# Patient Record
Sex: FEMALE | Race: WHITE | Employment: FULL TIME | URBAN - NONMETROPOLITAN AREA
[De-identification: names, ages, dates, MRNs, and addresses within clinical notes are randomized per-mention and may not be internally consistent; named-entity substitution may affect disease eponyms.]

---

## 2017-09-14 ENCOUNTER — APPOINTMENT (OUTPATIENT)
Dept: GENERAL RADIOLOGY | Age: 47
End: 2017-09-14
Payer: COMMERCIAL

## 2017-09-14 ENCOUNTER — HOSPITAL ENCOUNTER (EMERGENCY)
Age: 47
Discharge: HOME | End: 2017-09-14
Attending: INTERNAL MEDICINE
Payer: COMMERCIAL

## 2017-09-14 VITALS
SYSTOLIC BLOOD PRESSURE: 116 MMHG | TEMPERATURE: 98 F | OXYGEN SATURATION: 98 % | WEIGHT: 238 LBS | RESPIRATION RATE: 16 BRPM | HEIGHT: 64 IN | HEART RATE: 99 BPM | BODY MASS INDEX: 40.63 KG/M2 | DIASTOLIC BLOOD PRESSURE: 80 MMHG

## 2017-09-14 DIAGNOSIS — E86.0 DEHYDRATION: ICD-10-CM

## 2017-09-14 DIAGNOSIS — R00.0 TACHYCARDIA: Primary | ICD-10-CM

## 2017-09-14 LAB
ALBUMIN SERPL-MCNC: 4 G/DL (ref 3.5–5.1)
ALLEN TEST: POSITIVE
ALP BLD-CCNC: 72 U/L (ref 38–126)
ALT SERPL-CCNC: 19 U/L (ref 11–66)
AMYLASE: 29 U/L (ref 20–104)
ANION GAP SERPL CALCULATED.3IONS-SCNC: 15 MEQ/L (ref 8–16)
AST SERPL-CCNC: 18 U/L (ref 5–40)
BASE EXCESS (CALCULATED): 1.6 MMOL/L (ref -2.5–2.5)
BASOPHILS # BLD: 0.8 %
BASOPHILS ABSOLUTE: 0.1 THOU/MM3 (ref 0–0.1)
BILIRUB SERPL-MCNC: 0.3 MG/DL (ref 0.3–1.2)
BILIRUBIN DIRECT: < 0.2 MG/DL (ref 0–0.3)
BUN BLDV-MCNC: 20 MG/DL (ref 7–22)
CALCIUM SERPL-MCNC: 10 MG/DL (ref 8.5–10.5)
CHLORIDE BLD-SCNC: 91 MEQ/L (ref 98–111)
CO2: 27 MEQ/L (ref 23–33)
COLLECTED BY:: NORMAL
CREAT SERPL-MCNC: 0.8 MG/DL (ref 0.4–1.2)
D-DIMER QUANTITATIVE: < 215 NG/ML FEU (ref 0–500)
DEVICE: NORMAL
EKG ATRIAL RATE: 114 BPM
EKG P AXIS: 55 DEGREES
EKG P-R INTERVAL: 128 MS
EKG Q-T INTERVAL: 314 MS
EKG QRS DURATION: 76 MS
EKG QTC CALCULATION (BAZETT): 432 MS
EKG R AXIS: -7 DEGREES
EKG T AXIS: 41 DEGREES
EKG VENTRICULAR RATE: 114 BPM
EOSINOPHIL # BLD: 4.1 %
EOSINOPHILS ABSOLUTE: 0.4 THOU/MM3 (ref 0–0.4)
GFR SERPL CREATININE-BSD FRML MDRD: 77 ML/MIN/1.73M2
GLUCOSE BLD-MCNC: 324 MG/DL (ref 70–108)
HCO3: 26 MMOL/L (ref 23–28)
HCT VFR BLD CALC: 41.7 % (ref 37–47)
HEMOGLOBIN: 14.2 GM/DL (ref 12–16)
LIPASE: 22.7 U/L (ref 5.6–51.3)
LYMPHOCYTES # BLD: 26.4 %
LYMPHOCYTES ABSOLUTE: 2.5 THOU/MM3 (ref 1–4.8)
MCH RBC QN AUTO: 29.7 PG (ref 27–31)
MCHC RBC AUTO-ENTMCNC: 34.1 GM/DL (ref 33–37)
MCV RBC AUTO: 87 FL (ref 81–99)
MONOCYTES # BLD: 5.5 %
MONOCYTES ABSOLUTE: 0.5 THOU/MM3 (ref 0.4–1.3)
NUCLEATED RED BLOOD CELLS: 0 /100 WBC
O2 SATURATION: 97 %
OSMOLALITY CALCULATION: 281.5 MOSMOL/KG (ref 275–300)
PCO2: 37 MMHG (ref 35–45)
PDW BLD-RTO: 13.3 % (ref 11.5–14.5)
PH BLOOD GAS: 7.44 (ref 7.35–7.45)
PLATELET # BLD: 230 THOU/MM3 (ref 130–400)
PMV BLD AUTO: 8.1 MCM (ref 7.4–10.4)
PO2: 83 MMHG (ref 71–104)
POTASSIUM SERPL-SCNC: 4.1 MEQ/L (ref 3.5–5.2)
PREGNANCY, SERUM: NEGATIVE
PRO-BNP: 7 PG/ML (ref 0–450)
RBC # BLD: 4.79 MILL/MM3 (ref 4.2–5.4)
RBC # BLD: NORMAL 10*6/UL
SEG NEUTROPHILS: 63.2 %
SEGMENTED NEUTROPHILS ABSOLUTE COUNT: 5.9 THOU/MM3 (ref 1.8–7.7)
SODIUM BLD-SCNC: 133 MEQ/L (ref 135–145)
SOURCE, BLOOD GAS: NORMAL
TOTAL PROTEIN: 7 G/DL (ref 6.1–8)
TROPONIN T: < 0.01 NG/ML
WBC # BLD: 9.3 THOU/MM3 (ref 4.8–10.8)

## 2017-09-14 PROCEDURE — 96361 HYDRATE IV INFUSION ADD-ON: CPT

## 2017-09-14 PROCEDURE — 83690 ASSAY OF LIPASE: CPT

## 2017-09-14 PROCEDURE — 82248 BILIRUBIN DIRECT: CPT

## 2017-09-14 PROCEDURE — 85025 COMPLETE CBC W/AUTO DIFF WBC: CPT

## 2017-09-14 PROCEDURE — 2580000003 HC RX 258: Performed by: INTERNAL MEDICINE

## 2017-09-14 PROCEDURE — 83880 ASSAY OF NATRIURETIC PEPTIDE: CPT

## 2017-09-14 PROCEDURE — 82150 ASSAY OF AMYLASE: CPT

## 2017-09-14 PROCEDURE — 85379 FIBRIN DEGRADATION QUANT: CPT

## 2017-09-14 PROCEDURE — 36600 WITHDRAWAL OF ARTERIAL BLOOD: CPT

## 2017-09-14 PROCEDURE — 84484 ASSAY OF TROPONIN QUANT: CPT

## 2017-09-14 PROCEDURE — 96360 HYDRATION IV INFUSION INIT: CPT

## 2017-09-14 PROCEDURE — 84703 CHORIONIC GONADOTROPIN ASSAY: CPT

## 2017-09-14 PROCEDURE — 99284 EMERGENCY DEPT VISIT MOD MDM: CPT

## 2017-09-14 PROCEDURE — 93005 ELECTROCARDIOGRAM TRACING: CPT

## 2017-09-14 PROCEDURE — 80053 COMPREHEN METABOLIC PANEL: CPT

## 2017-09-14 PROCEDURE — 36415 COLL VENOUS BLD VENIPUNCTURE: CPT

## 2017-09-14 PROCEDURE — 82803 BLOOD GASES ANY COMBINATION: CPT

## 2017-09-14 PROCEDURE — 71020 XR CHEST STANDARD TWO VW: CPT

## 2017-09-14 RX ORDER — 0.9 % SODIUM CHLORIDE 0.9 %
1000 INTRAVENOUS SOLUTION INTRAVENOUS ONCE
Status: COMPLETED | OUTPATIENT
Start: 2017-09-14 | End: 2017-09-14

## 2017-09-14 RX ADMIN — SODIUM CHLORIDE 1000 ML: 9 INJECTION, SOLUTION INTRAVENOUS at 18:06

## 2017-09-14 ASSESSMENT — ENCOUNTER SYMPTOMS
SHORTNESS OF BREATH: 0
EYE DISCHARGE: 0
NAUSEA: 1
BACK PAIN: 0
DIARRHEA: 0
WHEEZING: 0
SORE THROAT: 0
VOMITING: 0
COUGH: 0
EYE PAIN: 0
ABDOMINAL PAIN: 0
RHINORRHEA: 0

## 2017-09-20 ENCOUNTER — HOSPITAL ENCOUNTER (OUTPATIENT)
Dept: WOMENS IMAGING | Age: 47
Discharge: HOME | End: 2017-09-20
Payer: COMMERCIAL

## 2017-09-20 DIAGNOSIS — N64.4 BREAST PAIN: ICD-10-CM

## 2017-09-20 PROCEDURE — 76642 ULTRASOUND BREAST LIMITED: CPT

## 2017-09-20 PROCEDURE — G0279 TOMOSYNTHESIS, MAMMO: HCPCS

## 2018-11-01 ENCOUNTER — APPOINTMENT (OUTPATIENT)
Dept: CT IMAGING | Age: 48
End: 2018-11-01
Payer: COMMERCIAL

## 2018-11-01 ENCOUNTER — HOSPITAL ENCOUNTER (EMERGENCY)
Age: 48
Discharge: HOME | End: 2018-11-01
Attending: FAMILY MEDICINE
Payer: COMMERCIAL

## 2018-11-01 ENCOUNTER — APPOINTMENT (OUTPATIENT)
Dept: GENERAL RADIOLOGY | Age: 48
End: 2018-11-01
Payer: COMMERCIAL

## 2018-11-01 VITALS
RESPIRATION RATE: 16 BRPM | HEART RATE: 96 BPM | TEMPERATURE: 98.3 F | WEIGHT: 241 LBS | HEIGHT: 63 IN | DIASTOLIC BLOOD PRESSURE: 97 MMHG | BODY MASS INDEX: 42.7 KG/M2 | SYSTOLIC BLOOD PRESSURE: 138 MMHG | OXYGEN SATURATION: 98 %

## 2018-11-01 DIAGNOSIS — I16.0 HYPERTENSIVE URGENCY: ICD-10-CM

## 2018-11-01 DIAGNOSIS — I10 ESSENTIAL HYPERTENSION: Primary | ICD-10-CM

## 2018-11-01 LAB
ALBUMIN SERPL-MCNC: 4.5 G/DL (ref 3.5–5.1)
ALP BLD-CCNC: 75 U/L (ref 38–126)
ALT SERPL-CCNC: 53 U/L (ref 11–66)
AMPHETAMINE+METHAMPHETAMINE URINE SCREEN: NEGATIVE
ANION GAP SERPL CALCULATED.3IONS-SCNC: 18 MEQ/L (ref 8–16)
APTT: 28.4 SECONDS (ref 22–38)
AST SERPL-CCNC: 74 U/L (ref 5–40)
BACTERIA: ABNORMAL /HPF
BARBITURATE QUANTITATIVE URINE: NEGATIVE
BASOPHILS # BLD: 0.6 %
BASOPHILS ABSOLUTE: 0 THOU/MM3 (ref 0–0.1)
BENZODIAZEPINE QUANTITATIVE URINE: NEGATIVE
BILIRUB SERPL-MCNC: 0.5 MG/DL (ref 0.3–1.2)
BILIRUBIN URINE: NEGATIVE
BLOOD, URINE: NEGATIVE
BUN BLDV-MCNC: 20 MG/DL (ref 7–22)
CALCIUM SERPL-MCNC: 10.2 MG/DL (ref 8.5–10.5)
CANNABINOID QUANTITATIVE URINE: NEGATIVE
CASTS 2: ABNORMAL /LPF
CASTS UA: ABNORMAL /LPF
CHARACTER, URINE: CLEAR
CHLORIDE BLD-SCNC: 96 MEQ/L (ref 98–111)
CO2: 22 MEQ/L (ref 23–33)
COCAINE METABOLITE QUANTITATIVE URINE: NEGATIVE
COLOR: YELLOW
CREAT SERPL-MCNC: 0.7 MG/DL (ref 0.4–1.2)
CRYSTALS, UA: ABNORMAL
D-DIMER QUANTITATIVE: 390 NG/ML FEU (ref 0–500)
EKG ATRIAL RATE: 105 BPM
EKG P AXIS: 47 DEGREES
EKG P-R INTERVAL: 130 MS
EKG Q-T INTERVAL: 340 MS
EKG QRS DURATION: 78 MS
EKG QTC CALCULATION (BAZETT): 449 MS
EKG R AXIS: -9 DEGREES
EKG T AXIS: 45 DEGREES
EKG VENTRICULAR RATE: 105 BPM
EOSINOPHIL # BLD: 2.5 %
EOSINOPHILS ABSOLUTE: 0.2 THOU/MM3 (ref 0–0.4)
EPITHELIAL CELLS, UA: ABNORMAL /HPF
ERYTHROCYTE [DISTWIDTH] IN BLOOD BY AUTOMATED COUNT: 12.7 % (ref 11.5–14.5)
ERYTHROCYTE [DISTWIDTH] IN BLOOD BY AUTOMATED COUNT: 40.3 FL (ref 35–45)
GFR SERPL CREATININE-BSD FRML MDRD: 90 ML/MIN/1.73M2
GLUCOSE BLD-MCNC: 181 MG/DL (ref 70–108)
GLUCOSE URINE: >= 1000 MG/DL
HCT VFR BLD CALC: 43.2 % (ref 37–47)
HEMOGLOBIN: 14.5 GM/DL (ref 12–16)
IMMATURE GRANS (ABS): 0.03 THOU/MM3 (ref 0–0.07)
IMMATURE GRANULOCYTES: 0.4 %
INR BLD: 0.94 (ref 0.85–1.13)
KETONES, URINE: 15
LEUKOCYTE ESTERASE, URINE: NEGATIVE
LYMPHOCYTES # BLD: 22.1 %
LYMPHOCYTES ABSOLUTE: 1.6 THOU/MM3 (ref 1–4.8)
MAGNESIUM: 1.8 MG/DL (ref 1.6–2.4)
MCH RBC QN AUTO: 29.2 PG (ref 26–33)
MCHC RBC AUTO-ENTMCNC: 33.6 GM/DL (ref 32.2–35.5)
MCV RBC AUTO: 87.1 FL (ref 81–99)
MISCELLANEOUS 2: ABNORMAL
MONOCYTES # BLD: 6.9 %
MONOCYTES ABSOLUTE: 0.5 THOU/MM3 (ref 0.4–1.3)
NITRITE, URINE: NEGATIVE
NUCLEATED RED BLOOD CELLS: 0 /100 WBC
OPIATES, URINE: NEGATIVE
OSMOLALITY CALCULATION: 279.2 MOSMOL/KG (ref 275–300)
OXYCODONE: NEGATIVE
PH UA: 5
PHENCYCLIDINE QUANTITATIVE URINE: NEGATIVE
PLATELET # BLD: 237 THOU/MM3 (ref 130–400)
PMV BLD AUTO: 9.3 FL (ref 9.4–12.4)
POTASSIUM SERPL-SCNC: 3.7 MEQ/L (ref 3.5–5.2)
PROTEIN UA: NEGATIVE
RBC # BLD: 4.96 MILL/MM3 (ref 4.2–5.4)
RBC URINE: ABNORMAL /HPF
RENAL EPITHELIAL, UA: ABNORMAL
SEG NEUTROPHILS: 67.5 %
SEGMENTED NEUTROPHILS ABSOLUTE COUNT: 4.9 THOU/MM3 (ref 1.8–7.7)
SODIUM BLD-SCNC: 136 MEQ/L (ref 135–145)
SPECIFIC GRAVITY, URINE: > 1.03 (ref 1–1.03)
TOTAL PROTEIN: 7.8 G/DL (ref 6.1–8)
TROPONIN T: < 0.01 NG/ML
TROPONIN T: < 0.01 NG/ML
UROBILINOGEN, URINE: 0.2 EU/DL
WBC # BLD: 7.2 THOU/MM3 (ref 4.8–10.8)
WBC UA: ABNORMAL /HPF
YEAST: ABNORMAL

## 2018-11-01 PROCEDURE — 83735 ASSAY OF MAGNESIUM: CPT

## 2018-11-01 PROCEDURE — 85379 FIBRIN DEGRADATION QUANT: CPT

## 2018-11-01 PROCEDURE — 71046 X-RAY EXAM CHEST 2 VIEWS: CPT

## 2018-11-01 PROCEDURE — 80053 COMPREHEN METABOLIC PANEL: CPT

## 2018-11-01 PROCEDURE — 85610 PROTHROMBIN TIME: CPT

## 2018-11-01 PROCEDURE — 85730 THROMBOPLASTIN TIME PARTIAL: CPT

## 2018-11-01 PROCEDURE — 84484 ASSAY OF TROPONIN QUANT: CPT

## 2018-11-01 PROCEDURE — 71275 CT ANGIOGRAPHY CHEST: CPT

## 2018-11-01 PROCEDURE — 36415 COLL VENOUS BLD VENIPUNCTURE: CPT

## 2018-11-01 PROCEDURE — 6360000004 HC RX CONTRAST MEDICATION: Performed by: FAMILY MEDICINE

## 2018-11-01 PROCEDURE — 74174 CTA ABD&PLVS W/CONTRAST: CPT

## 2018-11-01 PROCEDURE — 99284 EMERGENCY DEPT VISIT MOD MDM: CPT

## 2018-11-01 PROCEDURE — 80307 DRUG TEST PRSMV CHEM ANLYZR: CPT

## 2018-11-01 PROCEDURE — 85025 COMPLETE CBC W/AUTO DIFF WBC: CPT

## 2018-11-01 PROCEDURE — 93005 ELECTROCARDIOGRAM TRACING: CPT | Performed by: FAMILY MEDICINE

## 2018-11-01 PROCEDURE — 93010 ELECTROCARDIOGRAM REPORT: CPT | Performed by: INTERNAL MEDICINE

## 2018-11-01 PROCEDURE — 81001 URINALYSIS AUTO W/SCOPE: CPT

## 2018-11-01 RX ORDER — AMLODIPINE BESYLATE 5 MG/1
10 TABLET ORAL DAILY
COMMUNITY
End: 2020-02-04 | Stop reason: ALTCHOICE

## 2018-11-01 RX ADMIN — IOPAMIDOL 80 ML: 755 INJECTION, SOLUTION INTRAVENOUS at 10:19

## 2018-11-01 ASSESSMENT — ENCOUNTER SYMPTOMS
SORE THROAT: 0
ABDOMINAL DISTENTION: 0
ABDOMINAL PAIN: 0
RHINORRHEA: 0
VOMITING: 0
PHOTOPHOBIA: 0
WHEEZING: 0
SHORTNESS OF BREATH: 1
NAUSEA: 0
CHEST TIGHTNESS: 0
COUGH: 0
DIARRHEA: 0
BACK PAIN: 1

## 2018-11-01 ASSESSMENT — PAIN DESCRIPTION - ORIENTATION: ORIENTATION: LOWER

## 2018-11-01 ASSESSMENT — PAIN DESCRIPTION - LOCATION: LOCATION: BACK

## 2018-11-01 ASSESSMENT — PAIN DESCRIPTION - PAIN TYPE: TYPE: ACUTE PAIN

## 2018-11-01 ASSESSMENT — PAIN SCALES - GENERAL: PAINLEVEL_OUTOF10: 5

## 2018-11-01 NOTE — ED PROVIDER NOTES
[facility]  eMERGENCY dEPARTMENT eNCOUnter          CHIEF COMPLAINT       Chief Complaint   Patient presents with    Shortness of Breath       Nurses Notes reviewed and I agree except as noted in the HPI. HISTORY OF PRESENT ILLNESS    Nasim Nathan is a 57 y.o. female who presents to the Emergency Department for the evaluation of hypertension, shortness breath, chest pain and back pain. Currently pain free. Episode occurred at work this morning. Patient took blood pressure during the episode states that BP was 161/99. She did take all her morning medications including hydrochlorothiazide, lisinopril and amlodipine. Patient is also diabetic. She denies any other symptoms. No cough congestion or diaphoresis endorsed. The HPI was provided by the patient. REVIEW OF SYSTEMS     Review of Systems   Constitutional: Negative for chills, diaphoresis, fatigue and fever. HENT: Negative for congestion, rhinorrhea and sore throat. Eyes: Negative for photophobia and visual disturbance. No blurry vision   Respiratory: Positive for shortness of breath. Negative for cough, chest tightness and wheezing. Cardiovascular: Positive for chest pain. Negative for palpitations. Gastrointestinal: Negative for abdominal distention, abdominal pain, diarrhea, nausea and vomiting. Genitourinary: Negative for dysuria and frequency. Musculoskeletal: Positive for back pain. Negative for arthralgias, joint swelling, neck pain and neck stiffness. Skin: Negative for pallor, rash and wound. Neurological: Negative for dizziness, syncope, weakness, light-headedness, numbness and headaches. Psychiatric/Behavioral: Negative for agitation and confusion. PAST MEDICAL HISTORY    has a past medical history of Diabetes mellitus ([facility] [address] SURGICAL HISTORY      has a past surgical history that includes Cholecystectomy.     CURRENT MEDICATIONS       Discharge Medication List as of 11/1/2018 12:39 PM

## 2018-11-01 NOTE — ED TRIAGE NOTES
Pt states she just has not felt well for the last few days-She states she has been SOB and her BP has been running high-Pt alert, no acute distress noted-Skin warm and dry, respirations even and unlabored-Pt updated on plan of care- no questions noted at this time

## 2018-11-14 ENCOUNTER — OFFICE VISIT (OUTPATIENT)
Dept: CARDIOLOGY CLINIC | Age: 48
End: 2018-11-14
Payer: COMMERCIAL

## 2018-11-14 VITALS
SYSTOLIC BLOOD PRESSURE: 134 MMHG | DIASTOLIC BLOOD PRESSURE: 92 MMHG | BODY MASS INDEX: 42.17 KG/M2 | HEART RATE: 80 BPM | WEIGHT: 238 LBS | HEIGHT: 63 IN

## 2018-11-14 DIAGNOSIS — I10 ESSENTIAL HYPERTENSION: Primary | ICD-10-CM

## 2018-11-14 DIAGNOSIS — R06.02 SOB (SHORTNESS OF BREATH) ON EXERTION: ICD-10-CM

## 2018-11-14 PROCEDURE — 1036F TOBACCO NON-USER: CPT | Performed by: INTERNAL MEDICINE

## 2018-11-14 PROCEDURE — G8419 CALC BMI OUT NRM PARAM NOF/U: HCPCS | Performed by: INTERNAL MEDICINE

## 2018-11-14 PROCEDURE — 99204 OFFICE O/P NEW MOD 45 MIN: CPT | Performed by: INTERNAL MEDICINE

## 2018-11-14 PROCEDURE — G8427 DOCREV CUR MEDS BY ELIG CLIN: HCPCS | Performed by: INTERNAL MEDICINE

## 2018-11-14 PROCEDURE — G8484 FLU IMMUNIZE NO ADMIN: HCPCS | Performed by: INTERNAL MEDICINE

## 2018-11-14 RX ORDER — ASPIRIN 81 MG/1
81 TABLET ORAL DAILY
Qty: 90 TABLET | Refills: 1 | COMMUNITY
Start: 2018-11-14 | End: 2021-12-30 | Stop reason: SDUPTHER

## 2018-11-14 RX ORDER — LISINOPRIL AND HYDROCHLOROTHIAZIDE 20; 12.5 MG/1; MG/1
1 TABLET ORAL DAILY
Qty: 30 TABLET | Refills: 5
Start: 2018-11-14 | End: 2018-12-11

## 2018-11-26 ENCOUNTER — HOSPITAL ENCOUNTER (OUTPATIENT)
Dept: NON INVASIVE DIAGNOSTICS | Age: 48
Discharge: HOME | End: 2018-11-26
Payer: COMMERCIAL

## 2018-11-26 DIAGNOSIS — R06.02 SOB (SHORTNESS OF BREATH) ON EXERTION: ICD-10-CM

## 2018-11-26 DIAGNOSIS — I10 ESSENTIAL HYPERTENSION: ICD-10-CM

## 2018-11-26 LAB
LV EF: 57 %
LV EF: 60 %
LV EF: 60 %
LVEF MODALITY: NORMAL

## 2018-11-26 PROCEDURE — A9500 TC99M SESTAMIBI: HCPCS | Performed by: INTERNAL MEDICINE

## 2018-11-26 PROCEDURE — 93306 TTE W/DOPPLER COMPLETE: CPT

## 2018-11-26 PROCEDURE — 2709999900 HC NON-CHARGEABLE SUPPLY

## 2018-11-26 PROCEDURE — 93017 CV STRESS TEST TRACING ONLY: CPT | Performed by: INTERNAL MEDICINE

## 2018-11-26 PROCEDURE — 78452 HT MUSCLE IMAGE SPECT MULT: CPT | Performed by: INTERNAL MEDICINE

## 2018-11-26 PROCEDURE — 3430000000 HC RX DIAGNOSTIC RADIOPHARMACEUTICAL: Performed by: INTERNAL MEDICINE

## 2018-11-26 RX ADMIN — Medication 33.5 MILLICURIE: at 10:35

## 2018-11-26 RX ADMIN — Medication 10.5 MILLICURIE: at 09:20

## 2018-12-11 ENCOUNTER — OFFICE VISIT (OUTPATIENT)
Dept: CARDIOLOGY CLINIC | Age: 48
End: 2018-12-11
Payer: COMMERCIAL

## 2018-12-11 VITALS
BODY MASS INDEX: 40.82 KG/M2 | SYSTOLIC BLOOD PRESSURE: 160 MMHG | HEART RATE: 85 BPM | WEIGHT: 230.4 LBS | DIASTOLIC BLOOD PRESSURE: 107 MMHG | HEIGHT: 63 IN

## 2018-12-11 DIAGNOSIS — I10 ESSENTIAL HYPERTENSION: Primary | ICD-10-CM

## 2018-12-11 DIAGNOSIS — R06.02 SOB (SHORTNESS OF BREATH) ON EXERTION: ICD-10-CM

## 2018-12-11 PROCEDURE — G8417 CALC BMI ABV UP PARAM F/U: HCPCS | Performed by: INTERNAL MEDICINE

## 2018-12-11 PROCEDURE — 1036F TOBACCO NON-USER: CPT | Performed by: INTERNAL MEDICINE

## 2018-12-11 PROCEDURE — 99213 OFFICE O/P EST LOW 20 MIN: CPT | Performed by: INTERNAL MEDICINE

## 2018-12-11 PROCEDURE — G8428 CUR MEDS NOT DOCUMENT: HCPCS | Performed by: INTERNAL MEDICINE

## 2018-12-11 PROCEDURE — G8484 FLU IMMUNIZE NO ADMIN: HCPCS | Performed by: INTERNAL MEDICINE

## 2018-12-11 RX ORDER — LISINOPRIL AND HYDROCHLOROTHIAZIDE 20; 12.5 MG/1; MG/1
2 TABLET ORAL DAILY
Qty: 60 TABLET | Refills: 5 | Status: SHIPPED | OUTPATIENT
Start: 2018-12-11 | End: 2021-12-30 | Stop reason: SDUPTHER

## 2018-12-11 NOTE — PROGRESS NOTES
Component Value Date    ALKPHOS 75 11/01/2018    ALT 53 11/01/2018    AST 74 11/01/2018    PROT 7.8 11/01/2018    BILITOT 0.5 11/01/2018    BILIDIR <0.2 09/14/2017    LABALBU 4.5 11/01/2018     Magnesium:    Lab Results   Component Value Date    MG 1.8 11/01/2018     Warfarin PT/INR:  No components found for: PTPATWAR, PTINRWAR  HgBA1c:  No results found for: LABA1C  FLP:  No results found for: TRIG, HDL, LDLCALC, LDLDIRECT, LABVLDL  TSH:  No results found for: TSH    Sinus tachycardia  Minimal voltage criteria for LVH, may be normal variant  Borderline ECG  When compared with ECG of 14-SEP-2017 16:19,  No significant change was found  Confirmed by Mahamed Waddell MD, Tee Crockett ([id number]) on 11/1/2018 7:01:02 PM    Assessment     Diagnosis Orders   1. Essential hypertension     2. SOB (shortness of breath) on exertion         Plan   Continue the current treatment and with constant vigilance to changes in symptoms and also any potential side effects. Return for care or seek medical attention immediately if symptoms got worse and/or develop new symptoms. Hypertension, on medical treatment. need better control. Patient is compliant with medical treatment.    Home BP after 15 min rest  Cont norvasc 10  Increase lisn HCTZ 20/12.5 2 tab po qd from 1 tab po qd      Sob on exertion for 3 weeks  No recent wt gain  Abn ekg- LVH  Echo and  Ex nuc stress- wnl  Asa 81     RTC in  2 months    [facility]

## 2019-02-15 ENCOUNTER — TELEPHONE (OUTPATIENT)
Dept: CARDIOLOGY CLINIC | Age: 49
End: 2019-02-15

## 2019-04-01 ENCOUNTER — HOSPITAL ENCOUNTER (OUTPATIENT)
Dept: ULTRASOUND IMAGING | Age: 49
Discharge: HOME | End: 2019-04-01
Payer: COMMERCIAL

## 2019-04-01 DIAGNOSIS — R10.11 RIGHT UPPER QUADRANT PAIN: ICD-10-CM

## 2019-04-01 PROCEDURE — 76705 ECHO EXAM OF ABDOMEN: CPT

## 2019-04-02 ENCOUNTER — APPOINTMENT (OUTPATIENT)
Dept: CT IMAGING | Age: 49
End: 2019-04-02
Payer: COMMERCIAL

## 2019-04-02 ENCOUNTER — HOSPITAL ENCOUNTER (EMERGENCY)
Age: 49
Discharge: HOME | End: 2019-04-02
Attending: FAMILY MEDICINE
Payer: COMMERCIAL

## 2019-04-02 VITALS
HEART RATE: 91 BPM | TEMPERATURE: 98.1 F | RESPIRATION RATE: 19 BRPM | BODY MASS INDEX: 42.17 KG/M2 | SYSTOLIC BLOOD PRESSURE: 109 MMHG | OXYGEN SATURATION: 97 % | DIASTOLIC BLOOD PRESSURE: 73 MMHG | WEIGHT: 238 LBS | HEIGHT: 63 IN

## 2019-04-02 DIAGNOSIS — N63.0 BREAST NODULE: ICD-10-CM

## 2019-04-02 DIAGNOSIS — R07.9 CHEST PAIN, UNSPECIFIED TYPE: Primary | ICD-10-CM

## 2019-04-02 LAB
ANION GAP SERPL CALCULATED.3IONS-SCNC: 18 MEQ/L (ref 8–16)
APTT: 27.9 SECONDS (ref 22–38)
BASOPHILS # BLD: 0.5 %
BASOPHILS ABSOLUTE: 0 THOU/MM3 (ref 0–0.1)
BUN BLDV-MCNC: 15 MG/DL (ref 7–22)
CALCIUM SERPL-MCNC: 9.2 MG/DL (ref 8.5–10.5)
CHLORIDE BLD-SCNC: 97 MEQ/L (ref 98–111)
CO2: 22 MEQ/L (ref 23–33)
CREAT SERPL-MCNC: 0.6 MG/DL (ref 0.4–1.2)
EKG ATRIAL RATE: 100 BPM
EKG P AXIS: 54 DEGREES
EKG P-R INTERVAL: 138 MS
EKG Q-T INTERVAL: 352 MS
EKG QRS DURATION: 80 MS
EKG QTC CALCULATION (BAZETT): 454 MS
EKG R AXIS: -7 DEGREES
EKG T AXIS: 19 DEGREES
EKG VENTRICULAR RATE: 100 BPM
EOSINOPHIL # BLD: 2.2 %
EOSINOPHILS ABSOLUTE: 0.1 THOU/MM3 (ref 0–0.4)
ERYTHROCYTE [DISTWIDTH] IN BLOOD BY AUTOMATED COUNT: 12.2 % (ref 11.5–14.5)
ERYTHROCYTE [DISTWIDTH] IN BLOOD BY AUTOMATED COUNT: 40.5 FL (ref 35–45)
GFR SERPL CREATININE-BSD FRML MDRD: > 90 ML/MIN/1.73M2
GLUCOSE BLD-MCNC: 312 MG/DL (ref 70–108)
HCT VFR BLD CALC: 38.8 % (ref 37–47)
HEMOGLOBIN: 12.8 GM/DL (ref 12–16)
IMMATURE GRANS (ABS): 0.03 THOU/MM3 (ref 0–0.07)
IMMATURE GRANULOCYTES: 0.5 %
INR BLD: 0.99 (ref 0.85–1.13)
LYMPHOCYTES # BLD: 30.1 %
LYMPHOCYTES ABSOLUTE: 2 THOU/MM3 (ref 1–4.8)
MCH RBC QN AUTO: 29.6 PG (ref 26–33)
MCHC RBC AUTO-ENTMCNC: 33 GM/DL (ref 32.2–35.5)
MCV RBC AUTO: 89.8 FL (ref 81–99)
MONOCYTES # BLD: 5 %
MONOCYTES ABSOLUTE: 0.3 THOU/MM3 (ref 0.4–1.3)
NUCLEATED RED BLOOD CELLS: 0 /100 WBC
OSMOLALITY CALCULATION: 286.5 MOSMOL/KG (ref 275–300)
PLATELET # BLD: 206 THOU/MM3 (ref 130–400)
PMV BLD AUTO: 9.1 FL (ref 9.4–12.4)
POTASSIUM SERPL-SCNC: 3.5 MEQ/L (ref 3.5–5.2)
RBC # BLD: 4.32 MILL/MM3 (ref 4.2–5.4)
SEG NEUTROPHILS: 61.7 %
SEGMENTED NEUTROPHILS ABSOLUTE COUNT: 4 THOU/MM3 (ref 1.8–7.7)
SODIUM BLD-SCNC: 137 MEQ/L (ref 135–145)
TROPONIN T: < 0.01 NG/ML
WBC # BLD: 6.5 THOU/MM3 (ref 4.8–10.8)

## 2019-04-02 PROCEDURE — 2580000003 HC RX 258: Performed by: FAMILY MEDICINE

## 2019-04-02 PROCEDURE — 71275 CT ANGIOGRAPHY CHEST: CPT

## 2019-04-02 PROCEDURE — 99285 EMERGENCY DEPT VISIT HI MDM: CPT

## 2019-04-02 PROCEDURE — 84484 ASSAY OF TROPONIN QUANT: CPT

## 2019-04-02 PROCEDURE — 85025 COMPLETE CBC W/AUTO DIFF WBC: CPT

## 2019-04-02 PROCEDURE — 6360000004 HC RX CONTRAST MEDICATION: Performed by: FAMILY MEDICINE

## 2019-04-02 PROCEDURE — 80048 BASIC METABOLIC PNL TOTAL CA: CPT

## 2019-04-02 PROCEDURE — 85730 THROMBOPLASTIN TIME PARTIAL: CPT

## 2019-04-02 PROCEDURE — 85610 PROTHROMBIN TIME: CPT

## 2019-04-02 PROCEDURE — 36415 COLL VENOUS BLD VENIPUNCTURE: CPT

## 2019-04-02 PROCEDURE — 93005 ELECTROCARDIOGRAM TRACING: CPT | Performed by: FAMILY MEDICINE

## 2019-04-02 RX ORDER — SODIUM CHLORIDE 9 MG/ML
INJECTION, SOLUTION INTRAVENOUS CONTINUOUS
Status: DISCONTINUED | OUTPATIENT
Start: 2019-04-02 | End: 2019-04-02 | Stop reason: HOSPADM

## 2019-04-02 RX ADMIN — IOPAMIDOL 80 ML: 755 INJECTION, SOLUTION INTRAVENOUS at 12:35

## 2019-04-02 RX ADMIN — SODIUM CHLORIDE: 9 INJECTION, SOLUTION INTRAVENOUS at 11:22

## 2019-04-02 ASSESSMENT — ENCOUNTER SYMPTOMS
ABDOMINAL PAIN: 0
SHORTNESS OF BREATH: 1

## 2019-04-02 NOTE — ED PROVIDER NOTES
[facility]  eMERGENCY dEPARTMENT eNCOUnter          CHIEF COMPLAINT     No chief complaint on file. Nurses Notes reviewed and I agree except as noted in the HPI. HISTORY OF PRESENT ILLNESS    Cata Corona is a 54 y.o. female who presents to the Emergency Department for the evaluation of abnormal lab value. The patient reports that yesterday she had an 8/10 sharp pain in her right side. She states that the pain was exacerbated with movement. She was seen by her PCP who did an ultrasound of her kidney that was negative. She states that she received a call today while she was at work and was informed that she had an elevated D-dimer level and needed to be seen in the ED. She denies current pain or shortness of breath. She denies smoking or taking oral contraceptives. She also denies pain or swelling to her legs. Patient denies any further complaints at initial encounter. Location/Symptom: abnormal lab value. Right side pain yesterday  Timing/Onset: yesterday  Context/Setting: patient had sharp pain in her right side yesterday that has resolved today  Quality: sharp  Duration: constant  Modifying Factors: deep breaths or movement exacerbates   Severity: 8/10  The HPI was provided by the patient. REVIEW OF SYSTEMS     Review of Systems   Constitutional: Negative for chills and fever. HENT: Negative for congestion. Eyes: Negative for visual disturbance. Respiratory: Positive for shortness of breath. Slight shortness of breath    Nonsmoker   Cardiovascular: Positive for chest pain. Negative for leg swelling. Right posterior lateral chest pain yesterday, 8/10, worse with breathing or movement   Gastrointestinal: Negative for abdominal pain. Genitourinary: Negative for dysuria. Musculoskeletal: Negative for joint swelling. Neurological: Negative for weakness and numbness. Hematological: Negative for adenopathy. Does not bruise/bleed easily. Psychiatric/Behavioral: Negative for confusion. PAST MEDICAL HISTORY    has a past medical history of Diabetes mellitus ([address] SURGICAL HISTORY    has a past surgical history that includes Cholecystectomy. CURRENT MEDICATIONS       Previous Medications    AMLODIPINE (NORVASC) 5 MG TABLET    Take 10 mg by mouth daily     ASPIRIN EC 81 MG EC TABLET    Take 1 tablet by mouth daily    DAPAGLIFLOZIN (FARXIGA) 10 MG TABLET    Take 10 mg by mouth every morning    LISINOPRIL-HYDROCHLOROTHIAZIDE (PRINZIDE;ZESTORETIC) 20-12.5 MG PER TABLET    Take 2 tablets by mouth daily    METFORMIN (GLUCOPHAGE) 500 MG TABLET    Take 500 mg by mouth 2 times daily (with meals)     SERTRALINE (ZOLOFT) 50 MG TABLET    Take 50 mg by mouth daily    SITAGLIPTIN PHOSPHATE (JANUVIA PO)    Take 100 mg/day by mouth. ALLERGIES     is allergic to penicillins. FAMILY HISTORY     has no family status information on file. family history is not on file. SOCIAL HISTORY      reports that she has never smoked. She has never used smokeless tobacco. She reports that she does not drink alcohol or use drugs. PHYSICAL EXAM     INITIAL VITALS:  height is 5' 3\" (1.6 m) and weight is 238 lb (108 kg). Her oral temperature is 98.1 °F (36.7 °C). Her blood pressure is 109/73 and her pulse is 91. Her respiration is 19 and oxygen saturation is 97%. Physical Exam   Constitutional: She is oriented to person, place, and time. She appears well-developed and well-nourished. GCS 15   HENT:   Head: Normocephalic and atraumatic. Eyes: Pupils are equal, round, and reactive to light. EOM are normal. No scleral icterus. Neck: Normal range of motion. Neck supple. No JVD present. No thyromegaly present. Cardiovascular: Regular rhythm, normal heart sounds and intact distal pulses. Tachycardia   Pulmonary/Chest: Effort normal and breath sounds normal. She has no wheezes. She exhibits no tenderness. Abdominal: Soft. There is no tenderness. Musculoskeletal: She exhibits no edema or tenderness. Neurological: She is alert and oriented to person, place, and time. She exhibits normal muscle tone. Skin: Skin is warm and dry. Psychiatric: She has a normal mood and affect. Her behavior is normal.   Nursing note and vitals reviewed. DIFFERENTIAL DIAGNOSIS:     Right-sided chest pain yesterday with elevated d-dimer noted today consider pulmonary embolism        DIAGNOSTIC RESULTS     EKG: All EKG's are interpreted by the Emergency Department Physician who either signs or Co-signs this chart in the absence of a cardiologist.  EKG interpreted by Juliana Preciado MD:    EKG showed sinus rhythm with rate 100. QRS complexes show -7° axis, normal conduction. ST-T waves show no acute change        RADIOLOGY: non-plain film images(s) such as CT, Ultrasound and MRI are read by the radiologist.    CTA chest obtained revealed no evidence of PE. There was a right breast nodule 1.1 cm which is slightly bigger than previously. Radiology recommended follow-up mammogram.        CTA CHEST W WO CONTRAST   Final Result   1. No pulmonary emboli are seen. 2. 11 mm nodule right breast, slightly larger than on prior study. If the patient has not had recent mammography, such would be recommended at this time. 3. Mild groundglass appearance of the lung parenchyma bilaterally. This may simply be an artifactual finding related to the technique utilized but I cannot exclude mild pulmonary edema or interstitial pneumonitis. **This report has been created using voice recognition software. It may contain minor errors which are inherent in voice recognition technology. **          Final report electronically signed by Dr. Jacques Garcia on 4/2/2019 12:46 PM           LABS:   Labs Reviewed   CBC WITH AUTO DIFFERENTIAL - Abnormal; Notable for the following components:       Result Value    MPV 9.1 (*)     Monocytes # 0.3 (*)     All other components within normal limits   BASIC METABOLIC PANEL - Abnormal; Notable for the following components:    Chloride 97 (*)     CO2 22 (*)     Glucose 312 (*)     All other components within normal limits   ANION GAP - Abnormal; Notable for the following components:    Anion Gap 18.0 (*)     All other components within normal limits   PROTIME-INR   APTT   TROPONIN   OSMOLALITY   GLOMERULAR FILTRATION RATE, ESTIMATED       EMERGENCY DEPARTMENT COURSE:   Vitals:    Vitals:    04/02/19 1114 04/02/19 1209 04/02/19 1210   BP: (!) 140/88 109/73    Pulse: 105 91    Resp: 20 19    Temp: 98.1 °F (36.7 °C)     TempSrc: Oral     SpO2: 97% 97%    Weight:   238 lb (108 kg)   Height:   5' 3\" (1.6 m)       11:17 AM: The patient was seen and evaluated. Nursing notes reviewed    CTA chest negative for PE    She does not have pain today and is asymptomatic    No specific treatment needed for pain from yesterday    She was informed of the 1.1 cm nodule right breast which is slightly bigger than previous  She is to follow with her primary care to help coordinate outpatient mammogram    Discharge home           CRITICAL CARE:   none     CONSULTS:  none    PROCEDURES:  none     FINAL IMPRESSION      1. Chest pain, unspecified type    2.  Breast nodule          DISPOSITION/PLAN     Discharge home    PATIENT REFERRED TO:  Janet Dejesus  [address]  [phone]      For recheck in the next week, reevaluate right breast nodule, consider mammogram      DISCHARGE MEDICATIONS:  New Prescriptions    No medications on file       (Please note that portions of this note were completed with a voice recognition program.  Efforts were made to edit the dictations but occasionally words are mis-transcribed.)    Scribe:  Erendira Llanos   4/2/19 11:17 AM Scribing for and in the presence of Leyla Berger MD.    Signed by: Govind Flores, 04/02/19 1:19 PM    Provider:  I personally performed the services described in

## 2019-04-02 NOTE — ED NOTES
Patient to ED for abnormal lab. Patient was seen yesterday for right sided chest pain. Patient had US done and sent home. Patient's PCP tawanna labs at 7am which resulted in an elevated D-dimer. Patient denies pain at this time, easy and unlabored respirations noted.      Roseanne Darling RN  04/02/19 0292

## 2019-04-03 PROCEDURE — 93010 ELECTROCARDIOGRAM REPORT: CPT | Performed by: INTERNAL MEDICINE

## 2019-04-09 ENCOUNTER — HOSPITAL ENCOUNTER (OUTPATIENT)
Dept: WOMENS IMAGING | Age: 49
Discharge: HOME | End: 2019-04-09
Payer: COMMERCIAL

## 2019-04-09 ENCOUNTER — APPOINTMENT (OUTPATIENT)
Dept: WOMENS IMAGING | Age: 49
End: 2019-04-09
Payer: COMMERCIAL

## 2019-04-09 DIAGNOSIS — N63.0 BREAST NODULE: ICD-10-CM

## 2019-04-09 PROCEDURE — G0279 TOMOSYNTHESIS, MAMMO: HCPCS

## 2020-01-25 ENCOUNTER — HOSPITAL ENCOUNTER (EMERGENCY)
Age: 50
Discharge: HOME | End: 2020-01-25
Payer: COMMERCIAL

## 2020-01-25 VITALS
RESPIRATION RATE: 23 BRPM | HEART RATE: 106 BPM | DIASTOLIC BLOOD PRESSURE: 100 MMHG | SYSTOLIC BLOOD PRESSURE: 164 MMHG | OXYGEN SATURATION: 95 %

## 2020-01-25 LAB
AMPHETAMINE+METHAMPHETAMINE URINE SCREEN: NEGATIVE
ANION GAP SERPL CALCULATED.3IONS-SCNC: 21 MEQ/L (ref 8–16)
BACTERIA: ABNORMAL
BARBITURATE QUANTITATIVE URINE: NEGATIVE
BASOPHILS # BLD: 0.4 %
BASOPHILS ABSOLUTE: 0 THOU/MM3 (ref 0–0.1)
BENZODIAZEPINE QUANTITATIVE URINE: NEGATIVE
BILIRUBIN URINE: NEGATIVE
BLOOD, URINE: NEGATIVE
BUN BLDV-MCNC: 13 MG/DL (ref 7–22)
CALCIUM SERPL-MCNC: 9.7 MG/DL (ref 8.5–10.5)
CANNABINOID QUANTITATIVE URINE: NEGATIVE
CASTS: ABNORMAL /LPF
CASTS: ABNORMAL /LPF
CHARACTER, URINE: CLEAR
CHLORIDE BLD-SCNC: 94 MEQ/L (ref 98–111)
CO2: 22 MEQ/L (ref 23–33)
COCAINE METABOLITE QUANTITATIVE URINE: NEGATIVE
COLOR: YELLOW
CREAT SERPL-MCNC: 0.6 MG/DL (ref 0.4–1.2)
CRYSTALS: ABNORMAL
EKG ATRIAL RATE: 120 BPM
EKG P AXIS: 61 DEGREES
EKG P-R INTERVAL: 132 MS
EKG Q-T INTERVAL: 326 MS
EKG QRS DURATION: 76 MS
EKG QTC CALCULATION (BAZETT): 460 MS
EKG R AXIS: -14 DEGREES
EKG T AXIS: 42 DEGREES
EKG VENTRICULAR RATE: 120 BPM
EOSINOPHIL # BLD: 0.2 %
EOSINOPHILS ABSOLUTE: 0 THOU/MM3 (ref 0–0.4)
EPITHELIAL CELLS, UA: ABNORMAL /HPF
ERYTHROCYTE [DISTWIDTH] IN BLOOD BY AUTOMATED COUNT: 12.7 % (ref 11.5–14.5)
ERYTHROCYTE [DISTWIDTH] IN BLOOD BY AUTOMATED COUNT: 39.7 FL (ref 35–45)
GFR SERPL CREATININE-BSD FRML MDRD: > 90 ML/MIN/1.73M2
GLUCOSE BLD-MCNC: 286 MG/DL (ref 70–108)
GLUCOSE, URINE: >= 1000 MG/DL
HCT VFR BLD CALC: 45 % (ref 37–47)
HEMOGLOBIN: 14.6 GM/DL (ref 12–16)
IMMATURE GRANS (ABS): 0.05 THOU/MM3 (ref 0–0.07)
IMMATURE GRANULOCYTES: 0.4 %
KETONES, URINE: 15
LEUKOCYTE ESTERASE, URINE: NEGATIVE
LYMPHOCYTES # BLD: 10 %
LYMPHOCYTES ABSOLUTE: 1.2 THOU/MM3 (ref 1–4.8)
MCH RBC QN AUTO: 27.9 PG (ref 26–33)
MCHC RBC AUTO-ENTMCNC: 32.4 GM/DL (ref 32.2–35.5)
MCV RBC AUTO: 86 FL (ref 81–99)
MISCELLANEOUS LAB TEST RESULT: ABNORMAL
MONOCYTES # BLD: 2.5 %
MONOCYTES ABSOLUTE: 0.3 THOU/MM3 (ref 0.4–1.3)
NITRITE, URINE: NEGATIVE
NUCLEATED RED BLOOD CELLS: 0 /100 WBC
OPIATES, URINE: NEGATIVE
OSMOLALITY CALCULATION: 284.4 MOSMOL/KG (ref 275–300)
OXYCODONE: NEGATIVE
PH UA: 5 (ref 5–9)
PHENCYCLIDINE QUANTITATIVE URINE: NEGATIVE
PLATELET # BLD: 241 THOU/MM3 (ref 130–400)
PMV BLD AUTO: 9.8 FL (ref 9.4–12.4)
POTASSIUM SERPL-SCNC: 3.9 MEQ/L (ref 3.5–5.2)
PROTEIN UA: NEGATIVE MG/DL
RBC # BLD: 5.23 MILL/MM3 (ref 4.2–5.4)
RBC URINE: ABNORMAL /HPF
RENAL EPITHELIAL, UA: ABNORMAL
SEG NEUTROPHILS: 86.5 %
SEGMENTED NEUTROPHILS ABSOLUTE COUNT: 9.9 THOU/MM3 (ref 1.8–7.7)
SODIUM BLD-SCNC: 137 MEQ/L (ref 135–145)
SPECIFIC GRAVITY UA: > 1.03 (ref 1–1.03)
TROPONIN T: < 0.01 NG/ML
TSH SERPL DL<=0.05 MIU/L-ACNC: 0.85 UIU/ML (ref 0.4–4.2)
UROBILINOGEN, URINE: 0.2 EU/DL (ref 0–1)
WBC # BLD: 11.5 THOU/MM3 (ref 4.8–10.8)
WBC UA: ABNORMAL /HPF
YEAST: ABNORMAL

## 2020-01-25 PROCEDURE — 96375 TX/PRO/DX INJ NEW DRUG ADDON: CPT

## 2020-01-25 PROCEDURE — 84484 ASSAY OF TROPONIN QUANT: CPT

## 2020-01-25 PROCEDURE — 93005 ELECTROCARDIOGRAM TRACING: CPT | Performed by: NURSE PRACTITIONER

## 2020-01-25 PROCEDURE — 85025 COMPLETE CBC W/AUTO DIFF WBC: CPT

## 2020-01-25 PROCEDURE — 36415 COLL VENOUS BLD VENIPUNCTURE: CPT

## 2020-01-25 PROCEDURE — 99283 EMERGENCY DEPT VISIT LOW MDM: CPT

## 2020-01-25 PROCEDURE — 96374 THER/PROPH/DIAG INJ IV PUSH: CPT

## 2020-01-25 PROCEDURE — 80048 BASIC METABOLIC PNL TOTAL CA: CPT

## 2020-01-25 PROCEDURE — 80307 DRUG TEST PRSMV CHEM ANLYZR: CPT

## 2020-01-25 PROCEDURE — 84443 ASSAY THYROID STIM HORMONE: CPT

## 2020-01-25 PROCEDURE — 2500000003 HC RX 250 WO HCPCS: Performed by: NURSE PRACTITIONER

## 2020-01-25 PROCEDURE — 2580000003 HC RX 258: Performed by: NURSE PRACTITIONER

## 2020-01-25 PROCEDURE — 81001 URINALYSIS AUTO W/SCOPE: CPT

## 2020-01-25 PROCEDURE — 6360000002 HC RX W HCPCS: Performed by: NURSE PRACTITIONER

## 2020-01-25 RX ORDER — METOPROLOL TARTRATE 5 MG/5ML
5 INJECTION INTRAVENOUS ONCE
Status: COMPLETED | OUTPATIENT
Start: 2020-01-25 | End: 2020-01-25

## 2020-01-25 RX ORDER — KETOROLAC TROMETHAMINE 30 MG/ML
30 INJECTION, SOLUTION INTRAMUSCULAR; INTRAVENOUS ONCE
Status: COMPLETED | OUTPATIENT
Start: 2020-01-25 | End: 2020-01-25

## 2020-01-25 RX ORDER — 0.9 % SODIUM CHLORIDE 0.9 %
1000 INTRAVENOUS SOLUTION INTRAVENOUS ONCE
Status: COMPLETED | OUTPATIENT
Start: 2020-01-25 | End: 2020-01-25

## 2020-01-25 RX ADMIN — KETOROLAC TROMETHAMINE 30 MG: 30 INJECTION, SOLUTION INTRAMUSCULAR at 14:30

## 2020-01-25 RX ADMIN — METOPROLOL TARTRATE 5 MG: 5 INJECTION INTRAVENOUS at 13:31

## 2020-01-25 RX ADMIN — SODIUM CHLORIDE 1000 ML: 9 INJECTION, SOLUTION INTRAVENOUS at 13:31

## 2020-01-25 ASSESSMENT — PAIN DESCRIPTION - LOCATION: LOCATION: HEAD;NECK

## 2020-01-25 ASSESSMENT — ENCOUNTER SYMPTOMS
VOMITING: 0
NAUSEA: 0
SHORTNESS OF BREATH: 0

## 2020-01-25 ASSESSMENT — PAIN SCALES - GENERAL: PAINLEVEL_OUTOF10: 6

## 2020-01-25 ASSESSMENT — PAIN DESCRIPTION - ORIENTATION: ORIENTATION: RIGHT

## 2020-01-25 ASSESSMENT — PAIN DESCRIPTION - PAIN TYPE: TYPE: ACUTE PAIN

## 2020-01-25 ASSESSMENT — PAIN DESCRIPTION - FREQUENCY: FREQUENCY: CONTINUOUS

## 2020-01-25 NOTE — ED PROVIDER NOTES
independently by myself. The patient's final impression and disposition and plan was determined by myself. CRITICAL CARE:   None    CONSULTS:  None    PROCEDURES:  None    FINAL IMPRESSION      1. Sinus tachycardia    2. Essential hypertension    3. Type 2 diabetes mellitus with hyperglycemia, without long-term current use of insulin ([facility] [address]    4. Acute nonintractable headache, unspecified headache type          DISPOSITION/PLAN   Discharged in stable condition      PATIENT REFERRED TO:  Austin Abbey  [address] [place] [address]  [phone]    Call   For follow up and evaluation next week      DISCHARGE MEDICATIONS:  Discharge Medication List as of 1/25/2020  3:20 PM      START taking these medications    Details   metoprolol tartrate (LOPRESSOR) 25 MG tablet Take 1 tablet by mouth 2 times daily, Disp-60 tablet, R-0Print             (Please note that portions of this note were completed with a voice recognitionprogram.  Efforts were made to edit thedictations but occasionally words are mis-transcribed.)    The patient was given an opportunity to see theEmergency Attending. The patient voicedunderstanding that I was a Mid-Level Provider and was in agreement with being seen independently by myself. Scribe: Ciarra Lopez 1/25/20 12:52 PM Scribing for and in thepresence of Segundo Goddard CNP. Signed by: Govind Vazquez, 01/25/20 6:26 PM    Provider:  Viviane Hernandez performed the services described in the documentation, reviewed and edited the documentation which was dictated to the scribe in my presence, and it accurately records my words andactions.     Janice Goddard CNP 1/25/20 6:26 PM             SPENCER Guerra - ROGELIO  01/25/20 [id number]

## 2020-02-04 ENCOUNTER — OFFICE VISIT (OUTPATIENT)
Dept: CARDIOLOGY CLINIC | Age: 50
End: 2020-02-04
Payer: COMMERCIAL

## 2020-02-04 VITALS
HEART RATE: 68 BPM | WEIGHT: 235 LBS | BODY MASS INDEX: 41.63 KG/M2 | SYSTOLIC BLOOD PRESSURE: 136 MMHG | DIASTOLIC BLOOD PRESSURE: 64 MMHG

## 2020-02-04 PROBLEM — R00.2 INTERMITTENT PALPITATIONS: Status: ACTIVE | Noted: 2020-02-04

## 2020-02-04 PROBLEM — R00.0 SINUS TACHYCARDIA: Status: ACTIVE | Noted: 2020-02-04

## 2020-02-04 PROCEDURE — 1036F TOBACCO NON-USER: CPT | Performed by: INTERNAL MEDICINE

## 2020-02-04 PROCEDURE — G8484 FLU IMMUNIZE NO ADMIN: HCPCS | Performed by: INTERNAL MEDICINE

## 2020-02-04 PROCEDURE — G8417 CALC BMI ABV UP PARAM F/U: HCPCS | Performed by: INTERNAL MEDICINE

## 2020-02-04 PROCEDURE — G8427 DOCREV CUR MEDS BY ELIG CLIN: HCPCS | Performed by: INTERNAL MEDICINE

## 2020-02-04 PROCEDURE — 99214 OFFICE O/P EST MOD 30 MIN: CPT | Performed by: INTERNAL MEDICINE

## 2020-02-04 NOTE — PROGRESS NOTES
Chief Complaint   Patient presents with    Follow-Up from Hospital     sinus tachycardia    Hypertension       Originally Referred for HTN and sinus tachy    13 months F/u    Occasional palpitation and started on lopressor    Sob on exertion  Associated with sweating- chronic    Fatigue  Denied cp, palpitation, dizziness or swelling    Never smoked    FHX  Mother  at 76   None for CAD    Home /99 to 136/99 what she remember did not bring BP log      Patient Active Problem List   Diagnosis    Essential hypertension    SOB (shortness of breath) on exertion    Intermittent palpitations    Hx of Sinus tachycardia       Past Surgical History:   Procedure Laterality Date    CHOLECYSTECTOMY         Allergies   Allergen Reactions    Penicillins Swelling        History reviewed. No pertinent family history.      Social History     Socioeconomic History    Marital status:      Spouse name: Not on file    Number of children: Not on file    Years of education: Not on file    Highest education level: Not on file   Occupational History    Not on file   Social Needs    Financial resource strain: Not on file    Food insecurity:     Worry: Not on file     Inability: Not on file    Transportation needs:     Medical: Not on file     Non-medical: Not on file   Tobacco Use    Smoking status: Never Smoker    Smokeless tobacco: Never Used   Substance and Sexual Activity    Alcohol use: No    Drug use: No    Sexual activity: Not on file   Lifestyle    Physical activity:     Days per week: Not on file     Minutes per session: Not on file    Stress: Not on file   Relationships    Social connections:     Talks on phone: Not on file     Gets together: Not on file     Attends Yarsanism service: Not on file     Active member of club or organization: Not on file     Attends meetings of clubs or organizations: Not on file     Relationship status: Not on file    Intimate partner violence:     Fear of current or ex partner: Not on file     Emotionally abused: Not on file     Physically abused: Not on file     Forced sexual activity: Not on file   Other Topics Concern    Not on file   Social History Narrative    Not on file       Current Outpatient Medications   Medication Sig Dispense Refill    metoprolol tartrate (LOPRESSOR) 25 MG tablet Take 1 tablet by mouth 2 times daily 60 tablet 0    sertraline (ZOLOFT) 50 MG tablet Take 50 mg by mouth daily      lisinopril-hydrochlorothiazide (PRINZIDE;ZESTORETIC) 20-12.5 MG per tablet Take 2 tablets by mouth daily 60 tablet 5    aspirin EC 81 MG EC tablet Take 1 tablet by mouth daily 90 tablet 1    dapagliflozin (FARXIGA) 10 MG tablet Take 10 mg by mouth every morning      metFORMIN (GLUCOPHAGE) 500 MG tablet Take 500 mg by mouth 2 times daily (with meals)       SitaGLIPtin Phosphate (JANUVIA PO) Take 100 mg/day by mouth. No current facility-administered medications for this visit. Review of Systems -     General ROS: negative  Psychological ROS: negative  Hematological and Lymphatic ROS: No history of blood clots or bleeding disorder. Respiratory ROS: no cough, shortness of breath, or wheezing  Cardiovascular ROS: no chest pain or dyspnea on exertion  Gastrointestinal ROS: negative  Genito-Urinary ROS: no dysuria, trouble voiding, or hematuria  Musculoskeletal ROS: negative  Neurological ROS: no TIA or stroke symptoms  Dermatological ROS: negative      Blood pressure 136/64, pulse 68, weight 235 lb (106.6 kg).         Physical Examination:    General appearance - alert, well appearing, and in no distress  Mental status - alert, oriented to person, place, and time  Neck - supple, no significant adenopathy, no JVD, or carotid bruits  Chest - clear to auscultation, no wheezes, rales or rhonchi, symmetric air entry  Heart - normal rate, regular rhythm, normal S1, S2, no murmurs, rubs, clicks or gallops  Abdomen - soft, nontender, nondistended, no masses or organomegaly  Neurological - alert, oriented, normal speech, no focal findings or movement disorder noted  Musculoskeletal - no joint tenderness, deformity or swelling  Extremities - peripheral pulses normal, no pedal edema, no clubbing or cyanosis  Skin - normal coloration and turgor, no rashes, no suspicious skin lesions noted    Lab  No results for input(s): CKTOTAL, CKMB, CKMBINDEX, TROPONINI in the last 72 hours.   CBC:   Lab Results   Component Value Date    WBC 11.5 01/25/2020    RBC 5.23 01/25/2020    HGB 14.6 01/25/2020    HCT 45.0 01/25/2020    MCV 86.0 01/25/2020    MCH 27.9 01/25/2020    MCHC 32.4 01/25/2020    RDW 13.3 09/14/2017     01/25/2020    MPV 9.8 01/25/2020     BMP:    Lab Results   Component Value Date     01/25/2020    K 3.9 01/25/2020    CL 94 01/25/2020    CO2 22 01/25/2020    BUN 13 01/25/2020    LABALBU 4.5 11/01/2018    CREATININE 0.6 01/25/2020    CALCIUM 9.7 01/25/2020    LABGLOM >90 01/25/2020    GLUCOSE 286 01/25/2020     Hepatic Function Panel:    Lab Results   Component Value Date    ALKPHOS 75 11/01/2018    ALT 53 11/01/2018    AST 74 11/01/2018    PROT 7.8 11/01/2018    BILITOT 0.5 11/01/2018    BILIDIR <0.2 09/14/2017    LABALBU 4.5 11/01/2018     Magnesium:    Lab Results   Component Value Date    MG 1.8 11/01/2018     Warfarin PT/INR:  No components found for: PTPATWAR, PTINRWAR  HgBA1c:  No results found for: LABA1C  FLP:  No results found for: TRIG, HDL, LDLCALC, LDLDIRECT, LABVLDL  TSH:    Lab Results   Component Value Date    TSH 0.846 01/25/2020       Sinus tachycardia  Minimal voltage criteria for LVH, may be normal variant  Borderline ECG  When compared with ECG of 14-SEP-2017 16:19,  No significant change was found  Confirmed by Gordo Zhou MD, Miller Hodges ([id number]) on 11/1/2018 7:01:02 PM    Sinus tachycardia 120  Minimal voltage criteria for LVH, may be normal variant  Borderline ECG  When compared with ECG of 02-APR-2019 11:24,  No significant change was found  Confirmed by Brandy Erazo [phone]) on 1/25/2020 2:57:35 PM      Assessment     Diagnosis Orders   1. Intermittent palpitations     2. Hx of Sinus tachycardia     3. Essential hypertension     4. SOB (shortness of breath) on exertion         Plan   meds and labs reviewed    ER record reviewed    Continue the current treatment and with constant vigilance to changes in symptoms and also any potential side effects. Return for care or seek medical attention immediately if symptoms got worse and/or develop new symptoms. Hypertension, on medical treatment. need better control. Patient is compliant with medical treatment. palpitations  Home BP after 15 min rest  Cont lopressor 25 po bid  cont  lisn HCTZ 20/12.5 2 tab po qd     Sinus tachy Hx  Cont lopressor 25 bid consider to increased to 37 bid  Consider lopressor 25 po prn  Echo to assess lv funct  No Monitor as HR got better      Sob on exertion chronic  No recent wt gain  Abn ekg- LVH  Echo and  Ex nuc stress- wnl  Asa 81     Discussed use, benefit, and side effects of prescribed medications. All patient questions answered. Pt voiced understanding. Instructed to continue current medications, diet and exercise. Continue risk factor modification and medical management. Patient agreed with treatment plan. Follow up as directed.       RTC in  6 months    Carole Levi [facility]

## 2020-02-05 ENCOUNTER — TELEPHONE (OUTPATIENT)
Dept: CARDIOLOGY CLINIC | Age: 50
End: 2020-02-05

## 2020-02-18 ENCOUNTER — HOSPITAL ENCOUNTER (OUTPATIENT)
Dept: NON INVASIVE DIAGNOSTICS | Age: 50
Discharge: HOME | End: 2020-02-18
Payer: COMMERCIAL

## 2020-02-18 LAB
LV EF: 60 %
LVEF MODALITY: NORMAL

## 2020-02-18 PROCEDURE — 93306 TTE W/DOPPLER COMPLETE: CPT

## 2020-08-04 ENCOUNTER — TELEPHONE (OUTPATIENT)
Dept: CARDIOLOGY CLINIC | Age: 50
End: 2020-08-04

## 2020-08-04 NOTE — LETTER
[address] [facility]  [facility].  [address] [facility] [address]  Phone: [phone]  Fax: [phone]      August 4, 2020     [place] [address]      Dear Eneida Hernandez:    I am writing you because I have been informed of your missed appointment(s). We care about you and the management of your healthcare and want to make sure that you follow up as recommended. We're sorry you were unable to keep your appointment and hope that you are doing well. We would like to continue treating your healthcare needs. Please call the office to let us know your plans for treatment and to reschedule your appointment.      Sincerely,        Gertrude Snow MD

## 2021-12-29 ENCOUNTER — APPOINTMENT (OUTPATIENT)
Dept: CT IMAGING | Age: 51
End: 2021-12-29

## 2021-12-29 ENCOUNTER — HOSPITAL ENCOUNTER (EMERGENCY)
Age: 51
Discharge: HOME | End: 2021-12-29

## 2021-12-29 VITALS
WEIGHT: 230 LBS | RESPIRATION RATE: 18 BRPM | TEMPERATURE: 97.7 F | BODY MASS INDEX: 39.27 KG/M2 | DIASTOLIC BLOOD PRESSURE: 113 MMHG | HEIGHT: 64 IN | OXYGEN SATURATION: 96 % | SYSTOLIC BLOOD PRESSURE: 183 MMHG | HEART RATE: 88 BPM

## 2021-12-29 DIAGNOSIS — R03.0 ELEVATED BLOOD PRESSURE READING: ICD-10-CM

## 2021-12-29 DIAGNOSIS — L02.215 PERINEAL ABSCESS: Primary | ICD-10-CM

## 2021-12-29 DIAGNOSIS — R11.0 NAUSEA: ICD-10-CM

## 2021-12-29 LAB
ALBUMIN SERPL-MCNC: 4 G/DL (ref 3.5–5.1)
ALP BLD-CCNC: 114 U/L (ref 38–126)
ALT SERPL-CCNC: 18 U/L (ref 11–66)
ANION GAP SERPL CALCULATED.3IONS-SCNC: 15 MEQ/L (ref 8–16)
AST SERPL-CCNC: 17 U/L (ref 5–40)
BASOPHILS # BLD: 0.8 %
BASOPHILS ABSOLUTE: 0.1 THOU/MM3 (ref 0–0.1)
BILIRUB SERPL-MCNC: 0.3 MG/DL (ref 0.3–1.2)
BILIRUBIN DIRECT: < 0.2 MG/DL (ref 0–0.3)
BUN BLDV-MCNC: 10 MG/DL (ref 7–22)
CALCIUM SERPL-MCNC: 9.4 MG/DL (ref 8.5–10.5)
CHLORIDE BLD-SCNC: 96 MEQ/L (ref 98–111)
CO2: 22 MEQ/L (ref 23–33)
CREAT SERPL-MCNC: 0.5 MG/DL (ref 0.4–1.2)
EKG ATRIAL RATE: 99 BPM
EKG P AXIS: 57 DEGREES
EKG P-R INTERVAL: 140 MS
EKG Q-T INTERVAL: 348 MS
EKG QRS DURATION: 80 MS
EKG QTC CALCULATION (BAZETT): 446 MS
EKG R AXIS: -18 DEGREES
EKG T AXIS: 41 DEGREES
EKG VENTRICULAR RATE: 99 BPM
EOSINOPHIL # BLD: 4.3 %
EOSINOPHILS ABSOLUTE: 0.3 THOU/MM3 (ref 0–0.4)
ERYTHROCYTE [DISTWIDTH] IN BLOOD BY AUTOMATED COUNT: 11.9 % (ref 11.5–14.5)
ERYTHROCYTE [DISTWIDTH] IN BLOOD BY AUTOMATED COUNT: 38.5 FL (ref 35–45)
GFR SERPL CREATININE-BSD FRML MDRD: > 90 ML/MIN/1.73M2
GLUCOSE BLD-MCNC: 358 MG/DL (ref 70–108)
HCT VFR BLD CALC: 41.5 % (ref 37–47)
HEMOGLOBIN: 14.1 GM/DL (ref 12–16)
IMMATURE GRANS (ABS): 0.05 THOU/MM3 (ref 0–0.07)
IMMATURE GRANULOCYTES: 0.6 %
LACTIC ACID: 1.3 MMOL/L (ref 0.5–2)
LIPASE: 13.4 U/L (ref 5.6–51.3)
LYMPHOCYTES # BLD: 28.9 %
LYMPHOCYTES ABSOLUTE: 2.3 THOU/MM3 (ref 1–4.8)
MCH RBC QN AUTO: 29.7 PG (ref 26–33)
MCHC RBC AUTO-ENTMCNC: 34 GM/DL (ref 32.2–35.5)
MCV RBC AUTO: 87.4 FL (ref 81–99)
MONOCYTES # BLD: 5.9 %
MONOCYTES ABSOLUTE: 0.5 THOU/MM3 (ref 0.4–1.3)
NUCLEATED RED BLOOD CELLS: 0 /100 WBC
OSMOLALITY CALCULATION: 279.8 MOSMOL/KG (ref 275–300)
PLATELET # BLD: 246 THOU/MM3 (ref 130–400)
PMV BLD AUTO: 9 FL (ref 9.4–12.4)
POTASSIUM SERPL-SCNC: 3.4 MEQ/L (ref 3.5–5.2)
RBC # BLD: 4.75 MILL/MM3 (ref 4.2–5.4)
SEG NEUTROPHILS: 59.5 %
SEGMENTED NEUTROPHILS ABSOLUTE COUNT: 4.6 THOU/MM3 (ref 1.8–7.7)
SODIUM BLD-SCNC: 133 MEQ/L (ref 135–145)
TOTAL PROTEIN: 7.6 G/DL (ref 6.1–8)
TROPONIN T: < 0.01 NG/ML
WBC # BLD: 7.8 THOU/MM3 (ref 4.8–10.8)

## 2021-12-29 PROCEDURE — 84484 ASSAY OF TROPONIN QUANT: CPT

## 2021-12-29 PROCEDURE — 56405 I&D VULVA/PERINEAL ABSCESS: CPT

## 2021-12-29 PROCEDURE — 80053 COMPREHEN METABOLIC PANEL: CPT

## 2021-12-29 PROCEDURE — 85025 COMPLETE CBC W/AUTO DIFF WBC: CPT

## 2021-12-29 PROCEDURE — 82248 BILIRUBIN DIRECT: CPT

## 2021-12-29 PROCEDURE — 10061 I&D ABSCESS COMP/MULTIPLE: CPT

## 2021-12-29 PROCEDURE — 87040 BLOOD CULTURE FOR BACTERIA: CPT

## 2021-12-29 PROCEDURE — 2580000003 HC RX 258: Performed by: NURSE PRACTITIONER

## 2021-12-29 PROCEDURE — 2500000003 HC RX 250 WO HCPCS

## 2021-12-29 PROCEDURE — 96365 THER/PROPH/DIAG IV INF INIT: CPT

## 2021-12-29 PROCEDURE — 74177 CT ABD & PELVIS W/CONTRAST: CPT

## 2021-12-29 PROCEDURE — 6360000002 HC RX W HCPCS: Performed by: NURSE PRACTITIONER

## 2021-12-29 PROCEDURE — 99285 EMERGENCY DEPT VISIT HI MDM: CPT

## 2021-12-29 PROCEDURE — 6360000004 HC RX CONTRAST MEDICATION: Performed by: NURSE PRACTITIONER

## 2021-12-29 PROCEDURE — 96375 TX/PRO/DX INJ NEW DRUG ADDON: CPT

## 2021-12-29 PROCEDURE — 96376 TX/PRO/DX INJ SAME DRUG ADON: CPT

## 2021-12-29 PROCEDURE — 83690 ASSAY OF LIPASE: CPT

## 2021-12-29 PROCEDURE — 83605 ASSAY OF LACTIC ACID: CPT

## 2021-12-29 PROCEDURE — 96366 THER/PROPH/DIAG IV INF ADDON: CPT

## 2021-12-29 PROCEDURE — 93005 ELECTROCARDIOGRAM TRACING: CPT | Performed by: NURSE PRACTITIONER

## 2021-12-29 RX ORDER — LIDOCAINE HYDROCHLORIDE 10 MG/ML
INJECTION, SOLUTION INFILTRATION; PERINEURAL
Status: COMPLETED
Start: 2021-12-29 | End: 2021-12-29

## 2021-12-29 RX ORDER — CLINDAMYCIN HYDROCHLORIDE 150 MG/1
450 CAPSULE ORAL 4 TIMES DAILY
Qty: 84 CAPSULE | Refills: 0 | Status: SHIPPED | OUTPATIENT
Start: 2021-12-29 | End: 2022-01-05

## 2021-12-29 RX ORDER — KETOROLAC TROMETHAMINE 30 MG/ML
30 INJECTION, SOLUTION INTRAMUSCULAR; INTRAVENOUS ONCE
Status: COMPLETED | OUTPATIENT
Start: 2021-12-29 | End: 2021-12-29

## 2021-12-29 RX ORDER — 0.9 % SODIUM CHLORIDE 0.9 %
1000 INTRAVENOUS SOLUTION INTRAVENOUS ONCE
Status: COMPLETED | OUTPATIENT
Start: 2021-12-29 | End: 2021-12-29

## 2021-12-29 RX ORDER — ONDANSETRON 2 MG/ML
4 INJECTION INTRAMUSCULAR; INTRAVENOUS ONCE
Status: COMPLETED | OUTPATIENT
Start: 2021-12-29 | End: 2021-12-29

## 2021-12-29 RX ORDER — CLINDAMYCIN HYDROCHLORIDE 150 MG/1
450 CAPSULE ORAL 4 TIMES DAILY
Qty: 84 CAPSULE | Refills: 0 | Status: SHIPPED | OUTPATIENT
Start: 2021-12-29 | End: 2021-12-29 | Stop reason: CLARIF

## 2021-12-29 RX ADMIN — SODIUM CHLORIDE 1000 ML: 9 INJECTION, SOLUTION INTRAVENOUS at 02:45

## 2021-12-29 RX ADMIN — IOPAMIDOL 80 ML: 755 INJECTION, SOLUTION INTRAVENOUS at 03:32

## 2021-12-29 RX ADMIN — LIDOCAINE HYDROCHLORIDE 200 MG: 10 INJECTION, SOLUTION INFILTRATION; PERINEURAL at 05:20

## 2021-12-29 RX ADMIN — KETOROLAC TROMETHAMINE 30 MG: 30 INJECTION, SOLUTION INTRAMUSCULAR; INTRAVENOUS at 02:36

## 2021-12-29 RX ADMIN — ONDANSETRON 4 MG: 2 INJECTION INTRAMUSCULAR; INTRAVENOUS at 02:33

## 2021-12-29 RX ADMIN — VANCOMYCIN HYDROCHLORIDE 1500 MG: 5 INJECTION, POWDER, LYOPHILIZED, FOR SOLUTION INTRAVENOUS at 03:56

## 2021-12-29 RX ADMIN — ONDANSETRON 4 MG: 2 INJECTION INTRAMUSCULAR; INTRAVENOUS at 04:52

## 2021-12-29 ASSESSMENT — ENCOUNTER SYMPTOMS
CONSTIPATION: 0
VOMITING: 0
EYE REDNESS: 0
NAUSEA: 1
CHEST TIGHTNESS: 0
RHINORRHEA: 0
ABDOMINAL PAIN: 0
COUGH: 0
DIARRHEA: 0
BACK PAIN: 0

## 2021-12-29 ASSESSMENT — PAIN SCALES - GENERAL
PAINLEVEL_OUTOF10: 10
PAINLEVEL_OUTOF10: 10
PAINLEVEL_OUTOF10: 4
PAINLEVEL_OUTOF10: 4

## 2021-12-29 ASSESSMENT — PAIN DESCRIPTION - LOCATION: LOCATION: BUTTOCKS

## 2021-12-29 NOTE — ED TRIAGE NOTES
Pt reports abscess between her intergluteal cleft. Pt tried warm compress and reports some drainage. Pt states it has gotten larger and is c/o nausea. Pt also reports high BP. Pt changed jobs and is having problems with insurance so she has not been taking her prescribed medications.

## 2021-12-29 NOTE — Clinical Note
Ever Bryan was seen and treated in our emergency department on 12/29/2021. She may return to work on 12/31/2021. If you have any questions or concerns, please don't hesitate to call.       Aditya Ricks, APRN - CNP

## 2021-12-29 NOTE — Clinical Note
Patria Salcedo was seen and treated in our emergency department on 12/29/2021. She may return to work on 12/31/2021. If you have any questions or concerns, please don't hesitate to call.       Arnulfo Jacobs, SPENCER - CNP

## 2021-12-29 NOTE — Clinical Note
Narendra Whipple was seen and treated in our emergency department on 12/29/2021. She may return to work on 12/31/2021. If you have any questions or concerns, please don't hesitate to call.       Tomasa Zuniga, APRN - CNP

## 2021-12-29 NOTE — ED NOTES
Patient stable, respirations even and unlabored. Patient waiting for Vanc IV to be completed for discharge.       Brigitte Heart RN  12/29/21 0480

## 2021-12-30 ENCOUNTER — OFFICE VISIT (OUTPATIENT)
Dept: FAMILY MEDICINE CLINIC | Age: 51
End: 2021-12-30
Payer: COMMERCIAL

## 2021-12-30 VITALS
WEIGHT: 215.4 LBS | RESPIRATION RATE: 20 BRPM | HEART RATE: 110 BPM | TEMPERATURE: 97 F | DIASTOLIC BLOOD PRESSURE: 100 MMHG | BODY MASS INDEX: 36.77 KG/M2 | SYSTOLIC BLOOD PRESSURE: 198 MMHG | HEIGHT: 64 IN | OXYGEN SATURATION: 99 %

## 2021-12-30 DIAGNOSIS — E11.9 TYPE 2 DIABETES MELLITUS WITHOUT COMPLICATION, WITHOUT LONG-TERM CURRENT USE OF INSULIN (HCC): ICD-10-CM

## 2021-12-30 DIAGNOSIS — K61.0 PERIANAL ABSCESS: ICD-10-CM

## 2021-12-30 DIAGNOSIS — I10 ESSENTIAL HYPERTENSION: Primary | ICD-10-CM

## 2021-12-30 LAB — HBA1C MFR BLD: 12.4 % (ref 4.3–5.7)

## 2021-12-30 PROCEDURE — 83036 HEMOGLOBIN GLYCOSYLATED A1C: CPT | Performed by: STUDENT IN AN ORGANIZED HEALTH CARE EDUCATION/TRAINING PROGRAM

## 2021-12-30 PROCEDURE — 1036F TOBACCO NON-USER: CPT | Performed by: STUDENT IN AN ORGANIZED HEALTH CARE EDUCATION/TRAINING PROGRAM

## 2021-12-30 PROCEDURE — 99204 OFFICE O/P NEW MOD 45 MIN: CPT | Performed by: STUDENT IN AN ORGANIZED HEALTH CARE EDUCATION/TRAINING PROGRAM

## 2021-12-30 PROCEDURE — G8417 CALC BMI ABV UP PARAM F/U: HCPCS | Performed by: STUDENT IN AN ORGANIZED HEALTH CARE EDUCATION/TRAINING PROGRAM

## 2021-12-30 PROCEDURE — G8427 DOCREV CUR MEDS BY ELIG CLIN: HCPCS | Performed by: STUDENT IN AN ORGANIZED HEALTH CARE EDUCATION/TRAINING PROGRAM

## 2021-12-30 PROCEDURE — G8484 FLU IMMUNIZE NO ADMIN: HCPCS | Performed by: STUDENT IN AN ORGANIZED HEALTH CARE EDUCATION/TRAINING PROGRAM

## 2021-12-30 PROCEDURE — 2022F DILAT RTA XM EVC RTNOPTHY: CPT | Performed by: STUDENT IN AN ORGANIZED HEALTH CARE EDUCATION/TRAINING PROGRAM

## 2021-12-30 RX ORDER — LISINOPRIL AND HYDROCHLOROTHIAZIDE 20; 12.5 MG/1; MG/1
2 TABLET ORAL DAILY
Qty: 60 TABLET | Refills: 5 | Status: CANCELLED | OUTPATIENT
Start: 2021-12-30

## 2021-12-30 RX ORDER — LISINOPRIL AND HYDROCHLOROTHIAZIDE 20; 12.5 MG/1; MG/1
2 TABLET ORAL DAILY
Qty: 60 TABLET | Refills: 5 | Status: ON HOLD | OUTPATIENT
Start: 2021-12-30 | End: 2022-06-04 | Stop reason: HOSPADM

## 2021-12-30 RX ORDER — ONDANSETRON 4 MG/1
4 TABLET, ORALLY DISINTEGRATING ORAL 3 TIMES DAILY PRN
Qty: 21 TABLET | Refills: 0 | Status: SHIPPED | OUTPATIENT
Start: 2021-12-30 | End: 2022-01-09

## 2021-12-30 RX ORDER — ASPIRIN 81 MG/1
81 TABLET ORAL DAILY
Qty: 90 TABLET | Refills: 1 | Status: CANCELLED | OUTPATIENT
Start: 2021-12-30

## 2021-12-30 RX ORDER — ASPIRIN 81 MG/1
81 TABLET ORAL DAILY
Qty: 90 TABLET | Refills: 1 | Status: SHIPPED | OUTPATIENT
Start: 2021-12-30

## 2021-12-30 RX ORDER — GLIPIZIDE 5 MG/1
5 TABLET ORAL 2 TIMES DAILY
Qty: 60 TABLET | Refills: 3 | Status: ON HOLD | OUTPATIENT
Start: 2021-12-30 | End: 2022-06-04 | Stop reason: HOSPADM

## 2021-12-30 SDOH — ECONOMIC STABILITY: FOOD INSECURITY: WITHIN THE PAST 12 MONTHS, THE FOOD YOU BOUGHT JUST DIDN'T LAST AND YOU DIDN'T HAVE MONEY TO GET MORE.: NEVER TRUE

## 2021-12-30 SDOH — ECONOMIC STABILITY: FOOD INSECURITY: WITHIN THE PAST 12 MONTHS, YOU WORRIED THAT YOUR FOOD WOULD RUN OUT BEFORE YOU GOT MONEY TO BUY MORE.: NEVER TRUE

## 2021-12-30 ASSESSMENT — ENCOUNTER SYMPTOMS
DIARRHEA: 0
WHEEZING: 0
COUGH: 0
NAUSEA: 0
ABDOMINAL PAIN: 0
CONSTIPATION: 0
BACK PAIN: 0
VOMITING: 0
SHORTNESS OF BREATH: 0

## 2021-12-30 ASSESSMENT — PATIENT HEALTH QUESTIONNAIRE - PHQ9
SUM OF ALL RESPONSES TO PHQ QUESTIONS 1-9: 0
SUM OF ALL RESPONSES TO PHQ QUESTIONS 1-9: 0
SUM OF ALL RESPONSES TO PHQ9 QUESTIONS 1 & 2: 0
SUM OF ALL RESPONSES TO PHQ QUESTIONS 1-9: 0
2. FEELING DOWN, DEPRESSED OR HOPELESS: 0
1. LITTLE INTEREST OR PLEASURE IN DOING THINGS: 0

## 2021-12-30 ASSESSMENT — SOCIAL DETERMINANTS OF HEALTH (SDOH): HOW HARD IS IT FOR YOU TO PAY FOR THE VERY BASICS LIKE FOOD, HOUSING, MEDICAL CARE, AND HEATING?: NOT HARD AT ALL

## 2021-12-30 NOTE — ED PROVIDER NOTES
[facility] Emergency Department    CHIEF COMPLAINT       Chief Complaint   Patient presents with    Abscess    Nausea       Nurses Notes reviewed and I agree except as noted in the HPI. HISTORY OF PRESENT ILLNESS    Akash Contreras marie 54 y.o. female who presents to the ED for evaluation of abscess on her buttock/perineum. The patient states that this is day #3 of the pain and swelling. No fever. States she feels nauseated. Her BP is high because she is off all her meds (hypertension and diabetes) because of a job change/insurance change. Denies headache or dizziness, no chest pain. HPI was provided by the patient    REVIEW OF SYSTEMS     Review of Systems   Constitutional: Negative for chills, fatigue and fever. HENT: Negative for congestion, ear discharge, ear pain, postnasal drip and rhinorrhea. Eyes: Negative for redness. Respiratory: Negative for cough and chest tightness. Cardiovascular: Negative for chest pain and leg swelling. Gastrointestinal: Positive for nausea. Negative for abdominal pain, constipation, diarrhea and vomiting. Genitourinary: Negative for difficulty urinating, dysuria, enuresis, flank pain and hematuria. Musculoskeletal: Negative for back pain and joint swelling. Skin: Positive for wound. Negative for rash. Neurological: Negative for dizziness, light-headedness, numbness and headaches. Psychiatric/Behavioral: Negative for agitation, behavioral problems and confusion. All other systems negative except as noted. PAST MEDICAL HISTORY     Past Medical History:   Diagnosis Date    COVID-19 11/2021    Diabetes mellitus ([facility] [address]     Hypertension        SURGICALHISTORY      has a past surgical history that includes Cholecystectomy and Endometrial ablation.     CURRENT MEDICATIONS       Discharge Medication List as of 12/29/2021  6:30 AM      CONTINUE these medications which have NOT CHANGED    Details   metoprolol tartrate (LOPRESSOR) 25 MG tablet Take 1 tablet by mouth 2 times daily, Disp-60 tablet, R-0Print      sertraline (ZOLOFT) 50 MG tablet Take 50 mg by mouth dailyHistorical Med      lisinopril-hydrochlorothiazide (PRINZIDE;ZESTORETIC) 20-12.5 MG per tablet Take 2 tablets by mouth daily, Disp-60 tablet, R-5Normal      aspirin EC 81 MG EC tablet Take 1 tablet by mouth daily, Disp-90 tablet, R-1OTC      dapagliflozin (FARXIGA) 10 MG tablet Take 10 mg by mouth every morningHistorical Med      metFORMIN (GLUCOPHAGE) 500 MG tablet Take 500 mg by mouth 2 times daily (with meals) Historical Med      SitaGLIPtin Phosphate (JANUVIA PO) Take 100 mg/day by mouth. Historical Med             ALLERGIES     is allergic to penicillins. FAMILY HISTORY     She indicated that her mother is . She indicated that her father is alive. family history is not on file. SOCIAL HISTORY       Social History     Socioeconomic History    Marital status:      Spouse name: Not on file    Number of children: Not on file    Years of education: Not on file    Highest education level: Not on file   Occupational History    Not on file   Tobacco Use    Smoking status: Never Smoker    Smokeless tobacco: Never Used   Vaping Use    Vaping Use: Never used   Substance and Sexual Activity    Alcohol use: No    Drug use: No    Sexual activity: Not on file   Other Topics Concern    Not on file   Social History Narrative    Not on file     Social Determinants of Health     Financial Resource Strain:     Difficulty of Paying Living Expenses: Not on file   Food Insecurity:     Worried About Running Out of Food in the Last Year: Not on file    Imtiaz of Food in the Last Year: Not on file   Transportation Needs:     Lack of Transportation (Medical): Not on file    Lack of Transportation (Non-Medical):  Not on file   Physical Activity:     Days of Exercise per Week: Not on file    Minutes of Exercise per Session: Not on file   Stress:     Feeling of Stress : Not on file   Social Connections:     Frequency of Communication with Friends and Family: Not on file    Frequency of Social Gatherings with Friends and Family: Not on file    Attends Mandaen Services: Not on file    Active Member of Clubs or Organizations: Not on file    Attends Club or Organization Meetings: Not on file    Marital Status: Not on file   Intimate Partner Violence:     Fear of Current or Ex-Partner: Not on file    Emotionally Abused: Not on file    Physically Abused: Not on file    Sexually Abused: Not on file   Housing Stability:     Unable to Pay for Housing in the Last Year: Not on file    Number of [place] in the Last Year: Not on file    Unstable Housing in the Last Year: Not on file       PHYSICAL EXAM     INITIAL VITALS:  height is 5' 4\" (1.626 m) and weight is 230 lb (104.3 kg). Her oral temperature is 97.7 °F (36.5 °C). Her blood pressure is 183/113 (abnormal) and her pulse is 88. Her respiration is 18 and oxygen saturation is 96%. Physical Exam  Vitals and nursing note reviewed. Constitutional:       General: She is not in acute distress. Appearance: She is well-developed. She is not diaphoretic. HENT:      Head: Normocephalic and atraumatic. Nose: Nose normal.      Mouth/Throat:      Mouth: Mucous membranes are moist.      Pharynx: Oropharynx is clear. Eyes:      General:         Right eye: No discharge. Left eye: No discharge. Conjunctiva/sclera: Conjunctivae normal.   Neck:      Trachea: No tracheal deviation. Cardiovascular:      Rate and Rhythm: Normal rate and regular rhythm. Pulses: Normal pulses. Heart sounds: Normal heart sounds. No murmur heard. No gallop. Comments: Normal capillary refill  Pulmonary:      Effort: Pulmonary effort is normal. No respiratory distress. Breath sounds: Normal breath sounds. No stridor. Abdominal:      General: Bowel sounds are normal.      Palpations: Abdomen is soft. Genitourinary:      Musculoskeletal:         General: No tenderness or deformity. Normal range of motion. Cervical back: Normal range of motion. Skin:     General: Skin is warm and dry. Capillary Refill: Capillary refill takes less than 2 seconds. Coloration: Skin is not pale. Findings: No erythema or rash. Neurological:      General: No focal deficit present. Mental Status: She is alert and oriented to person, place, and time. Cranial Nerves: No cranial nerve deficit. Psychiatric:         Mood and Affect: Mood normal.         Behavior: Behavior normal.           DIFFERENTIAL DIAGNOSIS:   Abscess, nec fasc, uncontrolled hypertension, diabetes    DIAGNOSTIC RESULTS     EKG: All EKG's are interpreted by the Emergency Department Physician who eithersigns or Co-signs this chart in the absence of a cardiologist.        RADIOLOGY: non-plainfilm images(s) such as CT, Ultrasound and MRI are read by the radiologist.  Plain radiographic images are visualized and preliminarily interpreted by the emergency physician unless otherwise stated below. CT ABDOMEN PELVIS W IV CONTRAST Additional Contrast? None   Final Result   Impression:   1. Partial visualization of a left inferior perianal abscess that measures    3.2 cm in greatest diameter. 2. No acute process in the abdomen and pelvis. 3. Nonspecific 1.6 cm left adrenal nodule. Statistically this is most    likely a benign adenoma which could be confirmed with adrenal CT or MRI on    a nonurgent elective basis. 4. 2 cm right ovarian cyst.      This document has been electronically signed by: Deep Swenson DO on    12/29/2021 04:40 AM      All CTs at this facility use dose modulation techniques and iterative    reconstructions, and/or weight-based dosing   when appropriate to reduce radiation to a low as reasonably achievable.             LABS:   Labs Reviewed   CBC WITH AUTO DIFFERENTIAL - Abnormal; Notable for the following components:       Result Value    MPV 9.0 (*)     All other components within normal limits   BASIC METABOLIC PANEL - Abnormal; Notable for the following components:    Sodium 133 (*)     Potassium 3.4 (*)     Chloride 96 (*)     CO2 22 (*)     Glucose 358 (*)     All other components within normal limits   CULTURE, BLOOD 1    Narrative:     Source: blood-Adult-suboptimal <5.5oz./set volume       Site: Right arm          Current   Antibiotics: none   CULTURE, BLOOD 2    Narrative:     Source: blood-Adult-suboptimal <5.5oz./set volume       Site: left arm          Current   Antibiotics: none   LIPASE   HEPATIC FUNCTION PANEL   TROPONIN   LACTIC ACID, PLASMA   ANION GAP   OSMOLALITY   GLOMERULAR FILTRATION RATE, ESTIMATED       EMERGENCY DEPARTMENT COURSE:   Vitals:    Vitals:    12/29/21 0153 12/29/21 0322 12/29/21 0541   BP: (!) 192/134 (!) 186/97 (!) 183/113   Pulse: 110 97 88   Resp: 16 18 18   Temp: 97.7 °F (36.5 °C)     TempSrc: Oral     SpO2: 96% 97% 96%   Weight: 230 lb (104.3 kg)     Height: 5' 4\" (1.626 m)                                      MDM    Patient was seen in the ER for an abscess on her perineal area. Appropriate labs and imaging are ordered and reviewed. Abscess is drained per the procedure note. Patient tolerated well. She was given Vanco in the ER x 1 dose. Dc home on clindamycin due to allergies. [facility] [employer] provided to cover cost.  Patient instructed to do warm compresses and follow up with the family medicine clinic as scheduled for a wound check.       Medications   0.9 % sodium chloride bolus (0 mLs IntraVENous Stopped 12/29/21 0320)   ondansetron (ZOFRAN) injection 4 mg (4 mg IntraVENous Given 12/29/21 0233)   ketorolac (TORADOL) injection 30 mg (30 mg IntraVENous Given 12/29/21 0236)   iopamidol (ISOVUE-370) 76 % injection 80 mL (80 mLs IntraVENous Given 12/29/21 0332)   vancomycin (VANCOCIN) 1,500 mg in dextrose 5 % 500 mL IVPB (0 mg/kg × 104.3 kg IntraVENous Stopped 12/29/21 4156)   ondansetron (ZOFRAN) injection 4 mg (4 mg IntraVENous Given 12/29/21 4079)   lidocaine 1 % injection (200 mg  Given 12/29/21 9037)       Please note that the patient was evaluated during a pandemic. All efforts were made for HIPPA compliance as well as provision of appropriate care. Patient was seen independently by myself. The patient's final impression and disposition and plan was determined by myself. Strict return precautions and follow up instructions were discussed with the patient prior to discharge, with which the patient agrees. Physical assessment findings, diagnostic testing(s) if applicable, and vital signs reviewed with patient/patient representative. Questions answered. Medications asdirected, including OTC medications for supportive care. Education provided on medications. Differential diagnosis(s) discussed with patient/patient representative. Home care/self care instructions reviewed withpatient/patient representative. Patient is to follow-up with family care provider in 2-3 days if no improvement. Patient is to go to the emergency department if symptoms worsen. Patient/patient representative isaware of care plan, questions answered, verbalizes understanding and is in agreement. CRITICAL CARE:   None    CONSULTS:  None    PROCEDURES:  Incision/Drainage    Date/Time: 12/29/2021 7:57 PM  Performed by: SPENCER Abad CNP  Authorized by: SPENCER Abad CNP     Consent:     Consent obtained:  Verbal    Consent given by:  Patient    Risks discussed:  Bleeding, damage to other organs, infection, incomplete drainage and pain    Alternatives discussed:  No treatment, delayed treatment, alternative treatment, observation and referral  Location:     Type:  Abscess    Size:  4 cm    Location:  Anogenital    Anogenital location:  Perineum  Pre-procedure details:     Skin preparation:  Betadine  Anesthesia (see MAR for exact dosages):      Anesthesia method: Local infiltration    Local anesthetic:  Lidocaine 1% w/o epi  Procedure type:     Complexity:  Simple  Procedure details:     Needle aspiration: no      Incision types:  Cruciate    Incision depth:  Subcutaneous    Scalpel blade:  11    Wound management:  Probed and deloculated    Drainage:  Bloody and purulent    Drainage amount:  Copious    Wound treatment:  Wound left open    Packing materials:  None  Post-procedure details:     Patient tolerance of procedure: Tolerated well, no immediate complications        FINAL IMPRESSION     1. Perineal abscess    2. Elevated blood pressure reading    3. Nausea          DISPOSITION/PLAN   DISPOSITION Decision To Discharge 12/29/2021 05:25:59 AM      PATIENT REFERREDTO:  [address] [place] [address],[address]  [address] [place] [address]  Go on 12/30/2021  @ 1:20 pm      DISCHARGE MEDICATIONS:  Discharge Medication List as of 12/29/2021  6:30 AM      START taking these medications    Details   clindamycin (CLEOCIN) 150 MG capsule Take 3 capsules by mouth 4 times daily for 7 days, Disp-84 capsule, R-0Normal             (Please note that portions of this note were completed with a voice recognition program.  Efforts were made to edit the dictations but occasionally words are mis-transcribed.)         SPENCER Ruth CNP, APRN - CNP  12/29/21 2001

## 2021-12-30 NOTE — PROGRESS NOTES
S: 54 y.o. female with   Chief Complaint   Patient presents with    Skin Problem     f/u er from boil onleft inner buttock        HPI: please see resident note for HPI and ROS. BP Readings from Last 3 Encounters:   12/30/21 (!) 198/100   12/29/21 (!) 183/113   02/04/20 136/64     Wt Readings from Last 3 Encounters:   12/30/21 215 lb 6.4 oz (97.7 kg)   12/29/21 230 lb (104.3 kg)   02/04/20 235 lb (106.6 kg)       O: VS:  height is 5' 4\" (1.626 m) and weight is 215 lb 6.4 oz (97.7 kg). Her skin temperature is 97 °F (36.1 °C). Her blood pressure is 198/100 (abnormal) and her pulse is 110. Her respiration is 20 and oxygen saturation is 99%. AAO/NAD, appropriate affect for mood       Diagnosis Orders   1. Essential hypertension     2. Type 2 diabetes mellitus without complication, without long-term current use of insulin (HCC)  POCT glycosylated hemoglobin (Hb A1C)   3. Perianal abscess         Plan:  Continue clinda. Start zofran prn. Restart medications for htn and dm2 as ordered, increasing metformin to 1000mg bid and start glipizide in place of farxiga for the short term and farxiga side effects. Having cost issues. F/u 4 wks. Health Maintenance Due   Topic Date Due    Hepatitis C screen  Never done    COVID-19 Vaccine (1) Never done    HIV screen  Never done    Cervical cancer screen  Never done    Lipid screen  Never done    Colon cancer screen colonoscopy  Never done    Flu vaccine (1) Never done       Attending Physician Statement  I have discussed the case, including pertinent history and exam findings with the resident. I also have seen the patient and performed key portions of the examination. I agree with the documented assessment and plan as documented by the resident.         Pratik Payne DO 12/30/2021 2:26 PM

## 2021-12-30 NOTE — PROGRESS NOTES
Colin Lang is a 54 y.o. female who presents today for:  Chief Complaint   Patient presents with    Skin Problem     f/u er from boil onleft inner buttock          HPI:   Colin Lang is 54 y.o. who presents today for ED Follow-up. Patient was seen in the ED on 12/29/2021 for perianal abscess. She received vancomycin x1 dose. Abscess was drained. She was discharged home on clindamycin and instructions to do warm compresses to the area. Patient doing well since discharge. Taking clindamycin daily, does report some nausea with this. Patient has also recently been switching jobs, and has been off her medications for hypertension and diabetes for the past 3-4 months. Blood pressure elevated 183/100 in the ED. HTN: Patient previously taking Lopressor, and lisinopril-HCTZ. She has been off of these for several months. However reports that her blood pressure would still run [id number] systolic sometimes. She has not been monitoring her blood pressure. Denies chest pain, shortness of breath, palpitations, leg swelling, headaches, vision changes, dizziness. DM2: She was diagnosed in 2014. She was most recently taking Januvia and Metformin twice a day without side effects. She was also on Farxiga, but this caused diarrhea. She has not been checking blood sugars. She has been off of these medications for several months as well. Denies vision changes, problems with urination, or numbness and tingling.     Objective:     Vitals:    12/30/21 1344 12/30/21 1347   BP: (!) 198/98 (!) 198/100   Site: Right Upper Arm Left Upper Arm   Position: Sitting Sitting   Cuff Size: Large Adult Large Adult   Pulse: 110    Resp: 20    Temp: 97 °F (36.1 °C)    TempSrc: Skin    SpO2: 99%    Weight: 215 lb 6.4 oz (97.7 kg)    Height: 5' 4\" (1.626 m)        Wt Readings from Last 3 Encounters:   12/30/21 215 lb 6.4 oz (97.7 kg)   12/29/21 230 lb (104.3 kg)   02/04/20 235 lb (106.6 kg)       BP Readings from Last 3 Encounters:   12/30/21 (!) 198/100   12/29/21 (!) 183/113   02/04/20 136/64       Lab Results   Component Value Date    WBC 7.8 12/29/2021    HGB 14.1 12/29/2021    HCT 41.5 12/29/2021    MCV 87.4 12/29/2021     12/29/2021     Lab Results   Component Value Date     (L) 12/29/2021    K 3.4 (L) 12/29/2021    CL 96 (L) 12/29/2021    CO2 22 (L) 12/29/2021    BUN 10 12/29/2021    CREATININE 0.5 12/29/2021    GLUCOSE 358 (H) 12/29/2021    CALCIUM 9.4 12/29/2021    PROT 7.6 12/29/2021    LABALBU 4.0 12/29/2021    BILITOT 0.3 12/29/2021    ALKPHOS 114 12/29/2021    AST 17 12/29/2021    ALT 18 12/29/2021    LABGLOM >90 12/29/2021     Lab Results   Component Value Date    TSH 0.846 01/25/2020     Lab Results   Component Value Date    LABA1C 12.4 (H) 12/30/2021     No results found for: EAG  No results found for: CHOL  No results found for: TRIG  No results found for: HDL  No results found for: LDLCHOLESTEROL, LDLCALC    No results found for: LABMICR, [id number]    Review of Systems   Constitutional: Negative for activity change, chills, fatigue and fever. HENT: Negative for congestion. Eyes: Negative for visual disturbance. Respiratory: Negative for cough, shortness of breath and wheezing. Cardiovascular: Negative for chest pain and palpitations. Gastrointestinal: Negative for abdominal pain, constipation, diarrhea, nausea and vomiting. Genitourinary: Negative for dysuria. Musculoskeletal: Negative for back pain. Skin: Positive for wound (left perianal abscess). Neurological: Negative for dizziness, syncope, light-headedness and headaches. Psychiatric/Behavioral: Negative for dysphoric mood. The patient is not nervous/anxious. Physical Exam  Vitals and nursing note reviewed. Exam conducted with a chaperone present. Constitutional:       Appearance: Normal appearance. She is normal weight. HENT:      Head: Normocephalic and atraumatic.       Right Ear: Tympanic membrane and ear canal normal.      Left Ear: Tympanic membrane and ear canal normal.      Nose: Nose normal.      Mouth/Throat:      Mouth: Mucous membranes are moist.      Pharynx: No oropharyngeal exudate or posterior oropharyngeal erythema. Eyes:      Extraocular Movements: Extraocular movements intact. Conjunctiva/sclera: Conjunctivae normal.      Pupils: Pupils are equal, round, and reactive to light. Cardiovascular:      Rate and Rhythm: Normal rate and regular rhythm. Pulses: Normal pulses. Heart sounds: No murmur heard. Pulmonary:      Effort: Pulmonary effort is normal. No respiratory distress. Breath sounds: Normal breath sounds. No wheezing. Abdominal:      General: Abdomen is flat. Bowel sounds are normal. There is no distension. Palpations: Abdomen is soft. There is no mass. Tenderness: There is no abdominal tenderness. Genitourinary:      Musculoskeletal:      Cervical back: Neck supple. Skin:     General: Skin is warm and dry. Neurological:      General: No focal deficit present. Mental Status: She is alert and oriented to person, place, and time. Psychiatric:         Mood and Affect: Mood normal.         Behavior: Behavior normal.           There is no immunization history on file for this patient. Health Maintenance Due   Topic Date Due    Hepatitis C screen  Never done    COVID-19 Vaccine (1) Never done    HIV screen  Never done    Cervical cancer screen  Never done    Lipid screen  Never done    Colon cancer screen colonoscopy  Never done    Flu vaccine (1) Never done          Food Insecurity: No Food Insecurity    Worried About Running Out of Food in the Last Year: Never true    Ran Out of Food in the Last Year: Never true       Assessment / Plan:   1. Essential hypertension  -Restart previous dose of Lopressor and lisinopril-HCTZ  -Recommend patient monitor BP at home  -Follow-up in 1 month for medication changes    2.  Type 2 diabetes mellitus without complication, without long-term current use of insulin (HCC)  Hemoglobin A1c 12.4 in the office today. -Restart Metformin, recommend patient begin taking 1 tablet BID, then increase to 2 tablets BID if no diarrhea occurs  -Restart Januvia  -Discontinue Farxiga due to diarrhea  -Will start glipizide 5 mg BID, consider transitioning to GLP-1 after patient's new insurance starts  - POCT glycosylated hemoglobin (Hb A1C)    3. Perianal abscess  - Complete course of Clindamycin as prescribed       Return in about 4 weeks (around 1/27/2022). Medications Prescribed:  Orders Placed This Encounter   Medications    aspirin EC 81 MG EC tablet     Sig: Take 1 tablet by mouth daily     Dispense:  90 tablet     Refill:  1    lisinopril-hydroCHLOROthiazide (PRINZIDE;ZESTORETIC) 20-12.5 MG per tablet     Sig: Take 2 tablets by mouth daily     Dispense:  60 tablet     Refill:  5    metoprolol tartrate (LOPRESSOR) 25 MG tablet     Sig: Take 1 tablet by mouth 2 times daily     Dispense:  60 tablet     Refill:  0    metFORMIN (GLUCOPHAGE) 500 MG tablet     Sig: Take 1 tablet by mouth 2 times daily (with meals) Indications: two tablets twice daily     Dispense:  60 tablet     Refill:  0    sertraline (ZOLOFT) 50 MG tablet     Sig: Take 1 tablet by mouth daily     Dispense:  30 tablet     Refill:  0    SITagliptin (JANUVIA) 100 MG tablet     Sig: Take 1 tablet by mouth daily     Dispense:  30 tablet     Refill:  0    ondansetron (ZOFRAN-ODT) 4 MG disintegrating tablet     Sig: Take 1 tablet by mouth 3 times daily as needed for Nausea or Vomiting     Dispense:  21 tablet     Refill:  0    glipiZIDE (GLUCOTROL) 5 MG tablet     Sig: Take 1 tablet by mouth 2 times daily     Dispense:  60 tablet     Refill:  3       Future Appointments   Date Time Provider Hung Giron   2/8/2022  8:00 AM [facility] [address]       Patient given educational materials - see patient instructions.   Discussed use, benefit,

## 2022-01-03 LAB
BLOOD CULTURE, ROUTINE: NORMAL
BLOOD CULTURE, ROUTINE: NORMAL

## 2022-05-16 ENCOUNTER — HOSPITAL ENCOUNTER (OUTPATIENT)
Dept: WOMENS IMAGING | Age: 52
Discharge: HOME | End: 2022-05-16
Payer: COMMERCIAL

## 2022-05-16 DIAGNOSIS — Z12.31 VISIT FOR SCREENING MAMMOGRAM: ICD-10-CM

## 2022-05-16 PROCEDURE — 77063 BREAST TOMOSYNTHESIS BI: CPT

## 2022-05-29 ENCOUNTER — APPOINTMENT (OUTPATIENT)
Dept: CT IMAGING | Age: 52
DRG: 871 | End: 2022-05-29

## 2022-05-29 ENCOUNTER — HOSPITAL ENCOUNTER (INPATIENT)
Age: 52
LOS: 6 days | Discharge: HOME | DRG: 871 | End: 2022-06-04
Attending: FAMILY MEDICINE | Admitting: PEDIATRICS

## 2022-05-29 DIAGNOSIS — N39.0 ACUTE UTI: ICD-10-CM

## 2022-05-29 DIAGNOSIS — E80.6 HYPERBILIRUBINEMIA: ICD-10-CM

## 2022-05-29 DIAGNOSIS — A41.9 SEPTICEMIA (HCC): Primary | ICD-10-CM

## 2022-05-29 DIAGNOSIS — N10 ACUTE PYELONEPHRITIS: ICD-10-CM

## 2022-05-29 DIAGNOSIS — R79.89 ELEVATED LFTS: ICD-10-CM

## 2022-05-29 DIAGNOSIS — E11.10 DIABETIC KETOACIDOSIS WITHOUT COMA ASSOCIATED WITH TYPE 2 DIABETES MELLITUS (HCC): ICD-10-CM

## 2022-05-29 LAB
ALBUMIN SERPL-MCNC: 4.2 G/DL (ref 3.5–5.1)
ALP BLD-CCNC: 162 U/L (ref 38–126)
ALT SERPL-CCNC: 136 U/L (ref 11–66)
ANION GAP SERPL CALCULATED.3IONS-SCNC: 13 MEQ/L (ref 8–16)
ANION GAP SERPL CALCULATED.3IONS-SCNC: 15 MEQ/L (ref 8–16)
AST SERPL-CCNC: 182 U/L (ref 5–40)
BASOPHILS # BLD: 0.4 %
BASOPHILS ABSOLUTE: 0 THOU/MM3 (ref 0–0.1)
BETA-HYDROXYBUTYRATE: 10.15 MG/DL (ref 0.2–2.81)
BILIRUB SERPL-MCNC: 2.4 MG/DL (ref 0.3–1.2)
BILIRUBIN DIRECT: 1.6 MG/DL (ref 0–0.3)
BILIRUBIN URINE: NEGATIVE
BLOOD, URINE: ABNORMAL
BUN BLDV-MCNC: 10 MG/DL (ref 7–22)
BUN BLDV-MCNC: 14 MG/DL (ref 7–22)
CALCIUM SERPL-MCNC: 8.8 MG/DL (ref 8.5–10.5)
CALCIUM SERPL-MCNC: 9.6 MG/DL (ref 8.5–10.5)
CHARACTER, URINE: ABNORMAL
CHLORIDE BLD-SCNC: 102 MEQ/L (ref 98–111)
CHLORIDE BLD-SCNC: 95 MEQ/L (ref 98–111)
CO2: 22 MEQ/L (ref 23–33)
CO2: 23 MEQ/L (ref 23–33)
COLOR: ABNORMAL
CREAT SERPL-MCNC: 0.6 MG/DL (ref 0.4–1.2)
CREAT SERPL-MCNC: 0.7 MG/DL (ref 0.4–1.2)
EKG ATRIAL RATE: 148 BPM
EKG P AXIS: 63 DEGREES
EKG P-R INTERVAL: 124 MS
EKG Q-T INTERVAL: 270 MS
EKG QRS DURATION: 74 MS
EKG QTC CALCULATION (BAZETT): 423 MS
EKG R AXIS: -14 DEGREES
EKG T AXIS: 67 DEGREES
EKG VENTRICULAR RATE: 148 BPM
EOSINOPHIL # BLD: 0.4 %
EOSINOPHILS ABSOLUTE: 0 THOU/MM3 (ref 0–0.4)
ERYTHROCYTE [DISTWIDTH] IN BLOOD BY AUTOMATED COUNT: 11.9 % (ref 11.5–14.5)
ERYTHROCYTE [DISTWIDTH] IN BLOOD BY AUTOMATED COUNT: 38.3 FL (ref 35–45)
GFR SERPL CREATININE-BSD FRML MDRD: 88 ML/MIN/1.73M2
GFR SERPL CREATININE-BSD FRML MDRD: > 90 ML/MIN/1.73M2
GLUCOSE BLD-MCNC: 192 MG/DL (ref 70–108)
GLUCOSE BLD-MCNC: 206 MG/DL (ref 70–108)
GLUCOSE BLD-MCNC: 209 MG/DL (ref 70–108)
GLUCOSE BLD-MCNC: 212 MG/DL (ref 70–108)
GLUCOSE BLD-MCNC: 241 MG/DL (ref 70–108)
GLUCOSE BLD-MCNC: 279 MG/DL (ref 70–108)
GLUCOSE BLD-MCNC: 281 MG/DL (ref 70–108)
GLUCOSE BLD-MCNC: 344 MG/DL (ref 70–108)
GLUCOSE BLD-MCNC: 411 MG/DL (ref 70–108)
GLUCOSE BLD-MCNC: 430 MG/DL (ref 70–108)
GLUCOSE URINE: 500 MG/DL
HCT VFR BLD CALC: 40.4 % (ref 37–47)
HEMOGLOBIN: 13.6 GM/DL (ref 12–16)
IMMATURE GRANS (ABS): 0.01 THOU/MM3 (ref 0–0.07)
IMMATURE GRANULOCYTES: 0.2 %
KETONES, URINE: 15
LACTIC ACID, SEPSIS: 2 MMOL/L (ref 0.5–1.9)
LACTIC ACID, SEPSIS: 2.7 MMOL/L (ref 0.5–1.9)
LEUKOCYTE ESTERASE, URINE: ABNORMAL
LIPASE: 10.3 U/L (ref 5.6–51.3)
LYMPHOCYTES # BLD: 7.5 %
LYMPHOCYTES ABSOLUTE: 0.3 THOU/MM3 (ref 1–4.8)
MAGNESIUM: 1.6 MG/DL (ref 1.6–2.4)
MAGNESIUM: 1.9 MG/DL (ref 1.6–2.4)
MCH RBC QN AUTO: 29.9 PG (ref 26–33)
MCHC RBC AUTO-ENTMCNC: 33.7 GM/DL (ref 32.2–35.5)
MCV RBC AUTO: 88.8 FL (ref 81–99)
MONOCYTES # BLD: 2.4 %
MONOCYTES ABSOLUTE: 0.1 THOU/MM3 (ref 0.4–1.3)
NITRITE, URINE: POSITIVE
NUCLEATED RED BLOOD CELLS: 0 /100 WBC
OSMOLALITY CALCULATION: 284.2 MOSMOL/KG (ref 275–300)
PH UA: 5.5 (ref 5–9)
PLATELET # BLD: 122 THOU/MM3 (ref 130–400)
PMV BLD AUTO: 9.9 FL (ref 9.4–12.4)
POTASSIUM REFLEX MAGNESIUM: 4 MEQ/L (ref 3.5–5.2)
POTASSIUM SERPL-SCNC: 3.5 MEQ/L (ref 3.5–5.2)
PROTEIN UA: >= 300 MG/DL
RBC # BLD: 4.55 MILL/MM3 (ref 4.2–5.4)
SEG NEUTROPHILS: 89.1 %
SEGMENTED NEUTROPHILS ABSOLUTE COUNT: 4 THOU/MM3 (ref 1.8–7.7)
SODIUM BLD-SCNC: 133 MEQ/L (ref 135–145)
SODIUM BLD-SCNC: 137 MEQ/L (ref 135–145)
SPECIFIC GRAVITY UA: 1.02 (ref 1–1.03)
TOTAL PROTEIN: 7.6 G/DL (ref 6.1–8)
TROPONIN T: < 0.01 NG/ML
UROBILINOGEN, URINE: 0.2 EU/DL (ref 0.2–1)
WBC # BLD: 4.5 THOU/MM3 (ref 4.8–10.8)

## 2022-05-29 PROCEDURE — 6360000004 HC RX CONTRAST MEDICATION: Performed by: STUDENT IN AN ORGANIZED HEALTH CARE EDUCATION/TRAINING PROGRAM

## 2022-05-29 PROCEDURE — 82010 KETONE BODYS QUAN: CPT

## 2022-05-29 PROCEDURE — 96374 THER/PROPH/DIAG INJ IV PUSH: CPT

## 2022-05-29 PROCEDURE — 93005 ELECTROCARDIOGRAM TRACING: CPT | Performed by: STUDENT IN AN ORGANIZED HEALTH CARE EDUCATION/TRAINING PROGRAM

## 2022-05-29 PROCEDURE — 96375 TX/PRO/DX INJ NEW DRUG ADDON: CPT

## 2022-05-29 PROCEDURE — 36415 COLL VENOUS BLD VENIPUNCTURE: CPT

## 2022-05-29 PROCEDURE — 80076 HEPATIC FUNCTION PANEL: CPT

## 2022-05-29 PROCEDURE — 80048 BASIC METABOLIC PNL TOTAL CA: CPT

## 2022-05-29 PROCEDURE — 74177 CT ABD & PELVIS W/CONTRAST: CPT

## 2022-05-29 PROCEDURE — 83605 ASSAY OF LACTIC ACID: CPT

## 2022-05-29 PROCEDURE — 6360000002 HC RX W HCPCS: Performed by: STUDENT IN AN ORGANIZED HEALTH CARE EDUCATION/TRAINING PROGRAM

## 2022-05-29 PROCEDURE — 2060000000 HC ICU INTERMEDIATE R&B

## 2022-05-29 PROCEDURE — 96361 HYDRATE IV INFUSION ADD-ON: CPT

## 2022-05-29 PROCEDURE — 85025 COMPLETE CBC W/AUTO DIFF WBC: CPT

## 2022-05-29 PROCEDURE — 87186 SC STD MICRODIL/AGAR DIL: CPT

## 2022-05-29 PROCEDURE — 82948 REAGENT STRIP/BLOOD GLUCOSE: CPT

## 2022-05-29 PROCEDURE — 6370000000 HC RX 637 (ALT 250 FOR IP): Performed by: STUDENT IN AN ORGANIZED HEALTH CARE EDUCATION/TRAINING PROGRAM

## 2022-05-29 PROCEDURE — 87040 BLOOD CULTURE FOR BACTERIA: CPT

## 2022-05-29 PROCEDURE — 87077 CULTURE AEROBIC IDENTIFY: CPT

## 2022-05-29 PROCEDURE — 2580000003 HC RX 258: Performed by: STUDENT IN AN ORGANIZED HEALTH CARE EDUCATION/TRAINING PROGRAM

## 2022-05-29 PROCEDURE — 83690 ASSAY OF LIPASE: CPT

## 2022-05-29 PROCEDURE — 6370000000 HC RX 637 (ALT 250 FOR IP): Performed by: PEDIATRICS

## 2022-05-29 PROCEDURE — 81003 URINALYSIS AUTO W/O SCOPE: CPT

## 2022-05-29 PROCEDURE — 6360000002 HC RX W HCPCS

## 2022-05-29 PROCEDURE — 93005 ELECTROCARDIOGRAM TRACING: CPT | Performed by: PEDIATRICS

## 2022-05-29 PROCEDURE — 2580000003 HC RX 258: Performed by: PEDIATRICS

## 2022-05-29 PROCEDURE — 87801 DETECT AGNT MULT DNA AMPLI: CPT

## 2022-05-29 PROCEDURE — 99285 EMERGENCY DEPT VISIT HI MDM: CPT

## 2022-05-29 PROCEDURE — 83735 ASSAY OF MAGNESIUM: CPT

## 2022-05-29 PROCEDURE — 96376 TX/PRO/DX INJ SAME DRUG ADON: CPT

## 2022-05-29 PROCEDURE — 84484 ASSAY OF TROPONIN QUANT: CPT

## 2022-05-29 PROCEDURE — 93010 ELECTROCARDIOGRAM REPORT: CPT | Performed by: INTERNAL MEDICINE

## 2022-05-29 PROCEDURE — 2500000003 HC RX 250 WO HCPCS: Performed by: PEDIATRICS

## 2022-05-29 PROCEDURE — 96365 THER/PROPH/DIAG IV INF INIT: CPT

## 2022-05-29 PROCEDURE — 96367 TX/PROPH/DG ADDL SEQ IV INF: CPT

## 2022-05-29 PROCEDURE — 99215 OFFICE O/P EST HI 40 MIN: CPT

## 2022-05-29 PROCEDURE — 87086 URINE CULTURE/COLONY COUNT: CPT

## 2022-05-29 RX ORDER — SODIUM CHLORIDE, SODIUM LACTATE, POTASSIUM CHLORIDE, CALCIUM CHLORIDE 600; 310; 30; 20 MG/100ML; MG/100ML; MG/100ML; MG/100ML
INJECTION, SOLUTION INTRAVENOUS CONTINUOUS
Status: ACTIVE | OUTPATIENT
Start: 2022-05-29 | End: 2022-05-30

## 2022-05-29 RX ORDER — DEXTROSE MONOHYDRATE 50 MG/ML
100 INJECTION, SOLUTION INTRAVENOUS PRN
Status: DISCONTINUED | OUTPATIENT
Start: 2022-05-29 | End: 2022-06-04 | Stop reason: HOSPADM

## 2022-05-29 RX ORDER — ALOGLIPTIN 25 MG/1
25 TABLET, FILM COATED ORAL DAILY
Status: DISCONTINUED | OUTPATIENT
Start: 2022-05-30 | End: 2022-05-30

## 2022-05-29 RX ORDER — DEXTROSE AND SODIUM CHLORIDE 5; .9 G/100ML; G/100ML
INJECTION, SOLUTION INTRAVENOUS CONTINUOUS
Status: DISCONTINUED | OUTPATIENT
Start: 2022-05-29 | End: 2022-05-30

## 2022-05-29 RX ORDER — SODIUM CHLORIDE 0.9 % (FLUSH) 0.9 %
10 SYRINGE (ML) INJECTION EVERY 12 HOURS SCHEDULED
Status: DISCONTINUED | OUTPATIENT
Start: 2022-05-29 | End: 2022-06-04 | Stop reason: HOSPADM

## 2022-05-29 RX ORDER — ENOXAPARIN SODIUM 100 MG/ML
40 INJECTION SUBCUTANEOUS DAILY
Status: DISCONTINUED | OUTPATIENT
Start: 2022-05-30 | End: 2022-06-04 | Stop reason: HOSPADM

## 2022-05-29 RX ORDER — GLIPIZIDE 5 MG/1
5 TABLET ORAL 2 TIMES DAILY
Status: DISCONTINUED | OUTPATIENT
Start: 2022-05-29 | End: 2022-06-04 | Stop reason: HOSPADM

## 2022-05-29 RX ORDER — 0.9 % SODIUM CHLORIDE 0.9 %
1000 INTRAVENOUS SOLUTION INTRAVENOUS ONCE
Status: COMPLETED | OUTPATIENT
Start: 2022-05-29 | End: 2022-05-29

## 2022-05-29 RX ORDER — SODIUM CHLORIDE 9 MG/ML
INJECTION, SOLUTION INTRAVENOUS PRN
Status: DISCONTINUED | OUTPATIENT
Start: 2022-05-29 | End: 2022-06-04 | Stop reason: HOSPADM

## 2022-05-29 RX ORDER — DILTIAZEM HYDROCHLORIDE 5 MG/ML
10 INJECTION INTRAVENOUS ONCE
Status: DISCONTINUED | OUTPATIENT
Start: 2022-05-29 | End: 2022-05-29

## 2022-05-29 RX ORDER — SODIUM CHLORIDE, SODIUM LACTATE, POTASSIUM CHLORIDE, AND CALCIUM CHLORIDE .6; .31; .03; .02 G/100ML; G/100ML; G/100ML; G/100ML
1000 INJECTION, SOLUTION INTRAVENOUS ONCE
Status: COMPLETED | OUTPATIENT
Start: 2022-05-29 | End: 2022-05-29

## 2022-05-29 RX ORDER — ACETAMINOPHEN 650 MG/1
650 SUPPOSITORY RECTAL EVERY 6 HOURS PRN
Status: DISCONTINUED | OUTPATIENT
Start: 2022-05-29 | End: 2022-06-04 | Stop reason: HOSPADM

## 2022-05-29 RX ORDER — DILTIAZEM HYDROCHLORIDE 5 MG/ML
10 INJECTION INTRAVENOUS ONCE
Status: COMPLETED | OUTPATIENT
Start: 2022-05-29 | End: 2022-05-29

## 2022-05-29 RX ORDER — PROCHLORPERAZINE EDISYLATE 5 MG/ML
10 INJECTION INTRAMUSCULAR; INTRAVENOUS ONCE
Status: COMPLETED | OUTPATIENT
Start: 2022-05-29 | End: 2022-05-29

## 2022-05-29 RX ORDER — ACETAMINOPHEN 325 MG/1
650 TABLET ORAL EVERY 6 HOURS PRN
Status: DISCONTINUED | OUTPATIENT
Start: 2022-05-29 | End: 2022-06-04 | Stop reason: HOSPADM

## 2022-05-29 RX ORDER — ASPIRIN 81 MG/1
81 TABLET ORAL DAILY
Status: DISCONTINUED | OUTPATIENT
Start: 2022-05-30 | End: 2022-06-04 | Stop reason: HOSPADM

## 2022-05-29 RX ORDER — ACETAMINOPHEN 500 MG
1000 TABLET ORAL ONCE
Status: COMPLETED | OUTPATIENT
Start: 2022-05-29 | End: 2022-05-29

## 2022-05-29 RX ORDER — INSULIN LISPRO 100 [IU]/ML
0-3 INJECTION, SOLUTION INTRAVENOUS; SUBCUTANEOUS NIGHTLY
Status: DISCONTINUED | OUTPATIENT
Start: 2022-05-29 | End: 2022-05-30

## 2022-05-29 RX ORDER — MAGNESIUM SULFATE IN WATER 40 MG/ML
2000 INJECTION, SOLUTION INTRAVENOUS PRN
Status: DISCONTINUED | OUTPATIENT
Start: 2022-05-29 | End: 2022-06-04 | Stop reason: HOSPADM

## 2022-05-29 RX ORDER — POLYETHYLENE GLYCOL 3350 17 G/17G
17 POWDER, FOR SOLUTION ORAL DAILY PRN
Status: DISCONTINUED | OUTPATIENT
Start: 2022-05-29 | End: 2022-06-04 | Stop reason: HOSPADM

## 2022-05-29 RX ORDER — DEXTROSE MONOHYDRATE 25 G/50ML
12.5 INJECTION, SOLUTION INTRAVENOUS PRN
Status: DISCONTINUED | OUTPATIENT
Start: 2022-05-29 | End: 2022-06-04 | Stop reason: HOSPADM

## 2022-05-29 RX ORDER — SODIUM CHLORIDE, SODIUM LACTATE, POTASSIUM CHLORIDE, AND CALCIUM CHLORIDE .6; .31; .03; .02 G/100ML; G/100ML; G/100ML; G/100ML
1000 INJECTION, SOLUTION INTRAVENOUS ONCE
Status: DISCONTINUED | OUTPATIENT
Start: 2022-05-29 | End: 2022-05-29

## 2022-05-29 RX ORDER — LABETALOL 20 MG/4 ML (5 MG/ML) INTRAVENOUS SYRINGE
10 ONCE
Status: DISCONTINUED | OUTPATIENT
Start: 2022-05-29 | End: 2022-05-29

## 2022-05-29 RX ORDER — FENTANYL CITRATE 50 UG/ML
50 INJECTION, SOLUTION INTRAMUSCULAR; INTRAVENOUS ONCE
Status: COMPLETED | OUTPATIENT
Start: 2022-05-29 | End: 2022-05-29

## 2022-05-29 RX ORDER — POTASSIUM CHLORIDE 7.45 MG/ML
10 INJECTION INTRAVENOUS PRN
Status: DISCONTINUED | OUTPATIENT
Start: 2022-05-29 | End: 2022-06-04 | Stop reason: HOSPADM

## 2022-05-29 RX ORDER — ONDANSETRON 2 MG/ML
4 INJECTION INTRAMUSCULAR; INTRAVENOUS EVERY 6 HOURS PRN
Status: DISCONTINUED | OUTPATIENT
Start: 2022-05-29 | End: 2022-06-04 | Stop reason: HOSPADM

## 2022-05-29 RX ORDER — METOPROLOL TARTRATE 50 MG/1
50 TABLET, FILM COATED ORAL 2 TIMES DAILY
Status: DISCONTINUED | OUTPATIENT
Start: 2022-05-30 | End: 2022-06-02

## 2022-05-29 RX ORDER — DROPERIDOL 2.5 MG/ML
2.5 INJECTION, SOLUTION INTRAMUSCULAR; INTRAVENOUS EVERY 6 HOURS PRN
Status: DISCONTINUED | OUTPATIENT
Start: 2022-05-29 | End: 2022-05-29 | Stop reason: RX

## 2022-05-29 RX ORDER — MAGNESIUM HYDROXIDE/ALUMINUM HYDROXICE/SIMETHICONE 120; 1200; 1200 MG/30ML; MG/30ML; MG/30ML
30 SUSPENSION ORAL EVERY 6 HOURS PRN
Status: DISCONTINUED | OUTPATIENT
Start: 2022-05-29 | End: 2022-06-04 | Stop reason: HOSPADM

## 2022-05-29 RX ORDER — POTASSIUM CHLORIDE 20 MEQ/1
40 TABLET, EXTENDED RELEASE ORAL PRN
Status: DISCONTINUED | OUTPATIENT
Start: 2022-05-29 | End: 2022-06-04 | Stop reason: HOSPADM

## 2022-05-29 RX ORDER — ONDANSETRON 4 MG/1
4 TABLET, ORALLY DISINTEGRATING ORAL EVERY 8 HOURS PRN
Status: DISCONTINUED | OUTPATIENT
Start: 2022-05-29 | End: 2022-06-04 | Stop reason: HOSPADM

## 2022-05-29 RX ORDER — ONDANSETRON 2 MG/ML
INJECTION INTRAMUSCULAR; INTRAVENOUS
Status: COMPLETED
Start: 2022-05-29 | End: 2022-05-29

## 2022-05-29 RX ORDER — SODIUM CHLORIDE, SODIUM LACTATE, POTASSIUM CHLORIDE, CALCIUM CHLORIDE 600; 310; 30; 20 MG/100ML; MG/100ML; MG/100ML; MG/100ML
500 INJECTION, SOLUTION INTRAVENOUS ONCE
Status: COMPLETED | OUTPATIENT
Start: 2022-05-29 | End: 2022-05-29

## 2022-05-29 RX ORDER — ONDANSETRON 2 MG/ML
4 INJECTION INTRAMUSCULAR; INTRAVENOUS ONCE
Status: COMPLETED | OUTPATIENT
Start: 2022-05-29 | End: 2022-05-29

## 2022-05-29 RX ORDER — LISINOPRIL 20 MG/1
20 TABLET ORAL DAILY
Status: DISCONTINUED | OUTPATIENT
Start: 2022-05-29 | End: 2022-05-31

## 2022-05-29 RX ORDER — INSULIN LISPRO 100 [IU]/ML
0-6 INJECTION, SOLUTION INTRAVENOUS; SUBCUTANEOUS
Status: DISCONTINUED | OUTPATIENT
Start: 2022-05-30 | End: 2022-05-30

## 2022-05-29 RX ORDER — METOPROLOL TARTRATE 50 MG/1
25 TABLET, FILM COATED ORAL ONCE
Status: COMPLETED | OUTPATIENT
Start: 2022-05-29 | End: 2022-05-29

## 2022-05-29 RX ORDER — SODIUM CHLORIDE 0.9 % (FLUSH) 0.9 %
10 SYRINGE (ML) INJECTION PRN
Status: DISCONTINUED | OUTPATIENT
Start: 2022-05-29 | End: 2022-06-04 | Stop reason: HOSPADM

## 2022-05-29 RX ORDER — INSULIN GLARGINE 100 [IU]/ML
20 INJECTION, SOLUTION SUBCUTANEOUS NIGHTLY
Status: DISCONTINUED | OUTPATIENT
Start: 2022-05-29 | End: 2022-05-31

## 2022-05-29 RX ADMIN — SODIUM CHLORIDE 5.7 UNITS/HR: 9 INJECTION, SOLUTION INTRAVENOUS at 17:59

## 2022-05-29 RX ADMIN — ONDANSETRON 4 MG: 2 INJECTION INTRAMUSCULAR; INTRAVENOUS at 20:01

## 2022-05-29 RX ADMIN — FENTANYL CITRATE 50 MCG: 50 INJECTION, SOLUTION INTRAMUSCULAR; INTRAVENOUS at 15:40

## 2022-05-29 RX ADMIN — SODIUM CHLORIDE, POTASSIUM CHLORIDE, SODIUM LACTATE AND CALCIUM CHLORIDE 1000 ML: 600; 310; 30; 20 INJECTION, SOLUTION INTRAVENOUS at 16:23

## 2022-05-29 RX ADMIN — INSULIN LISPRO 1 UNITS: 100 INJECTION, SOLUTION INTRAVENOUS; SUBCUTANEOUS at 23:51

## 2022-05-29 RX ADMIN — ACETAMINOPHEN 1000 MG: 500 TABLET ORAL at 15:42

## 2022-05-29 RX ADMIN — SODIUM CHLORIDE, PRESERVATIVE FREE 10 ML: 5 INJECTION INTRAVENOUS at 23:31

## 2022-05-29 RX ADMIN — ONDANSETRON 4 MG: 2 INJECTION INTRAMUSCULAR; INTRAVENOUS at 15:38

## 2022-05-29 RX ADMIN — SODIUM CHLORIDE, POTASSIUM CHLORIDE, SODIUM LACTATE AND CALCIUM CHLORIDE 1000 ML: 600; 310; 30; 20 INJECTION, SOLUTION INTRAVENOUS at 20:56

## 2022-05-29 RX ADMIN — SODIUM CHLORIDE, POTASSIUM CHLORIDE, SODIUM LACTATE AND CALCIUM CHLORIDE 1000 ML: 600; 310; 30; 20 INJECTION, SOLUTION INTRAVENOUS at 15:57

## 2022-05-29 RX ADMIN — FENTANYL CITRATE 50 MCG: 50 INJECTION, SOLUTION INTRAMUSCULAR; INTRAVENOUS at 18:49

## 2022-05-29 RX ADMIN — METOPROLOL TARTRATE 25 MG: 50 TABLET, FILM COATED ORAL at 15:42

## 2022-05-29 RX ADMIN — PROCHLORPERAZINE EDISYLATE 10 MG: 5 INJECTION INTRAMUSCULAR; INTRAVENOUS at 21:22

## 2022-05-29 RX ADMIN — DEXTROSE AND SODIUM CHLORIDE: 5; 900 INJECTION, SOLUTION INTRAVENOUS at 21:26

## 2022-05-29 RX ADMIN — IOPAMIDOL 80 ML: 755 INJECTION, SOLUTION INTRAVENOUS at 17:52

## 2022-05-29 RX ADMIN — SODIUM CHLORIDE, POTASSIUM CHLORIDE, SODIUM LACTATE AND CALCIUM CHLORIDE: 600; 310; 30; 20 INJECTION, SOLUTION INTRAVENOUS at 23:31

## 2022-05-29 RX ADMIN — SODIUM CHLORIDE 8.8 UNITS/HR: 9 INJECTION, SOLUTION INTRAVENOUS at 19:53

## 2022-05-29 RX ADMIN — SODIUM CHLORIDE 1000 ML: 9 INJECTION, SOLUTION INTRAVENOUS at 15:28

## 2022-05-29 RX ADMIN — SODIUM CHLORIDE, SODIUM LACTATE, POTASSIUM CHLORIDE, CALCIUM CHLORIDE 500 ML: 600; 310; 30; 20 INJECTION, SOLUTION INTRAVENOUS at 22:53

## 2022-05-29 RX ADMIN — DILTIAZEM HYDROCHLORIDE 5 MG: 5 INJECTION INTRAVENOUS at 22:54

## 2022-05-29 RX ADMIN — HYDROMORPHONE HYDROCHLORIDE 0.5 MG: 1 INJECTION, SOLUTION INTRAMUSCULAR; INTRAVENOUS; SUBCUTANEOUS at 21:20

## 2022-05-29 RX ADMIN — ACETAMINOPHEN 650 MG: 650 SUPPOSITORY RECTAL at 22:46

## 2022-05-29 RX ADMIN — CEFTRIAXONE SODIUM 1000 MG: 1 INJECTION, POWDER, FOR SOLUTION INTRAMUSCULAR; INTRAVENOUS at 15:35

## 2022-05-29 ASSESSMENT — PAIN DESCRIPTION - FREQUENCY: FREQUENCY: CONTINUOUS

## 2022-05-29 ASSESSMENT — PAIN - FUNCTIONAL ASSESSMENT: PAIN_FUNCTIONAL_ASSESSMENT: 0-10

## 2022-05-29 ASSESSMENT — ENCOUNTER SYMPTOMS
SHORTNESS OF BREATH: 0
SORE THROAT: 0
CHEST TIGHTNESS: 0
EYE REDNESS: 0
BACK PAIN: 0
NAUSEA: 0
COUGH: 0
VOMITING: 0
RHINORRHEA: 0
DIARRHEA: 0
SINUS PAIN: 0
ABDOMINAL PAIN: 1

## 2022-05-29 ASSESSMENT — PAIN DESCRIPTION - LOCATION
LOCATION: ABDOMEN
LOCATION: ABDOMEN

## 2022-05-29 ASSESSMENT — PAIN SCALES - GENERAL
PAINLEVEL_OUTOF10: 6
PAINLEVEL_OUTOF10: 3
PAINLEVEL_OUTOF10: 9
PAINLEVEL_OUTOF10: 10
PAINLEVEL_OUTOF10: 3
PAINLEVEL_OUTOF10: 5
PAINLEVEL_OUTOF10: 9

## 2022-05-29 ASSESSMENT — PAIN DESCRIPTION - DESCRIPTORS
DESCRIPTORS: ACHING
DESCRIPTORS: ACHING

## 2022-05-29 ASSESSMENT — PAIN DESCRIPTION - PAIN TYPE
TYPE: ACUTE PAIN
TYPE: ACUTE PAIN

## 2022-05-29 ASSESSMENT — PAIN DESCRIPTION - ORIENTATION: ORIENTATION: RIGHT;LEFT

## 2022-05-29 NOTE — ED NOTES
ED to inpatient nurses report    Chief Complaint   Patient presents with    Dysuria     fever    Abdominal Pain     (B) lower      Present to ED from home  LOC: alert and orientated to name, place, date  Vital signs   Vitals:    05/29/22 1542 05/29/22 1601 05/29/22 1708 05/29/22 1801   BP: (!) 221/117 (!) 226/189 (!) 147/90 (!) 171/88   Pulse: (!) 137 (!) 143 (!) 117 (!) 119   Resp:  28 18 20   Temp:   99.4 °F (37.4 °C)    TempSrc:   Oral    SpO2:  94% 95% 95%   Weight:          Oxygen Baseline room air    Current needs required room air Bipap/Cpap No  LDAs:   Peripheral IV 05/29/22 Right Antecubital (Active)   Site Assessment Clean, dry & intact 05/29/22 1528     Mobility: Independent  Pending ED orders: none  Present condition: stable.     Electronically signed by Mercedez Jenkins RN on 5/29/2022 at 7:00 PM       Mercedez Jenkins RN  05/29/22 1905

## 2022-05-29 NOTE — ED PROVIDER NOTES
[facility]          Pt Name: Edmar Vicente  MRN: [id number]  [place] 1970  Date of evaluation: 5/29/2022  Treating Resident Physician: Dottie Veras MD  Supervising Physician: Dr Amisha Thomas   Patient presents with    Dysuria     fever    Abdominal Pain     (B) lower     History obtained from the patient. HISTORY OF PRESENT ILLNESS    HPI  Edmar Vicente is a 50 y.o. female with past medical history of essential hypertension, diabetes mellitus, COVID-19, cholecystectomy who presents to the emergency department for evaluation of urinary tract infection, dehydration. She has had dysuria frequency last 3 days. She had fever and chills at home. She has not been taking her hypertensive medications or diabetic medications. Comes in with lower abdominal pain as well as been present last few days is waxing and waning sharp in nature. Denies any vaginal pain vaginal discharge. The patient has no other acute complaints at this time. REVIEW OF SYSTEMS   Review of Systems   Constitutional: Positive for chills, fatigue and fever. HENT: Negative for rhinorrhea, sinus pain and sore throat. Eyes: Negative for redness. Respiratory: Negative for cough and shortness of breath. Cardiovascular: Negative for chest pain. Gastrointestinal: Positive for abdominal pain. Negative for diarrhea, nausea and vomiting. Genitourinary: Positive for dysuria and frequency. Negative for vaginal bleeding and vaginal discharge. Musculoskeletal: Negative for back pain. Skin: Negative for rash. Neurological: Negative for light-headedness and headaches. Psychiatric/Behavioral: Negative for agitation.          PAST MEDICAL AND SURGICAL HISTORY     Past Medical History:   Diagnosis Date    COVID-19 11/2021    Diabetes mellitus ([address]     Hypertension      Past Surgical History:   Procedure Laterality Date    CHOLECYSTECTOMY      ENDOMETRIAL ABLATION      US BREAST NEEDLE BIOPSY RIGHT Right 09/03/2014    benign         MEDICATIONS     Current Facility-Administered Medications:     dextrose 50 % IV solution, 12.5 g, IntraVENous, PRN, Franky Emmanuel MD    insulin regular (HUMULIN R;NOVOLIN R) 100 Units in sodium chloride 0.9 % 100 mL infusion, 1-50 Units/hr, IntraVENous, Continuous, Franky Emmanuel MD, Last Rate: 5.7 mL/hr at 05/29/22 1759, 5.7 Units/hr at 05/29/22 1759    fentaNYL (SUBLIMAZE) injection 50 mcg, 50 mcg, IntraVENous, Once, Franky Emmanuel MD    Current Outpatient Medications:     aspirin EC 81 MG EC tablet, Take 1 tablet by mouth daily, Disp: 90 tablet, Rfl: 1    lisinopril-hydroCHLOROthiazide (PRINZIDE;ZESTORETIC) 20-12.5 MG per tablet, Take 2 tablets by mouth daily, Disp: 60 tablet, Rfl: 5    metoprolol tartrate (LOPRESSOR) 25 MG tablet, Take 1 tablet by mouth 2 times daily, Disp: 60 tablet, Rfl: 0    metFORMIN (GLUCOPHAGE) 500 MG tablet, Take 1 tablet by mouth 2 times daily (with meals) Indications: two tablets twice daily, Disp: 60 tablet, Rfl: 0    sertraline (ZOLOFT) 50 MG tablet, Take 1 tablet by mouth daily, Disp: 30 tablet, Rfl: 0    SITagliptin (JANUVIA) 100 MG tablet, Take 1 tablet by mouth daily, Disp: 30 tablet, Rfl: 0    glipiZIDE (GLUCOTROL) 5 MG tablet, Take 1 tablet by mouth 2 times daily, Disp: 60 tablet, Rfl: 3      SOCIAL HISTORY     Social History     Social History Narrative    Not on file     Social History     Tobacco Use    Smoking status: Never Smoker    Smokeless tobacco: Never Used   Vaping Use    Vaping Use: Never used   Substance Use Topics    Alcohol use: No    Drug use: No         ALLERGIES     Allergies   Allergen Reactions    Penicillins Swelling         FAMILY HISTORY   History reviewed. No pertinent family history. PREVIOUS RECORDS   Previous records reviewed: Patient was seen on 12/29/2021.        PHYSICAL EXAM     ED Triage Vitals [05/29/22 1406]   BP Temp Temp Source Heart Rate Resp SpO2 Height Weight   (!) 211/116 (!) 100.5 °F (38.1 °C) Temporal (!) 128 20 97 % -- 191 lb (86.6 kg)     Initial vital signs and nursing assessment reviewed and abnormal from hypertensive, febrile, tachycardic. Pulsoximetry is normal per my interpretation. Additional Vital Signs:  Vitals:    05/29/22 1801   BP: (!) 171/88   Pulse: (!) 119   Resp: 20   Temp:    SpO2: 95%       Physical Exam  Vitals and nursing note reviewed. Constitutional:       General: She is in acute distress. Appearance: She is ill-appearing. She is not toxic-appearing. HENT:      Head: Normocephalic and atraumatic. Right Ear: External ear normal.      Left Ear: External ear normal.      Nose: Nose normal.      Mouth/Throat:      Mouth: Mucous membranes are moist.      Pharynx: Oropharynx is clear. Eyes:      General: No scleral icterus. Conjunctiva/sclera: Conjunctivae normal.   Cardiovascular:      Rate and Rhythm: Regular rhythm. Tachycardia present. Pulses: Normal pulses. Heart sounds: Normal heart sounds. Pulmonary:      Effort: Pulmonary effort is normal. No respiratory distress. Breath sounds: Normal breath sounds. Abdominal:      General: Abdomen is flat. There is no distension. Palpations: Abdomen is soft. Tenderness: There is abdominal tenderness. There is no guarding or rebound. Comments: Lower abdominal tenderness to palpation. Musculoskeletal:         General: Normal range of motion. Cervical back: Normal range of motion and neck supple. No rigidity. No muscular tenderness. Lymphadenopathy:      Cervical: No cervical adenopathy. Skin:     General: Skin is warm and dry. Capillary Refill: Capillary refill takes less than 2 seconds. Coloration: Skin is not jaundiced. Neurological:      General: No focal deficit present.       Mental Status: She is alert and oriented to person, place, and time.   Psychiatric:         Mood and Affect: Mood normal.         Behavior: Behavior normal.             MEDICAL DECISION MAKING   Initial Assessment: This is a 61-year-old female with past medical history of hypertension diabetes mellitus with lab take her medication last 2 days. Is coming with lower abdominal pain, dysuria, frequency, fever and chills at home and last 3 days. Differential Diagnosis Included but not limited to: DKA, sepsis, UTI, cystitis, pyelonephritis, intra-abdominal abscess    MDM:   Patient is significantly elevated pediatrics. With her glucose anion gap she is in DKA she is given multiple liters of fluids Here in the Emergency Department As Well As Her on Insulin Infusion. Her Symptoms Have Improved. She Was Recognized As Being High Risk for Sirs Given Antibiotics Shortly after Arrival As Well As Blood Cultures Were Obtained and Lactic Acid Was Ordered. She Is Not Hypotensive Does Not Require the Full 30 Cc/Kg Bolus However Due To Her DKA Will Benefit Tremendously from Fluid Resuscitation. ED RESULTS   Laboratory results:  Labs Reviewed   URINALYSIS - Abnormal; Notable for the following components:       Result Value    Glucose, Ur 500 (*)     Blood, Urine Moderate (*)     Protein, UA >= 300 (*)     Nitrite, Urine Positive (*)     Leukocyte Esterase, Urine Trace (*)     Color, UA Dark yellow (*)     Character, Urine Cloudy (*)     All other components within normal limits   CBC WITH AUTO DIFFERENTIAL - Abnormal; Notable for the following components:    WBC 4.5 (*)     Platelets 444 (*)     Lymphocytes Absolute 0.3 (*)     Monocytes Absolute 0.1 (*)     All other components within normal limits   BASIC METABOLIC PANEL W/ REFLEX TO MG FOR LOW K - Abnormal; Notable for the following components:    Sodium 133 (*)     Chloride 95 (*)     Glucose 411 (*)     All other components within normal limits   HEPATIC FUNCTION PANEL - Abnormal; Notable for the following components:     Total 100 Units in sodium chloride 0.9 % 100 mL infusion (5.7 Units/hr IntraVENous New Bag 5/29/22 1759)   fentaNYL (SUBLIMAZE) injection 50 mcg (has no administration in time range)   0.9 % sodium chloride bolus (0 mLs IntraVENous Stopped 5/29/22 1557)   acetaminophen (TYLENOL) tablet 1,000 mg (1,000 mg Oral Given 5/29/22 1542)   cefTRIAXone (ROCEPHIN) 1000 mg IVPB in 50 mL D5W minibag (0 mg IntraVENous Stopped 5/29/22 1604)   lactated ringers bolus (0 mLs IntraVENous Stopped 5/29/22 1712)   fentaNYL (SUBLIMAZE) injection 50 mcg (50 mcg IntraVENous Given 5/29/22 1540)   metoprolol tartrate (LOPRESSOR) tablet 25 mg (25 mg Oral Given 5/29/22 1542)   ondansetron (ZOFRAN) injection 4 mg (4 mg IntraVENous Given 5/29/22 1538)   lactated ringers bolus (0 mLs IntraVENous Stopped 5/29/22 1801)   iopamidol (ISOVUE-370) 76 % injection 80 mL (80 mLs IntraVENous Given 5/29/22 1752)         ED COURSE     ED Course as of 05/29/22 1837   Sun May 29, 2022   1511 Glucose, UA(!): 500 [AL]   1511 Blood, Urine(!): Moderate [AL]   1511 Protein, UA(!): >= 300 [AL]   1511 Nitrite, Urine(!): Positive [AL]   1511 Leukocyte Esterase, Urine(!): Trace [AL]   1511 Color, UA(!): Dark yellow [AL]   1511 Character, Urine(!): Cloudy [AL]   1534 POC Glucose(!): 430 [AL]   1605 Beta-Hydroxybutyrate(!): 10.15 [AL]   1605 Lactic Acid, Sepsis(!): 2.7 [AL]   1605 WBC(!): 4.5 [AL]   1605 Hemoglobin Quant: 13.6 [AL]   1605 Hematocrit: 40.4 [AL]   1605 Platelet Count(!): 629 [AL]   1606 Lactic Acid, Sepsis(!): 2.7 [AL]   1635 Bilirubin, Direct(!): 1.6 [AL]   1635 Alk Phos(!): 162 [AL]   1635 AST(!): 182 [AL]   1635 ALT(!): 136 [AL]   1716 Heart Rate(!): 117 [AL]   1716 BP(!): 147/90 [AL]      ED Course User Index  [AL] Jesusita Badillo MD       MEDICATION CHANGES     New Prescriptions    No medications on file         FINAL DISPOSITION     Final diagnoses:   Septicemia [address] [address]   Acute UTI   Diabetic ketoacidosis without coma associated with type 2 diabetes

## 2022-05-29 NOTE — ED NOTES
Report received from [facility]. Pt resting in bed. Respirations unlabored on room air. Denies further needs at this time. Call light within reach.       Edward Chung RN  05/29/22 0752

## 2022-05-29 NOTE — ED PROVIDER NOTES
[facility] EMERGENCY DEPT  Urgent Care Encounter       CHIEF COMPLAINT       Chief Complaint   Patient presents with    Dysuria     fever    Abdominal Pain     (B) lower       Nurses Notes reviewed and I agree except as noted in the HPI. HISTORY OF PRESENT ILLNESS   Rebecca Swain is a 50 y.o. female who presents to the [facility] urgent care for evaluation of dysuria and abdominal pain. She reports the dysuria started Wednesday, 5 days ago. She reports roughly 1 hour prior to arrival developing severe suprapubic abdominal pain. She reports her dysuria as dysuria, urinary frequency, and urgency. She denies fever or chills. She is noted to be febrile on arrival at 100.5. She is also noted to be tachycardic at a heart rate of 134. She reports that she did not take her antihypertensive medications prior to arrival.  Her blood pressure was taken 3 times. All 3 readings had a systolic blood pressure of greater than 200. She denies nausea, vomiting and diarrhea. Denies nasal congestion, rhinorrhea, or postnasal drainage. Denies chest pain or dyspnea. She was sitting in the chair with her head down on the examination table on arrival for my assessment. Patient reported she had her head down trying to help alleviate the pain. The history is provided by the patient. No  was used. REVIEW OF SYSTEMS     Review of Systems   Constitutional: Negative for activity change, appetite change, chills, fatigue and fever. HENT: Negative for ear discharge, ear pain, rhinorrhea and sore throat. Respiratory: Negative for cough, chest tightness and shortness of breath. Cardiovascular: Negative for chest pain. Gastrointestinal: Positive for abdominal pain. Negative for diarrhea, nausea and vomiting. Genitourinary: Positive for dysuria, frequency and urgency. Skin: Negative for rash. Allergic/Immunologic: Negative for environmental allergies and food allergies.    Neurological: Negative for dizziness and headaches. PAST MEDICAL HISTORY         Diagnosis Date    COVID-19 11/2021    Diabetes mellitus ([facility] [address]     Hypertension        SURGICALHISTORY     Patient  has a past surgical history that includes Cholecystectomy; Endometrial ablation; and US BREAST BIOPSY W LOC DEVICE 1ST LESION RIGHT (Right, 09/03/2014). CURRENT MEDICATIONS       Previous Medications    ASPIRIN EC 81 MG EC TABLET    Take 1 tablet by mouth daily    GLIPIZIDE (GLUCOTROL) 5 MG TABLET    Take 1 tablet by mouth 2 times daily    LISINOPRIL-HYDROCHLOROTHIAZIDE (PRINZIDE;ZESTORETIC) 20-12.5 MG PER TABLET    Take 2 tablets by mouth daily    METFORMIN (GLUCOPHAGE) 500 MG TABLET    Take 1 tablet by mouth 2 times daily (with meals) Indications: two tablets twice daily    METOPROLOL TARTRATE (LOPRESSOR) 25 MG TABLET    Take 1 tablet by mouth 2 times daily    SERTRALINE (ZOLOFT) 50 MG TABLET    Take 1 tablet by mouth daily    SITAGLIPTIN (JANUVIA) 100 MG TABLET    Take 1 tablet by mouth daily       ALLERGIES     Patient is is allergic to penicillins. Patients   There is no immunization history on file for this patient. FAMILY HISTORY     Patient's family history is not on file. SOCIAL HISTORY     Patient  reports that she has never smoked. She has never used smokeless tobacco. She reports that she does not drink alcohol and does not use drugs. PHYSICAL EXAM     ED TRIAGE VITALS  BP: (!) 201/98, Temp: (!) 100.5 °F (38.1 °C), Heart Rate: (!) 134, Resp: 20, SpO2: 97 %,Estimated body mass index is 32.79 kg/m² as calculated from the following:    Height as of 12/30/21: 5' 4\" (1.626 m). Weight as of this encounter: 191 lb (86.6 kg). ,No LMP recorded. Patient has had an ablation. Physical Exam  Vitals and nursing note reviewed. Constitutional:       General: She is not in acute distress. Appearance: Normal appearance. She is not ill-appearing, toxic-appearing or diaphoretic.    HENT:      Head: Normocephalic. Right Ear: Ear canal and external ear normal.      Left Ear: Ear canal and external ear normal.      Nose: Nose normal. No congestion or rhinorrhea. Mouth/Throat:      Mouth: Mucous membranes are moist.      Pharynx: Oropharynx is clear. No oropharyngeal exudate or posterior oropharyngeal erythema. Cardiovascular:      Rate and Rhythm: Tachycardia present. Pulses: Normal pulses. Pulmonary:      Effort: Pulmonary effort is normal. No respiratory distress. Breath sounds: No stridor. No wheezing or rhonchi. Abdominal:      General: Abdomen is flat. Bowel sounds are normal.      Palpations: Abdomen is soft. Tenderness: There is abdominal tenderness in the right upper quadrant and suprapubic area. Musculoskeletal:         General: No swelling or tenderness. Normal range of motion. Cervical back: Normal range of motion. Neurological:      General: No focal deficit present. Mental Status: She is alert and oriented to person, place, and time.    Psychiatric:         Mood and Affect: Mood normal.         Behavior: Behavior normal.         DIAGNOSTIC RESULTS     Labs:  Results for orders placed or performed during the hospital encounter of 05/29/22   Urinalysis   Result Value Ref Range    Glucose, Ur 500 (A) NEGATIVE mg/dl    Bilirubin Urine Negative NEGATIVE    Ketones, Urine 15 NEGATIVE    Specific Gravity, UA 1.025 1.002 - 1.030    Blood, Urine Moderate (A) NEGATIVE    pH, UA 5.50 5.0 - 9.0    Protein, UA >= 300 (A) NEGATIVE mg/dl    Urobilinogen, Urine 0.20 0.2 - 1.0 eu/dl    Nitrite, Urine Positive (A) NEGATIVE    Leukocyte Esterase, Urine Trace (A) NEGATIVE    Color, UA Dark yellow (A) STRAW-YELLOW    Character, Urine Cloudy (A) CLEAR-SL CLOUD       IMAGING:    No orders to display         EKG: None      URGENT CARE COURSE:     Vitals:    05/29/22 1406 05/29/22 1409 05/29/22 1413 05/29/22 1414   BP: (!) 211/116 (!) 228/112 (!) 201/98    Pulse: (!) 128   (!) 134 Resp: 20      Temp: (!) 100.5 °F (38.1 °C)      TempSrc: Temporal      SpO2: 97%      Weight: 191 lb (86.6 kg)          Medications - No data to display         PROCEDURES:  None    FINAL IMPRESSION      1. Suprapubic abdominal pain          DISPOSITION/ PLAN     Patient seen and evaluated for abdominal pain and dysuria. A urinalysis was obtained revealing trace leukocytes, positive nitrites, moderate blood, greater than 300 protein, and glucose of 500. She does appear to have severe suprapubic abdominal pain. She is noted to be tender and guarding. Due to the abdominal pain, hypertension, and tachycardia I did elect to transfer the patient to [facility] emergency department. I did discuss the transfer with [facility]. The patient is instructed to go directly to the emergency department for further evaluation. She is instructed not to eat or drink on the way. Patient is agreeable to the above plan and denies questions or concerns at this time. PATIENT REFERRED TO:  Ayan Dia MD  [address] [address] / [facility] [id number]      DISCHARGE MEDICATIONS:  New Prescriptions    No medications on file       Discontinued Medications    No medications on file       Current Discharge Medication List          SPENCER Saha CNP    (Please note that portions of this note were completed with a voice recognition program. Efforts were made to edit the dictations but occasionally words are mis-transcribed.)           SPENCER Saha CNP  05/29/22 1856

## 2022-05-29 NOTE — LETTER
[facility] ICU [facility]PDOWN TELEMET[facility]K  [address] [facility] [address]  Phone: [phone]    No name on file. June 4, 2022     Patient: Weston Walters   YOB: 1970   Date of Visit: 5/29/2022       To Whom It May Concern: It is my medical opinion that Dana Olivia may return to work on 06/20/2022. If you have any questions or concerns, please don't hesitate to call. Sincerely,        No name on file.

## 2022-05-29 NOTE — ED TRIAGE NOTES
Patient to room with c/o bilateral lower abdominal pain and pain with urination beginning two days ago. C/o fever and chills beginning yesterday evening. Urine specimen  obtained.

## 2022-05-29 NOTE — ED NOTES
Glucose 344 at this time. CT at bedside to take patient to imaging.       Fanta Nick RN  05/29/22 1088

## 2022-05-30 LAB
ACETAMINOPHEN LEVEL: 5.5 UG/ML (ref 0–20)
ACINETOBACTER BAUMANNII FILM ARRAY: NOT DETECTED
ALBUMIN SERPL-MCNC: 3 G/DL (ref 3.5–5.1)
ALBUMIN SERPL-MCNC: 3.2 G/DL (ref 3.5–5.1)
ALP BLD-CCNC: 156 U/L (ref 38–126)
ALP BLD-CCNC: 178 U/L (ref 38–126)
ALT SERPL-CCNC: 119 U/L (ref 11–66)
ALT SERPL-CCNC: 122 U/L (ref 11–66)
ANION GAP SERPL CALCULATED.3IONS-SCNC: 11 MEQ/L (ref 8–16)
ANION GAP SERPL CALCULATED.3IONS-SCNC: 11 MEQ/L (ref 8–16)
AST SERPL-CCNC: 106 U/L (ref 5–40)
AST SERPL-CCNC: 134 U/L (ref 5–40)
AVERAGE GLUCOSE: 309 MG/DL (ref 70–126)
BASOPHILS # BLD: 0.4 %
BASOPHILS ABSOLUTE: 0 THOU/MM3 (ref 0–0.1)
BILIRUB SERPL-MCNC: 2.5 MG/DL (ref 0.3–1.2)
BILIRUB SERPL-MCNC: 2.9 MG/DL (ref 0.3–1.2)
BILIRUBIN DIRECT: 2.3 MG/DL (ref 0–0.3)
BOTTLE TYPE: ABNORMAL
BUN BLDV-MCNC: 10 MG/DL (ref 7–22)
BUN BLDV-MCNC: 12 MG/DL (ref 7–22)
CALCIUM SERPL-MCNC: 8.3 MG/DL (ref 8.5–10.5)
CALCIUM SERPL-MCNC: 8.9 MG/DL (ref 8.5–10.5)
CANDIDA ALBICANS FILM ARRAY: NOT DETECTED
CANDIDA GLABRATA FILM ARRAY: NOT DETECTED
CANDIDA KRUSEI FILM ARRAY: NOT DETECTED
CANDIDA PARAPSILOSIS FILM ARRAY: NOT DETECTED
CANDIDA TROPICALIS FILM ARRAY: NOT DETECTED
CARBAPENEM RESITANT FILM ARRAY: NOT DETECTED
CHLORIDE BLD-SCNC: 100 MEQ/L (ref 98–111)
CHLORIDE BLD-SCNC: 98 MEQ/L (ref 98–111)
CO2: 23 MEQ/L (ref 23–33)
CO2: 23 MEQ/L (ref 23–33)
CREAT SERPL-MCNC: 0.5 MG/DL (ref 0.4–1.2)
CREAT SERPL-MCNC: 0.5 MG/DL (ref 0.4–1.2)
EKG ATRIAL RATE: 155 BPM
EKG ATRIAL RATE: 92 BPM
EKG P AXIS: 64 DEGREES
EKG P AXIS: 72 DEGREES
EKG P-R INTERVAL: 120 MS
EKG P-R INTERVAL: 140 MS
EKG Q-T INTERVAL: 250 MS
EKG Q-T INTERVAL: 340 MS
EKG QRS DURATION: 74 MS
EKG QRS DURATION: 82 MS
EKG QTC CALCULATION (BAZETT): 401 MS
EKG QTC CALCULATION (BAZETT): 420 MS
EKG R AXIS: -11 DEGREES
EKG R AXIS: -12 DEGREES
EKG T AXIS: 34 DEGREES
EKG T AXIS: 78 DEGREES
EKG VENTRICULAR RATE: 155 BPM
EKG VENTRICULAR RATE: 92 BPM
ENTERBACTER CLOACAE FILM ARRAY: NOT DETECTED
ENTERBACTERIACEAE FILM ARRAY: DETECTED
ENTEROCOCCUS FILM ARRAY: NOT DETECTED
EOSINOPHIL # BLD: 0 %
EOSINOPHILS ABSOLUTE: 0 THOU/MM3 (ref 0–0.4)
ERYTHROCYTE [DISTWIDTH] IN BLOOD BY AUTOMATED COUNT: 12.2 % (ref 11.5–14.5)
ERYTHROCYTE [DISTWIDTH] IN BLOOD BY AUTOMATED COUNT: 39.5 FL (ref 35–45)
ESCHERICHIA COLI FILM ARRAY: DETECTED
GFR SERPL CREATININE-BSD FRML MDRD: > 90 ML/MIN/1.73M2
GFR SERPL CREATININE-BSD FRML MDRD: > 90 ML/MIN/1.73M2
GLUCOSE BLD-MCNC: 211 MG/DL (ref 70–108)
GLUCOSE BLD-MCNC: 224 MG/DL (ref 70–108)
GLUCOSE BLD-MCNC: 253 MG/DL (ref 70–108)
GLUCOSE BLD-MCNC: 262 MG/DL (ref 70–108)
GLUCOSE BLD-MCNC: 288 MG/DL (ref 70–108)
GLUCOSE BLD-MCNC: 290 MG/DL (ref 70–108)
GLUCOSE BLD-MCNC: 296 MG/DL (ref 70–108)
GLUCOSE BLD-MCNC: 319 MG/DL (ref 70–108)
HAEMOPHILUS INFLUENZA FILM ARRAY: NOT DETECTED
HBA1C MFR BLD: 12.3 % (ref 4.4–6.4)
HCT VFR BLD CALC: 34.6 % (ref 37–47)
HEMOGLOBIN: 11.3 GM/DL (ref 12–16)
IMMATURE GRANS (ABS): 0.05 THOU/MM3 (ref 0–0.07)
IMMATURE GRANULOCYTES: 0.6 %
INR BLD: 1.27 (ref 0.85–1.13)
KLEBSIELLA OXYTOCA FILM ARRAY: NOT DETECTED
KLEBSIELLA PNEUMONIAE FILM ARRAY: NOT DETECTED
LACTIC ACID: 1.4 MMOL/L (ref 0.5–2)
LISTERIA MONOCYTOGENES FILM ARRAY: NOT DETECTED
LYMPHOCYTES # BLD: 9.8 %
LYMPHOCYTES ABSOLUTE: 0.8 THOU/MM3 (ref 1–4.8)
MAGNESIUM: 1.6 MG/DL (ref 1.6–2.4)
MCH RBC QN AUTO: 29.1 PG (ref 26–33)
MCHC RBC AUTO-ENTMCNC: 32.7 GM/DL (ref 32.2–35.5)
MCV RBC AUTO: 89.2 FL (ref 81–99)
METHICILLIN RESISTANT FILM ARRAY: ABNORMAL
MONOCYTES # BLD: 6.1 %
MONOCYTES ABSOLUTE: 0.5 THOU/MM3 (ref 0.4–1.3)
NEISSERIA MENIGITIDIS FILM ARRAY: NOT DETECTED
NUCLEATED RED BLOOD CELLS: 0 /100 WBC
PLATELET # BLD: 100 THOU/MM3 (ref 130–400)
PLATELET ESTIMATE: ADEQUATE
PMV BLD AUTO: 10 FL (ref 9.4–12.4)
POTASSIUM REFLEX MAGNESIUM: 3.3 MEQ/L (ref 3.5–5.2)
POTASSIUM REFLEX MAGNESIUM: 4.2 MEQ/L (ref 3.5–5.2)
PROTEUS FILM ARRAY: NOT DETECTED
PSEUDOMONAS AERUGINOSA FILM ARRAY: NOT DETECTED
RBC # BLD: 3.88 MILL/MM3 (ref 4.2–5.4)
SCAN OF BLOOD SMEAR: NORMAL
SEG NEUTROPHILS: 83.1 %
SEGMENTED NEUTROPHILS ABSOLUTE COUNT: 6.5 THOU/MM3 (ref 1.8–7.7)
SERRATIA MARCESCENS FILM ARRAY: NOT DETECTED
SODIUM BLD-SCNC: 132 MEQ/L (ref 135–145)
SODIUM BLD-SCNC: 134 MEQ/L (ref 135–145)
SOURCE OF BLOOD CULTURE: ABNORMAL
STAPH AUREUS FILM ARRAY: NOT DETECTED
STAPHYLOCOCCUS FILM ARRAY: NOT DETECTED
STREP AGALACTIAE FILM ARRAY: NOT DETECTED
STREP PNEUMONIAE FILM ARRAY: NOT DETECTED
STREP PYOCGENES FILM ARRAY: NOT DETECTED
STREPTOCOCCUS FILM ARRAY: NOT DETECTED
TOTAL PROTEIN: 6.1 G/DL (ref 6.1–8)
TOTAL PROTEIN: 6.3 G/DL (ref 6.1–8)
VANCOMYCIN RESISTANT FILM ARRAY: ABNORMAL
WBC # BLD: 7.8 THOU/MM3 (ref 4.8–10.8)

## 2022-05-30 PROCEDURE — 82948 REAGENT STRIP/BLOOD GLUCOSE: CPT

## 2022-05-30 PROCEDURE — 85610 PROTHROMBIN TIME: CPT

## 2022-05-30 PROCEDURE — 93005 ELECTROCARDIOGRAM TRACING: CPT | Performed by: PEDIATRICS

## 2022-05-30 PROCEDURE — 6360000002 HC RX W HCPCS: Performed by: PEDIATRICS

## 2022-05-30 PROCEDURE — 2060000000 HC ICU INTERMEDIATE R&B

## 2022-05-30 PROCEDURE — 80143 DRUG ASSAY ACETAMINOPHEN: CPT

## 2022-05-30 PROCEDURE — 2580000003 HC RX 258: Performed by: PEDIATRICS

## 2022-05-30 PROCEDURE — 83036 HEMOGLOBIN GLYCOSYLATED A1C: CPT

## 2022-05-30 PROCEDURE — 93010 ELECTROCARDIOGRAM REPORT: CPT | Performed by: INTERNAL MEDICINE

## 2022-05-30 PROCEDURE — 6370000000 HC RX 637 (ALT 250 FOR IP): Performed by: STUDENT IN AN ORGANIZED HEALTH CARE EDUCATION/TRAINING PROGRAM

## 2022-05-30 PROCEDURE — 99233 SBSQ HOSP IP/OBS HIGH 50: CPT | Performed by: FAMILY MEDICINE

## 2022-05-30 PROCEDURE — 83605 ASSAY OF LACTIC ACID: CPT

## 2022-05-30 PROCEDURE — 83735 ASSAY OF MAGNESIUM: CPT

## 2022-05-30 PROCEDURE — 93010 ELECTROCARDIOGRAM REPORT: CPT | Performed by: NUCLEAR MEDICINE

## 2022-05-30 PROCEDURE — 85025 COMPLETE CBC W/AUTO DIFF WBC: CPT

## 2022-05-30 PROCEDURE — 80053 COMPREHEN METABOLIC PANEL: CPT

## 2022-05-30 PROCEDURE — 36415 COLL VENOUS BLD VENIPUNCTURE: CPT

## 2022-05-30 PROCEDURE — 6370000000 HC RX 637 (ALT 250 FOR IP): Performed by: PEDIATRICS

## 2022-05-30 PROCEDURE — 99223 1ST HOSP IP/OBS HIGH 75: CPT | Performed by: PEDIATRICS

## 2022-05-30 RX ORDER — POTASSIUM CHLORIDE 20 MEQ/1
40 TABLET, EXTENDED RELEASE ORAL ONCE
Status: COMPLETED | OUTPATIENT
Start: 2022-05-30 | End: 2022-05-30

## 2022-05-30 RX ORDER — INSULIN GLARGINE 100 [IU]/ML
10 INJECTION, SOLUTION SUBCUTANEOUS ONCE
Status: COMPLETED | OUTPATIENT
Start: 2022-05-30 | End: 2022-05-30

## 2022-05-30 RX ORDER — MAGNESIUM SULFATE 1 G/100ML
1000 INJECTION INTRAVENOUS ONCE
Status: COMPLETED | OUTPATIENT
Start: 2022-05-30 | End: 2022-05-30

## 2022-05-30 RX ORDER — INSULIN LISPRO 100 [IU]/ML
0-12 INJECTION, SOLUTION INTRAVENOUS; SUBCUTANEOUS
Status: DISCONTINUED | OUTPATIENT
Start: 2022-05-30 | End: 2022-06-04 | Stop reason: HOSPADM

## 2022-05-30 RX ORDER — INSULIN LISPRO 100 [IU]/ML
0-6 INJECTION, SOLUTION INTRAVENOUS; SUBCUTANEOUS NIGHTLY
Status: DISCONTINUED | OUTPATIENT
Start: 2022-05-30 | End: 2022-06-04 | Stop reason: HOSPADM

## 2022-05-30 RX ORDER — HYDROCODONE BITARTRATE AND ACETAMINOPHEN 5; 325 MG/1; MG/1
1 TABLET ORAL ONCE
Status: COMPLETED | OUTPATIENT
Start: 2022-05-30 | End: 2022-05-30

## 2022-05-30 RX ADMIN — LISINOPRIL 20 MG: 20 TABLET ORAL at 00:10

## 2022-05-30 RX ADMIN — INSULIN LISPRO 3 UNITS: 100 INJECTION, SOLUTION INTRAVENOUS; SUBCUTANEOUS at 08:56

## 2022-05-30 RX ADMIN — ACETAMINOPHEN 650 MG: 650 SUPPOSITORY RECTAL at 09:15

## 2022-05-30 RX ADMIN — SODIUM CHLORIDE, PRESERVATIVE FREE 10 ML: 5 INJECTION INTRAVENOUS at 09:15

## 2022-05-30 RX ADMIN — INSULIN GLARGINE 20 UNITS: 100 INJECTION, SOLUTION SUBCUTANEOUS at 00:22

## 2022-05-30 RX ADMIN — ACETAMINOPHEN 650 MG: 650 SUPPOSITORY RECTAL at 21:52

## 2022-05-30 RX ADMIN — ENOXAPARIN SODIUM 40 MG: 100 INJECTION SUBCUTANEOUS at 12:15

## 2022-05-30 RX ADMIN — INSULIN GLARGINE 20 UNITS: 100 INJECTION, SOLUTION SUBCUTANEOUS at 21:52

## 2022-05-30 RX ADMIN — INSULIN LISPRO 3 UNITS: 100 INJECTION, SOLUTION INTRAVENOUS; SUBCUTANEOUS at 12:15

## 2022-05-30 RX ADMIN — METOPROLOL TARTRATE 50 MG: 50 TABLET, FILM COATED ORAL at 19:48

## 2022-05-30 RX ADMIN — CEFEPIME HYDROCHLORIDE 2000 MG: 2 INJECTION, POWDER, FOR SOLUTION INTRAVENOUS at 12:18

## 2022-05-30 RX ADMIN — ACETAMINOPHEN 650 MG: 325 TABLET ORAL at 03:54

## 2022-05-30 RX ADMIN — CEFEPIME HYDROCHLORIDE 2000 MG: 2 INJECTION, POWDER, FOR SOLUTION INTRAVENOUS at 00:14

## 2022-05-30 RX ADMIN — ONDANSETRON 4 MG: 2 INJECTION INTRAMUSCULAR; INTRAVENOUS at 04:00

## 2022-05-30 RX ADMIN — INSULIN GLARGINE 10 UNITS: 100 INJECTION, SOLUTION SUBCUTANEOUS at 07:04

## 2022-05-30 RX ADMIN — MAGNESIUM SULFATE HEPTAHYDRATE 1000 MG: 1 INJECTION, SOLUTION INTRAVENOUS at 05:36

## 2022-05-30 RX ADMIN — INSULIN LISPRO 4 UNITS: 100 INJECTION, SOLUTION INTRAVENOUS; SUBCUTANEOUS at 17:17

## 2022-05-30 RX ADMIN — METOPROLOL TARTRATE 50 MG: 50 TABLET, FILM COATED ORAL at 00:34

## 2022-05-30 RX ADMIN — INSULIN LISPRO 2 UNITS: 100 INJECTION, SOLUTION INTRAVENOUS; SUBCUTANEOUS at 19:48

## 2022-05-30 RX ADMIN — METOPROLOL TARTRATE 50 MG: 50 TABLET, FILM COATED ORAL at 09:15

## 2022-05-30 RX ADMIN — ONDANSETRON 4 MG: 2 INJECTION INTRAMUSCULAR; INTRAVENOUS at 13:45

## 2022-05-30 RX ADMIN — ASPIRIN 81 MG: 81 TABLET, COATED ORAL at 12:14

## 2022-05-30 RX ADMIN — POTASSIUM CHLORIDE 40 MEQ: 1500 TABLET, EXTENDED RELEASE ORAL at 05:36

## 2022-05-30 RX ADMIN — POTASSIUM CHLORIDE 40 MEQ: 1500 TABLET, EXTENDED RELEASE ORAL at 09:15

## 2022-05-30 RX ADMIN — SODIUM CHLORIDE, POTASSIUM CHLORIDE, SODIUM LACTATE AND CALCIUM CHLORIDE: 600; 310; 30; 20 INJECTION, SOLUTION INTRAVENOUS at 04:01

## 2022-05-30 RX ADMIN — LISINOPRIL 20 MG: 20 TABLET ORAL at 09:15

## 2022-05-30 RX ADMIN — ACETAMINOPHEN 650 MG: 650 SUPPOSITORY RECTAL at 15:45

## 2022-05-30 RX ADMIN — SODIUM CHLORIDE, PRESERVATIVE FREE 10 ML: 5 INJECTION INTRAVENOUS at 19:48

## 2022-05-30 RX ADMIN — HYDROCODONE BITARTRATE AND ACETAMINOPHEN 1 TABLET: 5; 325 TABLET ORAL at 13:42

## 2022-05-30 ASSESSMENT — PAIN SCALES - GENERAL
PAINLEVEL_OUTOF10: 0

## 2022-05-30 NOTE — CONSULTS
Urology Consult Note      Reason for Consult:  Pyelonephritis and mild hydronephrosis  Requesting Physician:  Dr. Memo Nunez    History Obtained From:  patient    Chief Complaint: fever, \"feeling like I had a UTI\"    HISTORY OF PRESENT ILLNESS:                The patient is a 50 y.o. female with hx of DM who presented to [facility] ER on 5/29/22 secondary to abdominal pain, n/v, fever/chills. Pt had fever 105.9 on presentation. UA significant for infection. Blood cultures growing GNB and Biofire with E coli. CT imaging with area of decreased perfusion in left mid kidney and mild left hydronephrosis with suggestion of ureteral wall enhancement. Started on Cefepime and fluids. Urology consultation requested for pyelonephritis and mild hydronephrosis. Pt reports she is feeling better after medications. Reports pain currently mainly resolved and mild nausea without any further vomiting. Urinating frequently, small amounts.       Past Medical History:        Diagnosis Date    COVID-19 11/2021    Diabetes mellitus ([facility] [address]     Hypertension      Past Surgical History:        Procedure Laterality Date    CHOLECYSTECTOMY      ENDOMETRIAL ABLATION      US BREAST NEEDLE BIOPSY RIGHT Right 09/03/2014    benign       Social History     Socioeconomic History    Marital status:      Spouse name: Not on file    Number of children: Not on file    Years of education: Not on file    Highest education level: Not on file   Occupational History    Not on file   Tobacco Use    Smoking status: Never Smoker    Smokeless tobacco: Never Used   Vaping Use    Vaping Use: Never used   Substance and Sexual Activity    Alcohol use: No    Drug use: No    Sexual activity: Not on file   Other Topics Concern    Not on file   Social History Narrative    Not on file     Social Determinants of Health     Financial Resource Strain: Low Risk     Difficulty of Paying Living Expenses: Not hard at all   Food Insecurity: No Food Insecurity    Worried About Running Out of Food in the Last Year: Never true    Imtiaz of Food in the Last Year: Never true   Transportation Needs:     Lack of Transportation (Medical): Not on file    Lack of Transportation (Non-Medical): Not on file   Physical Activity:     Days of Exercise per Week: Not on file    Minutes of Exercise per Session: Not on file   Stress:     Feeling of Stress : Not on file   Social Connections:     Frequency of Communication with Friends and Family: Not on file    Frequency of Social Gatherings with Friends and Family: Not on file    Attends [employer] Services: Not on file    Active Member of [address] [place] [address] or Organizations: Not on file    Attends Club or Organization Meetings: Not on file    Marital Status: Not on file   Intimate Partner Violence:     Fear of Current or Ex-Partner: Not on file    Emotionally Abused: Not on file    Physically Abused: Not on file    Sexually Abused: Not on file   Housing Stability:     Unable to Pay for Housing in the Last Year: Not on file    Number of [place] in the Last Year: Not on file    Unstable Housing in the Last Year: Not on file       ALHistory reviewed. No pertinent family history. Allergies: Allergies   Allergen Reactions    Penicillins Swelling       ROS:  Constitutional: See HPI  Eyes: Denies reported visual changes. ENT: Denies headache, difficulty swallowing, nose bleeds, ringing in ears, or earaches. Cardiovascular: Negative for chest pain, palpitations, tachycardia or edema. Respiratory: Denies cough or SOB. GI:  See HPI  : See HPI  Musculoskeletal: Patient denies low back pain or painful or reduced ROM of the back or extremities. Neurological: The patient denies any symptoms of neurological impairment or               TIA's; no history of stroke. Lymphatic: Denies swollen glands in neck, axillary or inguinal areas. Psychiatric: Denies anxiety or depression. Skin: Denies rash or lesions.   The remainder of the complete ROS is negative    PHYSICAL EXAM:  VITALS:  BP (!) 156/90   Pulse (S) (!) 115   Temp (S) (!) 102 °F (38.9 °C) (Rectal)   Resp 15   Ht 5' 4\" (1.626 m)   Wt 222 lb (100.7 kg)   SpO2 96%   BMI 38.11 kg/m² . Nursing note and vitals reviewed. Constitutional:    Alert and oriented times 3, no acute distress and cooperative to examination with appropriate mood and affect. HEENT:   Head:         Normocephalic and atraumatic. Mouth/Throat:          Mucous membranes are normal.   Eyes:         EOM are normal. No scleral icterus. Nose:    The external appearance of the nose is normal  Ears: The ears appear normal to external inspection   Neck:         Supple, symmetrical, trachea midline, no adenopathy, thyroid symmetric, not enlarged and no tenderness. Cardiovascular:        Normal rate, regular rhythm, S1 S2 heart sounds. Pulmonary/Chest:       Chest symmetric with normal A/P diameter, no wheezes, rales, or rhonchi noted. Normal respiratory rate and rhthym. No use of accessory muscles. Abdominal:          Soft. Mild suprapubic tenderness. Hypoactive bowel sounds. No CVA tenderness. Musculoskeletal:    Normal range of motion. She exhibits no edema or tenderness of lower extremities. Extremities:    No cyanosis, clubbing, or edema present. Neurological:    Alert and oriented. No cranial nerve deficit. There are no focalizing motor or sensory deficits.     DATA:  CBC:   Lab Results   Component Value Date    WBC 7.8 05/30/2022    RBC 3.88 05/30/2022    HGB 11.3 05/30/2022    HCT 34.6 05/30/2022    MCV 89.2 05/30/2022    MCH 29.1 05/30/2022    MCHC 32.7 05/30/2022    RDW 13.3 09/14/2017     05/30/2022    MPV 10.0 05/30/2022     BMP:    Lab Results   Component Value Date     05/30/2022    K 3.3 05/30/2022     05/30/2022    CO2 23 05/30/2022    BUN 10 05/30/2022    CREATININE 0.5 05/30/2022    CALCIUM 8.3 05/30/2022    LABGLOM >90 05/30/2022    GLUCOSE 319 05/30/2022     BUN/Creatinine:    Lab Results   Component Value Date    BUN 10 05/30/2022    CREATININE 0.5 05/30/2022     Magnesium:    Lab Results   Component Value Date    MG 1.6 05/30/2022     Phosphorus:  No results found for: PHOS  PT/INR:    Lab Results   Component Value Date    INR 0.99 04/02/2019     U/A:    Lab Results   Component Value Date    COLORU Dark yellow 05/29/2022    PHUR 5.50 05/29/2022    LABCAST NONE SEEN 01/25/2020    LABCAST NONE SEEN 01/25/2020    WBCUA 2-4 01/25/2020    RBCUA NONE SEEN 01/25/2020    YEAST NONE SEEN 01/25/2020    BACTERIA FEW 01/25/2020    SPECGRAV 1.025 05/29/2022    LEUKOCYTESUR Trace 05/29/2022    UROBILINOGEN 0.20 05/29/2022    BILIRUBINUR Negative 05/29/2022    BLOODU Moderate 05/29/2022    GLUCOSEU 500 05/29/2022       Imaging: The patient has had a CT abd/pelvis 5/29/22 which I have reviewed along with its accompanying report. The study demonstrates an indistinct area of decreased perfusion in the left kidney and mild left hydroureter with suggestion of ureteral wall enhancement. IMPRESSION:   Sepsis with bacteremia  Acute pyelonephritis with mild left hydroureter  Elevated LFTs  DM uncontrolled    Plan:      Mild hydoureter likely from infection. CRT normal.  Clinically improving. Ok to eat from urology standpoint. Continue broad spectrum anbx and IVFs.         Thank you for including us in the care of [facility]  05/30/22 9:44 AM  Urology

## 2022-05-30 NOTE — PROGRESS NOTES
PROGRESS NOTE      Patient:  Mohit Stewart      Unit/Bed:4-28/028-A    YOB: 1970    MRN: [id number]       Acct: [de-identified]     PCP: Isidro Tolbert MD    Date of Admission: 5/29/2022      Assessment/Plan:    Anticipated Discharge in : 2-3 days    Active Hospital Problems    Diagnosis Date Noted    Sepsis due to Escherichia coli without acute organ dysfunction ([facility] [address] [A41.51]      Priority: Medium    Acute pyelonephritis [N10]      Priority: Medium    Acute UTI [N39.0]      Priority: Medium    Elevated LFTs [R79.89]      Priority: Medium    Hyperbilirubinemia [E80.6]      Priority: Medium    Hypokalemia [E87.6]      Priority: Medium    Normocytic anemia [D64.9]      Priority: Medium    Thrombocytopenia ([facility] [address] [D69.6]      Priority: Medium    Hyponatremia [E87.1]      Priority: Medium    Hydroureter on left [N13.4]      Priority: Medium    Leukopenia [D72.819]      Priority: Medium    Type 2 diabetes mellitus with insulin therapy ([facility] [address] [E11.9, Z79.4]      Priority: Medium    Obesity (BMI 30-39. 9) [E66.9]      Priority: Medium    Essential hypertension [I10] 11/14/2018     Sepsis with evidence of end-organ damage  Patient with high fever, tachycardia, and tachypnea on admission. Source of infection gram negative bacilli from urinary tract. Patient also with elevated lactate at 2.7 on admission. Blood cultures and urine cultures with gram negative bacilli. S/p full sepsis bolus of IV fluid (5/29). - Treated with Rocephin (5/29) -> transition to Cefepime (5/30)  - Await final cultures and sensitivities  - Continue IV fluids  - Liver enzymes and bilirubin also elevated - likely related to hypoperfusion from sepsis - treatment as below. Bacteremia  2 of 2 blood cultures positive for gram negative bacilli. Urinary source most likely.   - Continue Cefepime (started 5/30), await final culture results and sensitivities  - Continue IV fluids     Acute Hypoxic Respiratory Failure  Patient initially maintaining O2 on room air on presentation, then had desaturation requiring up to 3L NC.  - Continue weaning as tolerated to maintain O2 >90%  - If unable to wean, will obtain CXR to further evaluate  - Incentive Spirometry ordered     Pyelonephritis  UA + for Nitrites, leukocyte esterase, blood, protein. Urine culture with gram negative bacilli (5/29). CT abdomen (5/29) with left hydroureter, decreased perfusion of left mid kidney concerning for pyelo. - Treated with Rocephin (5/29) -> transition to Cefepime (5/30)  - Continue IV fluids  - Urology following - continue IV antibiotics, no plans for surgical intervention  - Await final culture and sensitivities    Left hydroureter, mild  Seen on CT abdomen (5/29) with suggestion of ureteral wall enhancement, and poorly defined area of decreased perfusion of left mid kidney, consistent with pyelonephritis. - Urology consulted - no plans for surgical management currently  - Continue IVF and Antibiotics as above    Fever  Persistently febrile throughout admission. Tmax 105.9 F (5/29). Due to underlying sepsis. - Tylenol PRN for fever  - Avoid NSAIDs due to pyelonephritis  - Ice packs, cooling blankets PRN for fever    Tachycardia  HR elevated throughout admission, between 100-182. Rapid Response called evening of 5/29 due to HR in the 160s. EKG with sinus tachycardia. Patient treated with Cardizem 10 mg x1 dose with improvement of HR. Likely related to underlying sepsis, fevers, and pain. - Continue Metoprolol 50 mg PO BID - may need to increase further  - Treat infection as above  - Monitor telemetry    Uncontrolled HTN  BP noted to be 211/116 on arrival to ED, max /189. Patient had not been taking medications prior to arrival, also component of pain.   - Will lower BP slowly due to extent of elevation on presentation  - Continue home dose Lisinopril  - Increase Metoprolol to 50 mg BID  - Holding home dose HCTZ  - Overall, BP improving - continue to monitor, adjust medications as necessary    Hyponatremia, mild  Na 134 -> 132 (5/30). Likely pseudohyponatremia due to hyperglycemia, as Na corrects to near normal range. - Monitor BMP daily  - Will decrease IVF rate if Na continues to decrease, pending patient able to tolerate PO    Elevated liver enzymes  AST, ALT, Alk Phos all mildly elevated on admission. Suspect most likely related to hypoperfusion due to underlying sepsis and dehydration. Mild RUQ tenderness on exam. No prior history of elevated LFTs. - Trend LFTs daily  - Will check acute hepatitis panel in am  - Continue IV fluids    Direct Hyperbilirubinemia  Total bili 2.4 on admission -> 2.9 (5/30). Direct Bili 1.6 -> 2.3 (5/30). Likely related to underlying sepsis. Patient with mild RUQ pain, but s/p cholecystectomy. - Trend bilirubin daily  - If not improving, consider possible retained common bile duct stone and will evaluate with RUQ vs. ERCP as needed     Thrombocytopenia  Plt 122 on admission -> 100 (5/30). Likely related to underlying infection. No signs of bleeding currently. - Monitor CBC daily  - If continues to decrease despite treating underlying infection, will hold Lovenox    Normocytic Anemia  Hemoglobin 11.3 (5/30) - decreased from 13.6 on admission. Baseline appears in the 14s. However likely a dilutional component given patient received 4L IVF for sepsis on presentation. No signs of bleeding.  - Monitor clinically, if continues to decrease - will check iron studies  - Daily CBC    Leukopenia, resolved  WBC 4.5 on admission -> 7.8 (5/30). Likely related to underlying infection. - Will monitor as clinical status improves, otherwise may need to evaluate for further causes of bone marrow suppression  - Daily CBC    Hypokalemia, resolved  K 3.3 -> 4.0 (5/30). Received KLOR 40 mEq x1 dose (5/30). - Potassium replacement protocol PRN  - Daily BMP    Elevated Lactate, resolved  Lactate 2.7 on admission -> 1.4 (5/30).  Likely related to underlying sepsis. S/p 1L NS + 3L LR for sepsis bolus. - Monitor clinically, no need to trend lactate further    Ketosis  Beta-hydroxybutyrate 10.15 on admission. CO2 23. AG 15. Glucose 411. Likely due to decreased PO intake, uncontrolled hyperglycemia. Likely represents developing DKA on admission.   - Treat hyperglycemia as below  - No indication for DKA protocol currently  - PO intake as tolerated  - Will monitor serum ketone level    Uncontrolled Type 2 Diabetes Mellitus  Glucose 411 on admission. A1c 12.3% (5/30) - was 12.4% in 12/2021. Patient not checking blood sugars at home. Had not taken medication in several days. Patient with ketosis on admission, but not yet classified as DKA - noted as above. Insulin gtt initiated, but then discontinued (5/29). Treated with 20 U + 10 U Lantus (5/29-5/30)  - POCT Glucose  - Hypoglycemia protocol in place  - Continue 20 U Lantus nightly  - Medium dose SSI PRN  - Diabetic Diet  - Holding home Metformin, Januvia, Glipizide currently. Depression and Anxiety  Per history, patient on Zoloft at home  - Zoloft held currently with patient's fever, tachycardia, and hypertension - resume as patient clinically improves    Obesity with BMI 38.11 kg/m2  - Noted, encourage lifestyle modifications        Chief Complaint: Fevers, dysuria, weakness, fatigue    Hospital Course:   Per H&P:  Rolando Salinas is a 50 y.o. female with PMHx of hypertension, hyperlipidemia, type 2 diabetes mellitus, depression/anxiety, who presented to the emergency room [facility] at [facility] on [facility]/29/2022 complaining of at least a 3-day history of recurrent fevers with chills, lower abdominal pain, dysuria and increased frequency, lack of energy, decreased appetite, and progressive weakness. Patient admits to not being able to take her home medications including her diabetes medications for the last 2 days at least due to being very tired and sick.   Patient suspected that she might have a urinary tract infection due to her symptoms above and decided to come in for further evaluation. Her abdominal pain did not radiate and was sharp and present mostly in the suprapubic area. No flank pain. Some nausea without vomiting. Denies any diarrhea. No sick contacts or recent traveling. No vaginal bleeding or discharge.     In the emergency room the patient was febrile at 100.5 °F with severe tachycardia in the 130s to 140s, tachypneic, toxic looking, with good O2 sats on room air. Laboratory investigations revealed a positive urinalysis with positive nitrates and leukocyte esterase. Elevated beta hydroxybutyrate of 10. Elevated blood sugars in the 300-400 range. CT scan of the abdomen pelvis with contrast with findings suspicious for left pyelonephritis. Patient given ceftriaxone 2 g IV x1 with appropriate fluid resuscitation. She was then admitted to our hospitalist service for further evaluation and treatment.     At the time of my evaluation on the medical floor, patient was in obvious discomfort with tachycardia up to 160, tachypnea, and a high fever of 105.8 degrees Fahrenheit stating that she feels very hot. Blood pressure was also elevated in the 682I systolic. Patient was flushed with warm skin to touch. Was still responsive and answering questions appropriately and following commands.     Rapid response was called on patient just prior to my arrival due to severe tachycardia. Twelve-lead EKG confirmed sinus tachycardia with no ischemic changes or underlying pathological tachyarrhythmia. Patient was given 5 mg of IV Cardizem push and 500 cc LR fluid bolus then transferred to stepdown for further management and treatment. \"    Subjective:  Patient seen and examined this morning. States she is feeling tired, but is otherwise feeling better compared to prior to admission. She does have some abdominal pain, but this is also improved from prior.  She has remained febrile since admission, which is intermittently brought down with ice packs and rectal Tylenol. Tmax 105.9 F over last 24 hours. She remains mildly tachycardic with elevated BP this morning. Otherwise, patient denies nausea, vomiting, chest pain, shortness of breath, palpitations, leg swelling, or change in bowel movements. Medications:  Reviewed    Infusion Medications    dextrose      sodium chloride      lactated ringers 100 mL/hr at 05/30/22 1719     Scheduled Medications    insulin lispro  0-12 Units SubCUTAneous TID WC    insulin lispro  0-6 Units SubCUTAneous Nightly    aspirin EC  81 mg Oral Daily    [Held by provider] glipiZIDE  5 mg Oral BID    metoprolol tartrate  50 mg Oral BID    [Held by provider] sertraline  50 mg Oral Daily    sodium chloride flush  10 mL IntraVENous 2 times per day    enoxaparin  40 mg SubCUTAneous Daily    insulin glargine  20 Units SubCUTAneous Nightly    lisinopril  20 mg Oral Daily    cefepime  2,000 mg IntraVENous Q12H     PRN Meds: dextrose, glucose, dextrose bolus **OR** dextrose bolus, glucagon (rDNA), dextrose, sodium chloride flush, sodium chloride, ondansetron **OR** ondansetron, polyethylene glycol, acetaminophen **OR** acetaminophen, potassium chloride **OR** potassium alternative oral replacement **OR** potassium chloride, magnesium sulfate, aluminum & magnesium hydroxide-simethicone      Intake/Output Summary (Last 24 hours) at 5/30/2022 1740  Last data filed at 5/30/2022 1719  Gross per 24 hour   Intake 2987.38 ml   Output 700 ml   Net 2287.38 ml       Diet:  ADULT DIET; Regular; 3 carb choices (45 gm/meal)    Exam:  BP (!) 146/96   Pulse (!) 108   Temp (!) 102.1 °F (38.9 °C) (Oral)   Resp 22   Ht 5' 4\" (1.626 m)   Wt 222 lb (100.7 kg)   SpO2 94%   BMI 38.11 kg/m²     General appearance: Alert, ill appearing female resting in bed; No apparent distress, appears stated age and cooperative. HEENT: Pupils equal, round, and reactive to light. Conjunctivae/corneas clear.   Neck: Supple, with full range of motion. No jugular venous distention. Trachea midline. Respiratory:  Clear bilaterally, no wheezing or rhonchi noted  Cardiovascular: tachycardic rate, regular rhythm with normal S1/S2 without murmurs, rubs or gallops. Abdomen: Soft, obese, mildly tender RUQ and RLQ, non-distended with normal bowel sounds. No CVA tenderness  Musculoskeletal: passive and active ROM x 4 extremities. No edema  Skin: Skin color, texture, turgor normal.  No rashes or lesions. Psychiatric: Alert and oriented, thought content appropriate, normal insight  Capillary Refill: Brisk,< 3 seconds   Peripheral Pulses: +2 palpable, equal bilaterally       Labs:   Recent Labs     05/29/22  1515 05/30/22  0531   WBC 4.5* 7.8   HGB 13.6 11.3*   HCT 40.4 34.6*   * 100*     Recent Labs     05/29/22  2251 05/30/22  0531 05/30/22  1428    134* 132*   K 3.5 3.3* 4.2    100 98   CO2 22* 23 23   BUN 10 10 12   CREATININE 0.6 0.5 0.5   CALCIUM 8.8 8.3* 8.9     Recent Labs     05/29/22  1515 05/30/22  0531 05/30/22  1428   * 134* 106*   * 122* 119*   BILIDIR 1.6* 2.3*  --    BILITOT 2.4* 2.5* 2.9*   ALKPHOS 162* 156* 178*     Recent Labs     05/30/22  1428   INR 1.27*     No results for input(s): CKTOTAL, TROPONINI in the last 72 hours. Urinalysis:      Lab Results   Component Value Date    NITRU Positive 05/29/2022    WBCUA 2-4 01/25/2020    BACTERIA FEW 01/25/2020    RBCUA NONE SEEN 01/25/2020    BLOODU Moderate 05/29/2022    SPECGRAV 1.025 05/29/2022    GLUCOSEU 500 05/29/2022       Radiology:  CT ABDOMEN PELVIS W IV CONTRAST Additional Contrast? None   Final Result   1. Indistinct area of decreased perfusion in the left kidney suspicious for pyelonephritis. Clinical and laboratory correlation are required. 2. Mild left-sided hydroureter with suggestion of ureteral wall enhancement, also suspicious for infection.       Final report electronically signed by Dr. Lauren Singer on 5/29/2022 6:16 PM          Diet: ADULT DIET; Regular; 3 carb choices (45 gm/meal)    DVT prophylaxis: [x] Lovenox                                 [] SCDs                                 [] SQ Heparin                                 [] Encourage ambulation           [] Already on Anticoagulation     Disposition:    [x] Home       [] TCU       [] Rehab       [] Psych       [] SNF       [] [place]       [] Other-    Code Status: Full Code    PT/OT Eval Status: pending clinical improvement      Electronically signed by Elijah Carmichael MD on 5/30/2022 at 5:40 PM    This note was electronically signed. Parts of this note may have been dictated by use of voice recognition software and electronically transcribed. The note may contain errors not detected in proofreading. Please refer to my supervising physician's documentation if my documentation differs.

## 2022-05-30 NOTE — PLAN OF CARE
Problem: Discharge Planning  Goal: Discharge to home or other facility with appropriate resources  5/30/2022 1043 by Bigg Hassan RN  Outcome: Progressing  Flowsheets (Taken 5/30/2022 0900)  Discharge to home or other facility with appropriate resources:   Identify barriers to discharge with patient and caregiver   Arrange for needed discharge resources and transportation as appropriate   Identify discharge learning needs (meds, wound care, etc)   Refer to discharge planning if patient needs post-hospital services based on physician order or complex needs related to functional status, cognitive ability or social support system     Problem: Pain  Goal: Verbalizes/displays adequate comfort level or baseline comfort level  5/30/2022 1043 by Bigg Hassan RN  Outcome: Progressing  Flowsheets (Taken 5/30/2022 0900)  Verbalizes/displays adequate comfort level or baseline comfort level:   Encourage patient to monitor pain and request assistance   Assess pain using appropriate pain scale   Administer analgesics based on type and severity of pain and evaluate response   Implement non-pharmacological measures as appropriate and evaluate response     Problem: Safety - Adult  Goal: Free from fall injury  5/30/2022 1043 by Bigg Hassan RN  Outcome: Progressing  Flowsheets (Taken 5/30/2022 0900)  Free From Fall Injury:   Instruct family/caregiver on patient safety   Based on caregiver fall risk screen, instruct family/caregiver to ask for assistance with transferring infant if caregiver noted to have fall risk factors     Problem: ABCDS Injury Assessment  Goal: Absence of physical injury  Outcome: Progressing  Flowsheets (Taken 5/30/2022 1043)  Absence of Physical Injury: Implement safety measures based on patient assessment     Problem: Respiratory - Adult  Goal: Achieves optimal ventilation and oxygenation  Outcome: Progressing  Flowsheets (Taken 5/30/2022 1043)  Achieves optimal ventilation and oxygenation:   Assess for changes in respiratory status   Position to facilitate oxygenation and minimize respiratory effort   Respiratory therapy support as indicated   Assess for changes in mentation and behavior   Assess and instruct to report shortness of breath or any respiratory difficulty     Problem: Cardiovascular - Adult  Goal: Maintains optimal cardiac output and hemodynamic stability  Outcome: Progressing  Flowsheets (Taken 5/30/2022 1043)  Maintains optimal cardiac output and hemodynamic stability: Monitor blood pressure and heart rate     Problem: Cardiovascular - Adult  Goal: Absence of cardiac dysrhythmias or at baseline  Outcome: Progressing     Problem: Skin/Tissue Integrity - Adult  Goal: Skin integrity remains intact  Outcome: Progressing  Flowsheets (Taken 5/30/2022 1043)  Skin Integrity Remains Intact: Monitor for areas of redness and/or skin breakdown     Problem: Musculoskeletal - Adult  Goal: Return mobility to safest level of function  Outcome: Progressing  Flowsheets (Taken 5/30/2022 1043)  Return Mobility to Safest Level of Function:   Assess patient stability and activity tolerance for standing, transferring and ambulating with or without assistive devices   Instruct patient/family in ordered activity level     Problem: Gastrointestinal - Adult  Goal: Minimal or absence of nausea and vomiting  Outcome: Progressing  Flowsheets (Taken 5/30/2022 1043)  Minimal or absence of nausea and vomiting:   Administer IV fluids as ordered to ensure adequate hydration   Provide nonpharmacologic comfort measures as appropriate   Administer ordered antiemetic medications as needed     Problem: Infection - Adult  Goal: Absence of infection at discharge  Outcome: Progressing  Flowsheets (Taken 5/30/2022 1043)  Absence of infection at discharge:   Assess and monitor for signs and symptoms of infection   Monitor lab/diagnostic results   Instruct and encourage patient and family to use good hand hygiene technique   [place] appropriate cooling/warming therapies per order   Administer medications as ordered     Problem: Infection - Adult  Goal: Absence of infection during hospitalization  Outcome: Progressing  Flowsheets (Taken 5/30/2022 1043)  Absence of infection during hospitalization:   Assess and monitor for signs and symptoms of infection   Monitor lab/diagnostic results   [place]ass appropriate cooling/warming therapies per order   Administer medications as ordered   Instruct and encourage patient and family to use good hand hygiene technique     Problem: Infection - Adult  Goal: Absence of fever/infection during anticipated neutropenic period  Outcome: Progressing  Flowsheets (Taken 5/30/2022 1043)  Absence of fever/infection during anticipated neutropenic period: Monitor white blood cell count     Problem: Metabolic/Fluid and Electrolytes - Adult  Goal: Electrolytes maintained within normal limits  Outcome: Progressing  Flowsheets (Taken 5/30/2022 1043)  Electrolytes maintained within normal limits:   Monitor labs and assess patient for signs and symptoms of electrolyte imbalances   Administer electrolyte replacement as ordered   Monitor response to electrolyte replacements, including repeat lab results as appropriate     Problem: Metabolic/Fluid and Electrolytes - Adult  Goal: Hemodynamic stability and optimal renal function maintained  Outcome: Progressing  Flowsheets (Taken 5/30/2022 1043)  Hemodynamic stability and optimal renal function maintained:   Monitor labs and assess for signs and symptoms of volume excess or deficit   Monitor intake, output and patient weight   Monitor urine specific gravity, serum osmolarity and serum sodium as indicated or ordered   Monitor response to interventions for patient's volume status, including labs, urine output, blood pressure (other measures as available)   Encourage oral intake as appropriate     Problem: Metabolic/Fluid and Electrolytes - Adult  Goal: Glucose maintained within prescribed range  Outcome: Progressing  Flowsheets (Taken 5/30/2022 1043)  Glucose maintained within prescribed range:   Monitor blood glucose as ordered   Administer ordered medications to maintain glucose within target range   Assess for signs and symptoms of hyperglycemia and hypoglycemia     Care plan reviewed with patient and family. Patient and family verbalize understanding of the plan of care and contribute to goal setting.

## 2022-05-30 NOTE — H&P
liver function tests. Consider right upper quadrant ultrasound if not improved. Starvation ketosis:  Elevated beta hydroxybutyrate up to 10, however, normal serum bicarb of 23. Patient confirms no oral intake for at least 24 hours. IV fluid hydration and advance diet as tolerated. Type 2 diabetes mellitus:  Severely uncontrolled hyperglycemia secondary to sepsis and not taking medications for several days due to sickness. Discontinue insulin drip. Lantus 20 units subcu x1 now. Accu-Cheks every 4 hours. Sliding scale insulin coverage. Hold metformin, Januvia, and glipizide. Diabetic diet. Hypertension:  Severely uncontrolled secondary to pain, discomfort, and not taking medications for several days. Resumed home dose lisinopril. Increased metoprolol to 50 mg twice daily. Hold hydrochlorothiazide. As needed clonidine or IV hydralazine for severely elevated systolic blood pressures above 180 mmHg. Tachycardia:  Sinus tachycardia up to 160. Secondary to high fevers, pain, and intravascular volume depletion. Cardizem 5 mg IV x1 given. Increased home dose metoprolol to 50 mg twice daily. EKG confirming sinus tachycardia. Telemetry monitor. Hold Zoloft. Depression/anxiety:  Holding Zoloft for now. DVT prophylaxis:  Subcu Lovenox. Fluids/electrolytes/nutrition:  Continue LR at 100 cc an hour. Replenish borderline hypokalemia and hypomagnesemia. Diabetic diet.      =======================================================================      Chief Complaint: Fevers, fatigue, chills, no appetite, weakness.       History Of Present Illness:  Jose Avalos is a 50 y.o. female with PMHx of hypertension, hyperlipidemia, type 2 diabetes mellitus, depression/anxiety, who presented to the emergency room here at [facility] on 5/29/2022 complaining of at least a 3-day history of recurrent fevers with chills, lower abdominal pain, dysuria and increased frequency, lack of energy, decreased appetite, and progressive weakness. Patient admits to not being able to take her home medications including her diabetes medications for the last 2 days at least due to being very tired and sick. Patient suspected that she might have a urinary tract infection due to her symptoms above and decided to come in for further evaluation. Her abdominal pain did not radiate and was sharp and present mostly in the suprapubic area. No flank pain. Some nausea without vomiting. Denies any diarrhea. No sick contacts or recent traveling. No vaginal bleeding or discharge. In the emergency room the patient was febrile at 100.5 °F with severe tachycardia in the 130s to 140s, tachypneic, toxic looking, with good O2 sats on room air. Laboratory investigations revealed a positive urinalysis with positive nitrates and leukocyte esterase. Elevated beta hydroxybutyrate of 10. Elevated blood sugars in the 300-400 range. CT scan of the abdomen pelvis with contrast with findings suspicious for left pyelonephritis. Patient given ceftriaxone 2 g IV x1 with appropriate fluid resuscitation. She was then admitted to our hospitalist service for further evaluation and treatment. At the time of my evaluation on the medical floor, patient was in obvious discomfort with tachycardia up to 160, tachypnea, and a high fever of 105.8 degrees Fahrenheit stating that she feels very hot. Blood pressure was also elevated in the 112C systolic. Patient was flushed with warm skin to touch. Was still responsive and answering questions appropriately and following commands. Rapid response was called on patient just prior to my arrival due to severe tachycardia. Twelve-lead EKG confirmed sinus tachycardia with no ischemic changes or underlying pathological tachyarrhythmia. Patient was given 5 mg of IV Cardizem push and 500 cc LR fluid bolus then transferred to stepdown for further management and treatment.       Past Medical History:        Diagnosis Date    COVID-19 11/2021    Diabetes mellitus ([facility] [address]     Hypertension        Past Surgical History:        Procedure Laterality Date    CHOLECYSTECTOMY      ENDOMETRIAL ABLATION      US BREAST NEEDLE BIOPSY RIGHT Right 09/03/2014    benign       Medications Prior to Admission:   Prior to Admission medications    Medication Sig Start Date End Date Taking? Authorizing Provider   aspirin EC 81 MG EC tablet Take 1 tablet by mouth daily  Patient not taking: Reported on 5/29/2022 12/30/21   Romel Og MD   lisinopril-hydroCHLOROthiazide (PRINZIDE;ZESTORETIC) 20-12.5 MG per tablet Take 2 tablets by mouth daily  Patient not taking: Reported on 5/29/2022 12/30/21   Romel Og MD   metoprolol tartrate (LOPRESSOR) 25 MG tablet Take 1 tablet by mouth 2 times daily 12/30/21 1/29/22  Romel Og MD   metFORMIN (GLUCOPHAGE) 500 MG tablet Take 1 tablet by mouth 2 times daily (with meals) Indications: two tablets twice daily  Patient not taking: Reported on 5/29/2022 12/30/21   Romel Og MD   sertraline (ZOLOFT) 50 MG tablet Take 1 tablet by mouth daily  Patient not taking: Reported on 5/29/2022 12/30/21   Romel Og MD   SITagliptin (JANUVIA) 100 MG tablet Take 1 tablet by mouth daily  Patient not taking: Reported on 5/29/2022 12/30/21   Romel Og MD   glipiZIDE (GLUCOTROL) 5 MG tablet Take 1 tablet by mouth 2 times daily  Patient not taking: Reported on 5/29/2022 12/30/21   Romel Og MD       Allergies:  Penicillins    Social History:    The patient currently lives alone and is pretty independent. Tobacco use:   reports that she has never smoked. She has never used smokeless tobacco.  Alcohol use:   reports no history of alcohol use. Drug use:  reports no history of drug use. Family History:  as follows:  History reviewed. No pertinent family history. Review of Systems:   Pertinent positives and negatives as noted in the HPI.  All other systems reviewed and negative. Physical Exam:    BP (!) 153/93   Pulse (!) 117   Temp (!) 101.5 °F (38.6 °C) (Rectal)   Resp 21   Ht 5' 4\" (1.626 m)   Wt 222 lb (100.7 kg)   SpO2 97%   BMI 38.11 kg/m²       General appearance: No apparent distress, well developed, appears stated age. Eyes:  Pupils equal, round, and reactive to light. Conjunctivae/corneas clear. HENT: Head normal in appearance. External nares normal.  Oral mucosa moist without lesions. Hearing grossly intact. Neck: Supple, with full range of motion. Trachea midline. No gross JVD appreciated. Respiratory:  Normal respiratory effort. Clear to auscultation, bilaterally without rales or wheezes or rhonchi. Cardiovascular: Tachycardic with regular rhythm, with normal S1/S2 without murmurs. No lower extremity edema. Abdomen: Soft, mild tenderness to palpation of the suprapubic area, no CVA tenderness no rebound,, non-distended with normal bowel sounds. Musculoskeletal: There is no joint swelling or tenderness. Normal tone. No abnormal movements. Skin: Warm and flushed all over. Neurologic:  No focal sensory/motor deficits in the upper and lower extremities. Cranial nerves:  grossly non-focal 2-12. Psychiatric: Alert and oriented, normal insight and thought content. Capillary Refill: Brisk,< 3 seconds. Peripheral Pulses: +2 palpable, equal bilaterally. Labs:     Recent Labs     05/29/22  1515   WBC 4.5*   HGB 13.6   HCT 40.4   *     Recent Labs     05/29/22  1515 05/29/22  2251   * 137   K 4.0 3.5   CL 95* 102   CO2 23 22*   BUN 14 10   CREATININE 0.7 0.6   CALCIUM 9.6 8.8     Recent Labs     05/29/22  1515   *   *   BILIDIR 1.6*   BILITOT 2.4*   ALKPHOS 162*     No results for input(s): INR in the last 72 hours. No results for input(s): Rajan Breeze in the last 72 hours.   Lab Results   Component Value Date    NITRU Positive 05/29/2022    WBCUA 2-4 01/25/2020    BACTERIA FEW 01/25/2020    RBCUA NONE SEEN 01/25/2020    BLOODU Moderate 05/29/2022    SPECGRAV 1.025 05/29/2022    GLUCOSEU 500 05/29/2022         Radiology:     CT ABDOMEN PELVIS W IV CONTRAST Additional Contrast? None   Final Result   1. Indistinct area of decreased perfusion in the left kidney suspicious for pyelonephritis. Clinical and laboratory correlation are required. 2. Mild left-sided hydroureter with suggestion of ureteral wall enhancement, also suspicious for infection. Final report electronically signed by Dr. Petra Vicente on 5/29/2022 6:16 PM             EKG:  I have reviewed the EKG with the following interpretation: Sinus tachycardia, no acute ischemic changes. Diet: ADULT DIET; Regular; 4 carb choices (60 gm/meal)  DVT prophylaxis: Subcu Lovenox. Code Status: Full Code  Disposition: admit to inpatient stepdown with telemetry. Thank you Dayna Mueller MD for the opportunity to be involved in this patient's care.     Electronically signed by Slime Larson MD on 5/30/2022 at 5:14 AM.

## 2022-05-30 NOTE — PLAN OF CARE
Problem: Discharge Planning  Goal: Discharge to home or other facility with appropriate resources  5/30/2022 0106 by Gerald Maria RN  Outcome: Progressing  5/30/2022 0106 by Gerald Maria RN  Outcome: Progressing  5/30/2022 0106 by Gerald Maria RN  Outcome: Progressing     Problem: Pain  Goal: Verbalizes/displays adequate comfort level or baseline comfort level  5/30/2022 0106 by Gerald Maria RN  Outcome: Progressing  5/30/2022 0106 by Gerald Maria RN  Outcome: Progressing  5/30/2022 0106 by Gerald Maria RN  Outcome: Progressing     Problem: Safety - Adult  Goal: Free from fall injury  5/30/2022 0106 by Gerald Maria RN  Outcome: Progressing  5/30/2022 0106 by Gerald Maria RN  Outcome: Progressing  5/30/2022 0106 by Gerald Maria RN  Outcome: Progressing

## 2022-05-30 NOTE — PROGRESS NOTES
Patient arrived from ED via stretcher. Moved over in bed. Vitals signs obtained. Bp- 192/94. Hr- 182. This RN and Jasmyne Hernandez RN in room. Jyoti Silva RN making rounds, assessed patient and suggested to call rapid response. House Supervisor Sparkle SCHMID arrived to unit followed by Dr. Charles Garyole EKG tech, lab, and respiratory. Temperature 105.9 rectally. Blood sugar- 206. Ice packs applied to patient. 650 mg of tylenol given rectally. Vitals obtained again Bp- 179/94 Hr- 167. 10mg of cardizem ordered only 5 mg given, 500 ml bolus of LR started. EKG and labs ordered and obtained. Order for transfer placed to stepdown. Vitals obtained Bp- 176/94 Hr- 153. Pt transferred to [facility].

## 2022-05-30 NOTE — ED NOTES
Patient transferred to [facility] room 018 nurse informed     Brayden Bidding  05/29/22 [id number]

## 2022-05-31 ENCOUNTER — APPOINTMENT (OUTPATIENT)
Dept: MRI IMAGING | Age: 52
DRG: 871 | End: 2022-05-31

## 2022-05-31 LAB
ALBUMIN SERPL-MCNC: 2.8 G/DL (ref 3.5–5.1)
ALP BLD-CCNC: 215 U/L (ref 38–126)
ALT SERPL-CCNC: 115 U/L (ref 11–66)
ANION GAP SERPL CALCULATED.3IONS-SCNC: 13 MEQ/L (ref 8–16)
AST SERPL-CCNC: 119 U/L (ref 5–40)
BASOPHILS # BLD: 0.2 %
BASOPHILS ABSOLUTE: 0 THOU/MM3 (ref 0–0.1)
BETA-HYDROXYBUTYRATE: 18.06 MG/DL (ref 0.2–2.81)
BILIRUB SERPL-MCNC: 3.5 MG/DL (ref 0.3–1.2)
BILIRUBIN DIRECT: 3.2 MG/DL (ref 0–0.3)
BLOOD CULTURE, ROUTINE: ABNORMAL
BUN BLDV-MCNC: 13 MG/DL (ref 7–22)
CALCIUM SERPL-MCNC: 8.8 MG/DL (ref 8.5–10.5)
CHLORIDE BLD-SCNC: 98 MEQ/L (ref 98–111)
CO2: 19 MEQ/L (ref 23–33)
CREAT SERPL-MCNC: 0.3 MG/DL (ref 0.4–1.2)
EOSINOPHIL # BLD: 0.1 %
EOSINOPHILS ABSOLUTE: 0 THOU/MM3 (ref 0–0.4)
ERYTHROCYTE [DISTWIDTH] IN BLOOD BY AUTOMATED COUNT: 12.3 % (ref 11.5–14.5)
ERYTHROCYTE [DISTWIDTH] IN BLOOD BY AUTOMATED COUNT: 42.2 FL (ref 35–45)
GFR SERPL CREATININE-BSD FRML MDRD: > 90 ML/MIN/1.73M2
GLUCOSE BLD-MCNC: 219 MG/DL (ref 70–108)
GLUCOSE BLD-MCNC: 223 MG/DL (ref 70–108)
GLUCOSE BLD-MCNC: 224 MG/DL (ref 70–108)
GLUCOSE BLD-MCNC: 227 MG/DL (ref 70–108)
GLUCOSE BLD-MCNC: 231 MG/DL (ref 70–108)
HAV IGM SER IA-ACNC: NEGATIVE
HCT VFR BLD CALC: 35.8 % (ref 37–47)
HEMOGLOBIN: 11.4 GM/DL (ref 12–16)
HEPATITIS B CORE IGM ANTIBODY: NEGATIVE
HEPATITIS B SURFACE ANTIGEN: NEGATIVE
HEPATITIS C ANTIBODY: NEGATIVE
IMMATURE GRANS (ABS): 0.07 THOU/MM3 (ref 0–0.07)
IMMATURE GRANULOCYTES: 0.8 %
LD: 359 U/L (ref 100–190)
LYMPHOCYTES # BLD: 10.1 %
LYMPHOCYTES ABSOLUTE: 0.9 THOU/MM3 (ref 1–4.8)
MAGNESIUM: 2 MG/DL (ref 1.6–2.4)
MCH RBC QN AUTO: 29.5 PG (ref 26–33)
MCHC RBC AUTO-ENTMCNC: 31.8 GM/DL (ref 32.2–35.5)
MCV RBC AUTO: 92.7 FL (ref 81–99)
MONOCYTES # BLD: 6.9 %
MONOCYTES ABSOLUTE: 0.6 THOU/MM3 (ref 0.4–1.3)
NUCLEATED RED BLOOD CELLS: 0 /100 WBC
ORGANISM: ABNORMAL
PHOSPHORUS: 1.7 MG/DL (ref 2.4–4.7)
PLATELET # BLD: 104 THOU/MM3 (ref 130–400)
PMV BLD AUTO: 11.1 FL (ref 9.4–12.4)
POTASSIUM SERPL-SCNC: 4.3 MEQ/L (ref 3.5–5.2)
RBC # BLD: 3.86 MILL/MM3 (ref 4.2–5.4)
REVIEWED BY: NORMAL
SCAN OF BLOOD SMEAR: NORMAL
SEG NEUTROPHILS: 81.9 %
SEGMENTED NEUTROPHILS ABSOLUTE COUNT: 7.4 THOU/MM3 (ref 1.8–7.7)
SMEAR REVIEW: NORMAL
SODIUM BLD-SCNC: 130 MEQ/L (ref 135–145)
SODIUM BLD-SCNC: 131 MEQ/L (ref 135–145)
TOTAL PROTEIN: 6.3 G/DL (ref 6.1–8)
TSH SERPL DL<=0.05 MIU/L-ACNC: 0.7 UIU/ML (ref 0.4–4.2)
URINE CULTURE, ROUTINE: ABNORMAL
WBC # BLD: 9 THOU/MM3 (ref 4.8–10.8)

## 2022-05-31 PROCEDURE — 6360000002 HC RX W HCPCS: Performed by: PEDIATRICS

## 2022-05-31 PROCEDURE — 84443 ASSAY THYROID STIM HORMONE: CPT

## 2022-05-31 PROCEDURE — 85025 COMPLETE CBC W/AUTO DIFF WBC: CPT

## 2022-05-31 PROCEDURE — 2580000003 HC RX 258: Performed by: FAMILY MEDICINE

## 2022-05-31 PROCEDURE — 99233 SBSQ HOSP IP/OBS HIGH 50: CPT | Performed by: FAMILY MEDICINE

## 2022-05-31 PROCEDURE — 6370000000 HC RX 637 (ALT 250 FOR IP): Performed by: STUDENT IN AN ORGANIZED HEALTH CARE EDUCATION/TRAINING PROGRAM

## 2022-05-31 PROCEDURE — 80074 ACUTE HEPATITIS PANEL: CPT

## 2022-05-31 PROCEDURE — 6370000000 HC RX 637 (ALT 250 FOR IP): Performed by: PEDIATRICS

## 2022-05-31 PROCEDURE — 2580000003 HC RX 258: Performed by: PEDIATRICS

## 2022-05-31 PROCEDURE — 74183 MRI ABD W/O CNTR FLWD CNTR: CPT

## 2022-05-31 PROCEDURE — 83615 LACTATE (LD) (LDH) ENZYME: CPT

## 2022-05-31 PROCEDURE — 84295 ASSAY OF SERUM SODIUM: CPT

## 2022-05-31 PROCEDURE — 83735 ASSAY OF MAGNESIUM: CPT

## 2022-05-31 PROCEDURE — 2060000000 HC ICU INTERMEDIATE R&B

## 2022-05-31 PROCEDURE — 6360000004 HC RX CONTRAST MEDICATION: Performed by: STUDENT IN AN ORGANIZED HEALTH CARE EDUCATION/TRAINING PROGRAM

## 2022-05-31 PROCEDURE — 99232 SBSQ HOSP IP/OBS MODERATE 35: CPT | Performed by: UROLOGY

## 2022-05-31 PROCEDURE — 36415 COLL VENOUS BLD VENIPUNCTURE: CPT

## 2022-05-31 PROCEDURE — 84100 ASSAY OF PHOSPHORUS: CPT

## 2022-05-31 PROCEDURE — 82248 BILIRUBIN DIRECT: CPT

## 2022-05-31 PROCEDURE — 83010 ASSAY OF HAPTOGLOBIN QUANT: CPT

## 2022-05-31 PROCEDURE — 2500000003 HC RX 250 WO HCPCS: Performed by: FAMILY MEDICINE

## 2022-05-31 PROCEDURE — 6370000000 HC RX 637 (ALT 250 FOR IP)

## 2022-05-31 PROCEDURE — A9579 GAD-BASE MR CONTRAST NOS,1ML: HCPCS | Performed by: STUDENT IN AN ORGANIZED HEALTH CARE EDUCATION/TRAINING PROGRAM

## 2022-05-31 PROCEDURE — 82010 KETONE BODYS QUAN: CPT

## 2022-05-31 PROCEDURE — 93005 ELECTROCARDIOGRAM TRACING: CPT

## 2022-05-31 PROCEDURE — 82948 REAGENT STRIP/BLOOD GLUCOSE: CPT

## 2022-05-31 PROCEDURE — 80053 COMPREHEN METABOLIC PANEL: CPT

## 2022-05-31 PROCEDURE — 6360000002 HC RX W HCPCS: Performed by: FAMILY MEDICINE

## 2022-05-31 PROCEDURE — 6370000000 HC RX 637 (ALT 250 FOR IP): Performed by: FAMILY MEDICINE

## 2022-05-31 RX ORDER — IBUPROFEN 600 MG/1
600 TABLET ORAL ONCE
Status: COMPLETED | OUTPATIENT
Start: 2022-05-31 | End: 2022-05-31

## 2022-05-31 RX ORDER — HYDRALAZINE HYDROCHLORIDE 20 MG/ML
10 INJECTION INTRAMUSCULAR; INTRAVENOUS EVERY 6 HOURS PRN
Status: DISCONTINUED | OUTPATIENT
Start: 2022-05-31 | End: 2022-06-01

## 2022-05-31 RX ORDER — INSULIN GLARGINE 100 [IU]/ML
25 INJECTION, SOLUTION SUBCUTANEOUS NIGHTLY
Status: DISCONTINUED | OUTPATIENT
Start: 2022-05-31 | End: 2022-06-02

## 2022-05-31 RX ORDER — LABETALOL 20 MG/4 ML (5 MG/ML) INTRAVENOUS SYRINGE
10 ONCE
Status: DISCONTINUED | OUTPATIENT
Start: 2022-06-01 | End: 2022-05-31

## 2022-05-31 RX ORDER — LISINOPRIL 20 MG/1
20 TABLET ORAL DAILY
Status: DISCONTINUED | OUTPATIENT
Start: 2022-05-31 | End: 2022-06-01

## 2022-05-31 RX ORDER — LABETALOL 20 MG/4 ML (5 MG/ML) INTRAVENOUS SYRINGE
5 ONCE
Status: COMPLETED | OUTPATIENT
Start: 2022-06-01 | End: 2022-06-01

## 2022-05-31 RX ADMIN — IBUPROFEN 600 MG: 600 TABLET, FILM COATED ORAL at 16:09

## 2022-05-31 RX ADMIN — ASPIRIN 81 MG: 81 TABLET, COATED ORAL at 08:48

## 2022-05-31 RX ADMIN — GADOTERIDOL 20 ML: 279.3 INJECTION, SOLUTION INTRAVENOUS at 18:28

## 2022-05-31 RX ADMIN — ENOXAPARIN SODIUM 40 MG: 100 INJECTION SUBCUTANEOUS at 08:48

## 2022-05-31 RX ADMIN — ACETAMINOPHEN 650 MG: 650 SUPPOSITORY RECTAL at 04:04

## 2022-05-31 RX ADMIN — INSULIN LISPRO 2 UNITS: 100 INJECTION, SOLUTION INTRAVENOUS; SUBCUTANEOUS at 20:25

## 2022-05-31 RX ADMIN — ACETAMINOPHEN 650 MG: 325 TABLET ORAL at 22:04

## 2022-05-31 RX ADMIN — CEFEPIME HYDROCHLORIDE 2000 MG: 2 INJECTION, POWDER, FOR SOLUTION INTRAVENOUS at 11:51

## 2022-05-31 RX ADMIN — ONDANSETRON 4 MG: 2 INJECTION INTRAMUSCULAR; INTRAVENOUS at 03:14

## 2022-05-31 RX ADMIN — SODIUM CHLORIDE, PRESERVATIVE FREE 10 ML: 5 INJECTION INTRAVENOUS at 08:50

## 2022-05-31 RX ADMIN — CEFEPIME HYDROCHLORIDE 2000 MG: 2 INJECTION, POWDER, FOR SOLUTION INTRAVENOUS at 00:30

## 2022-05-31 RX ADMIN — LISINOPRIL 20 MG: 20 TABLET ORAL at 06:39

## 2022-05-31 RX ADMIN — SODIUM PHOSPHATE, MONOBASIC, MONOHYDRATE AND SODIUM PHOSPHATE, DIBASIC, ANHYDROUS 15 MMOL: 276; 142 INJECTION, SOLUTION INTRAVENOUS at 08:50

## 2022-05-31 RX ADMIN — INSULIN LISPRO 4 UNITS: 100 INJECTION, SOLUTION INTRAVENOUS; SUBCUTANEOUS at 11:54

## 2022-05-31 RX ADMIN — METOPROLOL TARTRATE 50 MG: 50 TABLET, FILM COATED ORAL at 08:48

## 2022-05-31 RX ADMIN — SODIUM CHLORIDE, PRESERVATIVE FREE 10 ML: 5 INJECTION INTRAVENOUS at 20:20

## 2022-05-31 RX ADMIN — ONDANSETRON 4 MG: 2 INJECTION INTRAMUSCULAR; INTRAVENOUS at 15:13

## 2022-05-31 RX ADMIN — INSULIN LISPRO 4 UNITS: 100 INJECTION, SOLUTION INTRAVENOUS; SUBCUTANEOUS at 08:48

## 2022-05-31 RX ADMIN — INSULIN GLARGINE 25 UNITS: 100 INJECTION, SOLUTION SUBCUTANEOUS at 20:25

## 2022-05-31 RX ADMIN — HYDRALAZINE HYDROCHLORIDE 10 MG: 20 INJECTION, SOLUTION INTRAMUSCULAR; INTRAVENOUS at 21:58

## 2022-05-31 RX ADMIN — IBUPROFEN 600 MG: 600 TABLET, FILM COATED ORAL at 05:28

## 2022-05-31 RX ADMIN — METOPROLOL TARTRATE 50 MG: 50 TABLET, FILM COATED ORAL at 20:20

## 2022-05-31 ASSESSMENT — PAIN SCALES - GENERAL
PAINLEVEL_OUTOF10: 8
PAINLEVEL_OUTOF10: 9
PAINLEVEL_OUTOF10: 0
PAINLEVEL_OUTOF10: 6

## 2022-05-31 ASSESSMENT — PAIN DESCRIPTION - ONSET
ONSET: ON-GOING

## 2022-05-31 ASSESSMENT — PAIN DESCRIPTION - PAIN TYPE
TYPE: ACUTE PAIN

## 2022-05-31 ASSESSMENT — PAIN - FUNCTIONAL ASSESSMENT
PAIN_FUNCTIONAL_ASSESSMENT: ACTIVITIES ARE NOT PREVENTED

## 2022-05-31 ASSESSMENT — PAIN DESCRIPTION - FREQUENCY
FREQUENCY: CONTINUOUS

## 2022-05-31 ASSESSMENT — PAIN DESCRIPTION - DESCRIPTORS
DESCRIPTORS: ACHING

## 2022-05-31 ASSESSMENT — PAIN DESCRIPTION - ORIENTATION
ORIENTATION: LEFT;RIGHT;MID
ORIENTATION: RIGHT;LEFT;MID
ORIENTATION: RIGHT;LEFT;MID

## 2022-05-31 ASSESSMENT — PAIN DESCRIPTION - LOCATION
LOCATION: HEAD

## 2022-05-31 NOTE — CARE COORDINATION
5/31/22, 2:17 PM EDT  DISCHARGE PLANNING EVALUATION:    Frankey Freer       Admitted: 5/29/2022/ [address] day: 2   Location: [facility]28/028-A Reason for admit: Hyperbilirubinemia [E80.6]  Septicemia ([facility] [address] [A41.9]  Acute pyelonephritis [N10]  Elevated LFTs [R79.89]  Acute UTI [N39.0]  Sepsis secondary to UTI ([address] [A41.9, N39.0]  Diabetic ketoacidosis without coma associated with type 2 diabetes mellitus ([address] [E11.10]   PMH:  has a past medical history of COVID-19, Diabetes mellitus ([facility] [address] and Hypertension. Procedure: 5/29: CT abd:  1. Indistinct area of decreased perfusion in the left kidney suspicious for pyelonephritis. Clinical and laboratory correlation are required. 2. Mild left-sided hydroureter with suggestion of ureteral wall enhancement, also suspicious for infection. Barriers to Discharge: Urine and blood + e coli. Fevers, IV Cefepime, urology following, bladder scan pending  PCP: Lg Rubio MD  Readmission Risk Score: 10.2 ( )%  Patient's Healthcare Decision Maker: Patient Declined (Legal Next of Kin Remains as Decision Maker)    Patient Goals/Plan/Treatment Preferences: Spoke with pt. She lives home with boyfriend. Independent with ADL's. Able to drive. Denies DME/HH needs upon admission. Does not have health insurance. PCP verified. Transportation/Food Security/Housekeeping Addressed:  No issues identified.

## 2022-05-31 NOTE — PLAN OF CARE
Problem: Discharge Planning  Goal: Discharge to home or other facility with appropriate resources  5/31/2022 0945 by Deep Stratton RN  Outcome: Progressing  Flowsheets (Taken 5/31/2022 0830)  Discharge to home or other facility with appropriate resources:   Identify barriers to discharge with patient and caregiver   Arrange for needed discharge resources and transportation as appropriate   Identify discharge learning needs (meds, wound care, etc)   Refer to discharge planning if patient needs post-hospital services based on physician order or complex needs related to functional status, cognitive ability or social support system     Problem: Pain  Goal: Verbalizes/displays adequate comfort level or baseline comfort level  5/31/2022 0945 by Deep Stratton RN  Outcome: Progressing  Flowsheets (Taken 5/31/2022 0830)  Verbalizes/displays adequate comfort level or baseline comfort level:   Encourage patient to monitor pain and request assistance   Assess pain using appropriate pain scale   Administer analgesics based on type and severity of pain and evaluate response   Implement non-pharmacological measures as appropriate and evaluate response     Problem: Safety - Adult  Goal: Free from fall injury  5/31/2022 0945 by Deep Stratton RN  Outcome: Progressing  Flowsheets (Taken 5/31/2022 0930)  Free From Fall Injury: Instruct family/caregiver on patient safety     Problem: ABCDS Injury Assessment  Goal: Absence of physical injury  5/31/2022 0945 by Deep Stratton RN  Outcome: Progressing  Flowsheets (Taken 5/31/2022 0930)  Absence of Physical Injury: Implement safety measures based on patient assessment     Problem: Respiratory - Adult  Goal: Achieves optimal ventilation and oxygenation  5/31/2022 0945 by Deep Stratton RN  Outcome: Progressing  Flowsheets (Taken 5/31/2022 0830)  Achieves optimal ventilation and oxygenation:   Assess for changes in respiratory status   Assess for changes in mentation and behavior   Encourage broncho-pulmonary hygiene including cough, deep breathe, incentive spirometry   Assess and instruct to report shortness of breath or any respiratory difficulty   Respiratory therapy support as indicated     Problem: Cardiovascular - Adult  Goal: Maintains optimal cardiac output and hemodynamic stability  5/31/2022 0945 by Luiza Pierce RN  Outcome: Progressing  Flowsheets (Taken 5/31/2022 0830)  Maintains optimal cardiac output and hemodynamic stability: Monitor blood pressure and heart rate     Problem: Cardiovascular - Adult  Goal: Absence of cardiac dysrhythmias or at baseline  5/31/2022 0945 by Luiza Pierce RN  Outcome: Progressing  Flowsheets (Taken 5/31/2022 0830)  Absence of cardiac dysrhythmias or at baseline: Monitor cardiac rate and rhythm     Problem: Skin/Tissue Integrity - Adult  Goal: Skin integrity remains intact  5/31/2022 0945 by Luiza Pierce RN  Outcome: Progressing  Flowsheets  Taken 5/31/2022 0930  Skin Integrity Remains Intact: Monitor for areas of redness and/or skin breakdown  Taken 5/31/2022 0830  Skin Integrity Remains Intact: Monitor for areas of redness and/or skin breakdown     Problem: Musculoskeletal - Adult  Goal: Return mobility to safest level of function  5/31/2022 0945 by Luiza Pierce RN  Outcome: Progressing  Flowsheets (Taken 5/31/2022 0830)  Return Mobility to Safest Level of Function:   Assess patient stability and activity tolerance for standing, transferring and ambulating with or without assistive devices   Instruct patient/family in ordered activity level     Problem: Gastrointestinal - Adult  Goal: Minimal or absence of nausea and vomiting  5/31/2022 0945 by Luiza Pierce RN  Outcome: Progressing  Flowsheets (Taken 5/31/2022 0830)  Minimal or absence of nausea and vomiting:   Administer IV fluids as ordered to ensure adequate hydration   Administer ordered antiemetic medications as needed   Provide nonpharmacologic comfort measures as appropriate     Problem: Infection - Adult  Goal: Absence of infection at discharge  5/31/2022 0945 by Magdy Thomas RN  Outcome: Progressing  Flowsheets (Taken 5/31/2022 0830)  Absence of infection at discharge:   Assess and monitor for signs and symptoms of infection   Monitor lab/diagnostic results   [place] appropriate cooling/warming therapies per order   Administer medications as ordered   Instruct and encourage patient and family to use good hand hygiene technique     Problem: Infection - Adult  Goal: Absence of infection during hospitalization  5/31/2022 0945 by Magdy Thomas RN  Outcome: Progressing  Flowsheets (Taken 5/31/2022 0830)  Absence of infection during hospitalization:   Assess and monitor for signs and symptoms of infection   Monitor lab/diagnostic results   [place] appropriate cooling/warming therapies per order   Administer medications as ordered   Instruct and encourage patient and family to use good hand hygiene technique     Problem: Infection - Adult  Goal: Absence of fever/infection during anticipated neutropenic period  5/31/2022 0945 by Magdy Thomas RN  Outcome: Progressing  Flowsheets (Taken 5/31/2022 0830)  Absence of fever/infection during anticipated neutropenic period: Monitor white blood cell count     Problem: Metabolic/Fluid and Electrolytes - Adult  Goal: Electrolytes maintained within normal limits  5/31/2022 0945 by Magdy Thomas RN  Outcome: Progressing  Flowsheets (Taken 5/31/2022 0830)  Electrolytes maintained within normal limits:   Monitor labs and assess patient for signs and symptoms of electrolyte imbalances   Administer electrolyte replacement as ordered   Monitor response to electrolyte replacements, including repeat lab results as appropriate     Problem: Metabolic/Fluid and Electrolytes - Adult  Goal: Hemodynamic stability and optimal renal function maintained  5/31/2022 0945 by Mark Jarvis Huseyin Gusman RN  Outcome: Progressing  Flowsheets (Taken 5/31/2022 0830)  Hemodynamic stability and optimal renal function maintained:   Monitor labs and assess for signs and symptoms of volume excess or deficit   Monitor intake, output and patient weight     Problem: Metabolic/Fluid and Electrolytes - Adult  Goal: Glucose maintained within prescribed range  5/31/2022 0945 by Dione Iqbal RN  Outcome: Progressing  Flowsheets (Taken 5/31/2022 0830)  Glucose maintained within prescribed range:   Monitor blood glucose as ordered   Assess for signs and symptoms of hyperglycemia and hypoglycemia   Administer ordered medications to maintain glucose within target range     Problem: Chronic Conditions and Co-morbidities  Goal: Patient's chronic conditions and co-morbidity symptoms are monitored and maintained or improved  Outcome: Progressing  Flowsheets (Taken 5/31/2022 0830)  Care Plan - Patient's Chronic Conditions and Co-Morbidity Symptoms are Monitored and Maintained or Improved:   Monitor and assess patient's chronic conditions and comorbid symptoms for stability, deterioration, or improvement   Collaborate with multidisciplinary team to address chronic and comorbid conditions and prevent exacerbation or deterioration   Update acute care plan with appropriate goals if chronic or comorbid symptoms are exacerbated and prevent overall improvement and discharge    Care plan reviewed with patient and family. Patient and family verbalize understanding of the plan of care and contribute to goal setting.

## 2022-05-31 NOTE — PROGRESS NOTES
PROGRESS NOTE      Patient:  Eliseo Dobbins      Unit/Bed:-28/028-A    YOB: 1970    MRN: [id number]       Acct: [de-identified]     PCP: Goyo Musa MD    Date of Admission: 5/29/2022      Assessment/Plan:    Anticipated Discharge in : 2-3 days    Active Hospital Problems    Diagnosis Date Noted    Sepsis due to Escherichia coli without acute organ dysfunction ([facility] [address] [A41.51]      Priority: Medium    Acute pyelonephritis [N10]      Priority: Medium    Acute UTI [N39.0]      Priority: Medium    Elevated LFTs [R79.89]      Priority: Medium    Hyperbilirubinemia [E80.6]      Priority: Medium    Hypokalemia [E87.6]      Priority: Medium    Normocytic anemia [D64.9]      Priority: Medium    Thrombocytopenia ([facility] [address] [D69.6]      Priority: Medium    Hyponatremia [E87.1]      Priority: Medium    Hydroureter on left [N13.4]      Priority: Medium    Leukopenia [D72.819]      Priority: Medium    Type 2 diabetes mellitus with insulin therapy ([facility] [address] [E11.9, Z79.4]      Priority: Medium    Obesity (BMI 30-39. 9) [E66.9]      Priority: Medium    Essential hypertension [I10] 11/14/2018     Sepsis with evidence of end-organ damage  Patient with high fever, tachycardia, and tachypnea on admission. Source of infection gram negative bacilli from urinary tract. Patient also with elevated lactate at 2.7 on admission. Blood cultures and urine cultures with gram negative bacilli. S/p full sepsis bolus of IV fluid (5/29). - Treated with Rocephin (5/29) -> transition to Cefepime (5/30)  - Await final cultures and sensitivities  - S/p IV fluids  - Liver enzymes and bilirubin also elevated - likely related to hypoperfusion from sepsis - treatment as below. Bacteremia  2 of 2 blood cultures positive for gram negative bacilli. Urinary source most likely.   - Continue Cefepime (started 5/30), await final culture results and sensitivities  - IV fluids discontinued    Acute Hypoxic Respiratory Failure  Patient (5/30)  - Holding home dose HCTZ  - Overall, BP improving - continue to monitor, adjust medications as necessary    Hyponatremia, mild  Na 134 -> 132 -> 130 (5/31). Likely pseudohyponatremia due to hyperglycemia on admission. Now compounded by treatment with IV fluids.  - Monitor BMP daily  - IV fluids discontinued    Elevated liver enzymes  AST, ALT, Alk Phos all mildly elevated on admission. Suspect most likely related to hypoperfusion due to underlying sepsis and dehydration. Mild RUQ tenderness on exam. No prior history of elevated LFTs. - Trend LFTs daily  - Acute hepatitis panel - pending  - Discontinue IV fluids    Direct Hyperbilirubinemia  Total bili 2.4 on admission -> 2.9 -> 3.5 (5/31). Direct Bili 1.6 -> 2.3 -> 3.2 (5/30). Likely related to underlying sepsis. Patient with mild RUQ pain, but s/p cholecystectomy. - Trend bilirubin daily  -  Will obtain MRCP due to persistently elevated bilirubin to further evaluate for possible retained biliary stone    Thrombocytopenia  Plt 122 on admission -> 100 -> 104 (5/31). Likely related to underlying infection. No signs of bleeding currently. - Monitor CBC daily  - If continues to decrease despite treating underlying infection, will hold Lovenox    Normocytic Anemia  Hemoglobin 11.3 -> 11.4 (5/31) - decreased from 13.6 on admission. Baseline appears in the 14s. However likely a dilutional component given patient received 4L IVF for sepsis on presentation. No signs of bleeding.  - Monitor clinically, if continues to decrease - will check iron studies  - Daily CBC    Leukopenia, resolved  WBC 4.5 on admission -> 7.8 -> 9.0 (5/31). Likely related to underlying infection. - Will monitor as clinical status improves, otherwise may need to evaluate for further causes of bone marrow suppression  - Daily CBC    Hypokalemia, resolved  K 3.3 -> 4.3 (5/31). Received KLOR 40 mEq x1 dose (5/30).   - Potassium replacement protocol PRN  - Daily BMP    Elevated Lactate, resolved  Lactate 2.7 on admission -> 1.4 (5/30). Likely related to underlying sepsis. S/p 1L NS + 3L LR for sepsis bolus. - Monitor clinically, no need to trend lactate further    Ketosis  Beta-hydroxybutyrate 10.15 on admission -> 18.06 (5/31). CO2 23. AG 15. Glucose 411. Likely due to decreased PO intake, uncontrolled hyperglycemia. Likely represents developing DKA on admission.   - Treat hyperglycemia as below  - No indication for DKA protocol currently  - PO intake as tolerated  - Will monitor patient clinically as PO intake increases    Uncontrolled Type 2 Diabetes Mellitus  Glucose 411 on admission. A1c 12.3% (5/30) - was 12.4% in 12/2021. Patient not checking blood sugars at home. Had not taken medication in several days. Patient with ketosis on admission, but not yet classified as DKA - noted as above. Insulin gtt initiated, but then discontinued (5/29). Treated with 20 U + 10 U Lantus (5/29-5/30)  - POCT Glucose  - Hypoglycemia protocol in place  - Increase to 25 U Lantus nightly  - Medium dose SSI PRN  - Diabetic Diet  - Holding home Metformin, Januvia, Glipizide currently. Depression and Anxiety  Per history, patient on Zoloft at home  - Zoloft held currently with patient's fever, tachycardia, and hypertension - resume as patient clinically improves    Obesity with BMI 38.11 kg/m2  - Noted, encourage lifestyle modifications        Chief Complaint: Fevers, dysuria, weakness, fatigue    Hospital Course:   Per H&P:  Kwabena Pritchett is a 50 y.o. female with PMHx of hypertension, hyperlipidemia, type 2 diabetes mellitus, depression/anxiety, who presented to the emergency room here at [address] [address] on 5/29/2022 complaining of at least a 3-day history of recurrent fevers with chills, lower abdominal pain, dysuria and increased frequency, lack of energy, decreased appetite, and progressive weakness.    Patient admits to not being able to take her home medications including her diabetes medications for the last 2 days at least due to being very tired and sick. Patient suspected that she might have a urinary tract infection due to her symptoms above and decided to come in for further evaluation. Her abdominal pain did not radiate and was sharp and present mostly in the suprapubic area. No flank pain. Some nausea without vomiting. Denies any diarrhea. No sick contacts or recent traveling. No vaginal bleeding or discharge.     In the emergency room the patient was febrile at 100.5 °F with severe tachycardia in the 130s to 140s, tachypneic, toxic looking, with good O2 sats on room air. Laboratory investigations revealed a positive urinalysis with positive nitrates and leukocyte esterase. Elevated beta hydroxybutyrate of 10. Elevated blood sugars in the 300-400 range. CT scan of the abdomen pelvis with contrast with findings suspicious for left pyelonephritis. Patient given ceftriaxone 2 g IV x1 with appropriate fluid resuscitation. She was then admitted to our hospitalist service for further evaluation and treatment.     At the time of my evaluation on the medical floor, patient was in obvious discomfort with tachycardia up to 160, tachypnea, and a high fever of 105.8 degrees Fahrenheit stating that she feels very hot. Blood pressure was also elevated in the 161S systolic. Patient was flushed with warm skin to touch. Was still responsive and answering questions appropriately and following commands.     Rapid response was called on patient just prior to my arrival due to severe tachycardia. Twelve-lead EKG confirmed sinus tachycardia with no ischemic changes or underlying pathological tachyarrhythmia. Patient was given 5 mg of IV Cardizem push and 500 cc LR fluid bolus then transferred to stepdown for further management and treatment. \"    Subjective:  Patient seen and examined this morning. States she is feeling tired, but somewhat better than prior. She denies any pain currently.  Fever and HR have improved this morning after receiving ibuprofen x1 dose. Tmax in last 24 hours has been 103.6 F. BP and HR noted to increase with fever rising. Otherwise, patient denies nausea, vomiting, chest pain, shortness of breath, palpitations, leg swelling, or change in bowel movements. No acute concerns this morning. Medications:  Reviewed    Infusion Medications    dextrose      sodium chloride       Scheduled Medications    lisinopril  20 mg Oral Daily    phosphorus replacement protocol   Other RX Placeholder    insulin glargine  25 Units SubCUTAneous Nightly    insulin lispro  0-12 Units SubCUTAneous TID     insulin lispro  0-6 Units SubCUTAneous Nightly    aspirin EC  81 mg Oral Daily    [Held by provider] glipiZIDE  5 mg Oral BID    metoprolol tartrate  50 mg Oral BID    [Held by provider] sertraline  50 mg Oral Daily    sodium chloride flush  10 mL IntraVENous 2 times per day    enoxaparin  40 mg SubCUTAneous Daily    cefepime  2,000 mg IntraVENous Q12H     PRN Meds: dextrose, glucose, dextrose bolus **OR** dextrose bolus, glucagon (rDNA), dextrose, sodium chloride flush, sodium chloride, ondansetron **OR** ondansetron, polyethylene glycol, acetaminophen **OR** acetaminophen, potassium chloride **OR** potassium alternative oral replacement **OR** potassium chloride, magnesium sulfate, aluminum & magnesium hydroxide-simethicone      Intake/Output Summary (Last 24 hours) at 5/31/2022 1705  Last data filed at 5/31/2022 1406  Gross per 24 hour   Intake 7749.92 ml   Output 700 ml   Net 7049.92 ml       Diet:  ADULT DIET; Regular; 3 carb choices (45 gm/meal)    Exam:  BP (!) 175/99   Pulse (!) 102   Temp 98.6 °F (37 °C) (Oral)   Resp 20   Ht 5' 4\" (1.626 m)   Wt 222 lb (100.7 kg)   SpO2 93%   BMI 38.11 kg/m²     General appearance: Alert, ill appearing female resting in bed; No apparent distress, appears stated age and cooperative. HEENT: Pupils equal, round, and reactive to light. Conjunctivae/corneas clear. Neck: Supple, with full range of motion. No jugular venous distention. Trachea midline. Respiratory:  Clear bilaterally, no wheezing or rhonchi noted  Cardiovascular: Regular rate and rhythm with normal S1/S2 without murmurs, rubs or gallops. Abdomen: Soft, obese, mildly tender RUQ and RLQ, non-distended with normal bowel sounds. No CVA tenderness  Musculoskeletal: passive and active ROM x 4 extremities. No edema  Skin: Skin color, texture, turgor normal.  No rashes or lesions. Psychiatric: Alert and oriented, thought content appropriate, normal insight  Capillary Refill: Brisk,< 3 seconds   Peripheral Pulses: +2 palpable, equal bilaterally       Labs:   Recent Labs     05/29/22  1515 05/30/22  0531 05/31/22  0439   WBC 4.5* 7.8 9.0   HGB 13.6 11.3* 11.4*   HCT 40.4 34.6* 35.8*   * 100* 104*     Recent Labs     05/30/22  0531 05/30/22  1428 05/31/22  0438   * 132* 130*   K 3.3* 4.2 4.3    98 98   CO2 23 23 19*   BUN 10 12 13   CREATININE 0.5 0.5 0.3*   CALCIUM 8.3* 8.9 8.8   PHOS  --   --  1.7*     Recent Labs     05/29/22  1515 05/29/22  1515 05/30/22  0531 05/30/22  1428 05/31/22  0438   *   < > 134* 106* 119*   *   < > 122* 119* 115*   BILIDIR 1.6*  --  2.3*  --  3.2*   BILITOT 2.4*   < > 2.5* 2.9* 3.5*   ALKPHOS 162*   < > 156* 178* 215*    < > = values in this interval not displayed. Recent Labs     05/30/22  1428   INR 1.27*     No results for input(s): Yadi Belts in the last 72 hours. Urinalysis:      Lab Results   Component Value Date    NITRU Positive 05/29/2022    WBCUA 2-4 01/25/2020    BACTERIA FEW 01/25/2020    RBCUA NONE SEEN 01/25/2020    BLOODU Moderate 05/29/2022    SPECGRAV 1.025 05/29/2022    GLUCOSEU 500 05/29/2022       Radiology:  CT ABDOMEN PELVIS W IV CONTRAST Additional Contrast? None   Final Result   1. Indistinct area of decreased perfusion in the left kidney suspicious for pyelonephritis.  Clinical and laboratory correlation are required. 2. Mild left-sided hydroureter with suggestion of ureteral wall enhancement, also suspicious for infection. Final report electronically signed by Dr. Nevaeh Lloyd on 5/29/2022 6:16 PM      MRI ABDOMEN W WO CONTRAST MRCP    (Results Pending)       Diet: ADULT DIET; Regular; 3 carb choices (45 gm/meal)    DVT prophylaxis: [x] Lovenox                                 [] SCDs                                 [] SQ Heparin                                 [] Encourage ambulation           [] Already on Anticoagulation     Disposition:    [x] Home       [] TCU       [] Rehab       [] Psych       [] SNF       [] [place]       [] Other-    Code Status: Full Code    PT/OT Eval Status: pending clinical improvement      Electronically signed by Latrice Garces MD on 5/31/2022 at 5:05 PM    This note was electronically signed. Parts of this note may have been dictated by use of voice recognition software and electronically transcribed. The note may contain errors not detected in proofreading. Please refer to my supervising physician's documentation if my documentation differs.

## 2022-05-31 NOTE — PROGRESS NOTES
Kaykay Clemons, APRN - CNP  Urology Progress Note    Subjective: Carlos A Lewis is a 50 y.o. female.    s/p * Surgery not found * on     His/Her current Diet is: ADULT DIET; Regular; 3 carb choices (45 gm/meal). Since the previous note, the patient reports the following:  No acute issues overnight. No fevers or chills. No nausea or vomiting. No chest pain or shortness of breath. No calf pain. Pain Controlled. Ambulating. Tolerating PO Diet. Feels better  Febrile overnight  Ecoli in urine and blood    Vitals and Labs:  Patient Vitals for the past 24 hrs:   BP Temp Temp src Pulse Resp SpO2   05/31/22 1119 137/89 97.9 °F (36.6 °C) Oral 90 25 93 %   05/31/22 0830 (!) 142/83 97.5 °F (36.4 °C) Rectal 81 18 96 %   05/31/22 0630 (!) 151/89 99 °F (37.2 °C) -- -- -- --   05/31/22 0515 -- 100.4 °F (38 °C) -- -- -- --   05/31/22 0415 (!) 178/100 -- -- -- -- --   05/31/22 0400 -- (!) 101.7 °F (38.7 °C) Rectal -- -- --   05/31/22 0315 (!) 174/97 (!) 101.4 °F (38.6 °C) Rectal (!) 112 23 94 %   05/31/22 0230 -- (!) 100.8 °F (38.2 °C) Rectal -- -- --   05/31/22 0130 -- (!) 101.6 °F (38.7 °C) Rectal -- -- --   05/31/22 0030 -- (!) 102.1 °F (38.9 °C) Rectal -- -- --   05/30/22 2330 (!) 158/98 (!) 101.8 °F (38.8 °C) Rectal (!) 104 22 95 %   05/30/22 2152 -- (!) 102.6 °F (39.2 °C) Rectal -- -- --   05/30/22 1915 (!) 170/96 (!) 103.6 °F (39.8 °C) Rectal (!) 127 20 91 %   05/30/22 1745 133/75 (!) 102.3 °F (39.1 °C) Rectal (!) 106 18 93 %   05/30/22 1715 (!) 146/96 -- -- (!) 108 22 94 %   05/30/22 1532 (!) 154/87 (!) 102.1 °F (38.9 °C) Oral (!) 108 20 93 %   05/30/22 1430 (!) 141/79 (!) 102.7 °F (39.3 °C) Rectal (!) 115 18 (!) 88 %     I/O last 3 completed shifts:   In: 3567.3 [I.V.:3470.2; IV Piggyback:97.1]  Out: 1000 [Urine:1000]    Recent Labs     05/29/22  1515 05/30/22  0531 05/31/22  0439   WBC 4.5* 7.8 9.0   HGB 13.6 11.3* 11.4*   HCT 40.4 34.6* 35.8*   MCV 88.8 89.2 92.7   * 100* 104*     Recent Labs 05/30/22  0531 05/30/22  1428 05/31/22  0438   * 132* 130*   K 3.3* 4.2 4.3    98 98   CO2 23 23 19*   PHOS  --   --  1.7*   BUN 10 12 13   CREATININE 0.5 0.5 0.3*       Recent Labs     05/29/22  1415   COLORU Dark yellow*   PHUR 5.50   SPECGRAV 1.025   LEUKOCYTESUR Trace*   UROBILINOGEN 0.20   BILIRUBINUR Negative   BLOODU Moderate*       Physical Exam:  Constitutional:               Alert and oriented times 3, no acute distress and cooperative to examination with appropriate mood and affect. HEENT:   Head:               Normocephalic and atraumatic. Mouth/Throat:                Mucous membranes are normal.   Eyes:               EOM are normal. No scleral icterus. Nose:               The external appearance of the nose is normal  Ears: The ears appear normal to external inspection   Neck:               Supple, symmetrical, trachea midline, no adenopathy, thyroid symmetric, not enlarged and no tenderness. Cardiovascular:               Normal rate, regular rhythm, S1 S2 heart sounds. Pulmonary/Chest:              Chest symmetric with normal A/P diameter, no wheezes, rales, or rhonchi noted. Normal respiratory rate and rhthym. No use of accessory muscles. Abdominal:               Soft. Mild suprapubic tenderness. Hypoactive bowel sounds. No CVA tenderness. Musculoskeletal:               Normal range of motion. She exhibits no edema or tenderness of lower extremities. Extremities:               No cyanosis, clubbing, or edema present. Neurological:               Alert and oriented. No cranial nerve deficit.  There are no focalizing motor or sensory deficits.     Additional Lab/Culture results:     Imaging Reviewed:     SPENCER Barrett CNP independently reviewed the images and verified the radiology reports from:    CT ABDOMEN PELVIS W IV CONTRAST Additional Contrast? None    Result Date: 5/29/2022  PROCEDURE: CT ABDOMEN PELVIS W IV CONTRAST CLINICAL INFORMATION: Lower abdominal pain TECHNIQUE: CT of the abdomen and pelvis was performed following administration of 80 mL Isovue-370 intravenous contrast only. Axial images as well as coronal and sagittal reconstructions were obtained. All CT scans at this facility use dose modulation, iterative reconstruction, and/or weight-based dosing when appropriate to reduce radiation dose to as low as reasonably achievable. COMPARISON: CT abdomen and pelvis 12/29/2021 FINDINGS: Lower thorax: There is minimal scarring or atelectasis at the left lung base. No pleural effusion is identified. Abdomen: There is no free intraperitoneal air or fluid. Bowel is normal in course and caliber without evidence of obstruction. The gallbladder is surgically absent. There is a poorly defined area of decreased perfusion at the left mid kidney (image 48). There is mild left-sided hydroureter with suggestion of ureteral wall enhancement. Prominence of the common bile duct is likely postsurgical. The liver, spleen, right adrenal gland and right kidney are normal. A 1.6 cm hypoattenuating left adrenal nodule  is stable (image 29). Atherosclerotic calcifications are present in the abdominal aorta without evidence of aneurysm. There is no mesenteric or retroperitoneal lymphadenopathy. No aggressive osseous lesions are identified in the lumbar spine. Pelvis: The urinary bladder is unremarkable. There are phleboliths in the pelvis. There is no pelvic or inguinal lymphadenopathy. No aggressive osseous lesions are identified. 1. Indistinct area of decreased perfusion in the left kidney suspicious for pyelonephritis. Clinical and laboratory correlation are required. 2. Mild left-sided hydroureter with suggestion of ureteral wall enhancement, also suspicious for infection.  Final report electronically signed by Dr. Cesia Villafana on 5/29/2022 6:16 PM      Impression:    Patient Active Problem List   Diagnosis    Essential hypertension    SOB (shortness of breath) on exertion    Intermittent palpitations    Hx of Sinus tachycardia    Septicemia (HCC)    Sepsis due to Escherichia coli without acute organ dysfunction (HCC)    Acute pyelonephritis    Acute UTI    Elevated LFTs    Hyperbilirubinemia    Hypokalemia    Normocytic anemia    Thrombocytopenia (HCC)    Hyponatremia    Hydroureter on left    Leukopenia    Type 2 diabetes mellitus with insulin therapy (HCC)    Obesity (BMI 30-39. 9)       s/p * Surgery not found * on     IMPRESSION:   Sepsis with bacteremia  Acute pyelonephritis with mild left hydroureter  Elevated LFTs  DM uncontrolled    Plan:  Mild hydoureter likely from infection. CRT normal.  Clinically improving. Continue broad spectrum anbx and IVFs.     Feels like she emptying, will get bladder scan    Will follow    Benjamin Moreno, SPENCER - CNP  2:10 PM 5/31/2022

## 2022-05-31 NOTE — PLAN OF CARE
Problem: Discharge Planning  Goal: Discharge to home or other facility with appropriate resources  Outcome: Progressing  Flowsheets (Taken 5/31/2022 0137)  Discharge to home or other facility with appropriate resources:   Identify barriers to discharge with patient and caregiver   Arrange for needed discharge resources and transportation as appropriate     Problem: Pain  Goal: Verbalizes/displays adequate comfort level or baseline comfort level  Outcome: Progressing  Flowsheets (Taken 5/31/2022 0137)  Verbalizes/displays adequate comfort level or baseline comfort level:   Encourage patient to monitor pain and request assistance   Assess pain using appropriate pain scale   Administer analgesics based on type and severity of pain and evaluate response   Notify Licensed Independent Practitioner if interventions unsuccessful or patient reports new pain     Problem: Safety - Adult  Goal: Free from fall injury  Outcome: Progressing  Note: Remained free from falls     Problem: ABCDS Injury Assessment  Goal: Absence of physical injury  Outcome: Progressing  Flowsheets (Taken 5/31/2022 0137)  Absence of Physical Injury: Implement safety measures based on patient assessment     Problem: Respiratory - Adult  Goal: Achieves optimal ventilation and oxygenation  Outcome: Progressing  Flowsheets (Taken 5/31/2022 0137)  Achieves optimal ventilation and oxygenation:   Assess for changes in respiratory status   Assess for changes in mentation and behavior   Position to facilitate oxygenation and minimize respiratory effort   Oxygen supplementation based on oxygen saturation or arterial blood gases     Problem: Cardiovascular - Adult  Goal: Maintains optimal cardiac output and hemodynamic stability  Outcome: Progressing  Flowsheets (Taken 5/31/2022 0137)  Maintains optimal cardiac output and hemodynamic stability:   Monitor blood pressure and heart rate   Monitor urine output and notify Licensed Independent Practitioner for values outside of normal range     Problem: Skin/Tissue Integrity - Adult  Goal: Skin integrity remains intact  Outcome: Progressing  Flowsheets (Taken 5/31/2022 0137)  Skin Integrity Remains Intact: Monitor for areas of redness and/or skin breakdown     Problem: Musculoskeletal - Adult  Goal: Return mobility to safest level of function  Outcome: Progressing  Flowsheets (Taken 5/31/2022 0137)  Return Mobility to Safest Level of Function:   Assess patient stability and activity tolerance for standing, transferring and ambulating with or without assistive devices   Instruct patient/family in ordered activity level     Problem: Gastrointestinal - Adult  Goal: Minimal or absence of nausea and vomiting  Outcome: Progressing  Flowsheets (Taken 5/31/2022 0137)  Minimal or absence of nausea and vomiting:   Administer IV fluids as ordered to ensure adequate hydration   Provide nonpharmacologic comfort measures as appropriate   Administer ordered antiemetic medications as needed     Problem: Infection - Adult  Goal: Absence of infection at discharge  Outcome: Progressing  Flowsheets (Taken 5/31/2022 0137)  Absence of infection at discharge:   Assess and monitor for signs and symptoms of infection   Monitor lab/diagnostic results   [place] appropriate cooling/warming therapies per order   Administer medications as ordered     Problem: Infection - Adult  Goal: Absence of fever/infection during anticipated neutropenic period  Outcome: Progressing  Flowsheets (Taken 5/31/2022 0137)  Absence of fever/infection during anticipated neutropenic period: Monitor white blood cell count     Problem: Metabolic/Fluid and Electrolytes - Adult  Goal: Electrolytes maintained within normal limits  Outcome: Progressing  Flowsheets (Taken 5/31/2022 0137)  Electrolytes maintained within normal limits:   Monitor labs and assess patient for signs and symptoms of electrolyte imbalances   Administer electrolyte replacement as ordered   Monitor response to electrolyte replacements, including repeat lab results as appropriate     Problem: Metabolic/Fluid and Electrolytes - Adult  Goal: Hemodynamic stability and optimal renal function maintained  Outcome: Progressing  Flowsheets (Taken 5/31/2022 0137)  Hemodynamic stability and optimal renal function maintained:   Monitor labs and assess for signs and symptoms of volume excess or deficit   Monitor intake, output and patient weight   Monitor urine specific gravity, serum osmolarity and serum sodium as indicated or ordered   Monitor response to interventions for patient's volume status, including labs, urine output, blood pressure (other measures as available)   Encourage oral intake as appropriate     Problem: Metabolic/Fluid and Electrolytes - Adult  Goal: Glucose maintained within prescribed range  Outcome: Progressing  Flowsheets (Taken 5/31/2022 0137)  Glucose maintained within prescribed range:   Monitor blood glucose as ordered   Assess for signs and symptoms of hyperglycemia and hypoglycemia   Administer ordered medications to maintain glucose within target range   Care plan reviewed with patient. Patient verbalized understanding of the plan of care and contribute to goal setting.

## 2022-06-01 ENCOUNTER — APPOINTMENT (OUTPATIENT)
Dept: ULTRASOUND IMAGING | Age: 52
DRG: 871 | End: 2022-06-01

## 2022-06-01 LAB
ANION GAP SERPL CALCULATED.3IONS-SCNC: 12 MEQ/L (ref 8–16)
BASOPHILS # BLD: 0.3 %
BASOPHILS ABSOLUTE: 0 THOU/MM3 (ref 0–0.1)
BUN BLDV-MCNC: 13 MG/DL (ref 7–22)
CALCIUM SERPL-MCNC: 8.5 MG/DL (ref 8.5–10.5)
CHLORIDE BLD-SCNC: 98 MEQ/L (ref 98–111)
CO2: 21 MEQ/L (ref 23–33)
CREAT SERPL-MCNC: 0.3 MG/DL (ref 0.4–1.2)
EKG ATRIAL RATE: 115 BPM
EKG P AXIS: 73 DEGREES
EKG P-R INTERVAL: 132 MS
EKG Q-T INTERVAL: 304 MS
EKG QRS DURATION: 80 MS
EKG QTC CALCULATION (BAZETT): 420 MS
EKG R AXIS: 0 DEGREES
EKG T AXIS: 48 DEGREES
EKG VENTRICULAR RATE: 115 BPM
EOSINOPHIL # BLD: 1.1 %
EOSINOPHILS ABSOLUTE: 0.1 THOU/MM3 (ref 0–0.4)
ERYTHROCYTE [DISTWIDTH] IN BLOOD BY AUTOMATED COUNT: 12.3 % (ref 11.5–14.5)
ERYTHROCYTE [DISTWIDTH] IN BLOOD BY AUTOMATED COUNT: 40.4 FL (ref 35–45)
GFR SERPL CREATININE-BSD FRML MDRD: > 90 ML/MIN/1.73M2
GLUCOSE BLD-MCNC: 153 MG/DL (ref 70–108)
GLUCOSE BLD-MCNC: 159 MG/DL (ref 70–108)
GLUCOSE BLD-MCNC: 175 MG/DL (ref 70–108)
GLUCOSE BLD-MCNC: 191 MG/DL (ref 70–108)
GLUCOSE BLD-MCNC: 206 MG/DL (ref 70–108)
HAPTOGLOBIN: 240 MG/DL (ref 30–200)
HCT VFR BLD CALC: 32.7 % (ref 37–47)
HEMOGLOBIN: 10.9 GM/DL (ref 12–16)
IMMATURE GRANS (ABS): 0.07 THOU/MM3 (ref 0–0.07)
IMMATURE GRANULOCYTES: 0.9 %
LYMPHOCYTES # BLD: 10.3 %
LYMPHOCYTES ABSOLUTE: 0.8 THOU/MM3 (ref 1–4.8)
MAGNESIUM: 1.9 MG/DL (ref 1.6–2.4)
MCH RBC QN AUTO: 29.8 PG (ref 26–33)
MCHC RBC AUTO-ENTMCNC: 33.3 GM/DL (ref 32.2–35.5)
MCV RBC AUTO: 89.3 FL (ref 81–99)
MONOCYTES # BLD: 7.5 %
MONOCYTES ABSOLUTE: 0.6 THOU/MM3 (ref 0.4–1.3)
NUCLEATED RED BLOOD CELLS: 0 /100 WBC
PHOSPHORUS: 2 MG/DL (ref 2.4–4.7)
PLATELET # BLD: 129 THOU/MM3 (ref 130–400)
PMV BLD AUTO: 10.4 FL (ref 9.4–12.4)
POTASSIUM SERPL-SCNC: 3.4 MEQ/L (ref 3.5–5.2)
RBC # BLD: 3.66 MILL/MM3 (ref 4.2–5.4)
SEG NEUTROPHILS: 79.9 %
SEGMENTED NEUTROPHILS ABSOLUTE COUNT: 5.9 THOU/MM3 (ref 1.8–7.7)
SODIUM BLD-SCNC: 131 MEQ/L (ref 135–145)
WBC # BLD: 7.4 THOU/MM3 (ref 4.8–10.8)

## 2022-06-01 PROCEDURE — 2500000003 HC RX 250 WO HCPCS: Performed by: STUDENT IN AN ORGANIZED HEALTH CARE EDUCATION/TRAINING PROGRAM

## 2022-06-01 PROCEDURE — 2500000003 HC RX 250 WO HCPCS

## 2022-06-01 PROCEDURE — 6370000000 HC RX 637 (ALT 250 FOR IP): Performed by: PEDIATRICS

## 2022-06-01 PROCEDURE — 6360000002 HC RX W HCPCS: Performed by: FAMILY MEDICINE

## 2022-06-01 PROCEDURE — 6360000002 HC RX W HCPCS: Performed by: PEDIATRICS

## 2022-06-01 PROCEDURE — 6360000002 HC RX W HCPCS: Performed by: INTERNAL MEDICINE

## 2022-06-01 PROCEDURE — 2580000003 HC RX 258: Performed by: INTERNAL MEDICINE

## 2022-06-01 PROCEDURE — 6370000000 HC RX 637 (ALT 250 FOR IP): Performed by: STUDENT IN AN ORGANIZED HEALTH CARE EDUCATION/TRAINING PROGRAM

## 2022-06-01 PROCEDURE — 2060000000 HC ICU INTERMEDIATE R&B

## 2022-06-01 PROCEDURE — 85025 COMPLETE CBC W/AUTO DIFF WBC: CPT

## 2022-06-01 PROCEDURE — 83735 ASSAY OF MAGNESIUM: CPT

## 2022-06-01 PROCEDURE — 99231 SBSQ HOSP IP/OBS SF/LOW 25: CPT | Performed by: FAMILY MEDICINE

## 2022-06-01 PROCEDURE — 36415 COLL VENOUS BLD VENIPUNCTURE: CPT

## 2022-06-01 PROCEDURE — 2580000003 HC RX 258: Performed by: PEDIATRICS

## 2022-06-01 PROCEDURE — 99232 SBSQ HOSP IP/OBS MODERATE 35: CPT | Performed by: UROLOGY

## 2022-06-01 PROCEDURE — 84100 ASSAY OF PHOSPHORUS: CPT

## 2022-06-01 PROCEDURE — 2500000003 HC RX 250 WO HCPCS: Performed by: FAMILY MEDICINE

## 2022-06-01 PROCEDURE — 2580000003 HC RX 258: Performed by: STUDENT IN AN ORGANIZED HEALTH CARE EDUCATION/TRAINING PROGRAM

## 2022-06-01 PROCEDURE — 82948 REAGENT STRIP/BLOOD GLUCOSE: CPT

## 2022-06-01 PROCEDURE — 80048 BASIC METABOLIC PNL TOTAL CA: CPT

## 2022-06-01 PROCEDURE — 93010 ELECTROCARDIOGRAM REPORT: CPT | Performed by: INTERNAL MEDICINE

## 2022-06-01 PROCEDURE — 76775 US EXAM ABDO BACK WALL LIM: CPT

## 2022-06-01 PROCEDURE — 2580000003 HC RX 258: Performed by: FAMILY MEDICINE

## 2022-06-01 RX ORDER — LISINOPRIL 40 MG/1
40 TABLET ORAL DAILY
Status: DISCONTINUED | OUTPATIENT
Start: 2022-06-01 | End: 2022-06-04 | Stop reason: HOSPADM

## 2022-06-01 RX ORDER — HYDROCHLOROTHIAZIDE 25 MG/1
25 TABLET ORAL DAILY
Status: DISCONTINUED | OUTPATIENT
Start: 2022-06-01 | End: 2022-06-04 | Stop reason: HOSPADM

## 2022-06-01 RX ORDER — METOPROLOL TARTRATE 5 MG/5ML
10 INJECTION INTRAVENOUS EVERY 6 HOURS PRN
Status: DISCONTINUED | OUTPATIENT
Start: 2022-06-01 | End: 2022-06-01 | Stop reason: SDUPTHER

## 2022-06-01 RX ORDER — METOPROLOL TARTRATE 5 MG/5ML
5 INJECTION INTRAVENOUS ONCE
Status: COMPLETED | OUTPATIENT
Start: 2022-06-01 | End: 2022-06-01

## 2022-06-01 RX ORDER — DILTIAZEM HYDROCHLORIDE 5 MG/ML
10 INJECTION INTRAVENOUS ONCE
Status: DISCONTINUED | OUTPATIENT
Start: 2022-06-01 | End: 2022-06-01

## 2022-06-01 RX ADMIN — ENOXAPARIN SODIUM 40 MG: 100 INJECTION SUBCUTANEOUS at 08:25

## 2022-06-01 RX ADMIN — INSULIN LISPRO 2 UNITS: 100 INJECTION, SOLUTION INTRAVENOUS; SUBCUTANEOUS at 12:04

## 2022-06-01 RX ADMIN — CEFEPIME HYDROCHLORIDE 2000 MG: 2 INJECTION, POWDER, FOR SOLUTION INTRAVENOUS at 00:02

## 2022-06-01 RX ADMIN — CEFEPIME HYDROCHLORIDE 2000 MG: 2 INJECTION, POWDER, FOR SOLUTION INTRAVENOUS at 11:58

## 2022-06-01 RX ADMIN — SODIUM CHLORIDE, PRESERVATIVE FREE 10 ML: 5 INJECTION INTRAVENOUS at 08:27

## 2022-06-01 RX ADMIN — LABETALOL 20 MG/4 ML (5 MG/ML) INTRAVENOUS SYRINGE 5 MG: at 00:28

## 2022-06-01 RX ADMIN — SODIUM CHLORIDE, PRESERVATIVE FREE 10 ML: 5 INJECTION INTRAVENOUS at 20:25

## 2022-06-01 RX ADMIN — ACETAMINOPHEN 650 MG: 325 TABLET ORAL at 22:24

## 2022-06-01 RX ADMIN — METOPROLOL TARTRATE 50 MG: 50 TABLET, FILM COATED ORAL at 08:33

## 2022-06-01 RX ADMIN — CEFTRIAXONE SODIUM 2000 MG: 2 INJECTION, POWDER, FOR SOLUTION INTRAMUSCULAR; INTRAVENOUS at 20:55

## 2022-06-01 RX ADMIN — INSULIN LISPRO 2 UNITS: 100 INJECTION, SOLUTION INTRAVENOUS; SUBCUTANEOUS at 08:25

## 2022-06-01 RX ADMIN — METOPROLOL TARTRATE 50 MG: 50 TABLET, FILM COATED ORAL at 22:23

## 2022-06-01 RX ADMIN — INSULIN GLARGINE 25 UNITS: 100 INJECTION, SOLUTION SUBCUTANEOUS at 20:43

## 2022-06-01 RX ADMIN — ASPIRIN 81 MG: 81 TABLET, COATED ORAL at 08:25

## 2022-06-01 RX ADMIN — INSULIN LISPRO 2 UNITS: 100 INJECTION, SOLUTION INTRAVENOUS; SUBCUTANEOUS at 17:52

## 2022-06-01 RX ADMIN — METOPROLOL TARTRATE 5 MG: 5 INJECTION INTRAVENOUS at 11:58

## 2022-06-01 RX ADMIN — POTASSIUM CHLORIDE 40 MEQ: 1500 TABLET, EXTENDED RELEASE ORAL at 06:10

## 2022-06-01 RX ADMIN — METOPROLOL TARTRATE 10 MG: 1 INJECTION, SOLUTION INTRAVENOUS at 20:23

## 2022-06-01 RX ADMIN — SODIUM PHOSPHATE, MONOBASIC, MONOHYDRATE AND SODIUM PHOSPHATE, DIBASIC, ANHYDROUS 15 MMOL: 276; 142 INJECTION, SOLUTION INTRAVENOUS at 08:39

## 2022-06-01 RX ADMIN — HYDRALAZINE HYDROCHLORIDE 10 MG: 20 INJECTION, SOLUTION INTRAMUSCULAR; INTRAVENOUS at 10:05

## 2022-06-01 RX ADMIN — HYDROCHLOROTHIAZIDE 25 MG: 25 TABLET ORAL at 16:36

## 2022-06-01 RX ADMIN — LISINOPRIL 40 MG: 20 TABLET ORAL at 08:26

## 2022-06-01 RX ADMIN — INSULIN LISPRO 2 UNITS: 100 INJECTION, SOLUTION INTRAVENOUS; SUBCUTANEOUS at 20:37

## 2022-06-01 ASSESSMENT — PAIN SCALES - GENERAL
PAINLEVEL_OUTOF10: 5
PAINLEVEL_OUTOF10: 7

## 2022-06-01 ASSESSMENT — PAIN DESCRIPTION - FREQUENCY: FREQUENCY: INTERMITTENT

## 2022-06-01 ASSESSMENT — PAIN DESCRIPTION - PAIN TYPE: TYPE: ACUTE PAIN

## 2022-06-01 ASSESSMENT — PAIN DESCRIPTION - ORIENTATION: ORIENTATION: OTHER (COMMENT)

## 2022-06-01 ASSESSMENT — PAIN DESCRIPTION - DESCRIPTORS
DESCRIPTORS: ACHING
DESCRIPTORS: ACHING

## 2022-06-01 ASSESSMENT — PAIN DESCRIPTION - LOCATION
LOCATION: HEAD
LOCATION: HEAD

## 2022-06-01 ASSESSMENT — PAIN - FUNCTIONAL ASSESSMENT: PAIN_FUNCTIONAL_ASSESSMENT: ACTIVITIES ARE NOT PREVENTED

## 2022-06-01 ASSESSMENT — PAIN DESCRIPTION - ONSET: ONSET: ON-GOING

## 2022-06-01 NOTE — PROGRESS NOTES
7. 4   HGB 11.3* 11.4* 10.9*   HCT 34.6* 35.8* 32.7*   MCV 89.2 92.7 89.3   * 104* 129*     Recent Labs     05/30/22  1428 05/30/22  1428 05/31/22  0438 05/31/22  2249 06/01/22  0418   *   < > 130* 131* 131*   K 4.2  --  4.3  --  3.4*   CL 98  --  98  --  98   CO2 23  --  19*  --  21*   PHOS  --   --  1.7*  --  2.0*   BUN 12  --  13  --  13   CREATININE 0.5  --  0.3*  --  0.3*    < > = values in this interval not displayed. Recent Labs     05/29/22  1415   COLORU Dark yellow*   PHUR 5.50   SPECGRAV 1.025   LEUKOCYTESUR Trace*   UROBILINOGEN 0.20   BILIRUBINUR Negative   BLOODU Moderate*       Physical Exam:  Constitutional:               Alert and oriented times 3, no acute distress and cooperative to examination with appropriate mood and affect. HEENT:   Head:               Normocephalic and atraumatic. Mouth/Throat:                Mucous membranes are normal.   Eyes:               EOM are normal. No scleral icterus. Nose:               The external appearance of the nose is normal  Ears: The ears appear normal to external inspection   Neck:               Supple, symmetrical, trachea midline, no adenopathy, thyroid symmetric, not enlarged and no tenderness. Cardiovascular:               Normal rate, regular rhythm, S1 S2 heart sounds. Pulmonary/Chest:              Chest symmetric with normal A/P diameter, no wheezes, rales, or rhonchi noted. Normal respiratory rate and rhthym. No use of accessory muscles. Abdominal:               Soft. Mild suprapubic tenderness. Hypoactive bowel sounds. No CVA tenderness. Musculoskeletal:               Normal range of motion. She exhibits no edema or tenderness of lower extremities. Extremities:               No cyanosis, clubbing, or edema present. Neurological:               Alert and oriented. No cranial nerve deficit.  There are no focalizing motor or sensory deficits.     Additional Lab/Culture results:     Imaging Reviewed: SPENCER Webster CNP independently reviewed the images and verified the radiology reports from:    CT ABDOMEN PELVIS W IV CONTRAST Additional Contrast? None    Result Date: 5/29/2022  PROCEDURE: CT ABDOMEN PELVIS W IV CONTRAST CLINICAL INFORMATION: Lower abdominal pain TECHNIQUE: CT of the abdomen and pelvis was performed following administration of 80 mL Isovue-370 intravenous contrast only. Axial images as well as coronal and sagittal reconstructions were obtained. All CT scans at this facility use dose modulation, iterative reconstruction, and/or weight-based dosing when appropriate to reduce radiation dose to as low as reasonably achievable. COMPARISON: CT abdomen and pelvis 12/29/2021 FINDINGS: Lower thorax: There is minimal scarring or atelectasis at the left lung base. No pleural effusion is identified. Abdomen: There is no free intraperitoneal air or fluid. Bowel is normal in course and caliber without evidence of obstruction. The gallbladder is surgically absent. There is a poorly defined area of decreased perfusion at the left mid kidney (image 48). There is mild left-sided hydroureter with suggestion of ureteral wall enhancement. Prominence of the common bile duct is likely postsurgical. The liver, spleen, right adrenal gland and right kidney are normal. A 1.6 cm hypoattenuating left adrenal nodule  is stable (image 29). Atherosclerotic calcifications are present in the abdominal aorta without evidence of aneurysm. There is no mesenteric or retroperitoneal lymphadenopathy. No aggressive osseous lesions are identified in the lumbar spine. Pelvis: The urinary bladder is unremarkable. There are phleboliths in the pelvis. There is no pelvic or inguinal lymphadenopathy. No aggressive osseous lesions are identified. 1. Indistinct area of decreased perfusion in the left kidney suspicious for pyelonephritis. Clinical and laboratory correlation are required.  2. Mild left-sided hydroureter with suggestion of ureteral wall enhancement, also suspicious for infection. Final report electronically signed by Dr. Rolando Lassiter on 5/29/2022 6:16 PM    MRI shows  Narrative    ADDENDUM #1    This report was discussed with Nikki Todd RN on May 31, 2022 21:40:00    EDT.       This document has been electronically signed by: Alexa Dacosta on 05/31/2022    09:40 PM    ORIGINAL REPORT    Exam: MRI abdomen without and with IV contrast   Exam: MRCP without IV contrast       Comparison: CT,SR - CT ABDOMEN PELVIS W IV CONTRAST - 05/29/2022 05:39 PM    EDT    CT,SR - CT ABDOMEN PELVIS W IV CONTRAST - 12/29/2021 03:29 AM EST       Findings:       MRCP:   Status post cholecystectomy. No intrahepatic or extrahepatic biliary    dilatation, common duct measures 7 mm at the david hepatis with mild    distal tapering, no choledocholithiasis. Pancreatic duct is not dilated,    no pancreatic divisum.       MRI abdomen: Motion artifacts degraded exam.   Small bilateral pleural effusion with associated compressive atelectasis. Hepatomegaly with steatosis, no discrete hepatic lesion, patent hepatic    and portal veins. Spleen, pancreas, right adrenal gland, right kidney and imaged ureters are    unremarkable. Left adrenal nodule 1.4 cm with signal drop of on the out of phase    imaging, compatible with adenoma. In the left kidney interpolar region, there is nonenhancing cystic    lesion/collection 2.8 x 3.6 cm, isointense on T1, mildly hyperintense on    T2 with hypointense rim, this collection demonstrates minimal perinephric    extension about 5 mm in thickness. Left perinephric stranding and renal    fascia thickening, corresponding to recent prior CT finding, new when    compared to 12/29/2021. Aorta is not dilated. No adenopathy. No ascites or pneumoperitoneum. Imaged GI tract is unremarkable. No suspicious bone marrow signal. Superficial soft tissues are    unremarkable.           Impression   Impression:   1. Nonenhancing cystic lesion/collection of the left kidney with minimal    perinephric extension, adjacent perinephric fatty stranding, most likely    renal abscess with perinephric abscess extension, complex cyst containing    hemorrhagic or proteinaceous content is less likely. 2. Hepatomegaly with steatosis. 3. Left adrenal adenoma. 4. Small bilateral pleural effusion with associated compressive    atelectasis. 5. Status post cholecystectomy, no biliary dilatation or    choledocholithiasis.           Impression:    Patient Active Problem List   Diagnosis    Essential hypertension    SOB (shortness of breath) on exertion    Intermittent palpitations    Hx of Sinus tachycardia    Septicemia ([address]    Sepsis due to Escherichia coli with acute hypercapnic respiratory failure without septic shock (HCC)    Acute pyelonephritis    Acute UTI    Elevated LFTs    Hyperbilirubinemia    Hypokalemia    Normocytic anemia    Thrombocytopenia (HCC)    Hyponatremia    Hydroureter on left    Leukopenia    Type 2 diabetes mellitus treated without insulin (HCC)    Obesity (BMI 30-39. 9)           IMPRESSION:   Sepsis with bacteremia  Left renal abscess  Elevated LFTs  DM uncontrolled    Plan:  Afebrile overnight, but having worsening tachycardia and hypertension  Ecoli in urine and blood  MRI shows developing renal abscess  Discussed with IR, may be able to aspirate with needle, but needs to be off blood thinners for 5 days  ASA now held, was given this AM  If fails to improve, may need emergent aspiration  Could re image in a couple days to evaluate if fails to improve    Will follow    SPENCER Parnell - CNP  1:39 PM 6/1/2022

## 2022-06-01 NOTE — PROGRESS NOTES
Comprehensive Nutrition Assessment    Type and Reason for Visit:  Initial,Positive Nutrition Screen (unplanned wt loss, poor po/appetite)    Nutrition Recommendations/Plan:   1. Continue current diet. 2. Send Glucerna TID. 3. Consider MVI. 4. Consider Vitamin B 12 level. 5. Monitor for diet education needs. Encouraged pt not to skip breakfast.      Malnutrition Assessment:  Malnutrition Status: At risk for malnutrition (Comment) (06/01/22 2613)    Context:  Acute Illness     Findings of the 6 clinical characteristics of malnutrition:  Energy Intake:  75% or less of estimated energy requirements for 7 or more days  Weight Loss:  No significant weight loss     Body Fat Loss:  No significant body fat loss     Muscle Mass Loss:  No significant muscle mass loss    Fluid Accumulation:  Unable to assess     Strength:  Not Performed    Nutrition Assessment:      Pt. nutritionally compromised AEB pt hx inadequate  intake ~ 7 months ( Dx covid ~11/2021) per pt . At risk for further nutrition compromise r/t admit with Sepsis with Bacteremia, Acute Pyelonephritis, Elevated LFT's, DM Uncontrolled Starvation Ketosis and underlying medical condition (Type II DM, HTN, Depression). Nutrition Related Findings:    Pt. Report/Treatments/Miscellaneous:  Pt seen ~1100, she & her  do the cooking, mentions poor appetite & \" food has no taste\" since she had Covid Dx ~11/2021. Pt denies difficulty chewing/swallowing foods. She denies N/V. Per diet hx, pt tends to skip breakfast, may have a small lunch such as a 6\" sub , tends to have portions that are~ 3/4 ours for dinner. May have bedtime snack such as regular size bowl of popcorn .      GI Status: Pt mentions had a small BM today ( not charted)  Pertinent Labs: (6/1) Na 131, K+ 3.4, Glucose 191, chemsticks 159-227, Phos 2, Hemoglobin 10.9  Pertinent Meds:  Lantus, Humalog, Phos replacement protocol, Zofran, Glycolax    Wound Type: None       Current Nutrition Intake & Therapies:    Average Meal Intake: 0% (breakfast per pt)  Average Supplements Intake:  (new)  ADULT DIET; Regular; 3 carb choices (45 gm/meal)  ADULT ORAL NUTRITION SUPPLEMENT; Breakfast, Lunch, Dinner; Diabetic Oral Supplement    Anthropometric Measures:  Height: 5' 4\" (162.6 cm)  Ideal Body Weight (IBW): 120 lbs (55 kg)    Admission Body Weight: 222 lb (100.7 kg) ((5/29) bedscale edema N/A)  Current Body Weight: 222 lb (100.7 kg) ((5/29) bedscale edema N/A),   IBW. Current BMI (kg/m2): 38.1  Usual Body Weight:  (per pt: 238# propr t her covid Dx in (11/2021), per EMR: (12/30/21: 215# 6.4oz))                            Estimated Daily Nutrient Needs:  Energy Requirements Based On: Kcal/kg     Energy (kcal/day): 1511-1813kcals (15-18kcals/kgm)  Weight Used for Protein Requirements: Ideal  Protein (g/day): 55+ grams as liver function tolerates (1gram protein/kgm IBW)         Nutrition Diagnosis:   · Inadequate oral intake related to catabolic illness as evidenced by poor intake prior to admission,weight loss      Nutrition Interventions:   Food and/or Nutrient Delivery: Continue Current Diet,Continue Oral Nutrition Supplement  Nutrition Education/Counseling: Education not appropriate  Coordination of Nutrition Care: Continue to monitor while inpatient       Goals:     Goals: PO intake 75% or greater,by next RD assessment       Nutrition Monitoring and Evaluation:   Behavioral-Environmental Outcomes: None Identified  Food/Nutrient Intake Outcomes: Diet Advancement/Tolerance,Food and Nutrient Intake,Supplement Intake  Physical Signs/Symptoms Outcomes: Biochemical Data,GI Status,Fluid Status or Edema,Hemodynamic Status,Nutrition Focused Physical Findings,Skin,Weight    Discharge Planning:     Too soon to determine     Iam Warren RD, LD  Contact: [phone]

## 2022-06-01 NOTE — CARE COORDINATION
[facility] [address]  :  1970  MRN:  [id number]    ADMIT DATE:  2022      Discharge Planning Discharge Planning  Potential Assistance Purchasing Medications: Yes  Meds-to-Beds: Does the patient want to have any new prescriptions delivered to bedside prior to discharge?: Yes  Patient expects to be discharged to[facility] Board Notes /Social Work Whiteboard Notes  /Social Work Whiteboard: : Plan return home with SO.  No DME/HH pta    Discharge Plan Home with family  Plans home w SO Mansi Patel; monitor for med assist as self pay status; monitor AB needs; Public Benefits following    Discharge Milestones and Delays: Clinical status    E.COLI Bacteremia/UTI, Na+ 131; IV AB continued     MRCP  Likely Renal Abscess w Perinephric Abscess Extension    ID consulted today    /s-190/s; monitor  /s-130/s; monitor    SIGNED:  Arnoldo Sawyer RN   2022, 12:09 PM

## 2022-06-01 NOTE — PROGRESS NOTES
PROGRESS NOTE      Patient:  Yessenia Espinal      Unit/Bed:4K-28/028-A    YOB: 1970    MRN: [id number]       Acct: [de-identified]     PCP: Gracie Cotton MD    Date of Admission: 5/29/2022      Assessment/Plan:    Anticipated Discharge in : 2-3 days    Active Hospital Problems    Diagnosis Date Noted    Sepsis due to Escherichia coli with acute hypercapnic respiratory failure without septic shock ([facility] [address] [A41.51, R65.20, J96.02]      Priority: Medium    Acute pyelonephritis [N10]      Priority: Medium    Acute UTI [N39.0]      Priority: Medium    Elevated LFTs [R79.89]      Priority: Medium    Hyperbilirubinemia [E80.6]      Priority: Medium    Hypokalemia [E87.6]      Priority: Medium    Normocytic anemia [D64.9]      Priority: Medium    Thrombocytopenia ([facility] [address] [D69.6]      Priority: Medium    Hyponatremia [E87.1]      Priority: Medium    Hydroureter on left [N13.4]      Priority: Medium    Leukopenia [D72.819]      Priority: Medium    Type 2 diabetes mellitus treated without insulin ([facility] [address] [E11.9]      Priority: Medium    Obesity (BMI 30-39. 9) [E66.9]      Priority: Medium    Essential hypertension [I10] 11/14/2018     Sepsis with evidence of end-organ damage  Patient with high fever, tachycardia, and tachypnea on admission. Source of infection gram negative bacilli from urinary tract. Patient also with elevated lactate at 2.7 on admission. Blood cultures and urine cultures with gram negative bacilli. S/p full sepsis bolus of IV fluid (5/29). MRCP (5/31) with left renal cystic lesion/collection 2.8 x 3.6 cm - most likely consistent with renal abscess  - Treated with Rocephin (5/29) -> transition to Cefepime (5/30)  - Await final cultures and sensitivities  - S/p IV fluids  - Liver enzymes and bilirubin also elevated - likely related to hypoperfusion from sepsis - treatment as below. Bacteremia  2 of 2 blood cultures positive for gram negative bacilli. Urinary source most likely.   - Continue Cefepime (started 5/30), await final culture results and sensitivities  - IV fluids discontinued  - ID consulted - appreciate recommendations    Pyelonephritis  UA + for Nitrites, leukocyte esterase, blood, protein. Urine culture with E. coli (5/30). CT abdomen (5/29) with left hydroureter, decreased perfusion of left mid kidney concerning for pyelo  - Treated with Rocephin (5/29) -> transition to Cefepime (5/30), plan for total 14 day course of treatment  - Urology following - continue IV antibiotics, no plans for surgical intervention  - MRCP (5/31) with left renal cystic lesion/collection 2.8 x 3.6 cm - most likely consistent with renal abscess  - Renal US pending to further evaluate possible left renal abscess. - ID consulted - appreciate recommendations given possible abscess    Left hydroureter, mild  Seen on CT abdomen (5/29) with suggestion of ureteral wall enhancement, and poorly defined area of decreased perfusion of left mid kidney, consistent with pyelonephritis. - Urology consulted - no plans for surgical management currently  - Repeat Renal US  - Continue Antibiotics as above    Fever, improved  Tmax 105.9 F (5/29). Due to underlying sepsis. Improved with ibuprofen x1 dose (5/31)  - Tylenol PRN for fever  - Caution with NSAIDs due to pyelonephritis  - Ice packs, cooling blankets PRN for fever    Tachycardia  HR elevated throughout admission, between 100-182. Rapid Response called evening of 5/29 due to HR in the 160s. EKG with sinus tachycardia. Patient treated with Cardizem 10 mg x1 dose with improvement of HR. Likely related to underlying sepsis, fevers, and pain. - Metoprolol 50 mg PO BID (5/30) - will increase in am if HR remains elevated  - Lopressor 5 mg IV x1 dose - monitor response. - Treat infection as above  - Monitor telemetry    Uncontrolled HTN  BP noted to be 211/116 on arrival to ED, max /189.  Patient had not been taking medications prior to arrival, also component of pain.  - Will lower BP slowly due to extent of elevation on presentation  - Increase Lisinopril to 40 mg daily  - /106,  today - treat with Lopressor 5 mg IV x1, monitor response  - Add HCTZ 25 mg daily  - Continue Metoprolol to 50 mg BID - will increase in am if HR and BP remain elevated  - Concern for secondary cause of HTN given unresponsiveness to medications, will check Aldosterone, Renin, and metanephrines    Left Adrenal Nodule  MRCP (5/31) noted a 1.4 cm nodule, compatible with adenoma. Given patient's ongoing uncontrolled HTN, tachycardia, and mild hypokalemia, consider possible hyperfunctioning adenoma. - Check am Aldosterone and Renin activity  - Patient will need outpatient follow-up to monitor nodule    Acute Hypoxic Respiratory Failure, resolved  Patient initially maintaining O2 on room air on presentation, then had desaturation requiring up to 3L NC.  - Currently on room air - Continue monitoring to maintain O2 >90%  - Incentive Spirometry ordered     Hyponatremia, mild  Na 134 -> 132 -> 130 -> 131. Likely pseudohyponatremia due to hyperglycemia on admission. Now compounded by treatment with IV fluids.  - Monitor BMP daily  - IV fluids discontinued    Elevated liver enzymes  AST, ALT, Alk Phos all mildly elevated on admission. Suspect most likely related to hypoperfusion due to underlying sepsis and dehydration. Mild RUQ tenderness on exam. No prior history of elevated LFTs. Hepatitis panel negative. - Trend LFTs in am  - Discontinue IV fluids    Direct Hyperbilirubinemia  Total bili 2.4 on admission -> 2.9 -> 3.5 (5/31). Direct Bili 1.6 -> 2.3 -> 3.2 (5/30). Likely related to underlying sepsis. Patient with mild RUQ pain, but s/p cholecystectomy. MRCP (5/31) without evidence of biliary cause of hyperbilirubinemia. - Trend bilirubin in am    Thrombocytopenia, improving  Plt 122 on admission -> 100 -> 129 Likely related to underlying infection. No signs of bleeding currently.   - Monitor CBC daily  - If continues to decrease despite treating underlying infection, will hold Lovenox    Normocytic Anemia  Hemoglobin 11.3 -> 11.4 -> 10.9 (6/1) - decreased from 13.6 on admission. Baseline appears in the 14s. However likely a dilutional component given patient received 4L IVF for sepsis on presentation. No signs of bleeding.  - Will check iron studies  - Daily CBC    Leukopenia, resolved  WBC 4.5 on admission -> 7.8 -> 9.0 (5/31). Likely related to underlying infection. - Will monitor as clinical status improves, otherwise may need to evaluate for further causes of bone marrow suppression  - Daily CBC    Hypokalemia, mild  K 3.3 -> 4.3 -> 3.4.  - Potassium replacement protocol PRN  - Daily BMP    Elevated Lactate, resolved  Lactate 2.7 on admission -> 1.4 (5/30). Likely related to underlying sepsis. S/p 1L NS + 3L LR for sepsis bolus. - Monitor clinically, no need to trend lactate further    Ketosis  Beta-hydroxybutyrate 10.15 on admission -> 18.06 (5/31). CO2 23. AG 15. Glucose 411. Likely due to decreased PO intake, uncontrolled hyperglycemia. Likely represents developing DKA on admission.   - Treat hyperglycemia as below  - No indication for DKA protocol currently  - PO intake as tolerated  - Will monitor patient clinically as PO intake increases    Uncontrolled Type 2 Diabetes Mellitus  Glucose 411 on admission. A1c 12.3% (5/30) - was 12.4% in 12/2021. Patient not checking blood sugars at home. Had not taken medication in several days. Patient with ketosis on admission, but not yet classified as DKA - noted as above. Insulin gtt initiated, but then discontinued (5/29). Treated with 20 U + 10 U Lantus (5/29-5/30)  - POCT Glucose  - Hypoglycemia protocol in place  - Continue 25 U Lantus nightly  - Medium dose SSI PRN  - Diabetic Diet  - Holding home Metformin, Januvia, Glipizide currently.     Depression and Anxiety  Per history, patient on Zoloft at home  - Zoloft held currently with patient's fever, tachycardia, and hypertension - will resume home dose    Obesity with BMI 38.11 kg/m2  - Noted, encourage lifestyle modifications        Chief Complaint: Fevers, dysuria, weakness, fatigue    Hospital Course:   Per H&P:  Carlos Carias is a 50 y.o. female with PMHx of hypertension, hyperlipidemia, type 2 diabetes mellitus, depression/anxiety, who presented to the emergency room here at [facility] on 5/29/2022 complaining of at least a 3-day history of recurrent fevers with chills, lower abdominal pain, dysuria and increased frequency, lack of energy, decreased appetite, and progressive weakness. Patient admits to not being able to take her home medications including her diabetes medications for the last 2 days at least due to being very tired and sick. Patient suspected that she might have a urinary tract infection due to her symptoms above and decided to come in for further evaluation. Her abdominal pain did not radiate and was sharp and present mostly in the suprapubic area. No flank pain. Some nausea without vomiting. Denies any diarrhea. No sick contacts or recent traveling. No vaginal bleeding or discharge.     In the emergency room the patient was febrile at 100.5 °F with severe tachycardia in the 130s to 140s, tachypneic, toxic looking, with good O2 sats on room air. Laboratory investigations revealed a positive urinalysis with positive nitrates and leukocyte esterase. Elevated beta hydroxybutyrate of 10. Elevated blood sugars in the 300-400 range. CT scan of the abdomen pelvis with contrast with findings suspicious for left pyelonephritis. Patient given ceftriaxone 2 g IV x1 with appropriate fluid resuscitation.     She was then admitted to our hospitalist service for further evaluation and treatment.     At the time of my evaluation on the medical floor, patient was in obvious discomfort with tachycardia up to 160, tachypnea, and a high fever of 105.8 degrees Fahrenheit stating that she feels very hot. Blood pressure was also elevated in the 793Y systolic. Patient was flushed with warm skin to touch. Was still responsive and answering questions appropriately and following commands.     Rapid response was called on patient just prior to my arrival due to severe tachycardia. Twelve-lead EKG confirmed sinus tachycardia with no ischemic changes or underlying pathological tachyarrhythmia. Patient was given 5 mg of IV Cardizem push and 500 cc LR fluid bolus then transferred to stepdown for further management and treatment. \"    6/1/22: Patient with elevated BP and HR - required IV Lopressor    Subjective:  Patient seen and examined this morning. States she is feeling tired, but somewhat better than prior. She denies any pain currently. Fever improved. BP and HR remain elevated. Patient did receive labetalol x1 dose overnight due to HTN and tachycardia with noted improvement. Otherwise, patient denies nausea, vomiting, chest pain, shortness of breath, palpitations, leg swelling, or change in bowel movements. No acute concerns this morning.       Medications:  Reviewed    Infusion Medications    dextrose      sodium chloride       Scheduled Medications    lisinopril  40 mg Oral Daily    hydroCHLOROthiazide  25 mg Oral Daily    cefTRIAXone (ROCEPHIN) IV  2,000 mg IntraVENous Q24H    phosphorus replacement protocol   Other RX Placeholder    insulin glargine  25 Units SubCUTAneous Nightly    insulin lispro  0-12 Units SubCUTAneous TID     insulin lispro  0-6 Units SubCUTAneous Nightly    [Held by provider] aspirin EC  81 mg Oral Daily    [Held by provider] glipiZIDE  5 mg Oral BID    metoprolol tartrate  50 mg Oral BID    sertraline  50 mg Oral Daily    sodium chloride flush  10 mL IntraVENous 2 times per day    enoxaparin  40 mg SubCUTAneous Daily     PRN Meds: hydrALAZINE, dextrose, glucose, dextrose bolus **OR** dextrose bolus, glucagon (rDNA), dextrose, sodium chloride flush, sodium chloride, ondansetron **OR** ondansetron, polyethylene glycol, acetaminophen **OR** acetaminophen, potassium chloride **OR** potassium alternative oral replacement **OR** potassium chloride, magnesium sulfate, aluminum & magnesium hydroxide-simethicone      Intake/Output Summary (Last 24 hours) at 6/1/2022 1520  Last data filed at 6/1/2022 1112  Gross per 24 hour   Intake 0 ml   Output 500 ml   Net -500 ml       Diet:  ADULT DIET; Regular; 3 carb choices (45 gm/meal)  ADULT ORAL NUTRITION SUPPLEMENT; Breakfast, Lunch, Dinner; Diabetic Oral Supplement    Exam:  BP (!) 164/95   Pulse (!) 115   Temp 98.6 °F (37 °C) (Oral)   Resp 18   Ht 5' 4\" (1.626 m)   Wt 222 lb (100.7 kg)   SpO2 93%   BMI 38.11 kg/m²     General appearance: Alert, ill appearing female resting in bed; No apparent distress, appears stated age and cooperative. HEENT: Pupils equal, round, and reactive to light. Conjunctivae/corneas clear. Neck: Supple, with full range of motion. No jugular venous distention. Trachea midline. Respiratory:  Clear bilaterally, no wheezing or rhonchi noted  Cardiovascular: Regular rate and rhythm with normal S1/S2 without murmurs, rubs or gallops. Abdomen: Soft, obese, mildly tender RUQ and RLQ, non-distended with normal bowel sounds. No CVA tenderness  Musculoskeletal: passive and active ROM x 4 extremities. No edema  Skin: Skin color, texture, turgor normal.  No rashes or lesions.   Psychiatric: Alert and oriented, thought content appropriate, normal insight  Capillary Refill: Brisk,< 3 seconds   Peripheral Pulses: +2 palpable, equal bilaterally       Labs:   Recent Labs     05/30/22  0531 05/31/22  0439 06/01/22  0418   WBC 7.8 9.0 7.4   HGB 11.3* 11.4* 10.9*   HCT 34.6* 35.8* 32.7*   * 104* 129*     Recent Labs     05/30/22  1428 05/30/22  1428 05/31/22  0438 05/31/22  2249 06/01/22  0418   *   < > 130* 131* 131*   K 4.2  --  4.3  --  3.4*   CL 98  --  98  --  98   CO2 23  --  19*  --  21* BUN 12  --  13  --  13   CREATININE 0.5  --  0.3*  --  0.3*   CALCIUM 8.9  --  8.8  --  8.5   PHOS  --   --  1.7*  --  2.0*    < > = values in this interval not displayed. Recent Labs     05/30/22  0531 05/30/22  1428 05/31/22  0438   * 106* 119*   * 119* 115*   BILIDIR 2.3*  --  3.2*   BILITOT 2.5* 2.9* 3.5*   ALKPHOS 156* 178* 215*     Recent Labs     05/30/22  1428   INR 1.27*     No results for input(s): CKTOTAL, TROPONINI in the last 72 hours. Urinalysis:      Lab Results   Component Value Date    NITRU Positive 05/29/2022    WBCUA 2-4 01/25/2020    BACTERIA FEW 01/25/2020    RBCUA NONE SEEN 01/25/2020    BLOODU Moderate 05/29/2022    SPECGRAV 1.025 05/29/2022    GLUCOSEU 500 05/29/2022       Radiology:  MRI ABDOMEN W WO CONTRAST MRCP   Final Result   Impression:   1. Nonenhancing cystic lesion/collection of the left kidney with minimal    perinephric extension, adjacent perinephric fatty stranding, most likely    renal abscess with perinephric abscess extension, complex cyst containing    hemorrhagic or proteinaceous content is less likely. 2. Hepatomegaly with steatosis. 3. Left adrenal adenoma. 4. Small bilateral pleural effusion with associated compressive    atelectasis. 5. Status post cholecystectomy, no biliary dilatation or    choledocholithiasis. This document has been electronically signed by: Yuriy Fong MD on 05/31/2022    09:29 PM         CT ABDOMEN PELVIS W IV CONTRAST Additional Contrast? None   Final Result   1. Indistinct area of decreased perfusion in the left kidney suspicious for pyelonephritis. Clinical and laboratory correlation are required. 2. Mild left-sided hydroureter with suggestion of ureteral wall enhancement, also suspicious for infection. Final report electronically signed by Dr. Ye Holliday on 5/29/2022 6:16 PM      US RENAL COMPLETE    (Results Pending)       Diet: ADULT DIET;  Regular; 3 carb choices (45 gm/meal)  ADULT ORAL NUTRITION SUPPLEMENT; Breakfast, Lunch, Dinner; Diabetic Oral Supplement    DVT prophylaxis: [x] Lovenox                                 [] SCDs                                 [] SQ Heparin                                 [] Encourage ambulation           [] Already on Anticoagulation     Disposition:    [x] Home       [] TCU       [] Rehab       [] Psych       [] SNF       [] [place]       [] Other-    Code Status: Full Code    PT/OT Eval Status: pending clinical improvement      Electronically signed by Eliseo Pruitt MD on 6/1/2022 at 3:20 PM    This note was electronically signed. Parts of this note may have been dictated by use of voice recognition software and electronically transcribed. The note may contain errors not detected in proofreading. Please refer to my supervising physician's documentation if my documentation differs.

## 2022-06-02 LAB
ALBUMIN SERPL-MCNC: 2.7 G/DL (ref 3.5–5.1)
ALP BLD-CCNC: 264 U/L (ref 38–126)
ALT SERPL-CCNC: 58 U/L (ref 11–66)
ANION GAP SERPL CALCULATED.3IONS-SCNC: 12 MEQ/L (ref 8–16)
AST SERPL-CCNC: 28 U/L (ref 5–40)
BASOPHILS # BLD: 0.6 %
BASOPHILS ABSOLUTE: 0 THOU/MM3 (ref 0–0.1)
BILIRUB SERPL-MCNC: 1.8 MG/DL (ref 0.3–1.2)
BILIRUBIN DIRECT: 1.1 MG/DL (ref 0–0.3)
BUN BLDV-MCNC: 9 MG/DL (ref 7–22)
CALCIUM SERPL-MCNC: 8.7 MG/DL (ref 8.5–10.5)
CHLORIDE BLD-SCNC: 96 MEQ/L (ref 98–111)
CO2: 26 MEQ/L (ref 23–33)
CREAT SERPL-MCNC: 0.4 MG/DL (ref 0.4–1.2)
EOSINOPHIL # BLD: 1.1 %
EOSINOPHILS ABSOLUTE: 0.1 THOU/MM3 (ref 0–0.4)
ERYTHROCYTE [DISTWIDTH] IN BLOOD BY AUTOMATED COUNT: 12.6 % (ref 11.5–14.5)
ERYTHROCYTE [DISTWIDTH] IN BLOOD BY AUTOMATED COUNT: 40.8 FL (ref 35–45)
FERRITIN: 807 NG/ML (ref 10–291)
GFR SERPL CREATININE-BSD FRML MDRD: > 90 ML/MIN/1.73M2
GLUCOSE BLD-MCNC: 146 MG/DL (ref 70–108)
GLUCOSE BLD-MCNC: 149 MG/DL (ref 70–108)
GLUCOSE BLD-MCNC: 192 MG/DL (ref 70–108)
GLUCOSE BLD-MCNC: 232 MG/DL (ref 70–108)
GLUCOSE BLD-MCNC: 241 MG/DL (ref 70–108)
HCT VFR BLD CALC: 36.6 % (ref 37–47)
HEMOGLOBIN: 12 GM/DL (ref 12–16)
IMMATURE GRANS (ABS): 0.14 THOU/MM3 (ref 0–0.07)
IMMATURE GRANULOCYTES: 1.7 %
IRON SATURATION: 22 % (ref 20–50)
IRON: 37 UG/DL (ref 50–170)
LYMPHOCYTES # BLD: 16.8 %
LYMPHOCYTES ABSOLUTE: 1.4 THOU/MM3 (ref 1–4.8)
MAGNESIUM: 1.8 MG/DL (ref 1.6–2.4)
MCH RBC QN AUTO: 28.9 PG (ref 26–33)
MCHC RBC AUTO-ENTMCNC: 32.8 GM/DL (ref 32.2–35.5)
MCV RBC AUTO: 88.2 FL (ref 81–99)
MONOCYTES # BLD: 10.1 %
MONOCYTES ABSOLUTE: 0.8 THOU/MM3 (ref 0.4–1.3)
NUCLEATED RED BLOOD CELLS: 0 /100 WBC
PHOSPHORUS: 2.6 MG/DL (ref 2.4–4.7)
PLATELET # BLD: 156 THOU/MM3 (ref 130–400)
PMV BLD AUTO: 9.6 FL (ref 9.4–12.4)
POTASSIUM SERPL-SCNC: 3 MEQ/L (ref 3.5–5.2)
RBC # BLD: 4.15 MILL/MM3 (ref 4.2–5.4)
SEG NEUTROPHILS: 69.7 %
SEGMENTED NEUTROPHILS ABSOLUTE COUNT: 5.6 THOU/MM3 (ref 1.8–7.7)
SODIUM BLD-SCNC: 134 MEQ/L (ref 135–145)
T4 FREE: 0.99 NG/DL (ref 0.93–1.76)
TOTAL IRON BINDING CAPACITY: 172 UG/DL (ref 171–450)
TOTAL PROTEIN: 6.2 G/DL (ref 6.1–8)
TSH SERPL DL<=0.05 MIU/L-ACNC: 1.93 UIU/ML (ref 0.4–4.2)
WBC # BLD: 8.1 THOU/MM3 (ref 4.8–10.8)

## 2022-06-02 PROCEDURE — 84244 ASSAY OF RENIN: CPT

## 2022-06-02 PROCEDURE — 82248 BILIRUBIN DIRECT: CPT

## 2022-06-02 PROCEDURE — 36415 COLL VENOUS BLD VENIPUNCTURE: CPT

## 2022-06-02 PROCEDURE — 83550 IRON BINDING TEST: CPT

## 2022-06-02 PROCEDURE — 6370000000 HC RX 637 (ALT 250 FOR IP): Performed by: STUDENT IN AN ORGANIZED HEALTH CARE EDUCATION/TRAINING PROGRAM

## 2022-06-02 PROCEDURE — 6370000000 HC RX 637 (ALT 250 FOR IP): Performed by: PEDIATRICS

## 2022-06-02 PROCEDURE — 84439 ASSAY OF FREE THYROXINE: CPT

## 2022-06-02 PROCEDURE — 6360000002 HC RX W HCPCS: Performed by: INTERNAL MEDICINE

## 2022-06-02 PROCEDURE — 84100 ASSAY OF PHOSPHORUS: CPT

## 2022-06-02 PROCEDURE — 2580000003 HC RX 258: Performed by: PEDIATRICS

## 2022-06-02 PROCEDURE — 2060000000 HC ICU INTERMEDIATE R&B

## 2022-06-02 PROCEDURE — 83540 ASSAY OF IRON: CPT

## 2022-06-02 PROCEDURE — 83835 ASSAY OF METANEPHRINES: CPT

## 2022-06-02 PROCEDURE — 2580000003 HC RX 258: Performed by: INTERNAL MEDICINE

## 2022-06-02 PROCEDURE — 84443 ASSAY THYROID STIM HORMONE: CPT

## 2022-06-02 PROCEDURE — 82948 REAGENT STRIP/BLOOD GLUCOSE: CPT

## 2022-06-02 PROCEDURE — 80053 COMPREHEN METABOLIC PANEL: CPT

## 2022-06-02 PROCEDURE — 99231 SBSQ HOSP IP/OBS SF/LOW 25: CPT | Performed by: FAMILY MEDICINE

## 2022-06-02 PROCEDURE — 82728 ASSAY OF FERRITIN: CPT

## 2022-06-02 PROCEDURE — 85025 COMPLETE CBC W/AUTO DIFF WBC: CPT

## 2022-06-02 PROCEDURE — 6360000002 HC RX W HCPCS: Performed by: PEDIATRICS

## 2022-06-02 PROCEDURE — 82088 ASSAY OF ALDOSTERONE: CPT

## 2022-06-02 PROCEDURE — 83735 ASSAY OF MAGNESIUM: CPT

## 2022-06-02 RX ORDER — INSULIN GLARGINE 100 [IU]/ML
30 INJECTION, SOLUTION SUBCUTANEOUS NIGHTLY
Status: DISCONTINUED | OUTPATIENT
Start: 2022-06-02 | End: 2022-06-04 | Stop reason: HOSPADM

## 2022-06-02 RX ORDER — METOPROLOL TARTRATE 100 MG/1
100 TABLET ORAL 2 TIMES DAILY
Status: DISCONTINUED | OUTPATIENT
Start: 2022-06-02 | End: 2022-06-04 | Stop reason: HOSPADM

## 2022-06-02 RX ADMIN — HYDROCHLOROTHIAZIDE 25 MG: 25 TABLET ORAL at 08:04

## 2022-06-02 RX ADMIN — POTASSIUM CHLORIDE 10 MEQ: 7.46 INJECTION, SOLUTION INTRAVENOUS at 12:52

## 2022-06-02 RX ADMIN — SODIUM CHLORIDE 1000 ML: 9 INJECTION, SOLUTION INTRAVENOUS at 12:52

## 2022-06-02 RX ADMIN — INSULIN GLARGINE 30 UNITS: 100 INJECTION, SOLUTION SUBCUTANEOUS at 21:56

## 2022-06-02 RX ADMIN — POTASSIUM CHLORIDE 10 MEQ: 7.46 INJECTION, SOLUTION INTRAVENOUS at 18:02

## 2022-06-02 RX ADMIN — INSULIN LISPRO 2 UNITS: 100 INJECTION, SOLUTION INTRAVENOUS; SUBCUTANEOUS at 13:13

## 2022-06-02 RX ADMIN — POTASSIUM CHLORIDE 10 MEQ: 7.46 INJECTION, SOLUTION INTRAVENOUS at 15:07

## 2022-06-02 RX ADMIN — LISINOPRIL 40 MG: 20 TABLET ORAL at 08:04

## 2022-06-02 RX ADMIN — INSULIN LISPRO 4 UNITS: 100 INJECTION, SOLUTION INTRAVENOUS; SUBCUTANEOUS at 17:59

## 2022-06-02 RX ADMIN — ACETAMINOPHEN 650 MG: 325 TABLET ORAL at 11:15

## 2022-06-02 RX ADMIN — METOPROLOL TARTRATE 100 MG: 50 TABLET, FILM COATED ORAL at 08:04

## 2022-06-02 RX ADMIN — CEFTRIAXONE SODIUM 2000 MG: 2 INJECTION, POWDER, FOR SOLUTION INTRAMUSCULAR; INTRAVENOUS at 20:33

## 2022-06-02 RX ADMIN — INSULIN LISPRO 2 UNITS: 100 INJECTION, SOLUTION INTRAVENOUS; SUBCUTANEOUS at 20:34

## 2022-06-02 RX ADMIN — ENOXAPARIN SODIUM 40 MG: 100 INJECTION SUBCUTANEOUS at 08:04

## 2022-06-02 RX ADMIN — SERTRALINE 50 MG: 50 TABLET, FILM COATED ORAL at 08:04

## 2022-06-02 RX ADMIN — METOPROLOL TARTRATE 100 MG: 50 TABLET, FILM COATED ORAL at 20:30

## 2022-06-02 RX ADMIN — SODIUM CHLORIDE, PRESERVATIVE FREE 10 ML: 5 INJECTION INTRAVENOUS at 08:06

## 2022-06-02 RX ADMIN — INSULIN LISPRO 2 UNITS: 100 INJECTION, SOLUTION INTRAVENOUS; SUBCUTANEOUS at 08:08

## 2022-06-02 RX ADMIN — POTASSIUM CHLORIDE 10 MEQ: 7.46 INJECTION, SOLUTION INTRAVENOUS at 17:07

## 2022-06-02 RX ADMIN — POTASSIUM CHLORIDE 10 MEQ: 7.46 INJECTION, SOLUTION INTRAVENOUS at 13:55

## 2022-06-02 RX ADMIN — POTASSIUM CHLORIDE 10 MEQ: 7.46 INJECTION, SOLUTION INTRAVENOUS at 16:04

## 2022-06-02 RX ADMIN — SODIUM CHLORIDE, PRESERVATIVE FREE 10 ML: 5 INJECTION INTRAVENOUS at 20:32

## 2022-06-02 ASSESSMENT — PAIN SCALES - GENERAL: PAINLEVEL_OUTOF10: 6

## 2022-06-02 NOTE — CARE COORDINATION
[facility] [address]  :  1970  MRN:  [id number]    ADMIT DATE:  2022      Discharge Planning Discharge Planning  Potential Assistance Purchasing Medications: Yes  Meds-to-Beds: Does the patient want to have any new prescriptions delivered to bedside prior to discharge?: Yes  Patient expects to be discharged to[facility] Board Notes /Social Work Whiteboard Notes  /Social Work Whiteboard: : Plan return home with SO.  No DME/HH pta    Discharge Plan  Plans home w SO Leeann Meza; monitor for med assist as self pay status; monitor AB needs; Public Benefits following  Discharge Milestones and Delays: Clinical status  E.COLI Bacteremia/UTI Sepsis/Pyelonephritis    IV AB continued      MRCP  Likely Renal Abscess w Perinephric Abscess Extension       SIGNED:  Faith Britton RN   2022, 5:00 PM

## 2022-06-02 NOTE — PROGRESS NOTES
Progress note: Infectious diseases    Patient - Ulises Morrison,  Age - 50 y.o.    - 1970      Room Number - 4K-28/028-A   MRN -  [id number]   Acct # - [de-identified]  Date of Admission -  2022  2:04 PM    SUBJECTIVE:   She feels better. OBJECTIVE   VITALS    height is 5' 4\" (1.626 m) and weight is 222 lb (100.7 kg). Her oral temperature is 98.8 °F (37.1 °C). Her blood pressure is 149/89 (abnormal) and her pulse is 91. Her respiration is 18 and oxygen saturation is 96%. Wt Readings from Last 3 Encounters:   22 222 lb (100.7 kg)   21 215 lb 6.4 oz (97.7 kg)   21 230 lb (104.3 kg)       I/O (24 Hours)    Intake/Output Summary (Last 24 hours) at 2022 1355  Last data filed at 2022 0355  Gross per 24 hour   Intake 462.77 ml   Output 900 ml   Net -437.23 ml       General Appearance  Awake, alert, oriented,  not  In acute distress  HEENT - normocephalic, atraumatic, pink conjunctiva,  anicteric sclera  Neck - Supple, no mass  Lungs -  Bilateral good air entry, no rhonchi, no wheeze  Cardiovascular - Heart sounds are normal.     Abdomen - soft, not distended, non tender.   Neurologic -oriented  Skin - No bruising or bleeding  Extremities - No edema, no cyanosis, clubbing     MEDICATIONS:      metoprolol tartrate  100 mg Oral BID    insulin glargine  30 Units SubCUTAneous Nightly    lisinopril  40 mg Oral Daily    hydroCHLOROthiazide  25 mg Oral Daily    cefTRIAXone (ROCEPHIN) IV  2,000 mg IntraVENous Q24H    phosphorus replacement protocol   Other RX Placeholder    insulin lispro  0-12 Units SubCUTAneous TID     insulin lispro  0-6 Units SubCUTAneous Nightly    [Held by provider] aspirin EC  81 mg Oral Daily    [Held by provider] glipiZIDE  5 mg Oral BID    sertraline  50 mg Oral Daily    sodium chloride flush  10 mL IntraVENous 2 times per day    enoxaparin  40 mg SubCUTAneous Daily      dextrose      sodium chloride 1,000 mL (06/02/22 1252)     metoprolol (LOPRESSOR) ivpb, dextrose, glucose, dextrose bolus **OR** dextrose bolus, glucagon (rDNA), dextrose, sodium chloride flush, sodium chloride, ondansetron **OR** ondansetron, polyethylene glycol, acetaminophen **OR** acetaminophen, potassium chloride **OR** potassium alternative oral replacement **OR** potassium chloride, magnesium sulfate, aluminum & magnesium hydroxide-simethicone      LABS:     CBC:   Recent Labs     05/31/22  0439 06/01/22 0418 06/02/22  0444   WBC 9.0 7.4 8.1   HGB 11.4* 10.9* 12.0   * 129* 156     BMP:    Recent Labs     05/31/22 0438 05/31/22 0438 05/31/22 2249 06/01/22 0418 06/02/22  0444   *   < > 131* 131* 134*   K 4.3  --   --  3.4* 3.0*   CL 98  --   --  98 96*   CO2 19*  --   --  21* 26   BUN 13  --   --  13 9   CREATININE 0.3*  --   --  0.3* 0.4   GLUCOSE 224*  --   --  191* 146*    < > = values in this interval not displayed. Calcium:  Recent Labs     06/02/22  0444   CALCIUM 8.7     Ionized Calcium:No results for input(s): IONCA in the last 72 hours. Magnesium:  Recent Labs     06/02/22  0444   MG 1.8     Phosphorus:  Recent Labs     06/02/22  0444   PHOS 2.6     BNP:No results for input(s): BNP in the last 72 hours. Glucose:  Recent Labs     06/01/22 2029 06/02/22  0733 06/02/22  1155   POCGLU 206* 149* 192*     HgbA1C: No results for input(s): LABA1C in the last 72 hours.   INR:   Recent Labs     05/30/22  1428   INR 1.27*     Hepatic:   Recent Labs     05/30/22  1428 05/31/22  0438 06/02/22  0444   ALKPHOS 178* 215* 264*   * 115* 58   * 119* 28   PROT 6.3 6.3 6.2   BILITOT 2.9* 3.5* 1.8*   BILIDIR  --  3.2* 1.1*   LABALBU 3.2* 2.8* 2.7*        CULTURES:   UA: No results for input(s): SPECGRAV, PHUR, COLORU, CLARITYU, MUCUS, PROTEINU, BLOODU, RBCUA, WBCUA, BACTERIA, NITRU, GLUCOSEU, BILIRUBINUR, UROBILINOGEN, KETUA, LABCAST, LABCASTTY, AMORPHOS in the last 72 hours.    Invalid input(s): CRYSTALS  Micro:   Lab Results   Component Value Date    [facility]  05/29/2022     sensitivity done-previous specimen [id number] blood culture collected 15:10 5/29/22           Problem list of patient:     Patient Active Problem List   Diagnosis Code    Essential hypertension I10    SOB (shortness of breath) on exertion R06.02    Intermittent palpitations R00.2    Hx of Sinus tachycardia R00.0    Septicemia ([address] A41.9    Sepsis due to Escherichia coli with acute hypercapnic respiratory failure without septic shock ([address] A41.51, R65.20, J96.02    Acute pyelonephritis N10    Acute UTI N39.0    Elevated LFTs R79.89    Hyperbilirubinemia E80.6    Hypokalemia E87.6    Normocytic anemia D64.9    Thrombocytopenia (HCC) D69.6    Hyponatremia E87.1    Hydroureter on left N13.4    Leukopenia D72.819    Type 2 diabetes mellitus treated without insulin (HCC) E11.9    Obesity (BMI 30-39. 9) E66.9         ASSESSMENT/PLAN   E.coli sepsis  Uncontrolled diabetes  Continue iv antibiotic  Will scan in 2-3 days.         Santosh Fisher MD, MD, FACP 6/2/2022 1:55 PM

## 2022-06-02 NOTE — FLOWSHEET NOTE
[facility] PROGRESS NOTE      Patient: Gerardo Acosta  Room #: 0H-48/555-D            YOB: 1970  Age: 50 y.o. Gender: female            Admit Date & Time: 5/29/2022  2:04 PM    Assessment:  Tova Hernandez is a 50year old female who is in bed on 4k. No family is in the room at this time. She is in good spirits but stated that the healing is coming but very slow. Interventions:  She is open to prayer for healing    Outcomes:  encouraged  Plan: 1. Care Plan:  Continue spiritual and emotional care for patient and family. Including prayers.      Electronically signed by Chelsea Briseno on 6/2/2022 at 2:15 PM.  [address] [place] [address]  [phone]

## 2022-06-02 NOTE — PROGRESS NOTES
PROGRESS NOTE      Patient:  [facility]      Unit/Bed:4K-28/028-A    YOB: 1970    MRN: [id number]       Acct: [de-identified]     PCP: Latrice Garces MD    Date of Admission: 5/29/2022      Assessment/Plan:    Anticipated Discharge in : 2-3 days    Active Hospital Problems    Diagnosis Date Noted    Sepsis due to Escherichia coli with acute hypercapnic respiratory failure without septic shock ([facility] [address] [A41.51, R65.20, J96.02]      Priority: Medium    Acute pyelonephritis [N10]      Priority: Medium    Acute UTI [N39.0]      Priority: Medium    Elevated LFTs [R79.89]      Priority: Medium    Hyperbilirubinemia [E80.6]      Priority: Medium    Hypokalemia [E87.6]      Priority: Medium    Normocytic anemia [D64.9]      Priority: Medium    Thrombocytopenia ([facility] [address] [D69.6]      Priority: Medium    Hyponatremia [E87.1]      Priority: Medium    Hydroureter on left [N13.4]      Priority: Medium    Leukopenia [D72.819]      Priority: Medium    Type 2 diabetes mellitus treated without insulin ([facility] [address] [E11.9]      Priority: Medium    Obesity (BMI 30-39. 9) [E66.9]      Priority: Medium    Essential hypertension [I10] 11/14/2018     Sepsis with evidence of end-organ damage  Patient with high fever, tachycardia, and tachypnea on admission. Source of infection gram negative bacilli from urinary tract. Patient also with elevated lactate at 2.7 on admission. Blood cultures and urine cultures with gram negative bacilli. S/p full sepsis bolus of IV fluid (5/29). MRCP (5/31) with left renal cystic lesion/collection 2.8 x 3.6 cm - most likely consistent with renal abscess  - Transition to Rocephin (6/1) per ID recommendations  - Await final cultures and sensitivities  - S/p IV fluids. Bacteremia  2 of 2 blood cultures positive for gram negative bacilli. Urinary source most likely.   - Cefepime (started 5/30-6/1) -> transition to Rocephin (6/1) per ID recommendations  - ID consulted - appreciate recommendations    Pyelonephritis  UA + for Nitrites, leukocyte esterase, blood, protein. Urine culture with E. coli (5/30). CT abdomen (5/29) with left hydroureter, decreased perfusion of left mid kidney concerning for pyelo. MRCP (5/31) with left renal cystic lesion/collection 2.8 x 3.6 cm - most likely consistent with renal abscess  - Treated with Rocephin (5/29) -> transition to Cefepime (5/30-6/1) -> Rocephin (6/1) per ID recommendations, plan for total 14 day course of treatment  - Urology following - continue IV antibiotics, no plans for surgical intervention  - Renal US (6/1) -shows small bilateral renal abscess versus bilateral pyelonephritis. - ID consulted - recommend transition to Rocephin (6/1), and monitor patient clinically  - Continue medical management, will repeat US in 2-3 days to monitor bilateral kidney lesions, if persistent at that time will pursue IR guided drainage    Tachycardia, improved  HR elevated throughout admission, between 100-182. Rapid Response called evening of 5/29 due to HR in the 160s. EKG with sinus tachycardia. Patient treated with Cardizem 10 mg x1 dose with improvement of HR. Likely related to underlying sepsis, fevers, and pain. - Lopressor 100 mg PO BID  - Treat infection as above  - Monitor telemetry    Uncontrolled HTN  BP noted to be 211/116 on arrival to ED, max /189. Patient had not been taking medications prior to arrival, also component of pain. - Will lower BP slowly due to extent of elevation on presentation  - Lisinopril to 40 mg daily  - HCTZ 25 mg daily  - Increase to Lopressor 100 mg PO BID  - Lopressor 10 mg IV PRN for SBP > 180  - Concern for secondary cause of HTN given unresponsiveness to medications, will check Aldosterone, Renin, and metanephrines - pending    Left Adrenal Nodule  MRCP (5/31) noted a 1.4 cm nodule, compatible with adenoma.  Given patient's ongoing uncontrolled HTN, tachycardia, and mild hypokalemia, consider possible hyperfunctioning adenoma. - Aldosterone and Renin activity pending  - Patient will need outpatient follow-up to monitor nodule    Left hydroureter, resolved  Seen on CT abdomen (5/29) with suggestion of ureteral wall enhancement, and poorly defined area of decreased perfusion of left mid kidney, consistent with pyelonephritis. - Urology consulted - no plans for surgical management currently  - Renal US (6/1) reports no hydronephrosis  - Continue Antibiotics as above    Fever, improved  Tmax 105.9 F (5/29). Due to underlying sepsis. Improved with ibuprofen x1 dose (5/31)  - Tylenol PRN for fever  - Ice packs, cooling blankets PRN for fever    Acute Hypoxic Respiratory Failure, resolved  Patient initially maintaining O2 on room air on presentation, then had desaturation requiring up to 3L NC.  - Currently on room air - Continue monitoring to maintain O2 >90%  - Incentive Spirometry ordered     Hyponatremia, mild  Na 134 -> 132 -> 130 -> 131 -> 134. Likely pseudohyponatremia due to hyperglycemia on admission. Now compounded by treatment with IV fluids.  - Monitor BMP daily    Elevated liver enzymes, improving  AST, ALT, Alk Phos all mildly elevated on admission. Suspect most likely related to hypoperfusion due to underlying sepsis and dehydration. Mild RUQ tenderness on exam. No prior history of elevated LFTs. Hepatitis panel negative. - Monitor clinically    Direct Hyperbilirubinemia, improved  Total bili 2.4 on admission -> 2.9 -> 3.5 -> 1.8 (6/2). Direct Bili 1.6 -> 2.3 -> 3.2 -> 1.1 (6/2). Likely related to underlying sepsis. Patient with mild RUQ pain, but s/p cholecystectomy. MRCP (5/31) without evidence of biliary cause of hyperbilirubinemia.  - Monitor clinically    Thrombocytopenia, resolved  Plt 122 on admission -> 100 -> 129 Likely related to underlying infection. No signs of bleeding currently. - Monitor CBC daily    Normocytic Anemia  Hemoglobin 11.3 -> 12.0 - decreased from 13.6 on admission.  Baseline appears in the 14s. However likely a dilutional component given patient received 4L IVF for sepsis on presentation. No signs of bleeding. Iron 37, TIBC WNL. Ferritin elevated, however likely related to acute infection  -Monitor clinically, as patient is stable  - Daily CBC    Leukopenia, resolved  WBC 4.5 on admission -> 7.8 -> 9.0 (5/31). Likely related to underlying infection. - Will monitor as clinical status improves, otherwise may need to evaluate for further causes of bone marrow suppression  - Daily CBC    Hypokalemia, mild  K 3.0. Has been mildly low throughout admission, possibly related to decreased PO intake. Also checking aldosterone levels. - Potassium replacement protocol PRN  - Daily BMP    Elevated Lactate, resolved  Lactate 2.7 on admission -> 1.4 (5/30). Likely related to underlying sepsis. S/p 1L NS + 3L LR for sepsis bolus. - Monitor clinically, no need to trend lactate further    Ketosis  Beta-hydroxybutyrate 10.15 on admission -> 18.06 (5/31). CO2 23. AG 15. Glucose 411. Likely due to decreased PO intake, uncontrolled hyperglycemia. Likely represents developing DKA on admission.   - Treat hyperglycemia as below  - No indication for DKA protocol currently  - PO intake as tolerated  - Will monitor patient clinically as PO intake increases    Uncontrolled Type 2 Diabetes Mellitus  Glucose 411 on admission. A1c 12.3% (5/30) - was 12.4% in 12/2021. Patient not checking blood sugars at home. Had not taken medication in several days. Patient with ketosis on admission, but not yet classified as DKA - noted as above. Insulin gtt initiated, but then discontinued (5/29). Treated with 20 U + 10 U Lantus (5/29-5/30)  - POCT Glucose  - Hypoglycemia protocol in place  - Increase to Lantus 30 units nightly  - Medium dose SSI PRN  - Diabetic Diet  - Holding home Metformin, Januvia, Glipizide currently.     Depression and Anxiety  Per history, patient on Zoloft at home  - Home dose Zoloft resumed    Obesity with BMI 38.11 kg/m2  - Noted, encourage lifestyle modifications        Chief Complaint: Fevers, dysuria, weakness, fatigue    Hospital Course:   Per H&P:  Irma Griffith is a 50 y.o. female with PMHx of hypertension, hyperlipidemia, type 2 diabetes mellitus, depression/anxiety, who presented to the emergency room here at [address] [place] [address] on 5/29/2022 complaining of at least a 3-day history of recurrent fevers with chills, lower abdominal pain, dysuria and increased frequency, lack of energy, decreased appetite, and progressive weakness. Patient admits to not being able to take her home medications including her diabetes medications for the last 2 days at least due to being very tired and sick. Patient suspected that she might have a urinary tract infection due to her symptoms above and decided to come in for further evaluation. Her abdominal pain did not radiate and was sharp and present mostly in the suprapubic area. No flank pain. Some nausea without vomiting. Denies any diarrhea. No sick contacts or recent traveling. No vaginal bleeding or discharge.     In the emergency room the patient was febrile at 100.5 °F with severe tachycardia in the 130s to 140s, tachypneic, toxic looking, with good O2 sats on room air. Laboratory investigations revealed a positive urinalysis with positive nitrates and leukocyte esterase. Elevated beta hydroxybutyrate of 10. Elevated blood sugars in the 300-400 range. CT scan of the abdomen pelvis with contrast with findings suspicious for left pyelonephritis. Patient given ceftriaxone 2 g IV x1 with appropriate fluid resuscitation. She was then admitted to our hospitalist service for further evaluation and treatment.     At the time of my evaluation on the medical floor, patient was in obvious discomfort with tachycardia up to 160, tachypnea, and a high fever of 105.8 degrees Fahrenheit stating that she feels very hot.   Blood pressure was also elevated in the 122O systolic. Patient was flushed with warm skin to touch. Was still responsive and answering questions appropriately and following commands.     Rapid response was called on patient just prior to my arrival due to severe tachycardia. Twelve-lead EKG confirmed sinus tachycardia with no ischemic changes or underlying pathological tachyarrhythmia. Patient was given 5 mg of IV Cardizem push and 500 cc LR fluid bolus then transferred to stepdown for further management and treatment. \"    6/1/22: Patient with elevated BP and HR - required IV Lopressor    Subjective:  Patient seen and examined this morning. States she is feeling better overall. She denies any abdominal pain. Her appetite has been improving. She has been afebrile. BP and HR remain somewhat elevated, however improved from prior. Otherwise, patient denies nausea, vomiting, chest pain, shortness of breath, palpitations, leg swelling, or change in bowel movements. No acute concerns this morning.       Medications:  Reviewed    Infusion Medications    dextrose      sodium chloride       Scheduled Medications    metoprolol tartrate  100 mg Oral BID    lisinopril  40 mg Oral Daily    hydroCHLOROthiazide  25 mg Oral Daily    cefTRIAXone (ROCEPHIN) IV  2,000 mg IntraVENous Q24H    phosphorus replacement protocol   Other RX Placeholder    insulin glargine  25 Units SubCUTAneous Nightly    insulin lispro  0-12 Units SubCUTAneous TID     insulin lispro  0-6 Units SubCUTAneous Nightly    [Held by provider] aspirin EC  81 mg Oral Daily    [Held by provider] glipiZIDE  5 mg Oral BID    sertraline  50 mg Oral Daily    sodium chloride flush  10 mL IntraVENous 2 times per day    enoxaparin  40 mg SubCUTAneous Daily     PRN Meds: metoprolol (LOPRESSOR) ivpb, dextrose, glucose, dextrose bolus **OR** dextrose bolus, glucagon (rDNA), dextrose, sodium chloride flush, sodium chloride, ondansetron **OR** ondansetron, polyethylene glycol, acetaminophen 1.7*  --   --  2.0* 2.6    < > = values in this interval not displayed. Recent Labs     05/30/22  1428 05/31/22  0438 06/02/22  0444   * 119* 28   * 115* 58   BILIDIR  --  3.2* 1.1*   BILITOT 2.9* 3.5* 1.8*   ALKPHOS 178* 215* 264*     Recent Labs     05/30/22  1428   INR 1.27*     No results for input(s): CKTOTAL, TROPONINI in the last 72 hours. Urinalysis:      Lab Results   Component Value Date    NITRU Positive 05/29/2022    WBCUA 2-4 01/25/2020    BACTERIA FEW 01/25/2020    RBCUA NONE SEEN 01/25/2020    BLOODU Moderate 05/29/2022    SPECGRAV 1.025 05/29/2022    GLUCOSEU 500 05/29/2022       Radiology:  US RENAL LIMITED   Final Result   Abnormal finding in each kidney, consistent with small solitary renal abscess in each kidney versus pyelonephritis. **This report has been created using voice recognition software. It may contain minor errors which are inherent in voice recognition technology. **      Final report electronically signed by Dr. Adela Wang on 6/1/2022 4:15 PM      MRI ABDOMEN W WO CONTRAST MRCP   Final Result   Impression:   1. Nonenhancing cystic lesion/collection of the left kidney with minimal    perinephric extension, adjacent perinephric fatty stranding, most likely    renal abscess with perinephric abscess extension, complex cyst containing    hemorrhagic or proteinaceous content is less likely. 2. Hepatomegaly with steatosis. 3. Left adrenal adenoma. 4. Small bilateral pleural effusion with associated compressive    atelectasis. 5. Status post cholecystectomy, no biliary dilatation or    choledocholithiasis. This document has been electronically signed by: Jericho Dunlap MD on 05/31/2022    09:29 PM         CT ABDOMEN PELVIS W IV CONTRAST Additional Contrast? None   Final Result   1. Indistinct area of decreased perfusion in the left kidney suspicious for pyelonephritis. Clinical and laboratory correlation are required.    2. Mild left-sided hydroureter with suggestion of ureteral wall enhancement, also suspicious for infection. Final report electronically signed by Dr. Connie Beasley on 5/29/2022 6:16 PM          Diet: ADULT DIET; Regular; 3 carb choices (45 gm/meal)  ADULT ORAL NUTRITION SUPPLEMENT; Breakfast, Lunch, Dinner; Diabetic Oral Supplement    DVT prophylaxis: [x] Lovenox                                 [] SCDs                                 [] SQ Heparin                                 [] Encourage ambulation           [] Already on Anticoagulation     Disposition:    [x] Home       [] TCU       [] Rehab       [] Psych       [] SNF       [] [place]       [] Other-    Code Status: Full Code    PT/OT Eval Status: pending clinical improvement      Electronically signed by Arcenio Zuluaga MD on 6/2/2022 at 12:43 PM    This note was electronically signed. Parts of this note may have been dictated by use of voice recognition software and electronically transcribed. The note may contain errors not detected in proofreading. Please refer to my supervising physician's documentation if my documentation differs.

## 2022-06-02 NOTE — CONSULTS
[address] [place] [id number]                                  CONSULTATION    PATIENT NAME: Eliezer Quintanilla                       :        1970  MED REC NO:   [id number]                           ROOM:       0028  ACCOUNT NO:   [de-identified]                           ADMIT DATE: 2022  PROVIDER:     Shawn Segovia. Jayla Huizar M.D.    Austin Polanco:  2022    REASON FOR CONSULTATION:  Sepsis due to E. coli with bacteremia. HISTORY OF PRESENT ILLNESS:  She is a 53-year-old female patient who was  admitted to the hospital due to fever and chills and was found to have  E. coli septicemia, originating from her UTI. She is a 53-year-old  female patient with previous history of diabetes, not on treatment;  history of UTI in the past who was admitted with fever and chills, was  noted to have E. coli bacteremia with starvation ketosis and  uncontrolled diabetes. Her CT scan shows pyelonephritis with possible  abscess. I was asked to make recommendation. The patient feels better. She admits of not taking medications due to lack of insurance. Her E.  coli was sensitive to ceftriaxone. She denies any previous history of  kidney stones. No history of ureteric intervention. She was evaluated  by Urology. PAST MEDICAL HISTORY:  Her histories include hypertension, diabetes,  history of COVID-19 infection. PAST SURGICAL HISTORY:  Includes cholecystectomy, endometrial ablation,  breast biopsy. ALLERGIES:  PENICILLIN. CURRENT MEDICATIONS:  Include Tylenol, aspirin, ceftriaxone 2 gm a day,  Lovenox, Glucotrol, hydrochlorothiazide, insulin, lisinopril,  metoprolol, sertraline. REVIEW OF SYSTEMS:  As noted above. PHYSICAL EXAMINATION:  VITAL SIGNS:  Temperature 98.6, respirations 18, pulse 112, blood  pressure 191/106. HEENT:  Slightly pale conjunctivae. Anicteric sclerae. CHEST:  Bilateral air entry.   CARDIOVASCULAR SYSTEM: Regular. ABDOMEN:  Soft, nontender  EXTREMITIES:  No cyanosis or clubbing. CNS:  She is awake. Oriented to person, place and time. DIAGNOSTICS:  WBC 7.4, hemoglobin 10.9, hematocrit 32.7, platelets of  970. Sodium 131, potassium 3.4, chloride 98, bicarb 21, BUN 13,  creatinine 0.3. Blood culture as noted above. CT scan and MRI reports  were noted. IMPRESSION:  A 65-year-old female patient admitted with fever and chills  related to E. coli septicemia, originating from the urinary tract  infection, untreated; uncontrolled diabetes; hypertension. RECOMMENDATIONS:  We will continue IV ceftriaxone. The patient still  has high blood glucose, high blood pressure. I would recommend to  continue conservative treatment and repeat scanning in two to three  days, and based on findings, recommend further treatment i.e., need for  aspiration. At the present time, the risk of aspiration with her high  blood pressure, uncontrolled diabetes and recent bacteremia would pose  risk. We will also hold aspirin to prevent any complications.         Miles Iqbal M.D.    D: 06/01/2022 79:51:48       T: 06/01/2022 20:48:30     [email or web address]  Job#: [id number]     Doc#: [id number]    CC:

## 2022-06-02 NOTE — PLAN OF CARE
Problem: Discharge Planning  Goal: Discharge to home or other facility with appropriate resources  Outcome: Progressing  Flowsheets (Taken 6/2/2022 1040)  Discharge to home or other facility with appropriate resources:   Identify barriers to discharge with patient and caregiver   Arrange for needed discharge resources and transportation as appropriate   Identify discharge learning needs (meds, wound care, etc)   Arrange for interpreters to assist at discharge as needed     Problem: Pain  Goal: Verbalizes/displays adequate comfort level or baseline comfort level  Outcome: Progressing  Flowsheets (Taken 6/2/2022 1040)  Verbalizes/displays adequate comfort level or baseline comfort level:   Encourage patient to monitor pain and request assistance   Assess pain using appropriate pain scale   Administer analgesics based on type and severity of pain and evaluate response   Implement non-pharmacological measures as appropriate and evaluate response   Consider cultural and social influences on pain and pain management     Problem: Safety - Adult  Goal: Free from fall injury  Outcome: Progressing  Flowsheets (Taken 6/2/2022 1040)  Free From Fall Injury:   Instruct family/caregiver on patient safety   Based on caregiver fall risk screen, instruct family/caregiver to ask for assistance with transferring infant if caregiver noted to have fall risk factors     Problem: ABCDS Injury Assessment  Goal: Absence of physical injury  Outcome: Progressing  Flowsheets (Taken 6/2/2022 1040)  Absence of Physical Injury: Implement safety measures based on patient assessment     Problem: Cardiovascular - Adult  Goal: Absence of cardiac dysrhythmias or at baseline  Outcome: Progressing  Flowsheets (Taken 6/2/2022 1040)  Absence of cardiac dysrhythmias or at baseline:   Monitor cardiac rate and rhythm   Assess for signs of decreased cardiac output   Administer antiarrhythmia medication and electrolyte replacement as ordered     Problem: Skin/Tissue Integrity - Adult  Goal: Skin integrity remains intact  Outcome: Progressing  Flowsheets (Taken 6/2/2022 1040)  Skin Integrity Remains Intact:   Monitor for areas of redness and/or skin breakdown   Assess vascular access sites hourly     Problem: Musculoskeletal - Adult  Goal: Return mobility to safest level of function  Outcome: Progressing  Flowsheets (Taken 6/2/2022 1040)  Return Mobility to Safest Level of Function:   Assess patient stability and activity tolerance for standing, transferring and ambulating with or without assistive devices   Assist with transfers and ambulation using safe patient handling equipment as needed   Ensure adequate protection for wounds/incisions during mobilization   Obtain physical therapy/occupational therapy consults as needed   Apply continuous passive motion per provider or physical therapy orders to increase flexion toward goal   Instruct patient/family in ordered activity level     Problem: Gastrointestinal - Adult  Goal: Minimal or absence of nausea and vomiting  Outcome: Progressing  Flowsheets (Taken 6/2/2022 1040)  Minimal or absence of nausea and vomiting:   Administer IV fluids as ordered to ensure adequate hydration   Nasogastric tube to low intermittent suction as ordered   Maintain NPO status until nausea and vomiting are resolved   Administer ordered antiemetic medications as needed   Provide nonpharmacologic comfort measures as appropriate   Advance diet as tolerated, if ordered   Nutrition consult to assist patient with adequate nutrition and appropriate food choices     Problem: Infection - Adult  Goal: Absence of infection at discharge  Outcome: Progressing  Flowsheets (Taken 6/2/2022 1040)  Absence of infection at discharge:   Assess and monitor for signs and symptoms of infection   Monitor lab/diagnostic results   Monitor all insertion sites i.e., indwelling lines, tubes and drains   [place] appropriate cooling/warming therapies per order Administer medications as ordered   Instruct and encourage patient and family to use good hand hygiene technique   Identify and instruct in appropriate isolation precautions for identified infection/condition

## 2022-06-03 LAB
ANION GAP SERPL CALCULATED.3IONS-SCNC: 11 MEQ/L (ref 8–16)
BASOPHILS # BLD: 0.5 %
BASOPHILS ABSOLUTE: 0 THOU/MM3 (ref 0–0.1)
BUN BLDV-MCNC: 9 MG/DL (ref 7–22)
CALCIUM SERPL-MCNC: 8.6 MG/DL (ref 8.5–10.5)
CHLORIDE BLD-SCNC: 97 MEQ/L (ref 98–111)
CO2: 27 MEQ/L (ref 23–33)
CREAT SERPL-MCNC: 0.4 MG/DL (ref 0.4–1.2)
EOSINOPHIL # BLD: 2.1 %
EOSINOPHILS ABSOLUTE: 0.2 THOU/MM3 (ref 0–0.4)
ERYTHROCYTE [DISTWIDTH] IN BLOOD BY AUTOMATED COUNT: 12.9 % (ref 11.5–14.5)
ERYTHROCYTE [DISTWIDTH] IN BLOOD BY AUTOMATED COUNT: 42.4 FL (ref 35–45)
GFR SERPL CREATININE-BSD FRML MDRD: > 90 ML/MIN/1.73M2
GLUCOSE BLD-MCNC: 126 MG/DL (ref 70–108)
GLUCOSE BLD-MCNC: 142 MG/DL (ref 70–108)
GLUCOSE BLD-MCNC: 181 MG/DL (ref 70–108)
GLUCOSE BLD-MCNC: 189 MG/DL (ref 70–108)
GLUCOSE BLD-MCNC: 189 MG/DL (ref 70–108)
GLUCOSE BLD-MCNC: 231 MG/DL (ref 70–108)
HCT VFR BLD CALC: 34.1 % (ref 37–47)
HEMOGLOBIN: 11.1 GM/DL (ref 12–16)
IMMATURE GRANS (ABS): 0.37 THOU/MM3 (ref 0–0.07)
IMMATURE GRANULOCYTES: 4.3 %
LYMPHOCYTES # BLD: 23.8 %
LYMPHOCYTES ABSOLUTE: 2 THOU/MM3 (ref 1–4.8)
MAGNESIUM: 1.8 MG/DL (ref 1.6–2.4)
MCH RBC QN AUTO: 29.2 PG (ref 26–33)
MCHC RBC AUTO-ENTMCNC: 32.6 GM/DL (ref 32.2–35.5)
MCV RBC AUTO: 89.7 FL (ref 81–99)
MONOCYTES # BLD: 8.8 %
MONOCYTES ABSOLUTE: 0.7 THOU/MM3 (ref 0.4–1.3)
NUCLEATED RED BLOOD CELLS: 0 /100 WBC
PHOSPHORUS: 3.6 MG/DL (ref 2.4–4.7)
PLATELET # BLD: 170 THOU/MM3 (ref 130–400)
PMV BLD AUTO: 9.4 FL (ref 9.4–12.4)
POTASSIUM SERPL-SCNC: 3.2 MEQ/L (ref 3.5–5.2)
RBC # BLD: 3.8 MILL/MM3 (ref 4.2–5.4)
SEG NEUTROPHILS: 60.5 %
SEGMENTED NEUTROPHILS ABSOLUTE COUNT: 5.1 THOU/MM3 (ref 1.8–7.7)
SODIUM BLD-SCNC: 135 MEQ/L (ref 135–145)
WBC # BLD: 8.5 THOU/MM3 (ref 4.8–10.8)

## 2022-06-03 PROCEDURE — 82948 REAGENT STRIP/BLOOD GLUCOSE: CPT

## 2022-06-03 PROCEDURE — 6370000000 HC RX 637 (ALT 250 FOR IP): Performed by: STUDENT IN AN ORGANIZED HEALTH CARE EDUCATION/TRAINING PROGRAM

## 2022-06-03 PROCEDURE — 94760 N-INVAS EAR/PLS OXIMETRY 1: CPT

## 2022-06-03 PROCEDURE — 85025 COMPLETE CBC W/AUTO DIFF WBC: CPT

## 2022-06-03 PROCEDURE — 2580000003 HC RX 258: Performed by: INTERNAL MEDICINE

## 2022-06-03 PROCEDURE — 80048 BASIC METABOLIC PNL TOTAL CA: CPT

## 2022-06-03 PROCEDURE — 2580000003 HC RX 258: Performed by: PEDIATRICS

## 2022-06-03 PROCEDURE — 6370000000 HC RX 637 (ALT 250 FOR IP): Performed by: PEDIATRICS

## 2022-06-03 PROCEDURE — 6360000002 HC RX W HCPCS: Performed by: INTERNAL MEDICINE

## 2022-06-03 PROCEDURE — 84100 ASSAY OF PHOSPHORUS: CPT

## 2022-06-03 PROCEDURE — 83735 ASSAY OF MAGNESIUM: CPT

## 2022-06-03 PROCEDURE — 2060000000 HC ICU INTERMEDIATE R&B

## 2022-06-03 PROCEDURE — 36415 COLL VENOUS BLD VENIPUNCTURE: CPT

## 2022-06-03 PROCEDURE — 99231 SBSQ HOSP IP/OBS SF/LOW 25: CPT | Performed by: FAMILY MEDICINE

## 2022-06-03 PROCEDURE — 6360000002 HC RX W HCPCS: Performed by: PEDIATRICS

## 2022-06-03 PROCEDURE — 99231 SBSQ HOSP IP/OBS SF/LOW 25: CPT | Performed by: NURSE PRACTITIONER

## 2022-06-03 RX ORDER — AMLODIPINE BESYLATE 5 MG/1
5 TABLET ORAL DAILY
Status: DISCONTINUED | OUTPATIENT
Start: 2022-06-03 | End: 2022-06-04 | Stop reason: HOSPADM

## 2022-06-03 RX ADMIN — METOPROLOL TARTRATE 100 MG: 50 TABLET, FILM COATED ORAL at 21:44

## 2022-06-03 RX ADMIN — INSULIN LISPRO 2 UNITS: 100 INJECTION, SOLUTION INTRAVENOUS; SUBCUTANEOUS at 17:39

## 2022-06-03 RX ADMIN — ENOXAPARIN SODIUM 40 MG: 100 INJECTION SUBCUTANEOUS at 08:13

## 2022-06-03 RX ADMIN — HYDROCHLOROTHIAZIDE 25 MG: 25 TABLET ORAL at 08:13

## 2022-06-03 RX ADMIN — INSULIN LISPRO 2 UNITS: 100 INJECTION, SOLUTION INTRAVENOUS; SUBCUTANEOUS at 20:59

## 2022-06-03 RX ADMIN — CEFTRIAXONE SODIUM 2000 MG: 2 INJECTION, POWDER, FOR SOLUTION INTRAMUSCULAR; INTRAVENOUS at 20:55

## 2022-06-03 RX ADMIN — INSULIN GLARGINE 30 UNITS: 100 INJECTION, SOLUTION SUBCUTANEOUS at 21:00

## 2022-06-03 RX ADMIN — SERTRALINE 50 MG: 50 TABLET, FILM COATED ORAL at 08:12

## 2022-06-03 RX ADMIN — METOPROLOL TARTRATE 100 MG: 50 TABLET, FILM COATED ORAL at 08:12

## 2022-06-03 RX ADMIN — POTASSIUM CHLORIDE 40 MEQ: 1500 TABLET, EXTENDED RELEASE ORAL at 06:36

## 2022-06-03 RX ADMIN — LISINOPRIL 40 MG: 20 TABLET ORAL at 08:12

## 2022-06-03 RX ADMIN — SODIUM CHLORIDE, PRESERVATIVE FREE 10 ML: 5 INJECTION INTRAVENOUS at 21:42

## 2022-06-03 RX ADMIN — INSULIN LISPRO 2 UNITS: 100 INJECTION, SOLUTION INTRAVENOUS; SUBCUTANEOUS at 13:25

## 2022-06-03 RX ADMIN — AMLODIPINE BESYLATE 5 MG: 5 TABLET ORAL at 13:31

## 2022-06-03 RX ADMIN — SODIUM CHLORIDE, PRESERVATIVE FREE 10 ML: 5 INJECTION INTRAVENOUS at 08:13

## 2022-06-03 NOTE — PLAN OF CARE
Problem: Pain  Goal: Verbalizes/displays adequate comfort level or baseline comfort level  6/2/2022 2055 by Juanita Magana RN  Outcome: Progressing  6/2/2022 1040 by Lamar Linares RN  Outcome: Progressing  Flowsheets (Taken 6/2/2022 1040)  Verbalizes/displays adequate comfort level or baseline comfort level:   Encourage patient to monitor pain and request assistance   Assess pain using appropriate pain scale   Administer analgesics based on type and severity of pain and evaluate response   Implement non-pharmacological measures as appropriate and evaluate response   Consider cultural and social influences on pain and pain management     Problem: Safety - Adult  Goal: Free from fall injury  6/2/2022 2055 by Juanita Magana RN  Outcome: Progressing  Flowsheets (Taken 6/2/2022 2054)  Free From Fall Injury: Instruct family/caregiver on patient safety  6/2/2022 1040 by Lamar Linares RN  Outcome: Progressing  [address] (Taken 6/2/2022 1040)  Free From Fall Injury:   Instruct family/caregiver on patient safety   Based on caregiver fall risk screen, instruct family/caregiver to ask for assistance with transferring infant if caregiver noted to have fall risk factors     Problem: ABCDS Injury Assessment  Goal: Absence of physical injury  6/2/2022 2055 by Juanita Magana RN  Outcome: Progressing  Flowsheets (Taken 6/2/2022 2054)  Absence of Physical Injury: Implement safety measures based on patient assessment  6/2/2022 1040 by Lamar Linares RN  Outcome: Progressing  Flowsheets (Taken 6/2/2022 1040)  Absence of Physical Injury: Implement safety measures based on patient assessment Cheek Interpolation Flap Text: A decision was made to reconstruct the defect utilizing an interpolation axial flap and a staged reconstruction.  A telfa template was made of the defect.  This telfa template was then used to outline the Cheek Interpolation flap.  The donor area for the pedicle flap was then injected with anesthesia.  The flap was excised through the skin and subcutaneous tissue down to the layer of the underlying musculature.  The interpolation flap was carefully excised within this deep plane to maintain its blood supply.  The edges of the donor site were undermined.   The donor site was closed in a primary fashion.  The pedicle was then rotated into position and sutured.  Once the tube was sutured into place, adequate blood supply was confirmed with blanching and refill.  The pedicle was then wrapped with xeroform gauze and dressed appropriately with a telfa and gauze bandage to ensure continued blood supply and protect the attached pedicle.

## 2022-06-03 NOTE — PROGRESS NOTES
Clementine Hope MD  Urology Progress Note    Subjective: Hemanth Lu is a 50 y.o. female. His/Her current Diet is: ADULT DIET; Regular; 3 carb choices (45 gm/meal)  ADULT ORAL NUTRITION SUPPLEMENT; Breakfast, Lunch, Dinner; Diabetic Oral Supplement. Since the previous note, the patient reports the following:  No acute issues overnight. No fevers or chills. No nausea or vomiting. No chest pain or shortness of breath. No calf pain. Pain Controlled. Ambulating. Tolerating PO Diet. Vitals and Labs:  Patient Vitals for the past 24 hrs:   BP Temp Temp src Pulse Resp SpO2   06/02/22 2030 (!) 163/92 98.8 °F (37.1 °C) Oral 96 16 97 %   06/02/22 1555 (!) 146/83 98.5 °F (36.9 °C) Oral 85 18 96 %   06/02/22 1101 (!) 149/89 98.8 °F (37.1 °C) Oral 91 18 96 %   06/02/22 0745 (!) 159/105 98.7 °F (37.1 °C) Oral (!) 102 18 98 %   06/02/22 0355 (!) 149/91 98.8 °F (37.1 °C) Oral 89 16 95 %   06/02/22 0026 (!) 143/85 98.5 °F (36.9 °C) Oral 87 16 95 %     I/O last 3 completed shifts: In: 702.8 [P.O.:490; IV Piggyback:212.8]  Out: 900 [Urine:900]    Recent Labs     05/31/22 0439 06/01/22 0418 06/02/22 0444   WBC 9.0 7.4 8.1   HGB 11.4* 10.9* 12.0   HCT 35.8* 32.7* 36.6*   MCV 92.7 89.3 88.2   * 129* 156     Recent Labs     05/31/22 0438 05/31/22 0438 05/31/22 2249 06/01/22 0418 06/02/22 0444   *   < > 131* 131* 134*   K 4.3  --   --  3.4* 3.0*   CL 98  --   --  98 96*   CO2 19*  --   --  21* 26   PHOS 1.7*  --   --  2.0* 2.6   BUN 13  --   --  13 9   CREATININE 0.3*  --   --  0.3* 0.4    < > = values in this interval not displayed. No results for input(s): COLORU, PHUR, LABCAST, WBCUA, RBCUA, MUCUS, TRICHOMONAS, YEAST, BACTERIA, CLARITYU, SPECGRAV, LEUKOCYTESUR, UROBILINOGEN, Zollie Kauffman in the last 72 hours. Invalid input(s): NITRATE, GLUCOSEUKETONESUAMORPHOUS    Physical Exam:  No acute distress. Awake, alert and oriented. Neck is supple  Regular rate and rhythm.  Normal peripheral pulses  No accessory muscles of inspiration. Symmetric chest rise  Abdomen soft, non-tender, non-distended. No CVA tenderness. No calf pain. Minimal/no edema in bilateral lower extremities. Skin is warm, dry  Psych: mood, affect normal    Additional Lab/Culture results:     Imaging Reviewed:     Angelica Domingo MD independently reviewed the images and verified the radiology reports from:    CT ABDOMEN PELVIS W IV CONTRAST Additional Contrast? None    Result Date: 5/29/2022  PROCEDURE: CT ABDOMEN PELVIS W IV CONTRAST CLINICAL INFORMATION: Lower abdominal pain TECHNIQUE: CT of the abdomen and pelvis was performed following administration of 80 mL Isovue-370 intravenous contrast only. Axial images as well as coronal and sagittal reconstructions were obtained. All CT scans at this facility use dose modulation, iterative reconstruction, and/or weight-based dosing when appropriate to reduce radiation dose to as low as reasonably achievable. COMPARISON: CT abdomen and pelvis 12/29/2021 FINDINGS: Lower thorax: There is minimal scarring or atelectasis at the left lung base. No pleural effusion is identified. Abdomen: There is no free intraperitoneal air or fluid. Bowel is normal in course and caliber without evidence of obstruction. The gallbladder is surgically absent. There is a poorly defined area of decreased perfusion at the left mid kidney (image 48). There is mild left-sided hydroureter with suggestion of ureteral wall enhancement. Prominence of the common bile duct is likely postsurgical. The liver, spleen, right adrenal gland and right kidney are normal. A 1.6 cm hypoattenuating left adrenal nodule  is stable (image 29). Atherosclerotic calcifications are present in the abdominal aorta without evidence of aneurysm. There is no mesenteric or retroperitoneal lymphadenopathy. No aggressive osseous lesions are identified in the lumbar spine. Pelvis: The urinary bladder is unremarkable.  There are phleboliths in the pelvis. There is no pelvic or inguinal lymphadenopathy. No aggressive osseous lesions are identified. 1. Indistinct area of decreased perfusion in the left kidney suspicious for pyelonephritis. Clinical and laboratory correlation are required. 2. Mild left-sided hydroureter with suggestion of ureteral wall enhancement, also suspicious for infection. Final report electronically signed by Dr. Jarrett Moore on 5/29/2022 6:16 PM      Impression:    Patient Active Problem List   Diagnosis    Essential hypertension    SOB (shortness of breath) on exertion    Intermittent palpitations    Hx of Sinus tachycardia    Septicemia ([address]    Sepsis due to Escherichia coli with acute hypercapnic respiratory failure without septic shock (HCC)    Acute pyelonephritis    Acute UTI    Elevated LFTs    Hyperbilirubinemia    Hypokalemia    Normocytic anemia    Thrombocytopenia (HCC)    Hyponatremia    Hydroureter on left    Leukopenia    Type 2 diabetes mellitus treated without insulin (HCC)    Obesity (BMI 30-39. 9)           Plan:  Cont abx   Pt clinically improving  Still spiking low grade fevers  At this point no plans for drainage of abscess  Appreciate ID input  Cont to hold long term blood thinners as we reassess need for intervention during admission.  Ok to Rhiannon Larsen MD

## 2022-06-03 NOTE — PROGRESS NOTES
Urology Progress Note    Chief Complaint: fever    Subjective:     Pt sitting up in chair. Denies flank or abdominal pain. Reports voiding without difficulty. Vitals:  BP (!) 147/92   Pulse 86   Temp 98.6 °F (37 °C) (Oral)   Resp 16   Ht 5' 4\" (1.626 m)   Wt 222 lb (100.7 kg)   SpO2 98%   BMI 38.11 kg/m²   Temp  Av.8 °F (37.1 °C)  Min: 98.4 °F (36.9 °C)  Max: 99.3 °F (37.4 °C)    Intake/Output Summary (Last 24 hours) at 6/3/2022 1202  Last data filed at 6/3/2022 1048  Gross per 24 hour   Intake 1758.7 ml   Output 300 ml   Net 1458.7 ml       Social History     Socioeconomic History    Marital status: Legally      Spouse name: Not on file    Number of children: Not on file    Years of education: Not on file    Highest education level: Not on file   Occupational History    Not on file   Tobacco Use    Smoking status: Never Smoker    Smokeless tobacco: Never Used   Vaping Use    Vaping Use: Never used   Substance and Sexual Activity    Alcohol use: No    Drug use: No    Sexual activity: Not on file   Other Topics Concern    Not on file   Social History Narrative    Not on file     Social Determinants of Health     Financial Resource Strain: Low Risk     Difficulty of Paying Living Expenses: Not hard at all   Food Insecurity: No Food Insecurity    Worried About Running Out of Food in the Last Year: Never true    Imtiaz of Food in the Last Year: Never true   Transportation Needs:     Lack of Transportation (Medical): Not on file    Lack of Transportation (Non-Medical):  Not on file   Physical Activity:     Days of Exercise per Week: Not on file    Minutes of Exercise per Session: Not on file   Stress:     Feeling of Stress : Not on file   Social Connections:     Frequency of Communication with Friends and Family: Not on file    Frequency of Social Gatherings with Friends and Family: Not on file    Attends [employer] Services: Not on file   [employer]s or Organizations: Not on file    Attends Club or Organization Meetings: Not on file    Marital Status: Not on file   Intimate Partner Violence:     Fear of Current or Ex-Partner: Not on file    Emotionally Abused: Not on file    Physically Abused: Not on file    Sexually Abused: Not on file   Housing Stability:     Unable to Pay for Housing in the Last Year: Not on file    Number of [place] in the Last Year: Not on file    Unstable Housing in the Last Year: Not on file     History reviewed. No pertinent family history. Allergies   Allergen Reactions    Penicillins Swelling         Constitutional: Alert and oriented times x3, no acute distress, and cooperative to examination with appropriate mood and affect. HEENT:   Head:         Normocephalic and atraumatic. Mucous membranes are normal.   Eyes:         EOM are normal. No scleral icterus. Nose:    The external appearance of the nose is normal  Ears: The ears appear normal to external inspection. Cardiovascular:       Normal rate, regular rhythm. Pulmonary/Chest:  Normal respiratory rate and rhthym. No use of accessory muscles. Lungs clear bilaterally. Abdominal:          Soft. No tenderness. Active bowel sounds. Musculoskeletal:    Normal range of motion. Exhibits no edema or tenderness of lower extremities. Extremities:    No cyanosis, clubbing, or edema present. Neurological:    Alert and oriented.      Labs:  WBC:    Lab Results   Component Value Date    WBC 8.5 06/03/2022     Hemoglobin/Hematocrit:    Lab Results   Component Value Date    HGB 11.1 06/03/2022    HCT 34.1 06/03/2022     BMP:    Lab Results   Component Value Date     06/03/2022    K 3.2 06/03/2022    K 4.2 05/30/2022    CL 97 06/03/2022    CO2 27 06/03/2022    BUN 9 06/03/2022    LABALBU 2.7 06/02/2022    CREATININE 0.4 06/03/2022    CALCIUM 8.6 06/03/2022    LABGLOM >90 06/03/2022     patient  Narrative   ** ADDENDUM #1 **   This report was discussed with Liyah Moore RN on May 31, 2022 21:40:00    EDT.       This document has been electronically signed by: Jun Subramanian on 05/31/2022    09:40 PM   * ORIGINAL REPORT *   Exam: MRI abdomen without and with IV contrast   Exam: MRCP without IV contrast       Comparison: CT,SR - CT ABDOMEN PELVIS W IV CONTRAST - 05/29/2022 05:39 PM    EDT    CT,SR - CT ABDOMEN PELVIS W IV CONTRAST - 12/29/2021 03:29 AM EST       Findings:       MRCP:   Status post cholecystectomy. No intrahepatic or extrahepatic biliary    dilatation, common duct measures 7 mm at the david hepatis with mild    distal tapering, no choledocholithiasis. Pancreatic duct is not dilated,    no pancreatic divisum.       MRI abdomen: Motion artifacts degraded exam.   Small bilateral pleural effusion with associated compressive atelectasis. Hepatomegaly with steatosis, no discrete hepatic lesion, patent hepatic    and portal veins. Spleen, pancreas, right adrenal gland, right kidney and imaged ureters are    unremarkable. Left adrenal nodule 1.4 cm with signal drop of on the out of phase    imaging, compatible with adenoma. In the left kidney interpolar region, there is nonenhancing cystic    lesion/collection 2.8 x 3.6 cm, isointense on T1, mildly hyperintense on    T2 with hypointense rim, this collection demonstrates minimal perinephric    extension about 5 mm in thickness. Left perinephric stranding and renal    fascia thickening, corresponding to recent prior CT finding, new when    compared to 12/29/2021. Aorta is not dilated. No adenopathy. No ascites or pneumoperitoneum. Imaged GI tract is unremarkable. No suspicious bone marrow signal. Superficial soft tissues are    unremarkable.           Impression   Impression:   1.  Nonenhancing cystic lesion/collection of the left kidney with minimal    perinephric extension, adjacent perinephric fatty stranding, most likely    renal abscess with perinephric abscess extension, complex cyst containing hemorrhagic or proteinaceous content is less likely. 2. Hepatomegaly with steatosis. 3. Left adrenal adenoma. 4. Small bilateral pleural effusion with associated compressive    atelectasis. 5. Status post cholecystectomy, no biliary dilatation or    choledocholithiasis.             Impression/Plan:  1. E coli Pyelonephritis with sepsis, bacteremia, and possible renal abscess 2.8 x 3.6 cms  2. Uncontrolled DM  3. Mild left hydroureter-possibly resolved. Not noted on renal us ordered by hospitalist 6/1.  4. Left adrenal nodule 1.4 cms    Clinically improving. 24 hour T max 99.3. ID following. Continues on Rocephin. ASA on hold in case would have clinical worsening requiring aspiration of possible abscess however no current plans for drainage of abscess. Ok to continue lovenox.     Opal Terry APRN - [id number] [facility]  06/03/22 12:02 PM  Urology

## 2022-06-03 NOTE — PROGRESS NOTES
PROGRESS NOTE      Patient:  Foreign Jenkins      Unit/Bed:4-28/028-A    YOB: 1970    MRN: [id number]       Acct: [de-identified]     PCP: Nicci Galan MD    Date of Admission: 5/29/2022      Assessment/Plan:    Anticipated Discharge in : 1-2 days    Active Hospital Problems    Diagnosis Date Noted    Sepsis due to Escherichia coli with acute hypercapnic respiratory failure without septic shock ([facility] [address] [A41.51, R65.20, J96.02]      Priority: Medium    Acute pyelonephritis [N10]      Priority: Medium    Acute UTI [N39.0]      Priority: Medium    Elevated LFTs [R79.89]      Priority: Medium    Hyperbilirubinemia [E80.6]      Priority: Medium    Hypokalemia [E87.6]      Priority: Medium    Normocytic anemia [D64.9]      Priority: Medium    Thrombocytopenia ([facility] [address] [D69.6]      Priority: Medium    Hyponatremia [E87.1]      Priority: Medium    Hydroureter on left [N13.4]      Priority: Medium    Leukopenia [D72.819]      Priority: Medium    Type 2 diabetes mellitus treated without insulin ([facility] [address] [E11.9]      Priority: Medium    Obesity (BMI 30-39. 9) [E66.9]      Priority: Medium    Essential hypertension [I10] 11/14/2018     Sepsis with evidence of end-organ damage  Patient with high fever, tachycardia, and tachypnea on admission. Source of infection gram negative bacilli from urinary tract. Patient also with elevated lactate at 2.7 on admission. Blood cultures and urine cultures with gram negative bacilli. S/p full sepsis bolus of IV fluid (5/29). MRCP (5/31) with left renal cystic lesion/collection 2.8 x 3.6 cm - most likely consistent with renal abscess  - Transition to Rocephin (6/1) per ID recommendations  - S/p IV fluids.  - Treatment of bacteremia and pyelonephritis as below    Bacteremia  2 of 2 blood cultures positive for E. coli. Urinary source most likely.   - Cefepime (started 5/30-6/1) -> transition to Rocephin (6/1) per ID recommendations  - ID consulted - appreciate recommendations    Pyelonephritis  UA + for Nitrites, leukocyte esterase, blood, protein. Urine culture with E. coli (5/30). CT abdomen (5/29) with left hydroureter, decreased perfusion of left mid kidney concerning for pyelo. MRCP (5/31) with left renal cystic lesion/collection 2.8 x 3.6 cm - most likely consistent with renal abscess  - Treated with Rocephin (5/29) -> transition to Cefepime (5/30-6/1) -> Rocephin (6/1) per ID recommendations, plan for total 14 day course of treatment  - Urology following - continue IV antibiotics, no plans for surgical intervention  - Renal US (6/1) -shows small bilateral renal abscess versus bilateral pyelonephritis. - ID consulted - recommend transition to Rocephin (6/1), and monitor patient clinically  - Continue medical management, will repeat US in 1-2 days to monitor bilateral kidney lesions, if persistent at that time will pursue IR guided drainage    Tachycardia, improved  HR elevated throughout admission, between 100-182. Rapid Response called evening of 5/29 due to HR in the 160s. EKG with sinus tachycardia. Patient treated with Cardizem 10 mg x1 dose with improvement of HR. Likely related to underlying sepsis, fevers, and pain. - Lopressor 100 mg PO BID  - Treat infection as above  - Monitor telemetry    Uncontrolled HTN, improving  BP noted to be 211/116 on arrival to ED, max /189. Patient had not been taking medications prior to arrival, also component of pain. - Lisinopril to 40 mg daily  - HCTZ 25 mg daily  - Lopressor 100 mg PO BID  - Norvasc 5 mg PO daily added  - Lopressor 10 mg IV PRN for SBP > 180  - Concern for secondary cause of HTN given unresponsiveness to medications, will check Aldosterone, Renin, and metanephrines - pending    Left Adrenal Nodule  MRCP (5/31) noted a 1.4 cm nodule, compatible with adenoma. Given patient's ongoing uncontrolled HTN, tachycardia, and mild hypokalemia, consider possible hyperfunctioning adenoma.   - Aldosterone and sepsis on presentation. No signs of bleeding. Iron 37, TIBC WNL. Ferritin elevated, however likely related to acute infection  - Monitor clinically, as patient is stable  - Daily CBC    Leukopenia, resolved  WBC 4.5 on admission -> now wnl. Likely related to underlying infection.  - Daily CBC    Hypokalemia, mild  Has been mildly low throughout admission, possibly related to decreased PO intake. Also checking aldosterone levels. - Potassium replacement protocol PRN  - Daily BMP    Elevated Lactate, resolved  Lactate 2.7 on admission -> 1.4 (5/30). Likely related to underlying sepsis. S/p 1L NS + 3L LR for sepsis bolus. - Monitor clinically, no need to trend lactate further    Ketosis  Beta-hydroxybutyrate 10.15 on admission -> 18.06 (5/31). CO2 23. AG 15. Glucose 411. Likely due to decreased PO intake, uncontrolled hyperglycemia. Likely represents developing DKA on admission.   - Treat hyperglycemia as below  - No indication for DKA protocol currently  - PO intake as tolerated  - Patient clinically improved, will recheck serum ketones in the morning    Uncontrolled Type 2 Diabetes Mellitus  Glucose 411 on admission. A1c 12.3% (5/30) - was 12.4% in 12/2021. Patient not checking blood sugars at home. Had not taken medication in several days. Patient with ketosis on admission, but not yet classified as DKA - noted as above. Insulin gtt initiated, but then discontinued (5/29). Treated with 20 U + 10 U Lantus (5/29-5/30)  - POCT Glucose  - Hypoglycemia protocol in place  - Continue Lantus 30 units nightly  - Medium dose SSI PRN  - Diabetic Diet  - Holding home Metformin, Januvia, Glipizide currently.     Depression and Anxiety  Per history, patient on Zoloft at home  - Home dose Zoloft resumed    Obesity with BMI 38.11 kg/m2  - Noted, encourage lifestyle modifications        Chief Complaint: Fevers, dysuria, weakness, fatigue    Hospital Course:   Per H&P:  Andreia Gardner is a 50 y.o. female with PMHx of hypertension, hyperlipidemia, type 2 diabetes mellitus, depression/anxiety, who presented to the emergency room here at [address] [id number] on 5/29/2022 complaining of at least a 3-day history of recurrent fevers with chills, lower abdominal pain, dysuria and increased frequency, lack of energy, decreased appetite, and progressive weakness. Patient admits to not being able to take her home medications including her diabetes medications for the last 2 days at least due to being very tired and sick. Patient suspected that she might have a urinary tract infection due to her symptoms above and decided to come in for further evaluation. Her abdominal pain did not radiate and was sharp and present mostly in the suprapubic area. No flank pain. Some nausea without vomiting. Denies any diarrhea. No sick contacts or recent traveling. No vaginal bleeding or discharge.     In the emergency room the patient was febrile at 100.5 °F with severe tachycardia in the 130s to 140s, tachypneic, toxic looking, with good O2 sats on room air. Laboratory investigations revealed a positive urinalysis with positive nitrates and leukocyte esterase. Elevated beta hydroxybutyrate of 10. Elevated blood sugars in the 300-400 range. CT scan of the abdomen pelvis with contrast with findings suspicious for left pyelonephritis. Patient given ceftriaxone 2 g IV x1 with appropriate fluid resuscitation. She was then admitted to our hospitalist service for further evaluation and treatment.     At the time of my evaluation on the medical floor, patient was in obvious discomfort with tachycardia up to 160, tachypnea, and a high fever of 105.8 degrees Fahrenheit stating that she feels very hot. Blood pressure was also elevated in the 717R systolic. Patient was flushed with warm skin to touch.   Was still responsive and answering questions appropriately and following commands.     Rapid response was called on patient just prior to my arrival due to severe tachycardia. Twelve-lead EKG confirmed sinus tachycardia with no ischemic changes or underlying pathological tachyarrhythmia. Patient was given 5 mg of IV Cardizem push and 500 cc LR fluid bolus then transferred to stepdown for further management and treatment. \"    6/1/22: Patient with elevated BP and HR - required IV Lopressor    Subjective:  Patient seen and examined this morning. States she is feeling better overall. She denies any abdominal pain. Her appetite has been improving. She has been afebrile. BP and HR continue to improve from prior. Otherwise, patient denies nausea, vomiting, chest pain, shortness of breath, palpitations, leg swelling, or change in bowel movements. No acute concerns this morning.       Medications:  Reviewed    Infusion Medications    dextrose      sodium chloride 1,000 mL (06/02/22 1252)     Scheduled Medications    amLODIPine  5 mg Oral Daily    metoprolol tartrate  100 mg Oral BID    insulin glargine  30 Units SubCUTAneous Nightly    lisinopril  40 mg Oral Daily    hydroCHLOROthiazide  25 mg Oral Daily    cefTRIAXone (ROCEPHIN) IV  2,000 mg IntraVENous Q24H    phosphorus replacement protocol   Other RX Placeholder    insulin lispro  0-12 Units SubCUTAneous TID     insulin lispro  0-6 Units SubCUTAneous Nightly    [Held by provider] aspirin EC  81 mg Oral Daily    [Held by provider] glipiZIDE  5 mg Oral BID    sertraline  50 mg Oral Daily    sodium chloride flush  10 mL IntraVENous 2 times per day    enoxaparin  40 mg SubCUTAneous Daily     PRN Meds: metoprolol (LOPRESSOR) ivpb, dextrose, glucose, dextrose bolus **OR** dextrose bolus, glucagon (rDNA), dextrose, sodium chloride flush, sodium chloride, ondansetron **OR** ondansetron, polyethylene glycol, acetaminophen **OR** acetaminophen, potassium chloride **OR** potassium alternative oral replacement **OR** potassium chloride, magnesium sulfate, aluminum & magnesium hydroxide-simethicone      Intake/Output Summary (Last 24 hours) at 6/3/2022 1150  Last data filed at 6/3/2022 1048  Gross per 24 hour   Intake 1758.7 ml   Output 300 ml   Net 1458.7 ml       Diet:  ADULT DIET; Regular; 3 carb choices (45 gm/meal)  ADULT ORAL NUTRITION SUPPLEMENT; Breakfast, Lunch, Dinner; Diabetic Oral Supplement    Exam:  BP (!) 148/95   Pulse 87   Temp 99.3 °F (37.4 °C) (Oral)   Resp 18   Ht 5' 4\" (1.626 m)   Wt 222 lb (100.7 kg)   SpO2 94%   BMI 38.11 kg/m²     General appearance: Alert, sitting in chair; No apparent distress, appears stated age and cooperative. HEENT: Pupils equal, round, and reactive to light. Conjunctivae/corneas clear. Neck: Supple, with full range of motion. No jugular venous distention. Trachea midline. Respiratory:  Clear bilaterally, no wheezing or rhonchi noted  Cardiovascular: Regular rate and rhythm with normal S1/S2 without murmurs, rubs or gallops. Abdomen: Soft, obese, non-tender, non-distended with normal bowel sounds. No CVA tenderness  Musculoskeletal: passive and active ROM x 4 extremities. No edema  Skin: Skin color, texture, turgor normal.  No rashes or lesions. Psychiatric: Alert and oriented, thought content appropriate, normal insight  Capillary Refill: Brisk,< 3 seconds   Peripheral Pulses: +2 palpable, equal bilaterally       Labs:   Recent Labs     06/01/22 0418 06/02/22 0444 06/03/22 0414   WBC 7.4 8.1 8.5   HGB 10.9* 12.0 11.1*   HCT 32.7* 36.6* 34.1*   * 156 170     Recent Labs     06/01/22 0418 06/02/22 0444 06/03/22 0414   * 134* 135   K 3.4* 3.0* 3.2*   CL 98 96* 97*   CO2 21* 26 27   BUN 13 9 9   CREATININE 0.3* 0.4 0.4   CALCIUM 8.5 8.7 8.6   PHOS 2.0* 2.6 3.6     Recent Labs     06/02/22 0444   AST 28   ALT 58   BILIDIR 1.1*   BILITOT 1.8*   ALKPHOS 264*     No results for input(s): INR in the last 72 hours. No results for input(s): Jessica Beat in the last 72 hours.     Urinalysis:      Lab Results [] SQ Heparin                                 [] Encourage ambulation           [] Already on Anticoagulation     Disposition:    [x] Home       [] TCU       [] Rehab       [] Psych       [] SNF       [] [place]       [] Other-    Code Status: Full Code    PT/OT Eval Status: not indicated      Electronically signed by Ayan Dia MD on 6/3/2022 at 11:50 AM    This note was electronically signed. Parts of this note may have been dictated by use of voice recognition software and electronically transcribed. The note may contain errors not detected in proofreading. Please refer to my supervising physician's documentation if my documentation differs.

## 2022-06-03 NOTE — PLAN OF CARE
Problem: Discharge Planning  Goal: Discharge to home or other facility with appropriate resources  Outcome: Progressing  Flowsheets (Taken 6/3/2022 1318)  Discharge to home or other facility with appropriate resources:   Identify barriers to discharge with patient and caregiver   Arrange for needed discharge resources and transportation as appropriate   Identify discharge learning needs (meds, wound care, etc)     Problem: Pain  Goal: Verbalizes/displays adequate comfort level or baseline comfort level  Outcome: Progressing  Flowsheets (Taken 6/3/2022 1318)  Verbalizes/displays adequate comfort level or baseline comfort level:   Encourage patient to monitor pain and request assistance   Assess pain using appropriate pain scale   Implement non-pharmacological measures as appropriate and evaluate response     Problem: Safety - Adult  Goal: Free from fall injury  Outcome: Progressing  Flowsheets (Taken 6/3/2022 1301)  Free From Fall Injury: Instruct family/caregiver on patient safety     Problem: ABCDS Injury Assessment  Goal: Absence of physical injury  Outcome: Progressing  Flowsheets (Taken 6/3/2022 1301)  Absence of Physical Injury: Implement safety measures based on patient assessment     Problem: Respiratory - Adult  Goal: Achieves optimal ventilation and oxygenation  Outcome: Progressing  Flowsheets (Taken 6/3/2022 0808)  Achieves optimal ventilation and oxygenation: Assess for changes in respiratory status     Problem: Cardiovascular - Adult  Goal: Maintains optimal cardiac output and hemodynamic stability  Outcome: Progressing  Flowsheets (Taken 6/3/2022 0808)  Maintains optimal cardiac output and hemodynamic stability:   Monitor blood pressure and heart rate   Assess for signs of decreased cardiac output     Problem: Cardiovascular - Adult  Goal: Absence of cardiac dysrhythmias or at baseline  Outcome: Progressing  Flowsheets (Taken 6/3/2022 0808)  Absence of cardiac dysrhythmias or at baseline: Monitor cardiac rate and rhythm     Problem: Skin/Tissue Integrity - Adult  Goal: Skin integrity remains intact  Outcome: Progressing  Flowsheets  Taken 6/3/2022 1301  Skin Integrity Remains Intact: Monitor for areas of redness and/or skin breakdown  Taken 6/3/2022 0808  Skin Integrity Remains Intact: Monitor for areas of redness and/or skin breakdown     Problem: Musculoskeletal - Adult  Goal: Return mobility to safest level of function  Outcome: Progressing  Flowsheets (Taken 6/3/2022 0808)  Return Mobility to Safest Level of Function: Assess patient stability and activity tolerance for standing, transferring and ambulating with or without assistive devices     Problem: Gastrointestinal - Adult  Goal: Minimal or absence of nausea and vomiting  Outcome: Progressing  Flowsheets (Taken 6/3/2022 0808)  Minimal or absence of nausea and vomiting: Administer IV fluids as ordered to ensure adequate hydration     Problem: Infection - Adult  Goal: Absence of infection at discharge  Outcome: Progressing  Flowsheets (Taken 6/3/2022 0808)  Absence of infection at discharge:   Assess and monitor for signs and symptoms of infection   Monitor lab/diagnostic results   Monitor all insertion sites i.e., indwelling lines, tubes and drains   Administer medications as ordered     Problem: Infection - Adult  Goal: Absence of infection during hospitalization  Outcome: Progressing  Flowsheets (Taken 6/3/2022 0808)  Absence of infection during hospitalization:   Assess and monitor for signs and symptoms of infection   Monitor lab/diagnostic results     Problem: Infection - Adult  Goal: Absence of fever/infection during anticipated neutropenic period  Outcome: Progressing  Flowsheets (Taken 6/3/2022 1318)  Absence of fever/infection during anticipated neutropenic period: Monitor white blood cell count     Problem: Metabolic/Fluid and Electrolytes - Adult  Goal: Electrolytes maintained within normal limits  Outcome: Progressing  Flowsheets (Taken 6/3/2022 1318)  Electrolytes maintained within normal limits:   Monitor labs and assess patient for signs and symptoms of electrolyte imbalances   Administer electrolyte replacement as ordered   Monitor response to electrolyte replacements, including repeat lab results as appropriate     Problem: Metabolic/Fluid and Electrolytes - Adult  Goal: Hemodynamic stability and optimal renal function maintained  Outcome: Progressing  Flowsheets (Taken 6/3/2022 1318)  Hemodynamic stability and optimal renal function maintained:   Monitor labs and assess for signs and symptoms of volume excess or deficit   Monitor intake, output and patient weight     Problem: Metabolic/Fluid and Electrolytes - Adult  Goal: Glucose maintained within prescribed range  Outcome: Progressing  Flowsheets (Taken 6/3/2022 1318)  Glucose maintained within prescribed range: Monitor blood glucose as ordered     Problem: Chronic Conditions and Co-morbidities  Goal: Patient's chronic conditions and co-morbidity symptoms are monitored and maintained or improved  Outcome: Progressing  Flowsheets (Taken 6/3/2022 1318)  Care Plan - Patient's Chronic Conditions and Co-Morbidity Symptoms are Monitored and Maintained or Improved: Monitor and assess patient's chronic conditions and comorbid symptoms for stability, deterioration, or improvement     Problem: Nutrition Deficit:  Goal: Optimize nutritional status  Outcome: Progressing  Flowsheets (Taken 6/3/2022 1318)  Nutrient intake appropriate for improving, restoring, or maintaining nutritional needs: Assess nutritional status and recommend course of action     Care plan reviewed with patient. Patient  verbalize understanding of the plan of care and contribute to goal setting.

## 2022-06-03 NOTE — PROGRESS NOTES
Progress note: Infectious diseases    Patient - Paul Enriquez,  Age - 50 y.o.    - 1970      Room Number - 4K-28/028-A   MRN -  [id number]   [facility]  Date of Admission -  2022  2:04 PM    SUBJECTIVE:   No new issues. OBJECTIVE   VITALS    height is 5' 4\" (1.626 m) and weight is 222 lb (100.7 kg). Her oral temperature is 98.6 °F (37 °C). Her blood pressure is 169/96 (abnormal) and her pulse is 86. Her respiration is 16 and oxygen saturation is 98%. Wt Readings from Last 3 Encounters:   22 222 lb (100.7 kg)   21 215 lb 6.4 oz (97.7 kg)   21 230 lb (104.3 kg)       I/O (24 Hours)    Intake/Output Summary (Last 24 hours) at 6/3/2022 1356  Last data filed at 6/3/2022 1331  Gross per 24 hour   Intake 1758.7 ml   Output 500 ml   Net 1258.7 ml       General Appearance  Awake, alert, oriented,  not  In acute distress  HEENT - normocephalic, atraumatic, pink conjunctiva,  anicteric sclera  Neck - Supple, no mass  Lungs -  Bilateral good air entry, no rhonchi, no wheeze  Cardiovascular - Heart sounds are normal.     Abdomen - soft, not distended, non tender.   Neurologic -oriented  Skin - No bruising or bleeding  Extremities - No edema, no cyanosis, clubbing     MEDICATIONS:      amLODIPine  5 mg Oral Daily    metoprolol tartrate  100 mg Oral BID    insulin glargine  30 Units SubCUTAneous Nightly    lisinopril  40 mg Oral Daily    hydroCHLOROthiazide  25 mg Oral Daily    cefTRIAXone (ROCEPHIN) IV  2,000 mg IntraVENous Q24H    phosphorus replacement protocol   Other RX Placeholder    insulin lispro  0-12 Units SubCUTAneous TID     insulin lispro  0-6 Units SubCUTAneous Nightly    [Held by provider] aspirin EC  81 mg Oral Daily    [Held by provider] glipiZIDE  5 mg Oral BID    sertraline  50 mg Oral Daily    sodium chloride flush  10 mL IntraVENous 2 times per day    enoxaparin  40 mg SubCUTAneous Daily      dextrose      sodium chloride 1,000 mL (06/02/22 1252)     metoprolol (LOPRESSOR) ivpb, dextrose, glucose, dextrose bolus **OR** dextrose bolus, glucagon (rDNA), dextrose, sodium chloride flush, sodium chloride, ondansetron **OR** ondansetron, polyethylene glycol, acetaminophen **OR** acetaminophen, potassium chloride **OR** potassium alternative oral replacement **OR** potassium chloride, magnesium sulfate, aluminum & magnesium hydroxide-simethicone      LABS:     CBC:   Recent Labs     06/01/22 0418 06/02/22 0444 06/03/22  0414   WBC 7.4 8.1 8.5   HGB 10.9* 12.0 11.1*   * 156 170     BMP:    Recent Labs     06/01/22 0418 06/02/22 0444 06/03/22  0414   * 134* 135   K 3.4* 3.0* 3.2*   CL 98 96* 97*   CO2 21* 26 27   BUN 13 9 9   CREATININE 0.3* 0.4 0.4   GLUCOSE 191* 146* 142*     Calcium:  Recent Labs     06/03/22  0414   CALCIUM 8.6     Ionized Calcium:No results for input(s): IONCA in the last 72 hours. Magnesium:  Recent Labs     06/03/22  0414   MG 1.8     Phosphorus:  Recent Labs     06/03/22  0414   PHOS 3.6     BNP:No results for input(s): BNP in the last 72 hours. Glucose:  Recent Labs     06/02/22 2007 06/03/22  0739 06/03/22  1203   POCGLU 241* 126* 189*     HgbA1C: No results for input(s): LABA1C in the last 72 hours. INR:   No results for input(s): INR in the last 72 hours. Hepatic:   Recent Labs     06/02/22  0444   ALKPHOS 264*   ALT 58   AST 28   PROT 6.2   BILITOT 1.8*   BILIDIR 1.1*   LABALBU 2.7*        CULTURES:   UA: No results for input(s): SPECGRAV, PHUR, COLORU, CLARITYU, MUCUS, PROTEINU, BLOODU, RBCUA, WBCUA, BACTERIA, NITRU, GLUCOSEU, BILIRUBINUR, UROBILINOGEN, KETUA, LABCAST, LABCASTTY, AMORPHOS in the last 72 hours.     Invalid input(s): CRYSTALS  Micro:   Lab Results   Component Value Date    [facility]  05/29/2022     sensitivity done-previous specimen [id number] blood culture collected 15:10 5/29/22           Problem list of patient:     Patient Active Problem List   Diagnosis Code    Essential hypertension I10    SOB (shortness of breath) on exertion R06.02    Intermittent palpitations R00.2    Hx of Sinus tachycardia R00.0    Septicemia ([facility] [address] A41.9    Sepsis due to Escherichia coli with acute hypercapnic respiratory failure without septic shock ([facility]) A41.51, R65.20, J96.02    Acute pyelonephritis N10    Acute UTI N39.0    Elevated LFTs R79.89    Hyperbilirubinemia E80.6    Hypokalemia E87.6    Normocytic anemia D64.9    Thrombocytopenia ([facility]) D69.6    Hyponatremia E87.1    Hydroureter on left N13.4    Leukopenia D72.819    Type 2 diabetes mellitus treated without insulin ([facility]) E11.9    Obesity (BMI 30-39. 9) E66.9         ASSESSMENT/PLAN   E.coli sepsis  Uncontrolled diabetes  Continue iv antibiotic  Scan at am for follow up of the abscess.         Larna Galeazzi, MD, MD, FACP 6/3/2022 1:56 PM

## 2022-06-04 ENCOUNTER — APPOINTMENT (OUTPATIENT)
Dept: ULTRASOUND IMAGING | Age: 52
DRG: 871 | End: 2022-06-04

## 2022-06-04 VITALS
RESPIRATION RATE: 18 BRPM | TEMPERATURE: 98.6 F | HEART RATE: 76 BPM | SYSTOLIC BLOOD PRESSURE: 162 MMHG | HEIGHT: 64 IN | OXYGEN SATURATION: 96 % | WEIGHT: 222 LBS | BODY MASS INDEX: 37.9 KG/M2 | DIASTOLIC BLOOD PRESSURE: 92 MMHG

## 2022-06-04 LAB
ALDOSTERONE: 3.4 NG/DL
ANION GAP SERPL CALCULATED.3IONS-SCNC: 9 MEQ/L (ref 8–16)
ATYPICAL LYMPHOCYTES: ABNORMAL %
BASOPHILS # BLD: 0.7 %
BASOPHILS ABSOLUTE: 0.1 THOU/MM3 (ref 0–0.1)
BETA-HYDROXYBUTYRATE: 2.7 MG/DL (ref 0.2–2.81)
BUN BLDV-MCNC: 9 MG/DL (ref 7–22)
CALCIUM SERPL-MCNC: 8.9 MG/DL (ref 8.5–10.5)
CHLORIDE BLD-SCNC: 99 MEQ/L (ref 98–111)
CO2: 29 MEQ/L (ref 23–33)
CREAT SERPL-MCNC: 0.4 MG/DL (ref 0.4–1.2)
EOSINOPHIL # BLD: 2.8 %
EOSINOPHILS ABSOLUTE: 0.3 THOU/MM3 (ref 0–0.4)
ERYTHROCYTE [DISTWIDTH] IN BLOOD BY AUTOMATED COUNT: 12.9 % (ref 11.5–14.5)
ERYTHROCYTE [DISTWIDTH] IN BLOOD BY AUTOMATED COUNT: 42.9 FL (ref 35–45)
GFR SERPL CREATININE-BSD FRML MDRD: > 90 ML/MIN/1.73M2
GLUCOSE BLD-MCNC: 120 MG/DL (ref 70–108)
GLUCOSE BLD-MCNC: 135 MG/DL (ref 70–108)
GLUCOSE BLD-MCNC: 136 MG/DL (ref 70–108)
HCT VFR BLD CALC: 36 % (ref 37–47)
HEMOGLOBIN: 11.4 GM/DL (ref 12–16)
IMMATURE GRANS (ABS): 0.44 THOU/MM3 (ref 0–0.07)
IMMATURE GRANULOCYTES: 4.7 %
LYMPHOCYTES # BLD: 23.7 %
LYMPHOCYTES ABSOLUTE: 2.2 THOU/MM3 (ref 1–4.8)
MAGNESIUM: 1.9 MG/DL (ref 1.6–2.4)
MCH RBC QN AUTO: 29.1 PG (ref 26–33)
MCHC RBC AUTO-ENTMCNC: 31.7 GM/DL (ref 32.2–35.5)
MCV RBC AUTO: 91.8 FL (ref 81–99)
MONOCYTES # BLD: 8.1 %
MONOCYTES ABSOLUTE: 0.8 THOU/MM3 (ref 0.4–1.3)
NUCLEATED RED BLOOD CELLS: 0 /100 WBC
PHOSPHORUS: 4.3 MG/DL (ref 2.4–4.7)
PLATELET # BLD: 213 THOU/MM3 (ref 130–400)
PLATELET ESTIMATE: ADEQUATE
PMV BLD AUTO: 9.7 FL (ref 9.4–12.4)
POTASSIUM SERPL-SCNC: 3.6 MEQ/L (ref 3.5–5.2)
RBC # BLD: 3.92 MILL/MM3 (ref 4.2–5.4)
RENIN ACTIVITY: 0.2 NG/ML/HR
SCAN OF BLOOD SMEAR: NORMAL
SEG NEUTROPHILS: 60 %
SEGMENTED NEUTROPHILS ABSOLUTE COUNT: 5.6 THOU/MM3 (ref 1.8–7.7)
SODIUM BLD-SCNC: 137 MEQ/L (ref 135–145)
WBC # BLD: 9.4 THOU/MM3 (ref 4.8–10.8)

## 2022-06-04 PROCEDURE — 2580000003 HC RX 258: Performed by: PEDIATRICS

## 2022-06-04 PROCEDURE — 85025 COMPLETE CBC W/AUTO DIFF WBC: CPT

## 2022-06-04 PROCEDURE — 76775 US EXAM ABDO BACK WALL LIM: CPT

## 2022-06-04 PROCEDURE — 6360000002 HC RX W HCPCS: Performed by: PEDIATRICS

## 2022-06-04 PROCEDURE — 82010 KETONE BODYS QUAN: CPT

## 2022-06-04 PROCEDURE — 99238 HOSP IP/OBS DSCHRG MGMT 30/<: CPT | Performed by: FAMILY MEDICINE

## 2022-06-04 PROCEDURE — 84100 ASSAY OF PHOSPHORUS: CPT

## 2022-06-04 PROCEDURE — 36415 COLL VENOUS BLD VENIPUNCTURE: CPT

## 2022-06-04 PROCEDURE — 6370000000 HC RX 637 (ALT 250 FOR IP): Performed by: PEDIATRICS

## 2022-06-04 PROCEDURE — 6370000000 HC RX 637 (ALT 250 FOR IP): Performed by: STUDENT IN AN ORGANIZED HEALTH CARE EDUCATION/TRAINING PROGRAM

## 2022-06-04 PROCEDURE — 82948 REAGENT STRIP/BLOOD GLUCOSE: CPT

## 2022-06-04 PROCEDURE — 83735 ASSAY OF MAGNESIUM: CPT

## 2022-06-04 PROCEDURE — 80048 BASIC METABOLIC PNL TOTAL CA: CPT

## 2022-06-04 PROCEDURE — 94760 N-INVAS EAR/PLS OXIMETRY 1: CPT

## 2022-06-04 RX ORDER — HYDROCHLOROTHIAZIDE 25 MG/1
25 TABLET ORAL DAILY
Qty: 30 TABLET | Refills: 3 | Status: SHIPPED | OUTPATIENT
Start: 2022-06-05 | End: 2022-07-14 | Stop reason: SDUPTHER

## 2022-06-04 RX ORDER — LISINOPRIL 40 MG/1
40 TABLET ORAL DAILY
Qty: 30 TABLET | Refills: 3 | Status: SHIPPED | OUTPATIENT
Start: 2022-06-05 | End: 2022-07-14 | Stop reason: ALTCHOICE

## 2022-06-04 RX ORDER — METFORMIN HYDROCHLORIDE 500 MG/1
500 TABLET, EXTENDED RELEASE ORAL
Qty: 30 TABLET | Refills: 1 | Status: SHIPPED | OUTPATIENT
Start: 2022-06-04 | End: 2022-07-14 | Stop reason: SDUPTHER

## 2022-06-04 RX ORDER — METOPROLOL TARTRATE 100 MG/1
100 TABLET ORAL 2 TIMES DAILY
Qty: 60 TABLET | Refills: 1 | Status: SHIPPED | OUTPATIENT
Start: 2022-06-04 | End: 2022-07-14 | Stop reason: SDUPTHER

## 2022-06-04 RX ORDER — AMLODIPINE BESYLATE 5 MG/1
5 TABLET ORAL DAILY
Qty: 30 TABLET | Refills: 3 | Status: SHIPPED | OUTPATIENT
Start: 2022-06-05 | End: 2022-07-14 | Stop reason: ALTCHOICE

## 2022-06-04 RX ORDER — CEFDINIR 300 MG/1
300 CAPSULE ORAL 2 TIMES DAILY
Qty: 42 CAPSULE | Refills: 0 | Status: ON HOLD | OUTPATIENT
Start: 2022-06-04 | End: 2022-06-28 | Stop reason: HOSPADM

## 2022-06-04 RX ORDER — INSULIN GLARGINE 100 [IU]/ML
25 INJECTION, SOLUTION SUBCUTANEOUS NIGHTLY
Qty: 5 PEN | Refills: 1 | Status: SHIPPED | OUTPATIENT
Start: 2022-06-04 | End: 2022-07-12 | Stop reason: SDUPTHER

## 2022-06-04 RX ADMIN — SODIUM CHLORIDE, PRESERVATIVE FREE 10 ML: 5 INJECTION INTRAVENOUS at 09:26

## 2022-06-04 RX ADMIN — METOPROLOL TARTRATE 100 MG: 50 TABLET, FILM COATED ORAL at 09:26

## 2022-06-04 RX ADMIN — AMLODIPINE BESYLATE 5 MG: 5 TABLET ORAL at 09:25

## 2022-06-04 RX ADMIN — HYDROCHLOROTHIAZIDE 25 MG: 25 TABLET ORAL at 09:25

## 2022-06-04 RX ADMIN — LISINOPRIL 40 MG: 20 TABLET ORAL at 09:25

## 2022-06-04 RX ADMIN — SERTRALINE 50 MG: 50 TABLET, FILM COATED ORAL at 09:25

## 2022-06-04 RX ADMIN — ENOXAPARIN SODIUM 40 MG: 100 INJECTION SUBCUTANEOUS at 09:25

## 2022-06-04 NOTE — DISCHARGE SUMMARY
DISCHARGE SUMMARY      Patient Identification:   Kori Greer   : 1970  MRN: [id number]   Account: [de-identified]      Patient's PCP: Daryl Cunha MD    Admit Date: 2022     Discharge Date:   22    Admitting Physician: Remi Leach MD     Discharge Physician: Daryl Cunha MD     Discharge Diagnoses: Active Hospital Problems    Diagnosis Date Noted    Sepsis due to Escherichia coli with acute hypercapnic respiratory failure without septic shock (HCC) [A41.51, R65.20, J96.02]      Priority: Medium    Acute pyelonephritis [N10]      Priority: Medium    Acute UTI [N39.0]      Priority: Medium    Elevated LFTs [R79.89]      Priority: Medium    Hyperbilirubinemia [E80.6]      Priority: Medium    Hypokalemia [E87.6]      Priority: Medium    Normocytic anemia [D64.9]      Priority: Medium    Thrombocytopenia ([address] [D69.6]      Priority: Medium    Hyponatremia [E87.1]      Priority: Medium    Hydroureter on left [N13.4]      Priority: Medium    Leukopenia [D72.819]      Priority: Medium    Type 2 diabetes mellitus treated without insulin ([address] [E11.9]      Priority: Medium    Obesity (BMI 30-39. 9) [E66.9]      Priority: Medium    Essential hypertension [I10] 2018     Discharge Assessment/Plan:    Sepsis with evidence of end-organ damage  Patient with high fever, tachycardia, and tachypnea on admission. Source of infection gram negative bacilli from urinary tract. Patient also with elevated lactate at 2.7 on admission. Blood cultures and urine cultures with gram negative bacilli. S/p full sepsis bolus of IV fluid (). MRCP () with left renal cystic lesion/collection 2.8 x 3.6 cm - most likely consistent with renal abscess  - Treatment of bacteremia and pyelonephritis as below     Bacteremia  2 of 2 blood cultures positive for E. coli. Urinary source most likely.   - Cefepime (started -) -> transition to Rocephin () per ID recommendations  -Will discharge with Omnicef x3 weeks     Pyelonephritis  UA + for Nitrites, leukocyte esterase, blood, protein. Urine culture with E. coli (5/30). CT abdomen (5/29) with left hydroureter, decreased perfusion of left mid kidney concerning for pyelo. MRCP (5/31) with left renal cystic lesion/collection 2.8 x 3.6 cm - most likely consistent with renal abscess  - Treated with Rocephin (5/29) -> transition to Cefepime (5/30-6/1) -> Rocephin (6/1) per ID recommendations  - Urology following - continue IV antibiotics, no plans for surgical intervention  - Renal US (6/1) -shows small bilateral renal abscess versus bilateral pyelonephritis. -Renal US (6/4) - focal area of increased echogenicity at the mid aspect of the left kidney measuring 4.7 x 2.7 x 2.8 cm. This is nonspecific, but represents likely evolving infectious etiology. Overall, kidney lesions appear to be stable or decreasing in size compared to prior imaging.  -Transition to Omnicef twice daily x3 weeks  -Plan for repeat renal US outpatient in 1-2 weeks  -Close follow-up with PCP for further monitoring     Uncontrolled HTN, improving  BP noted to be 211/116 on arrival to ED, max /189. Patient had not been taking medications prior to arrival, also component of pain. - Lisinopril to 40 mg daily  - HCTZ 25 mg daily  - Lopressor 100 mg PO BID  - Norvasc 5 mg PO daily  - Concern for secondary cause of HTN given unresponsiveness to medications, will check Aldosterone, Renin, and metanephrines - pending     Uncontrolled Type 2 Diabetes Mellitus  Glucose 411 on admission. A1c 12.3% (5/30) - was 12.4% in 12/2021. Patient not checking blood sugars at home. Had not taken medication in several days. Patient with ketosis on admission, but not yet classified as DKA - noted as above. Insulin gtt initiated, but then discontinued (5/29).  Treated with 20 U + 10 U Lantus (5/29-5/30)  -Glucose well controlled with Lantus 30 units nightly during hospitalization  -Patient agreeable to discharge with Lantus, will decrease to 25 units nightly  -Transition from metformin to metformin  mg daily  -Monitor blood sugars, and bring log to outpatient appointment  -Hold glipizide and Januvia on discharge    Left Adrenal Nodule  MRCP (5/31) noted a 1.4 cm nodule, compatible with adenoma. Given patient's ongoing uncontrolled HTN, tachycardia, and mild hypokalemia, consider possible hyperfunctioning adenoma. - Aldosterone and Renin activity pending  - Patient will need outpatient follow-up to monitor nodule     Left hydroureter, resolved  Seen on CT abdomen (5/29) with suggestion of ureteral wall enhancement, and poorly defined area of decreased perfusion of left mid kidney, consistent with pyelonephritis. - Urology consulted - no plans for surgical management  - Renal US (6/1) reports no hydronephrosis  - Continue Antibiotics as above     Tachycardia, improved  HR elevated throughout admission, between 100-182. Rapid Response called evening of 5/29 due to HR in the 160s. EKG with sinus tachycardia. Patient treated with Cardizem 10 mg x1 dose with improvement of HR. Likely related to underlying sepsis, fevers, and pain. - Lopressor 100 mg PO BID    Fever, resolved  Tmax 105.9 F (5/29). Due to underlying sepsis. Improved with ibuprofen x1 dose (5/31)     Acute Hypoxic Respiratory Failure, resolved  Patient initially maintaining O2 on room air on presentation, then had desaturation requiring up to 3L NC.  - Currently on room air - Continue monitoring to maintain O2 >90%  - Incentive Spirometry ordered      Hyponatremia, resolved  Na 134 -> 132 -> 130 -> 131 -> 134 ->135. Likely pseudohyponatremia due to hyperglycemia on admission. Compounded by treatment with IV fluids.     Elevated liver enzymes, improving  AST, ALT, Alk Phos all mildly elevated on admission. Suspect most likely related to hypoperfusion due to underlying sepsis and dehydration.  Mild RUQ tenderness on exam. No prior history of elevated LFTs. Hepatitis panel negative.     Direct Hyperbilirubinemia, improved  Total bili 2.4 on admission -> 2.9 -> 3.5 -> 1.8 (6/2). Direct Bili 1.6 -> 2.3 -> 3.2 -> 1.1 (6/2). Likely related to underlying sepsis. Patient with mild RUQ pain, but s/p cholecystectomy. MRCP (5/31) without evidence of biliary cause of hyperbilirubinemia.     Thrombocytopenia, resolved  Plt 122 on admission -> 100 -> 129 Likely related to underlying infection. No signs of bleeding currently.     Normocytic Anemia  Hemoglobin 11.1 - decreased from 13.6 on admission. Baseline appears in the 14s. However likely a dilutional component given patient received 4L IVF for sepsis on presentation. No signs of bleeding. Iron 37, TIBC WNL. Ferritin elevated, however likely related to acute infection  -Consider outpatient iron supplementation     Leukopenia, resolved  WBC 4.5 on admission -> now wnl. Likely related to underlying infection.     Hypokalemia, resolved  Has been mildly low throughout admission, possibly related to decreased PO intake. Also checking aldosterone levels.     Elevated Lactate, resolved  Lactate 2.7 on admission -> 1.4 (5/30). Likely related to underlying sepsis. S/p 1L NS + 3L LR for sepsis bolus.     Ketosis, resolved  Beta-hydroxybutyrate 10.15 on admission -> 2.70 (6/4). CO2 23. AG 15. Glucose 411. Likely due to decreased PO intake, uncontrolled hyperglycemia. Likely represents developing DKA on admission.      Depression and Anxiety  Per history, patient on Zoloft at home  - Home dose Zoloft resumed     Obesity with BMI 38.11 kg/m2  - Noted, encourage lifestyle modifications    The patient was seen and examined on day of discharge and this discharge summary is in conjunction with any daily progress note from day of discharge.     Hospital Course:   Per H&P:  \"Aster Loco is a 50 y.o. female with PMHx of hypertension, hyperlipidemia, type 2 diabetes mellitus, depression/anxiety, who presented to the emergency room here at [address] [address] on 5/29/2022 complaining of at least a 3-day history of recurrent fevers with chills, lower abdominal pain, dysuria and increased frequency, lack of energy, decreased appetite, and progressive weakness.   Patient admits to not being able to take her home medications including her diabetes medications for the last 2 days at least due to being very tired and sick. Mulugeta Bates suspected that she might have a urinary tract infection due to her symptoms above and decided to come in for further evaluation.  Her abdominal pain did not radiate and was sharp and present mostly in the suprapubic area.  No flank pain.  Some nausea without vomiting.  Denies any diarrhea.  No sick contacts or recent traveling.  No vaginal bleeding or discharge.     In the emergency room the patient was febrile at 100.5 °F with severe tachycardia in the 130s to 140s, tachypneic, toxic looking, with good O2 sats on room air.  Laboratory investigations revealed a positive urinalysis with positive nitrates and leukocyte esterase.  Elevated beta hydroxybutyrate of 10.  Elevated blood sugars in the 300-400 range. CT scan of the abdomen pelvis with contrast with findings suspicious for left pyelonephritis.   Patient given ceftriaxone 2 g IV x1 with appropriate fluid resuscitation.    She was then admitted to our hospitalist service for further evaluation and treatment.     At the time of my evaluation on the medical floor, patient was in obvious discomfort with tachycardia up to 160, tachypnea, and a high fever of 105.8 degrees Fahrenheit stating that she feels very hot.  Blood pressure was also elevated in the 606Y systolic.  Patient was flushed with warm skin to touch.  Was still responsive and answering questions appropriately and following commands.     Rapid response was called on patient just prior to my arrival due to severe tachycardia.  Twelve-lead EKG confirmed sinus tachycardia with no ischemic changes or underlying pathological tachyarrhythmia. Patient was given 5 mg of IV Cardizem push and 500 cc LR fluid bolus then transferred to stepdown for further management and treatment. \"     During admission, patient's urine and blood cultures were positive for E. coli. Imaging including abdominal CT, MRCP, and renal ultrasound all revealed evidence of bilateral perinephric abscesses versus severe pyelonephritis. Patient was treated with cefepime, transition to Rocephin per sensitivities. Infectious disease was consulted and assisted with antibiotic management. Urology was also consulted, but did not believe that surgical intervention was necessary. Patient noted to have persistently elevated blood pressure between 150s-190s/80s-110s throughout admission. She did require IV Lopressor multiple times due to resistant hypertension. Blood pressure medications were adjusted throughout hospitalization. At time of discharge, patient will be taking lisinopril 40 mg daily, HCTZ 25 mg daily, Lopressor 100 mg twice daily, and Norvasc 5 mg daily. Blood pressure remains elevated, however improved from prior. High suspicion for secondary causes of hypertension due to relatively resistant blood pressure given medication regimen. Aldosterone, renin, and metanephrines were sent out, and are pending at time of discharge. Patient recommended to have outpatient sleep study completed to further evaluate underlying hypertension. Hemoglobin A1c on admission notable for 12.3. Patient was managed with Lantus and sliding scale insulin throughout hospitalization. Blood glucose was relatively well controlled with 30 units of Lantus nightly during hospitalization. After discussion with patient, she is agreeable to continuing Lantus and replacement of oral diabetes medications on discharge. Patient also interested in transitioning from metformin to metformin ER due to nausea.   She will be discharged home with instructions to take Lantus 25 units nightly, and metformin  mg daily. She is instructed to follow-up outpatient for further medication adjustment, in addition to monitoring blood glucose at home. Throughout admission, patient symptomatically improved and was feeling near her baseline. Repeat renal US on 6/4/2022 shows focal area of increased echogenicity at the mid aspect of the left kidney measuring 4.7 x 2.7 x 2.8 cm. This is nonspecific, but represents likely evolving infectious etiology. Overall, kidney lesions appear to be stable or decreasing in size compared to prior imaging. Given literature review, evidence shows that abscesses less than 5 cm are often successfully managed with antibiotics alone. Discussed possible treatment options with patient, who was agreeable to continuing p.o. antibiotics and close follow-up outpatient. Patient will be discharged home with Omnicef x3 weeks. She is instructed to have repeat renal ultrasound completed in 1-2 weeks as an outpatient, and follow-up with PCP to discuss further results and management. On day of discharge, patient was feeling at her baseline. Plan of care was discussed with patient. All questions were answered. She will be discharged home in stable conditions with the medication changes noted above, and instructions to follow-up outpatient as directed. Exam:     Vitals:  Vitals:    06/04/22 0330 06/04/22 0730 06/04/22 0753 06/04/22 1141   BP: (!) 170/89 (!) 162/94  (!) 162/92   Pulse: 83 84 84 76   Resp: 16 17 16 18   Temp: 98.4 °F (36.9 °C) 98 °F (36.7 °C)  98.6 °F (37 °C)   TempSrc: Oral Oral  Oral   SpO2: 95% 95% 95% 96%   Weight:       Height:         Weight: Weight: 222 lb (100.7 kg)     24 hour intake/output:    Intake/Output Summary (Last 24 hours) at 6/4/2022 1451  Last data filed at 6/4/2022 1304  Gross per 24 hour   Intake 822.34 ml   Output 850 ml   Net -27.66 ml        General appearance: Alert, resting in bed;  No apparent distress, appears stated age and cooperative. HEENT: Pupils equal, round, and reactive to light. Conjunctivae/corneas clear. Neck: Supple, with full range of motion. No jugular venous distention. Trachea midline. Respiratory:  Clear bilaterally, no wheezing or rhonchi noted  Cardiovascular: Regular rate and rhythm with normal S1/S2 without murmurs, rubs or gallops. Abdomen: Soft, obese, non-tender, non-distended with normal bowel sounds. No CVA tenderness  Musculoskeletal: passive and active ROM x 4 extremities. No edema  Skin: Skin color, texture, turgor normal.  No rashes or lesions. Psychiatric: Alert and oriented, thought content appropriate, normal insight  Capillary Refill: Brisk,< 3 seconds   Peripheral Pulses: +2 palpable, equal bilaterally       Labs: For convenience and continuity at follow-up the following most recent labs are provided:      CBC:    Lab Results   Component Value Date    WBC 9.4 06/04/2022    HGB 11.4 06/04/2022    HCT 36.0 06/04/2022     06/04/2022       Renal:    Lab Results   Component Value Date     06/04/2022    K 3.6 06/04/2022    K 4.2 05/30/2022    CL 99 06/04/2022    CO2 29 06/04/2022    BUN 9 06/04/2022    CREATININE 0.4 06/04/2022    CALCIUM 8.9 06/04/2022    PHOS 4.3 06/04/2022         Significant Diagnostic Studies    Radiology:   US RENAL LIMITED   Final Result      1. Adjacent to the left kidney there is what appears to be a small fluid collection. The lesion of the mid pole the left kidney has also changed in appearance since the previous examination. This is nonspecific but may be related to an evolving    infectious process. 2. Nonspecific elevation of the left-sided renal artery resistive index. 3. The previously described ovoid structure at the right kidney is no longer evident. **This report has been created using voice recognition software. It may contain minor errors which are inherent in voice recognition technology. **      Final report electronically signed by  Jeyson Abraham on 6/4/2022 1:02 PM      US RENAL LIMITED   Final Result   Abnormal finding in each kidney, consistent with small solitary renal abscess in each kidney versus pyelonephritis. **This report has been created using voice recognition software. It may contain minor errors which are inherent in voice recognition technology. **      Final report electronically signed by Dr. Adela Wang on 6/1/2022 4:15 PM      MRI ABDOMEN W WO CONTRAST MRCP   Final Result   Impression:   1. Nonenhancing cystic lesion/collection of the left kidney with minimal    perinephric extension, adjacent perinephric fatty stranding, most likely    renal abscess with perinephric abscess extension, complex cyst containing    hemorrhagic or proteinaceous content is less likely. 2. Hepatomegaly with steatosis. 3. Left adrenal adenoma. 4. Small bilateral pleural effusion with associated compressive    atelectasis. 5. Status post cholecystectomy, no biliary dilatation or    choledocholithiasis. This document has been electronically signed by: Jericho Dunlap MD on 05/31/2022    09:29 PM         CT ABDOMEN PELVIS W IV CONTRAST Additional Contrast? None   Final Result   1. Indistinct area of decreased perfusion in the left kidney suspicious for pyelonephritis. Clinical and laboratory correlation are required. 2. Mild left-sided hydroureter with suggestion of ureteral wall enhancement, also suspicious for infection.       Final report electronically signed by Dr. Stephanie Beverly on 5/29/2022 6:16 PM      US RENAL LIMITED    (Results Pending)          Consults:     IP CONSULT TO UROLOGY  IP CONSULT TO INFECTIOUS DISEASES    Disposition:    [x] Home       [] TCU       [] Rehab       [] Psych       [] SNF       [] [facility]       [] Other-    Condition at Discharge: Stable    Code Status:  Full Code     Patient Instructions:    Discharge lab work: Repeat Renal US in 2 weeks  Activity: activity as tolerated  Diet: ADULT DIET; Regular; 3 carb choices (45 gm/meal)  ADULT ORAL NUTRITION SUPPLEMENT; Breakfast, Lunch, Dinner; Diabetic Oral Supplement      Follow-up visits:   [facility] Emergency Department    Go to       Nicci Galan MD  [address] [address]    Schedule an appointment as soon as possible for a visit in 1 week  For hospital follow-up appointment         Discharge Medications:        Medication List      START taking these medications    amLODIPine 5 MG tablet  Commonly known as: NORVASC  Take 1 tablet by mouth daily  Start taking on: June 5, 2022     cefdinir 300 MG capsule  Commonly known as: OMNICEF  Take 1 capsule by mouth 2 times daily for 21 days     hydroCHLOROthiazide 25 MG tablet  Commonly known as: HYDRODIURIL  Take 1 tablet by mouth daily  Start taking on: June 5, 2022     Lantus SoloStar 100 UNIT/ML injection pen  Generic drug: insulin glargine  Inject 25 Units into the skin nightly     lisinopril 40 MG tablet  Commonly known as: PRINIVIL;ZESTRIL  Take 1 tablet by mouth daily  Start taking on: June 5, 2022     metFORMIN 500 mg extended release tablet  Commonly known as: Glucophage XR  Take 1 tablet by mouth daily (with breakfast)  Replaces: metFORMIN 500 MG tablet        CHANGE how you take these medications    metoprolol 100 MG tablet  Commonly known as: LOPRESSOR  Take 1 tablet by mouth 2 times daily  What changed:   · medication strength  · how much to take        CONTINUE taking these medications    aspirin EC 81 MG EC tablet  Take 1 tablet by mouth daily     sertraline 50 MG tablet  Commonly known as: ZOLOFT  Take 1 tablet by mouth daily        STOP taking these medications    glipiZIDE 5 mg tablet  Commonly known as: GLUCOTROL     lisinopril-hydroCHLOROthiazide 20-12.5 MG per tablet  Commonly known as: PRINZIDE;ZESTORETIC     metFORMIN 500 MG tablet  Commonly known as: GLUCOPHAGE  Replaced by: metFORMIN 500 mg extended release tablet     SITagliptin 100 MG tablet  Commonly known as: Januvia           Where to Get Your Medications      These medications were sent to [facility] [phone]  [address] [place] [id number]    Phone: [phone]   · amLODIPine 5 MG tablet  · cefdinir 300 MG capsule  · hydroCHLOROthiazide 25 MG tablet  · Lantus SoloStar 100 UNIT/ML injection pen  · lisinopril 40 MG tablet  · metFORMIN 500 mg extended release tablet  · metoprolol 100 MG tablet         Time Spent on discharge is more than 45 minutes in the examination, evaluation, counseling and review of medications and discharge plan. Signed: Thank you Farhat Montilla MD for the opportunity to be involved in this patient's care.     Electronically signed by Farhat Montilla MD on 6/4/2022 at 2:51 PM

## 2022-06-04 NOTE — PLAN OF CARE
Problem: Discharge Planning  Goal: Discharge to home or other facility with appropriate resources  6/4/2022 0940 by Diann Napoles RN  Note: Planning home without home healthcare once medically cleared- awaiting repeat US to determine timeframe. Problem: Pain  Goal: Verbalizes/displays adequate comfort level or baseline comfort level  6/4/2022 0940 by Diann Napoles RN  Note: Pt denies pain this shift so far. Problem: Safety - Adult  Goal: Free from fall injury  6/4/2022 0940 by Diann Napoles RN  Note: Ambulates without unsteady gait, no falls or concerns for falls at this time. Problem: ABCDS Injury Assessment  Goal: Absence of physical injury  6/4/2022 0940 by Diann Napoles RN  Note: No injuries, pt alert and oriented and standard safety precautions in place. Problem: Respiratory - Adult  Goal: Achieves optimal ventilation and oxygenation  6/4/2022 0940 by Diann Napoles RN  Note: On room air, denies SOB. LCTA. Problem: Cardiovascular - Adult  Goal: Maintains optimal cardiac output and hemodynamic stability  6/4/2022 0940 by Diann Napoles RN  Note: Hemodynamically stable this shift. Problem: Skin/Tissue Integrity - Adult  Goal: Skin integrity remains intact  6/4/2022 0940 by Diann Napoles RN  Note: No sxs new skin breakdown. Pt turns self in bed and is mobile. Problem: Gastrointestinal - Adult  Goal: Minimal or absence of nausea and vomiting  6/4/2022 0940 by Diann Napoles RN  Note: Denies nausea/vomiting this morning. Active bowel sounds, BM yesterday. Problem: Infection - Adult  Goal: Absence of infection at discharge  6/4/2022 0940 by Diann Napoles RN  Outcome: Progressing  Note: Continues to be afebrile, will take antibiotic on discharge per ID instructions.      Problem: Nutrition Deficit:  Goal: Optimize nutritional status  6/4/2022 0940 by Diann Napoles RN  Outcome: Progressing     Problem: Musculoskeletal - Adult  Goal: Return mobility to safest level of function  6/4/2022 0940 by Cathy Mohs, RN  Outcome: Adequate for Discharge     Problem: Metabolic/Fluid and Electrolytes - Adult  Goal: Electrolytes maintained within normal limits  6/4/2022 0940 by Cathy Mohs, RN  Outcome: Adequate for Discharge  Note: WNL today   Care plan reviewed with patient. Patient verbalizes understanding of the plan of care and contributes to goal setting.

## 2022-06-04 NOTE — PLAN OF CARE
Problem: Discharge Planning  Goal: Discharge to home or other facility with appropriate resources  6/4/2022 0250 by Andrez Orlando RN  Outcome: Progressing     Problem: Pain  Goal: Verbalizes/displays adequate comfort level or baseline comfort level  6/4/2022 0250 by Andrez Orlando RN  Outcome: Progressing     Problem: Safety - Adult  Goal: Free from fall injury  6/4/2022 0250 by Andrez Orlando RN  Outcome: Progressing     Problem: ABCDS Injury Assessment  Goal: Absence of physical injury  6/4/2022 0250 by Andrez Orlando RN  Outcome: Progressing     Problem: Respiratory - Adult  Goal: Achieves optimal ventilation and oxygenation  6/4/2022 0250 by Andrez Orlando RN  Outcome: Progressing     Problem: Cardiovascular - Adult  Goal: Absence of cardiac dysrhythmias or at baseline  6/4/2022 0250 by Andrez Orlando RN  Outcome: Progressing     Problem: Skin/Tissue Integrity - Adult  Goal: Skin integrity remains intact  6/4/2022 0250 by Andrez Orlando RN  Outcome: Progressing     Problem: Musculoskeletal - Adult  Goal: Return mobility to safest level of function  6/4/2022 0250 by Andrez Orlando RN  Outcome: Progressing     Problem: Gastrointestinal - Adult  Goal: Minimal or absence of nausea and vomiting  6/4/2022 0250 by Andrez Orlando RN  Outcome: Progressing     Problem: Infection - Adult  Goal: Absence of fever/infection during anticipated neutropenic period  6/4/2022 0250 by Andrez Orlando RN  Outcome: Progressing     Problem: Metabolic/Fluid and Electrolytes - Adult  Goal: Electrolytes maintained within normal limits  6/4/2022 0250 by Andrez Orlando RN  Outcome: Progressing     Problem: Metabolic/Fluid and Electrolytes - Adult  Goal: Glucose maintained within prescribed range  6/4/2022 0250 by Andrez Orlando RN  Outcome: Progressing   Care plan reviewed with patient, patient verbalizing understanding and contributes to goal plans.

## 2022-06-04 NOTE — PROGRESS NOTES
Patient leaving floor with family. No questions or concerns, discharge instructions given and discussed with patient. IV removed prior to discharge.

## 2022-06-06 ENCOUNTER — TELEPHONE (OUTPATIENT)
Dept: FAMILY MEDICINE CLINIC | Age: 52
End: 2022-06-06

## 2022-06-06 LAB — METANEPHRINES PLASMA: NORMAL

## 2022-06-06 NOTE — LETTER
[address] [place] [address],[address] [facility]  Phone: [phone]  Fax: [phone]    We have made several attempts to contact you by phone and have   been unsuccessful. Please call our office at your earliest convenience  At [phone]. Thank you.

## 2022-06-06 NOTE — TELEPHONE ENCOUNTER
[facility] Transitions Initial Follow Up Call    Outreach made within 2 business days of discharge: Yes    Patient: Mardene Fothergill Patient : 1970   MRN: [id number]  Reason for Admission: There are no discharge diagnoses documented for the most recent discharge. Discharge Date: 22       Spoke with: FAYE for patient to return call at their earliest convenience. Discharge department/facility: [facility]        Scheduled appointment with PCP within 7-14 days    Follow Up  No future appointments.     Rubina Willams CMA (AAMA)

## 2022-06-10 ENCOUNTER — TELEPHONE (OUTPATIENT)
Dept: FAMILY MEDICINE CLINIC | Age: 52
End: 2022-06-10

## 2022-06-10 NOTE — TELEPHONE ENCOUNTER
[facility] Transitions Initial Follow Up Call    Outreach made within 2 business days of discharge: No    Patient: Kori Greer Patient : 1970   MRN: [id number]  Reason for Admission: There are no discharge diagnoses documented for the most recent discharge. Discharge Date: 22       Spoke with: sheridan    Discharge department/facility: [facility]     TCM Interactive Patient Contact:  Was patient able to fill all prescriptions: Yes  Was patient instructed to bring all medications to the follow-up visit: Yes  Is patient taking all medications as directed in the discharge summary? Yes  Does patient understand their discharge instructions: Yes  Does patient have questions or concerns that need addressed prior to 7-14 day follow up office visit: no    Scheduled appointment with PCP within 7-14 days will follow up when she get back from vaccation     Follow Up  No future appointments.     Tee Sanchez MA

## 2022-06-21 RX ORDER — LANCETS 30 GAUGE
1 EACH MISCELLANEOUS DAILY
Qty: 100 EACH | Refills: 3 | Status: SHIPPED | OUTPATIENT
Start: 2022-06-21

## 2022-06-21 NOTE — TELEPHONE ENCOUNTER
----- Message from Salome Laureano sent at 6/20/2022 10:45 AM EDT -----  Subject: Refill Request    QUESTIONS  Name of Medication? Other - lancet needles  Patient-reported dosage and instructions? 1 needle a day  How many days do you have left? 0  Preferred Pharmacy? [facility] [facility] phone number (if available)? [phone]  ---------------------------------------------------------------------------  --------------  CALL BACK INFO  What is the best way for the office to contact you? OK to leave message on   voicemail  Preferred Call Back Phone Number? [phone]  ---------------------------------------------------------------------------  --------------  SCRIPT ANSWERS  Relationship to Patient?  Self

## 2022-06-21 NOTE — TELEPHONE ENCOUNTER
Patient's last appointment was : 12/30/2021  Patient's next appointment is :   Future Appointments   Date Time Provider Hung Giron   6/28/2022  8:00 AM STR ULTRASOUND RM 2 STRZ US STR Radiolog   6/28/2022  3:00 PM Coby Lagos DO SRPX [facility] [address]     Last refilled: n/a    Lab Results   Component Value Date    LABA1C 12.3 (H) 05/30/2022     No results found for: CHOL, TRIG, HDL, LDLCALC, LDLDIRECT  Lab Results   Component Value Date     06/04/2022    K 3.6 06/04/2022    CL 99 06/04/2022    CO2 29 06/04/2022    BUN 9 06/04/2022    CREATININE 0.4 06/04/2022    GLUCOSE 136 (H) 06/04/2022    CALCIUM 8.9 06/04/2022    PROT 6.2 06/02/2022    LABALBU 2.7 (L) 06/02/2022    BILITOT 1.8 (H) 06/02/2022    ALKPHOS 264 (H) 06/02/2022    AST 28 06/02/2022    ALT 58 06/02/2022    LABGLOM >90 06/04/2022     Lab Results   Component Value Date    TSH 1.930 06/02/2022    T4FREE 0.99 06/02/2022     Lab Results   Component Value Date    WBC 9.4 06/04/2022    HGB 11.4 (L) 06/04/2022    HCT 36.0 (L) 06/04/2022    MCV 91.8 06/04/2022     06/04/2022

## 2022-06-22 NOTE — TELEPHONE ENCOUNTER
Pt called said when she called yesterday for a medication refill, she needed pen needles. However, we sent in lancets instead. Order pended.   Please review, attach diagnosis, approve or deny

## 2022-06-25 ENCOUNTER — HOSPITAL ENCOUNTER (INPATIENT)
Age: 52
LOS: 3 days | Discharge: HOME | DRG: 690 | End: 2022-06-28
Attending: EMERGENCY MEDICINE | Admitting: INTERNAL MEDICINE

## 2022-06-25 ENCOUNTER — APPOINTMENT (OUTPATIENT)
Dept: CT IMAGING | Age: 52
DRG: 690 | End: 2022-06-25

## 2022-06-25 DIAGNOSIS — N17.9 AKI (ACUTE KIDNEY INJURY) (HCC): ICD-10-CM

## 2022-06-25 DIAGNOSIS — L02.91 ABSCESS: ICD-10-CM

## 2022-06-25 DIAGNOSIS — N15.1 RENAL ABSCESS, LEFT: Primary | ICD-10-CM

## 2022-06-25 PROBLEM — N39.0 UTI (URINARY TRACT INFECTION): Status: ACTIVE | Noted: 2022-06-25

## 2022-06-25 LAB
ALBUMIN SERPL-MCNC: 4.4 G/DL (ref 3.5–5.1)
ALP BLD-CCNC: 100 U/L (ref 38–126)
ALT SERPL-CCNC: 17 U/L (ref 11–66)
ANION GAP SERPL CALCULATED.3IONS-SCNC: 13 MEQ/L (ref 8–16)
AST SERPL-CCNC: 14 U/L (ref 5–40)
BACTERIA: ABNORMAL /HPF
BASOPHILS # BLD: 0.7 %
BASOPHILS ABSOLUTE: 0.1 THOU/MM3 (ref 0–0.1)
BILIRUB SERPL-MCNC: 0.5 MG/DL (ref 0.3–1.2)
BILIRUBIN URINE: NEGATIVE
BLOOD, URINE: ABNORMAL
BUN BLDV-MCNC: 25 MG/DL (ref 7–22)
CALCIUM SERPL-MCNC: 10.3 MG/DL (ref 8.5–10.5)
CASTS 2: ABNORMAL /LPF
CASTS UA: ABNORMAL /LPF
CHARACTER, URINE: ABNORMAL
CHLORIDE BLD-SCNC: 97 MEQ/L (ref 98–111)
CO2: 24 MEQ/L (ref 23–33)
COLOR: YELLOW
CREAT SERPL-MCNC: 0.8 MG/DL (ref 0.4–1.2)
CRYSTALS, UA: ABNORMAL
EOSINOPHIL # BLD: 4 %
EOSINOPHILS ABSOLUTE: 0.3 THOU/MM3 (ref 0–0.4)
EPITHELIAL CELLS, UA: ABNORMAL /HPF
ERYTHROCYTE [DISTWIDTH] IN BLOOD BY AUTOMATED COUNT: 12.4 % (ref 11.5–14.5)
ERYTHROCYTE [DISTWIDTH] IN BLOOD BY AUTOMATED COUNT: 40.3 FL (ref 35–45)
GFR SERPL CREATININE-BSD FRML MDRD: 76 ML/MIN/1.73M2
GLUCOSE BLD-MCNC: 155 MG/DL (ref 70–108)
GLUCOSE BLD-MCNC: 266 MG/DL (ref 70–108)
GLUCOSE URINE: 250 MG/DL
HCT VFR BLD CALC: 37.5 % (ref 37–47)
HEMOGLOBIN: 12.3 GM/DL (ref 12–16)
IMMATURE GRANS (ABS): 0.02 THOU/MM3 (ref 0–0.07)
IMMATURE GRANULOCYTES: 0.2 %
KETONES, URINE: ABNORMAL
LEUKOCYTE ESTERASE, URINE: ABNORMAL
LYMPHOCYTES # BLD: 33.2 %
LYMPHOCYTES ABSOLUTE: 2.7 THOU/MM3 (ref 1–4.8)
MAGNESIUM: 1.7 MG/DL (ref 1.6–2.4)
MCH RBC QN AUTO: 29.6 PG (ref 26–33)
MCHC RBC AUTO-ENTMCNC: 32.8 GM/DL (ref 32.2–35.5)
MCV RBC AUTO: 90.1 FL (ref 81–99)
MISCELLANEOUS 2: ABNORMAL
MONOCYTES # BLD: 6.4 %
MONOCYTES ABSOLUTE: 0.5 THOU/MM3 (ref 0.4–1.3)
NITRITE, URINE: NEGATIVE
NUCLEATED RED BLOOD CELLS: 0 /100 WBC
OSMOLALITY CALCULATION: 281.9 MOSMOL/KG (ref 275–300)
PH UA: 5 (ref 5–9)
PHOSPHORUS: 4.2 MG/DL (ref 2.4–4.7)
PLATELET # BLD: 237 THOU/MM3 (ref 130–400)
PMV BLD AUTO: 9.6 FL (ref 9.4–12.4)
POTASSIUM REFLEX MAGNESIUM: 3.8 MEQ/L (ref 3.5–5.2)
PROTEIN UA: 30
RBC # BLD: 4.16 MILL/MM3 (ref 4.2–5.4)
RBC URINE: ABNORMAL /HPF
RENAL EPITHELIAL, UA: ABNORMAL
SEG NEUTROPHILS: 55.5 %
SEGMENTED NEUTROPHILS ABSOLUTE COUNT: 4.4 THOU/MM3 (ref 1.8–7.7)
SODIUM BLD-SCNC: 134 MEQ/L (ref 135–145)
SPECIFIC GRAVITY, URINE: 1.02 (ref 1–1.03)
TOTAL PROTEIN: 8.1 G/DL (ref 6.1–8)
UROBILINOGEN, URINE: 0.2 EU/DL (ref 0–1)
WBC # BLD: 8 THOU/MM3 (ref 4.8–10.8)
WBC UA: ABNORMAL /HPF
YEAST: ABNORMAL

## 2022-06-25 PROCEDURE — 2580000003 HC RX 258: Performed by: NURSE PRACTITIONER

## 2022-06-25 PROCEDURE — 85025 COMPLETE CBC W/AUTO DIFF WBC: CPT

## 2022-06-25 PROCEDURE — 96375 TX/PRO/DX INJ NEW DRUG ADDON: CPT

## 2022-06-25 PROCEDURE — 96361 HYDRATE IV INFUSION ADD-ON: CPT

## 2022-06-25 PROCEDURE — 99223 1ST HOSP IP/OBS HIGH 75: CPT | Performed by: INTERNAL MEDICINE

## 2022-06-25 PROCEDURE — 87086 URINE CULTURE/COLONY COUNT: CPT

## 2022-06-25 PROCEDURE — 83735 ASSAY OF MAGNESIUM: CPT

## 2022-06-25 PROCEDURE — 2580000003 HC RX 258: Performed by: INTERNAL MEDICINE

## 2022-06-25 PROCEDURE — 6360000002 HC RX W HCPCS: Performed by: NURSE PRACTITIONER

## 2022-06-25 PROCEDURE — 1200000000 HC SEMI PRIVATE

## 2022-06-25 PROCEDURE — 99285 EMERGENCY DEPT VISIT HI MDM: CPT

## 2022-06-25 PROCEDURE — 81001 URINALYSIS AUTO W/SCOPE: CPT

## 2022-06-25 PROCEDURE — 80053 COMPREHEN METABOLIC PANEL: CPT

## 2022-06-25 PROCEDURE — 84100 ASSAY OF PHOSPHORUS: CPT

## 2022-06-25 PROCEDURE — 6370000000 HC RX 637 (ALT 250 FOR IP): Performed by: INTERNAL MEDICINE

## 2022-06-25 PROCEDURE — 6360000004 HC RX CONTRAST MEDICATION: Performed by: EMERGENCY MEDICINE

## 2022-06-25 PROCEDURE — 82948 REAGENT STRIP/BLOOD GLUCOSE: CPT

## 2022-06-25 PROCEDURE — 96365 THER/PROPH/DIAG IV INF INIT: CPT

## 2022-06-25 PROCEDURE — 74177 CT ABD & PELVIS W/CONTRAST: CPT

## 2022-06-25 RX ORDER — SODIUM CHLORIDE 0.9 % (FLUSH) 0.9 %
10 SYRINGE (ML) INJECTION EVERY 12 HOURS SCHEDULED
Status: DISCONTINUED | OUTPATIENT
Start: 2022-06-25 | End: 2022-06-28 | Stop reason: HOSPADM

## 2022-06-25 RX ORDER — ENOXAPARIN SODIUM 100 MG/ML
40 INJECTION SUBCUTANEOUS DAILY
Status: DISCONTINUED | OUTPATIENT
Start: 2022-06-26 | End: 2022-06-28 | Stop reason: HOSPADM

## 2022-06-25 RX ORDER — 0.9 % SODIUM CHLORIDE 0.9 %
1000 INTRAVENOUS SOLUTION INTRAVENOUS ONCE
Status: COMPLETED | OUTPATIENT
Start: 2022-06-25 | End: 2022-06-25

## 2022-06-25 RX ORDER — ACETAMINOPHEN 325 MG/1
650 TABLET ORAL EVERY 6 HOURS PRN
Status: DISCONTINUED | OUTPATIENT
Start: 2022-06-25 | End: 2022-06-28 | Stop reason: HOSPADM

## 2022-06-25 RX ORDER — ONDANSETRON 2 MG/ML
4 INJECTION INTRAMUSCULAR; INTRAVENOUS EVERY 6 HOURS PRN
Status: DISCONTINUED | OUTPATIENT
Start: 2022-06-25 | End: 2022-06-28 | Stop reason: HOSPADM

## 2022-06-25 RX ORDER — INSULIN LISPRO 100 [IU]/ML
0-12 INJECTION, SOLUTION INTRAVENOUS; SUBCUTANEOUS
Status: DISCONTINUED | OUTPATIENT
Start: 2022-06-26 | End: 2022-06-28 | Stop reason: HOSPADM

## 2022-06-25 RX ORDER — POLYETHYLENE GLYCOL 3350 17 G/17G
17 POWDER, FOR SOLUTION ORAL DAILY PRN
Status: DISCONTINUED | OUTPATIENT
Start: 2022-06-25 | End: 2022-06-28 | Stop reason: HOSPADM

## 2022-06-25 RX ORDER — METOPROLOL TARTRATE 50 MG/1
100 TABLET, FILM COATED ORAL 2 TIMES DAILY
Status: DISCONTINUED | OUTPATIENT
Start: 2022-06-25 | End: 2022-06-28 | Stop reason: HOSPADM

## 2022-06-25 RX ORDER — DEXTROSE MONOHYDRATE 50 MG/ML
100 INJECTION, SOLUTION INTRAVENOUS PRN
Status: DISCONTINUED | OUTPATIENT
Start: 2022-06-25 | End: 2022-06-28 | Stop reason: HOSPADM

## 2022-06-25 RX ORDER — SODIUM CHLORIDE 9 MG/ML
INJECTION, SOLUTION INTRAVENOUS CONTINUOUS
Status: DISCONTINUED | OUTPATIENT
Start: 2022-06-25 | End: 2022-06-28 | Stop reason: HOSPADM

## 2022-06-25 RX ORDER — SODIUM CHLORIDE 0.9 % (FLUSH) 0.9 %
10 SYRINGE (ML) INJECTION PRN
Status: DISCONTINUED | OUTPATIENT
Start: 2022-06-25 | End: 2022-06-28 | Stop reason: HOSPADM

## 2022-06-25 RX ORDER — ACETAMINOPHEN 650 MG/1
650 SUPPOSITORY RECTAL EVERY 6 HOURS PRN
Status: DISCONTINUED | OUTPATIENT
Start: 2022-06-25 | End: 2022-06-28 | Stop reason: HOSPADM

## 2022-06-25 RX ORDER — ONDANSETRON 4 MG/1
4 TABLET, ORALLY DISINTEGRATING ORAL EVERY 8 HOURS PRN
Status: DISCONTINUED | OUTPATIENT
Start: 2022-06-25 | End: 2022-06-28 | Stop reason: HOSPADM

## 2022-06-25 RX ORDER — KETOROLAC TROMETHAMINE 30 MG/ML
30 INJECTION, SOLUTION INTRAMUSCULAR; INTRAVENOUS ONCE
Status: COMPLETED | OUTPATIENT
Start: 2022-06-25 | End: 2022-06-25

## 2022-06-25 RX ORDER — ONDANSETRON 2 MG/ML
4 INJECTION INTRAMUSCULAR; INTRAVENOUS ONCE
Status: COMPLETED | OUTPATIENT
Start: 2022-06-25 | End: 2022-06-25

## 2022-06-25 RX ORDER — ASPIRIN 81 MG/1
81 TABLET ORAL DAILY
Status: DISCONTINUED | OUTPATIENT
Start: 2022-06-25 | End: 2022-06-25

## 2022-06-25 RX ORDER — INSULIN LISPRO 100 [IU]/ML
0-6 INJECTION, SOLUTION INTRAVENOUS; SUBCUTANEOUS NIGHTLY
Status: DISCONTINUED | OUTPATIENT
Start: 2022-06-25 | End: 2022-06-28 | Stop reason: HOSPADM

## 2022-06-25 RX ORDER — AMLODIPINE BESYLATE 5 MG/1
5 TABLET ORAL DAILY
Status: DISCONTINUED | OUTPATIENT
Start: 2022-06-26 | End: 2022-06-26

## 2022-06-25 RX ORDER — SODIUM CHLORIDE 9 MG/ML
INJECTION, SOLUTION INTRAVENOUS PRN
Status: DISCONTINUED | OUTPATIENT
Start: 2022-06-25 | End: 2022-06-28 | Stop reason: HOSPADM

## 2022-06-25 RX ORDER — LISINOPRIL 40 MG/1
40 TABLET ORAL DAILY
Status: DISCONTINUED | OUTPATIENT
Start: 2022-06-26 | End: 2022-06-28 | Stop reason: HOSPADM

## 2022-06-25 RX ORDER — INSULIN GLARGINE 100 [IU]/ML
15 INJECTION, SOLUTION SUBCUTANEOUS 2 TIMES DAILY
Status: DISCONTINUED | OUTPATIENT
Start: 2022-06-25 | End: 2022-06-28 | Stop reason: HOSPADM

## 2022-06-25 RX ADMIN — SODIUM CHLORIDE 1000 ML: 9 INJECTION, SOLUTION INTRAVENOUS at 17:29

## 2022-06-25 RX ADMIN — KETOROLAC TROMETHAMINE 30 MG: 30 INJECTION, SOLUTION INTRAMUSCULAR; INTRAVENOUS at 17:28

## 2022-06-25 RX ADMIN — CEFTRIAXONE SODIUM 1000 MG: 1 INJECTION, POWDER, FOR SOLUTION INTRAMUSCULAR; INTRAVENOUS at 20:15

## 2022-06-25 RX ADMIN — ONDANSETRON 4 MG: 2 INJECTION INTRAMUSCULAR; INTRAVENOUS at 17:28

## 2022-06-25 RX ADMIN — SODIUM CHLORIDE, PRESERVATIVE FREE 10 ML: 5 INJECTION INTRAVENOUS at 22:50

## 2022-06-25 RX ADMIN — SODIUM CHLORIDE 1000 ML: 9 INJECTION, SOLUTION INTRAVENOUS at 20:13

## 2022-06-25 RX ADMIN — SODIUM CHLORIDE: 9 INJECTION, SOLUTION INTRAVENOUS at 22:53

## 2022-06-25 RX ADMIN — INSULIN GLARGINE 15 UNITS: 100 INJECTION, SOLUTION SUBCUTANEOUS at 22:46

## 2022-06-25 RX ADMIN — INSULIN LISPRO 1 UNITS: 100 INJECTION, SOLUTION INTRAVENOUS; SUBCUTANEOUS at 22:46

## 2022-06-25 RX ADMIN — IOPAMIDOL 80 ML: 755 INJECTION, SOLUTION INTRAVENOUS at 17:51

## 2022-06-25 ASSESSMENT — PAIN - FUNCTIONAL ASSESSMENT
PAIN_FUNCTIONAL_ASSESSMENT: 0-10
PAIN_FUNCTIONAL_ASSESSMENT: 0-10
PAIN_FUNCTIONAL_ASSESSMENT: ACTIVITIES ARE NOT PREVENTED
PAIN_FUNCTIONAL_ASSESSMENT: 0-10
PAIN_FUNCTIONAL_ASSESSMENT: 0-10

## 2022-06-25 ASSESSMENT — PAIN DESCRIPTION - ONSET: ONSET: ON-GOING

## 2022-06-25 ASSESSMENT — PAIN DESCRIPTION - LOCATION
LOCATION: FLANK
LOCATION: FLANK

## 2022-06-25 ASSESSMENT — PAIN DESCRIPTION - FREQUENCY: FREQUENCY: CONTINUOUS

## 2022-06-25 ASSESSMENT — PAIN SCALES - GENERAL
PAINLEVEL_OUTOF10: 6
PAINLEVEL_OUTOF10: 5
PAINLEVEL_OUTOF10: 8
PAINLEVEL_OUTOF10: 6
PAINLEVEL_OUTOF10: 6
PAINLEVEL_OUTOF10: 8

## 2022-06-25 ASSESSMENT — ENCOUNTER SYMPTOMS
ALLERGIC/IMMUNOLOGIC NEGATIVE: 1
GASTROINTESTINAL NEGATIVE: 1
RESPIRATORY NEGATIVE: 1
EYES NEGATIVE: 1

## 2022-06-25 ASSESSMENT — PAIN DESCRIPTION - PAIN TYPE: TYPE: ACUTE PAIN

## 2022-06-25 ASSESSMENT — PAIN DESCRIPTION - DESCRIPTORS: DESCRIPTORS: SHARP

## 2022-06-25 ASSESSMENT — PAIN DESCRIPTION - ORIENTATION
ORIENTATION: LEFT
ORIENTATION: LEFT

## 2022-06-25 NOTE — ED NOTES
Patient resting in bed. Respirations easy and unlabored. No distress noted. Call light within reach.        Romeo Hooks RN  06/25/22 2900

## 2022-06-25 NOTE — ED NOTES
Report received from [facility]. Upon first contact, pt is resting in bed. Lights dimmed and blanket provided for comfort.  Critical access hospital, [address] [place] [address]  06/25/22 1924

## 2022-06-25 NOTE — ED TRIAGE NOTES
Presents to ED with c/o left flank pain that started Thursday. Reports nausea. Reports recent kidney problems. Alert and oriented. Respirations easy and unlabored.

## 2022-06-26 PROBLEM — D50.8 IRON DEFICIENCY ANEMIA SECONDARY TO INADEQUATE DIETARY IRON INTAKE: Status: ACTIVE | Noted: 2022-06-26

## 2022-06-26 PROBLEM — I51.89 DIASTOLIC DYSFUNCTION WITHOUT HEART FAILURE: Status: ACTIVE | Noted: 2022-06-26

## 2022-06-26 PROBLEM — F32.A DEPRESSION: Status: ACTIVE | Noted: 2022-06-26

## 2022-06-26 PROBLEM — K76.0 HEPATIC STEATOSIS: Status: ACTIVE | Noted: 2022-06-26

## 2022-06-26 LAB
ANION GAP SERPL CALCULATED.3IONS-SCNC: 11 MEQ/L (ref 8–16)
BUN BLDV-MCNC: 14 MG/DL (ref 7–22)
CALCIUM SERPL-MCNC: 9.1 MG/DL (ref 8.5–10.5)
CHLORIDE BLD-SCNC: 102 MEQ/L (ref 98–111)
CO2: 23 MEQ/L (ref 23–33)
CREAT SERPL-MCNC: 0.5 MG/DL (ref 0.4–1.2)
ERYTHROCYTE [DISTWIDTH] IN BLOOD BY AUTOMATED COUNT: 12.4 % (ref 11.5–14.5)
ERYTHROCYTE [DISTWIDTH] IN BLOOD BY AUTOMATED COUNT: 40.1 FL (ref 35–45)
GFR SERPL CREATININE-BSD FRML MDRD: > 90 ML/MIN/1.73M2
GLUCOSE BLD-MCNC: 175 MG/DL (ref 70–108)
GLUCOSE BLD-MCNC: 175 MG/DL (ref 70–108)
GLUCOSE BLD-MCNC: 181 MG/DL (ref 70–108)
GLUCOSE BLD-MCNC: 217 MG/DL (ref 70–108)
GLUCOSE BLD-MCNC: 276 MG/DL (ref 70–108)
HCT VFR BLD CALC: 34.8 % (ref 37–47)
HEMOGLOBIN: 11.5 GM/DL (ref 12–16)
LACTIC ACID: 1.2 MMOL/L (ref 0.5–2)
MCH RBC QN AUTO: 29.5 PG (ref 26–33)
MCHC RBC AUTO-ENTMCNC: 33 GM/DL (ref 32.2–35.5)
MCV RBC AUTO: 89.2 FL (ref 81–99)
PLATELET # BLD: 192 THOU/MM3 (ref 130–400)
PMV BLD AUTO: 9.3 FL (ref 9.4–12.4)
POTASSIUM SERPL-SCNC: 3.7 MEQ/L (ref 3.5–5.2)
RBC # BLD: 3.9 MILL/MM3 (ref 4.2–5.4)
SODIUM BLD-SCNC: 136 MEQ/L (ref 135–145)
WBC # BLD: 5.4 THOU/MM3 (ref 4.8–10.8)

## 2022-06-26 PROCEDURE — 83605 ASSAY OF LACTIC ACID: CPT

## 2022-06-26 PROCEDURE — 85027 COMPLETE CBC AUTOMATED: CPT

## 2022-06-26 PROCEDURE — 1200000000 HC SEMI PRIVATE

## 2022-06-26 PROCEDURE — 6370000000 HC RX 637 (ALT 250 FOR IP): Performed by: INTERNAL MEDICINE

## 2022-06-26 PROCEDURE — 87040 BLOOD CULTURE FOR BACTERIA: CPT

## 2022-06-26 PROCEDURE — 99233 SBSQ HOSP IP/OBS HIGH 50: CPT | Performed by: PHYSICIAN ASSISTANT

## 2022-06-26 PROCEDURE — 2580000003 HC RX 258: Performed by: INTERNAL MEDICINE

## 2022-06-26 PROCEDURE — 6360000002 HC RX W HCPCS: Performed by: INTERNAL MEDICINE

## 2022-06-26 PROCEDURE — 82948 REAGENT STRIP/BLOOD GLUCOSE: CPT

## 2022-06-26 PROCEDURE — 80048 BASIC METABOLIC PNL TOTAL CA: CPT

## 2022-06-26 PROCEDURE — 36415 COLL VENOUS BLD VENIPUNCTURE: CPT

## 2022-06-26 RX ORDER — HYDRALAZINE HYDROCHLORIDE 20 MG/ML
10 INJECTION INTRAMUSCULAR; INTRAVENOUS EVERY 6 HOURS PRN
Status: DISCONTINUED | OUTPATIENT
Start: 2022-06-26 | End: 2022-06-28 | Stop reason: HOSPADM

## 2022-06-26 RX ORDER — MORPHINE SULFATE 2 MG/ML
2 INJECTION, SOLUTION INTRAMUSCULAR; INTRAVENOUS EVERY 4 HOURS PRN
Status: DISCONTINUED | OUTPATIENT
Start: 2022-06-26 | End: 2022-06-28 | Stop reason: HOSPADM

## 2022-06-26 RX ORDER — AMLODIPINE BESYLATE 10 MG/1
10 TABLET ORAL DAILY
Status: DISCONTINUED | OUTPATIENT
Start: 2022-06-26 | End: 2022-06-28 | Stop reason: HOSPADM

## 2022-06-26 RX ADMIN — SODIUM CHLORIDE: 9 INJECTION, SOLUTION INTRAVENOUS at 09:46

## 2022-06-26 RX ADMIN — METOPROLOL TARTRATE 100 MG: 50 TABLET, FILM COATED ORAL at 20:38

## 2022-06-26 RX ADMIN — CEFEPIME HYDROCHLORIDE 1000 MG: 1 INJECTION, POWDER, FOR SOLUTION INTRAMUSCULAR; INTRAVENOUS at 13:10

## 2022-06-26 RX ADMIN — INSULIN LISPRO 3 UNITS: 100 INJECTION, SOLUTION INTRAVENOUS; SUBCUTANEOUS at 20:28

## 2022-06-26 RX ADMIN — SODIUM CHLORIDE: 9 INJECTION, SOLUTION INTRAVENOUS at 20:27

## 2022-06-26 RX ADMIN — ACETAMINOPHEN 650 MG: 325 TABLET ORAL at 00:49

## 2022-06-26 RX ADMIN — INSULIN GLARGINE 15 UNITS: 100 INJECTION, SOLUTION SUBCUTANEOUS at 09:10

## 2022-06-26 RX ADMIN — INSULIN GLARGINE 15 UNITS: 100 INJECTION, SOLUTION SUBCUTANEOUS at 20:28

## 2022-06-26 RX ADMIN — CEFEPIME HYDROCHLORIDE 1000 MG: 1 INJECTION, POWDER, FOR SOLUTION INTRAMUSCULAR; INTRAVENOUS at 01:32

## 2022-06-26 RX ADMIN — LISINOPRIL 40 MG: 40 TABLET ORAL at 08:59

## 2022-06-26 RX ADMIN — AMLODIPINE BESYLATE 10 MG: 10 TABLET ORAL at 08:59

## 2022-06-26 RX ADMIN — MORPHINE SULFATE 2 MG: 2 INJECTION, SOLUTION INTRAMUSCULAR; INTRAVENOUS at 01:15

## 2022-06-26 RX ADMIN — METOPROLOL TARTRATE 100 MG: 50 TABLET, FILM COATED ORAL at 08:59

## 2022-06-26 RX ADMIN — INSULIN LISPRO 4 UNITS: 100 INJECTION, SOLUTION INTRAVENOUS; SUBCUTANEOUS at 13:23

## 2022-06-26 RX ADMIN — INSULIN LISPRO 2 UNITS: 100 INJECTION, SOLUTION INTRAVENOUS; SUBCUTANEOUS at 17:45

## 2022-06-26 RX ADMIN — ENOXAPARIN SODIUM 40 MG: 100 INJECTION SUBCUTANEOUS at 09:00

## 2022-06-26 RX ADMIN — HYDRALAZINE HYDROCHLORIDE 10 MG: 20 INJECTION INTRAMUSCULAR; INTRAVENOUS at 01:15

## 2022-06-26 RX ADMIN — INSULIN LISPRO 2 UNITS: 100 INJECTION, SOLUTION INTRAVENOUS; SUBCUTANEOUS at 09:10

## 2022-06-26 ASSESSMENT — PAIN SCALES - GENERAL
PAINLEVEL_OUTOF10: 7
PAINLEVEL_OUTOF10: 0
PAINLEVEL_OUTOF10: 7
PAINLEVEL_OUTOF10: 4
PAINLEVEL_OUTOF10: 5
PAINLEVEL_OUTOF10: 4

## 2022-06-26 ASSESSMENT — PAIN DESCRIPTION - LOCATION
LOCATION: FLANK
LOCATION: BACK

## 2022-06-26 ASSESSMENT — PAIN DESCRIPTION - ORIENTATION: ORIENTATION: LEFT

## 2022-06-26 ASSESSMENT — PAIN DESCRIPTION - DESCRIPTORS: DESCRIPTORS: SHARP

## 2022-06-26 ASSESSMENT — PAIN - FUNCTIONAL ASSESSMENT: PAIN_FUNCTIONAL_ASSESSMENT: ACTIVITIES ARE NOT PREVENTED

## 2022-06-26 NOTE — ED PROVIDER NOTES
[place] ENCOUNTER          Pt Name: Ileana Vazquez  MRN: [id number]  [place] 1970  Date of evaluation: 6/25/2022  Treating Resident Physician: Pierre Cheung MD  Supervising Physician: Karena You MD    History obtained from the patient. CHIEF COMPLAINT       Chief Complaint   Patient presents with    Stephanie FLORES  Ileana Vazquez is a 50 y.o. female who presents to the emergency department for evaluation of left flank pain. Patient stated that she has left flank pain x 3 days. Patient describes this pain as achy, constant 8/10 on pain scale. Patient has no modifying factors. Patient had similar similar symptoms in the past requiring admit 1 mth ago for kidney abscess. The patient has no other acute complaints at this time. REVIEW OF SYSTEMS   Review of Systems   Constitutional: Positive for chills. Negative for fatigue and fever. HENT: Negative for ear pain, rhinorrhea and sore throat. Eyes: Negative for pain and discharge. Respiratory: Negative for cough, shortness of breath and wheezing. Cardiovascular: Negative for chest pain, palpitations and leg swelling. Gastrointestinal: Negative for abdominal distention, constipation, diarrhea, nausea and vomiting. Endocrine: Negative for polydipsia and polyuria. Genitourinary: Positive for flank pain and frequency. Negative for difficulty urinating and dysuria. Musculoskeletal: Negative for arthralgias. Skin: Negative for color change, pallor and rash. Neurological: Negative for dizziness, seizures, syncope, weakness and numbness. Psychiatric/Behavioral: Negative for agitation and confusion.             PAST MEDICAL AND SURGICAL HISTORY     Past Medical History:   Diagnosis Date    COVID-19 11/2021    Diabetes mellitus ([facility] [address]     Hypertension      Past Surgical History:   Procedure Laterality Date    CHOLECYSTECTOMY      ENDOMETRIAL ABLATION      TUBAL LIGATION  2001     BREAST NEEDLE BIOPSY RIGHT Right 09/03/2014    benign         MEDICATIONS     Current Facility-Administered Medications:     morphine (PF) injection 2 mg, 2 mg, IntraVENous, Q4H PRN, Jing Oliveira MD, 2 mg at 06/26/22 0115    hydrALAZINE (APRESOLINE) injection 10 mg, 10 mg, IntraVENous, Q6H PRN, Jing Oliveira MD, 10 mg at 06/26/22 0115    amLODIPine (NORVASC) tablet 10 mg, 10 mg, Oral, Daily, Jing Oliveira MD    lisinopril (PRINIVIL;ZESTRIL) tablet 40 mg, 40 mg, Oral, Daily, Jing Oliveira MD    metoprolol tartrate (LOPRESSOR) tablet 100 mg, 100 mg, Oral, BID, Jing Oliveira MD    insulin lispro (HUMALOG) injection vial 0-12 Units, 0-12 Units, SubCUTAneous, TID WC, Jing Oliveira MD    insulin lispro (HUMALOG) injection vial 0-6 Units, 0-6 Units, SubCUTAneous, Nightly, Jing Oliveira MD, 1 Units at 06/25/22 2246    sodium chloride flush 0.9 % injection 10 mL, 10 mL, IntraVENous, 2 times per day, Jing Oliveira MD, 10 mL at 06/25/22 2250    sodium chloride flush 0.9 % injection 10 mL, 10 mL, IntraVENous, PRN, Jing Oliveira MD    0.9 % sodium chloride infusion, , IntraVENous, PRN, Jing Oliveira MD    enoxaparin (LOVENOX) injection 40 mg, 40 mg, SubCUTAneous, Daily, Jing Oliveira MD    ondansetron (ZOFRAN-ODT) disintegrating tablet 4 mg, 4 mg, Oral, Q8H PRN **OR** ondansetron (ZOFRAN) injection 4 mg, 4 mg, IntraVENous, Q6H PRN, Jing Oliveira MD    polyethylene glycol (GLYCOLAX) packet 17 g, 17 g, Oral, Daily PRN, Jing Oliveira MD    acetaminophen (TYLENOL) tablet 650 mg, 650 mg, Oral, Q6H PRN, 650 mg at 06/26/22 0049 **OR** acetaminophen (TYLENOL) suppository 650 mg, 650 mg, Rectal, Q6H PRN, iJng Oliveira MD    cefepime (MAXIPIME) 1000 mg IVPB minibag, 1,000 mg, IntraVENous, Q12H, Jing Oliveira MD    insulin glargine (LANTUS) injection vial 15 Units, 15 Units, SubCUTAneous, BID, Jing Oliveira MD, 15 Units at 06/25/22 2246    0.9 % sodium chloride infusion, , IntraVENous, Continuous, Jnig Oliveira MD, Last Rate: 100 mL/hr at 06/25/22 2253, New Bag at 06/25/22 2253    glucose chewable tablet 16 g, 4 tablet, Oral, PRN, Jing Oliveira MD    dextrose bolus 10% 125 mL, 125 mL, IntraVENous, PRN **OR** dextrose bolus 10% 250 mL, 250 mL, IntraVENous, PRN, Jing Oliveira MD    glucagon (rDNA) injection 1 mg, 1 mg, IntraMUSCular, PRN, Jing Oliveira MD    dextrose 5 % solution, 100 mL/hr, IntraVENous, PRN, Jing Oliveira MD      SOCIAL HISTORY     Social History     Social History Narrative    Not on file     Social History     Tobacco Use    Smoking status: Never Smoker    Smokeless tobacco: Never Used   Vaping Use    Vaping Use: Never used   Substance Use Topics    Alcohol use: Yes     Comment: occasionally    Drug use: No         ALLERGIES     Allergies   Allergen Reactions    Penicillins Nausea Only and Swelling         FAMILY HISTORY     Family History   Problem Relation Age of Onset    Bleeding Prob Mother     Clotting Disorder Mother     Depression Mother     Diabetes Mother     Heart Disease Mother     High Blood Pressure Mother     High Cholesterol Mother     Alcohol Abuse Father     Substance Abuse Father     Depression Sister     Diabetes Sister     High Blood Pressure Sister     Miscarriages / Stillbirths Sister          PREVIOUS RECORDS   Previous records reviewed. PHYSICAL EXAM     ED Triage Vitals   BP Temp Temp Source Heart Rate Resp SpO2 Height Weight   06/25/22 1745 06/25/22 1645 06/25/22 1645 06/25/22 1645 06/25/22 1645 06/25/22 1645 06/25/22 1645 06/25/22 2153   (!) 157/92 98.7 °F (37.1 °C) Oral 87 16 97 % 5' 3\" (1.6 m) 205 lb 4.8 oz (93.1 kg)     Initial vital signs and nursing assessment reviewed and hypertensive. Body mass index is 36.37 kg/m². Pulsoximetry is normal per my interpretation.     Additional Vital Signs:  Vitals:    06/26/22 0115   BP: (!) 182/85 Pulse:    Resp: 16   Temp:    SpO2:        Physical Exam  Constitutional:       Appearance: Normal appearance. HENT:      Head: Normocephalic. Right Ear: External ear normal.      Left Ear: External ear normal.      Nose: Nose normal.      Mouth/Throat:      Mouth: Mucous membranes are moist.      Pharynx: Oropharynx is clear. Eyes:      Extraocular Movements: Extraocular movements intact. Conjunctiva/sclera: Conjunctivae normal.      Pupils: Pupils are equal, round, and reactive to light. Cardiovascular:      Rate and Rhythm: Normal rate and regular rhythm. Pulses: Normal pulses. Heart sounds: Normal heart sounds. Pulmonary:      Effort: Pulmonary effort is normal.      Breath sounds: Normal breath sounds. Abdominal:      General: Bowel sounds are normal.      Palpations: Abdomen is soft. Musculoskeletal:         General: Normal range of motion. Cervical back: Normal range of motion and neck supple. Comments: Left flank pain    Skin:     General: Skin is warm and dry. Capillary Refill: Capillary refill takes less than 2 seconds. Neurological:      General: No focal deficit present. Mental Status: She is alert and oriented to person, place, and time. Psychiatric:         Mood and Affect: Mood normal.         Behavior: Behavior normal.             MEDICAL DECISION MAKING   Initial Assessment:   Patient is a 49 yo female with complaint of left flank pain x3 days. Patient differential diagnosis includes but is not limited to uti, mus/ske pain, urtherolithiasis, kidney abscess, PAZ.      Plan:   IV and lab  NS bolus  analgesic medication as needed  CT abd and pelvis with contrast         ED RESULTS   Laboratory results:  Labs Reviewed   CBC WITH AUTO DIFFERENTIAL - Abnormal; Notable for the following components:       Result Value    RBC 4.16 (*)     All other components within normal limits   COMPREHENSIVE METABOLIC PANEL W/ REFLEX TO MG FOR LOW K - Abnormal; Notable for the following components:    Glucose 266 (*)     BUN 25 (*)     Sodium 134 (*)     Chloride 97 (*)     Total Protein 8.1 (*)     All other components within normal limits   GLOMERULAR FILTRATION RATE, ESTIMATED - Abnormal; Notable for the following components:    Est, Glom Filt Rate 76 (*)     All other components within normal limits   URINE WITH REFLEXED MICRO - Abnormal; Notable for the following components:    Glucose, Ur 250 (*)     Ketones, Urine TRACE (*)     Blood, Urine TRACE (*)     Protein, UA 30 (*)     Leukocyte Esterase, Urine TRACE (*)     Character, Urine CLOUDY (*)     All other components within normal limits   POCT GLUCOSE - Abnormal; Notable for the following components:    POC Glucose 155 (*)     All other components within normal limits   CULTURE, REFLEXED, URINE    Narrative:     Source: urine clean void       Site:           Current Antibiotics: not stated   CULTURE, URINE   ANION GAP   OSMOLALITY   MAGNESIUM   PHOSPHORUS   LACTIC ACID   POCT GLUCOSE   POCT GLUCOSE       Radiologic studies results:  CT ABDOMEN PELVIS W IV CONTRAST Additional Contrast? None   Final Result   1. There is a small collection of fluid within the left mid kidney measuring approximately 2.4 x 2.1 cm on axial image 42. There is surrounding strandy opacity. This is concerning for small abscess within the left kidney. There is also increased    attenuation of the renal pelvis. This could be related to thickening of the urothelium versus sequela from debris within the left renal pelvis. This fluid collection appears new when compared to prior examination dated 5/29/2022.    2. Mild prominent lymph nodes within the left para-aortic region are present which may be reactive in nature. Recommend continued follow-up CT to document resolution. **This report has been created using voice recognition software. It may contain minor errors which are inherent in voice recognition technology. **      Final report electronically signed by Dr. Stephon Florez on 6/25/2022 6:26 PM          ED Medications administered this visit:   Medications   lisinopril (PRINIVIL;ZESTRIL) tablet 40 mg (has no administration in time range)   metoprolol tartrate (LOPRESSOR) tablet 100 mg (100 mg Oral Not Given 6/25/22 2255)   insulin lispro (HUMALOG) injection vial 0-12 Units (has no administration in time range)   insulin lispro (HUMALOG) injection vial 0-6 Units (1 Units SubCUTAneous Given 6/25/22 2246)   sodium chloride flush 0.9 % injection 10 mL (10 mLs IntraVENous Given 6/25/22 2250)   sodium chloride flush 0.9 % injection 10 mL (has no administration in time range)   0.9 % sodium chloride infusion (has no administration in time range)   enoxaparin (LOVENOX) injection 40 mg (has no administration in time range)   ondansetron (ZOFRAN-ODT) disintegrating tablet 4 mg (has no administration in time range)     Or   ondansetron (ZOFRAN) injection 4 mg (has no administration in time range)   polyethylene glycol (GLYCOLAX) packet 17 g (has no administration in time range)   acetaminophen (TYLENOL) tablet 650 mg (650 mg Oral Given 6/26/22 0049)     Or   acetaminophen (TYLENOL) suppository 650 mg ( Rectal See Alternative 6/26/22 0049)   cefepime (MAXIPIME) 1000 mg IVPB minibag (has no administration in time range)   insulin glargine (LANTUS) injection vial 15 Units (15 Units SubCUTAneous Given 6/25/22 2246)   0.9 % sodium chloride infusion ( IntraVENous New Bag 6/25/22 2253)   glucose chewable tablet 16 g (has no administration in time range)   dextrose bolus 10% 125 mL (has no administration in time range)     Or   dextrose bolus 10% 250 mL (has no administration in time range)   glucagon (rDNA) injection 1 mg (has no administration in time range)   dextrose 5 % solution (has no administration in time range)   morphine (PF) injection 2 mg (2 mg IntraVENous Given 6/26/22 0115)   hydrALAZINE (APRESOLINE) injection 10 mg (10 mg IntraVENous Given 6/26/22 0115)   amLODIPine (NORVASC) tablet 10 mg (has no administration in time range)   ondansetron (ZOFRAN) injection 4 mg (4 mg IntraVENous Given 6/25/22 1728)   ketorolac (TORADOL) injection 30 mg (30 mg IntraVENous Given 6/25/22 1728)   0.9 % sodium chloride bolus (0 mLs IntraVENous Stopped 6/25/22 2010)   iopamidol (ISOVUE-370) 76 % injection 80 mL (80 mLs IntraVENous Given 6/25/22 1751)   cefTRIAXone (ROCEPHIN) 1000 mg IVPB in 50 mL D5W minibag (0 mg IntraVENous Stopped 6/25/22 2042)   0.9 % sodium chloride bolus (0 mLs IntraVENous Stopped 6/25/22 2142)         ED COURSE       Spoke to urology regarding CT findings stating new abscess. Urology recommended admission to hospitalist and they would see patient as consult. MEDICATION CHANGES     Current Discharge Medication List            FINAL DISPOSITION     Final diagnoses:   Renal abscess, left   PAZ (acute kidney injury) ([facility] [address]     Condition: stable   Dispo: admit to hospital  This transcription was electronically signed. Parts of this transcriptions may have been dictated by use of voice recognition software and electronically transcribed, and parts may have been transcribed with the assistance of an ED scribe. The transcription may contain errors not detected in proofreading. Please refer to my supervising physician's documentation if my documentation differs.     Electronically Signed: Geanie Bernheim, MD, 06/26/22, 1:19 AM       Geanie Bernheim, MD  Resident  06/27/22 9495

## 2022-06-26 NOTE — PLAN OF CARE
Problem: Pain  Goal: Verbalizes/displays adequate comfort level or baseline comfort level  Outcome: Progressing  Flowsheets (Taken 6/26/2022 0159)  Verbalizes/displays adequate comfort level or baseline comfort level:   Encourage patient to monitor pain and request assistance   Assess pain using appropriate pain scale   Administer analgesics based on type and severity of pain and evaluate response   Implement non-pharmacological measures as appropriate and evaluate response     Problem: Genitourinary - Adult  Goal: Absence of urinary retention  Outcome: Progressing  Flowsheets (Taken 6/26/2022 0159)  Absence of urinary retention: Assess patients ability to void and empty bladder     Problem: Infection - Adult  Goal: Absence of infection during hospitalization  Outcome: Progressing  Flowsheets (Taken 6/25/2022 2153)  Absence of infection during hospitalization:   Assess and monitor for signs and symptoms of infection   Monitor lab/diagnostic results   Monitor all insertion sites i.e., indwelling lines, tubes and drains   Administer medications as ordered   Instruct and encourage patient and family to use good hand hygiene technique     Problem: Discharge Planning  Goal: Discharge to home or other facility with appropriate resources  Outcome: Progressing  Flowsheets  Taken 6/25/2022 2214  Discharge to home or other facility with appropriate resources:   Identify barriers to discharge with patient and caregiver   Arrange for needed discharge resources and transportation as appropriate   Identify discharge learning needs (meds, wound care, etc)  Taken 6/25/2022 2153  Discharge to home or other facility with appropriate resources:   Identify barriers to discharge with patient and caregiver   Arrange for needed discharge resources and transportation as appropriate   Identify discharge learning needs (meds, wound care, etc)     Care plan reviewed with patient.   Patient verbalizes understanding of the plan of care and contributes to goal setting.

## 2022-06-26 NOTE — ED NOTES
Pt ambulated to bathroom, tolerated well. Updated on IP bed.  [facility], [address] [place] [address]  06/25/22 9166

## 2022-06-26 NOTE — PLAN OF CARE
Problem: Pain  Goal: Verbalizes/displays adequate comfort level or baseline comfort level  Outcome: Progressing     Problem: Genitourinary - Adult  Goal: Absence of urinary retention  Outcome: Progressing     Problem: Infection - Adult  Goal: Absence of infection during hospitalization  Outcome: Progressing     Problem: Discharge Planning  Goal: Discharge to home or other facility with appropriate resources  Outcome: Progressing

## 2022-06-26 NOTE — ED NOTES
ED to inpatient nurses report    Chief Complaint   Patient presents with    Flank Pain      Present to ED from home  LOC: alert and orientated to name, place, date  Vital signs   Vitals:    06/25/22 1745 06/25/22 1805 06/25/22 1924 06/25/22 2015   BP: (!) 157/92 (!) 155/102 (!) 157/90 (!) 172/96   Pulse:  83 79 79   Resp: 16 16 18 16   Temp:       TempSrc:       SpO2: 95% 98% 97% 97%   Height:          Oxygen Baseline RA    Current needs required RA Bipap/Cpap No  LDAs:   Peripheral IV 06/25/22 Left Antecubital (Active)   Site Assessment Clean, dry & intact 06/25/22 2016   Line Status Infusing 06/25/22 2016   Phlebitis Assessment No symptoms 06/25/22 2016   Infiltration Assessment 0 06/25/22 2016   Dressing Status Clean, dry & intact 06/25/22 2016   Dressing Type Transparent 06/25/22 2016   Dressing Intervention New 06/25/22 1925     Mobility: Requires assistance * 1  Pending ED orders: Complete  Present condition: Stable    Electronically signed by Kathleen Borja RN on 6/25/2022 at [address]  06/25/22 2023

## 2022-06-26 NOTE — H&P
Patient: Mohini Slater    Unit/Bed: 5K-03/003-A    YOB: 1970    MRN: [id number]    Acct: [de-identified]     Admitting Diagnosis: UTI (urinary tract infection) [N39.0]  Renal abscess, left [N15.1]  PAZ (acute kidney injury) ([facility] [address] [N17.9]    Admit Date:  6/25/2022    CC: left Flank pain. HPI :  70-year-old female patient presented to the ED with left flank pain. Denies any fever or chills. Recent admission and discharge after treatment for E. coli pyelonephritis and bacteremia   with sepsis on 5/29/2022. Patient was discharged home on Omnicef x3 weeks. Just completed Omnicef few days ago. ED spoke with urologist who recommended admission on the hospitalist service and urology evaluation. Patient clinically stable at the time of interview. Initial vital signs in the ED temperature 37.1 °C, respiratory 16, heart rate 87, blood pressure 157/92, oxygen saturation 97% on room air. Initial labs serum sodium 134, potassium 3.8, chloride 97, CO2 24, BUN 25, creatinine 0.8, blood sugar 266, calcium 10.3, osmolality 281.9, albumin 4.4, alkaline phosphatase 100, ALT 17, AST 14, total bilirubin 0.5. WBC 8.0, hemoglobin 12.3, MCV 90.1, platelets 014. Review of Systems   HENT: Negative. Eyes: Negative. Respiratory: Negative. Cardiovascular: Negative. Gastrointestinal: Negative. Endocrine: Negative. Genitourinary: Positive for flank pain. Skin: Negative. Allergic/Immunologic: Negative. Neurological: Negative. Hematological: Negative. Psychiatric/Behavioral: Negative. All other systems reviewed and are negative.       Past Medical History:        Diagnosis Date    COVID-19 11/2021    Diabetes mellitus ([facility] [address]     Hypertension        Past Surgical History:        Procedure Laterality Date    CHOLECYSTECTOMY      ENDOMETRIAL ABLATION      TUBAL LIGATION  2001     BREAST NEEDLE BIOPSY RIGHT Right 09/03/2014    benign       Medications Prior to Admission:    Prior to Admission medications    Medication Sig Start Date End Date Taking? Authorizing Provider   Insulin Pen Needle 29G X 12.7MM MISC 1 each by Does not apply route daily 6/22/22   Inez Patricio MD   Lancets MISC 1 each by Does not apply route daily 6/21/22   Inez Patricio MD   metFORMIN (GLUCOPHAGE XR) 500 mg extended release tablet Take 1 tablet by mouth daily (with breakfast) 6/4/22   Inez Patricio MD   metoprolol (LOPRESSOR) 100 MG tablet Take 1 tablet by mouth 2 times daily 6/4/22   Inez Patricio MD   amLODIPine (NORVASC) 5 MG tablet Take 1 tablet by mouth daily 6/5/22   Inez Patricio MD   hydroCHLOROthiazide (HYDRODIURIL) 25 MG tablet Take 1 tablet by mouth daily 6/5/22   Inez Patricio MD   lisinopril (PRINIVIL;ZESTRIL) 40 MG tablet Take 1 tablet by mouth daily 6/5/22   Inez Patricio MD   insulin glargine (LANTUS SOLOSTAR) 100 UNIT/ML injection pen Inject 25 Units into the skin nightly 6/4/22   Inez Patricio MD   aspirin EC 81 MG EC tablet Take 1 tablet by mouth daily  Patient not taking: Reported on 5/29/2022 12/30/21   Inez Patricio MD   sertraline (ZOLOFT) 50 MG tablet Take 1 tablet by mouth daily  Patient not taking: Reported on 6/25/2022 12/30/21   Inez Patricio MD       Allergies:    Penicillins    Social History:    TOBACCO:   reports that she has never smoked. She has never used smokeless tobacco.    ETOH:   reports current alcohol use.     Family History:        Problem Relation Age of Onset    Bleeding Prob Mother     Clotting Disorder Mother     Depression Mother     Diabetes Mother     Heart Disease Mother     High Blood Pressure Mother     High Cholesterol Mother     Alcohol Abuse Father     Substance Abuse Father     Depression Sister     Diabetes Sister     High Blood Pressure Sister     Miscarriages / Stillbirths Sister        Objective:   BP (!) 161/83   Pulse 70   Temp 97.9 °F (36.6 °C) (Oral)   Resp 14   Ht 5' 3\" (1.6 m)   Wt 205 lb 4.8 oz (93.1 kg)   SpO2 97%   BMI 36.37 kg/m²       Intake/Output Summary (Last 24 hours) at 6/26/2022 0801  Last data filed at 6/26/2022 0121  Gross per 24 hour   Intake --   Output 650 ml   Net -650 ml       Physical Exam  Vitals reviewed. Constitutional:       General: She is not in acute distress. Appearance: Normal appearance. She is obese. She is not ill-appearing, toxic-appearing or diaphoretic. HENT:      Head: Normocephalic and atraumatic. Right Ear: External ear normal.      Left Ear: External ear normal.      Nose: Nose normal. No congestion or rhinorrhea. Mouth/Throat:      Mouth: Mucous membranes are moist.      Pharynx: Oropharynx is clear. No oropharyngeal exudate or posterior oropharyngeal erythema. Eyes:      General: No scleral icterus. Right eye: No discharge. Left eye: No discharge. Extraocular Movements: Extraocular movements intact. Conjunctiva/sclera: Conjunctivae normal.      Pupils: Pupils are equal, round, and reactive to light. Neck:      Vascular: No carotid bruit. Cardiovascular:      Rate and Rhythm: Normal rate and regular rhythm. Pulses: Normal pulses. Heart sounds: Normal heart sounds. No murmur heard. No friction rub. No gallop. Pulmonary:      Effort: Pulmonary effort is normal. No respiratory distress. Breath sounds: Normal breath sounds. No stridor. No wheezing, rhonchi or rales. Chest:      Chest wall: No tenderness. Abdominal:      General: Bowel sounds are normal. There is no distension. Palpations: Abdomen is soft. There is no mass. Tenderness: There is no abdominal tenderness. There is left CVA tenderness. There is no right CVA tenderness, guarding or rebound. Hernia: No hernia is present. Musculoskeletal:         General: No swelling, tenderness, deformity or signs of injury. Normal range of motion. Cervical back: Normal range of motion and neck supple.  No rigidity or tenderness. Right lower leg: No edema. Left lower leg: No edema. Lymphadenopathy:      Cervical: No cervical adenopathy. Skin:     General: Skin is warm. Coloration: Skin is not jaundiced or pale. Findings: No bruising, erythema, lesion or rash. Neurological:      General: No focal deficit present. Mental Status: She is alert and oriented to person, place, and time. Mental status is at baseline. Cranial Nerves: No cranial nerve deficit. Sensory: No sensory deficit. Motor: No weakness. Coordination: Coordination normal.      Gait: Gait normal.      Deep Tendon Reflexes: Reflexes normal.   Psychiatric:         Mood and Affect: Mood normal.         Behavior: Behavior normal.         Thought Content: Thought content normal.         Judgment: Judgment normal.     Medications:    amLODIPine  10 mg Oral Daily    lisinopril  40 mg Oral Daily    metoprolol  100 mg Oral BID    insulin lispro  0-12 Units SubCUTAneous TID WC    insulin lispro  0-6 Units SubCUTAneous Nightly    sodium chloride flush  10 mL IntraVENous 2 times per day    enoxaparin  40 mg SubCUTAneous Daily    cefepime  1,000 mg IntraVENous Q12H    insulin glargine  15 Units SubCUTAneous BID     Continuous Infusions:   sodium chloride      sodium chloride 100 mL/hr at 06/25/22 2253    dextrose         PRN Meds:    Data:    CBC:   Recent Labs     06/25/22  1650   WBC 8.0   RBC 4.16*   HGB 12.3   HCT 37.5   MCV 90.1          BMP:   Recent Labs     06/25/22  1650   *   K 3.8   CL 97*   CO2 24   PHOS 4.2   BUN 25*   CREATININE 0.8       PT/INR: No results for input(s): PROTIME, INR in the last 72 hours. LIVER PROFILE:   Recent Labs     06/25/22  1650   AST 14   ALT 17   BILITOT 0.5   ALKPHOS 100      Results for Alejandra Barros (MRN [id number]) as of 6/25/2022 20:17   Ref.  Range 12/30/2021 13:17 5/30/2022 05:31   Hemoglobin A1C Latest Ref Range: 4.4 - 6.4 % 12.4 (H) 12.3 (H)     Results for Amy Sawant (MRN [id number]) as of 6/25/2022 20:17   Ref. Range 1/25/2020 13:00 5/31/2022 04:38 6/2/2022 04:44   TSH Latest Ref Range: 0.400 - 4.200 uIU/mL 0.846 0.698 1.930       ABG. None. URINALYSIS. Results for Amy Sawant (MRN [id number]) as of 6/25/2022 20:17   Ref. Range 6/25/2022 17:00   Color, UA Latest Ref Range: STRAW-YELLOW  YELLOW   Glucose, UA Latest Ref Range: NEGATIVE mg/dl 250 (A)   Bilirubin, Urine Latest Ref Range: NEGATIVE  NEGATIVE   Ketones, Urine Latest Ref Range: NEGATIVE  TRACE (A)   Blood, Urine Latest Ref Range: NEGATIVE  TRACE (A)   pH, UA Latest Ref Range: 5.0 - 9.0  5.0   Protein, UA Latest Ref Range: NEGATIVE  30 (A)   Urobilinogen, Urine Latest Ref Range: 0.0 - 1.0 eu/dl 0.2   Nitrite, Urine Latest Ref Range: NEGATIVE  NEGATIVE   Leukocyte Esterase, Urine Latest Ref Range: NEGATIVE  TRACE (A)   Casts UA Latest Ref Range: NONE SEEN /lpf 8-15 HYALINE   CASTS 2 Latest Ref Range: NONE SEEN /lpf NONE SEEN   WBC, UA Latest Ref Range: 0-4/hpf /hpf 15-25   RBC, UA Latest Ref Range: 0-2/hpf /hpf 0-2   Epithelial Cells, UA Latest Ref Range: 3-5/hpf /hpf 5-10   Renal Epithelial, UA Latest Ref Range: NONE SEEN  NONE SEEN   Bacteria, UA Latest Ref Range: FEW/NONE SEEN /hpf NONE SEEN   Yeast, Urine Latest Ref Range: NONE SEEN  NONE SEEN   Crystals, UA Latest Ref Range: NONE SEEN  NONE SEEN   Character, Urine Latest Ref Range: CLEAR-SL CLOUD  CLOUDY (A)   Specific Gravity, Urine Latest Ref Range: 1.002 - 1.030  1.025   MISCELLANEOUS 2 Unknown NONE SEEN       SEROLOGY  None. TUMOR MARKERS. None.     MICROBIOLOGY   Component 5/29/22 1510     Organism Escherichia coli Abnormal       Resulting Agency [facility]        Susceptibility -Escherichia coli (1)    Antibiotic Interpretation Microscan  Method Status    cefepime Sensitive <=1 mcg/mL BACTERIAL SUSCEPTIBILITY PANEL BY CHAPO Final    cefTRIAXone Sensitive <=1 mcg/mL BACTERIAL SUSCEPTIBILITY PANEL BY CHAPO Final    gentamicin Sensitive <=1 mcg/mL BACTERIAL SUSCEPTIBILITY PANEL BY CHAPO Final    trimethoprim-sulfamethoxazole Sensitive <=20 mcg/mL BACTERIAL SUSCEPTIBILITY PANEL BY CHAPO                Results for Rasheeda Bellamy (MRN [id number]) as of 6/26/2022 07:53   Ref.  Range 5/29/2022 15:10   ENTEROCOCCUS FILM ARRAY Latest Ref Range: Not Detected  Not Detected   LISTERIA MONOCYTOGENES FILM ARRAY Latest Ref Range: Not Detected  Not Detected   STAPHYLOCOCCUS FILM ARRAY Latest Ref Range: Not Detected  Not Detected   STAPH AUREUS FILM ARRAY Latest Ref Range: Not Detected  Not Detected   STREPTOCOCCUS FILM ARRAY Latest Ref Range: Not Detected  Not Detected   STREP AGALACTIAE FILM ARRAY Latest Ref Range: Not Detected  Not Detected   STREP PYOCGENES FILM ARRAY Latest Ref Range: Not Detected  Not Detected   STREP PNEUMONIAE FILM ARRAY Latest Ref Range: Not Detected  Not Detected   ACINETOBACTER BAUMANNII FILM ARRAY Latest Ref Range: Not Detected  Not Detected   HAEMOPHILUS INFLUENZA FILM ARRAY Latest Ref Range: Not Detected  Not Detected   NEISSERIA MENIGITIDIS FILM ARRAY Latest Ref Range: Not Detected  Not Detected   PSEUDOMONAS AERUGINOSA FILM ARRAY Latest Ref Range: Not Detected  Not Detected   ENTERBACTERIACEAE FILM ARRAY Latest Ref Range: Not Detected  Detected (A)   ENTERBACTER CLOACAE FILM ARRAY Latest Ref Range: Not Detected  Not Detected   ESCHERICHIA COLI FILM ARRAY Latest Ref Range: Not Detected  Detected (A)   KLEBSIELLA OXYTOCA FILM ARRAY Latest Ref Range: Not Detected  Not Detected   KLEBSIELLA PNEUMONIAE FILM ARRAY Latest Ref Range: Not Detected  Not Detected   PROTEUS FILM ARRAY Latest Ref Range: Not Detected  Not Detected   SERRATIA MARCESCENS FILM ARRAY Latest Ref Range: Not Detected  Not Detected   ALLEN ALBICANS FILM ARRAY Latest Ref Range: Not Detected  Not Detected   ALLEN GLABRATA FILM ARRAY Latest Ref Range: Not Detected  Not Detected   ALLEN KRUSEI FILM ARRAY Latest Ref Range: Not Detected  Not Detected   CANDIDA PARAPSILOSIS FILM ARRAY Latest Ref Range: Not Detected  Not Detected   CANDIDA TROPICALIS FILM ARRAY Latest Ref Range: Not Detected  Not Detected   METHICILLIN RESISTANT FILM ARRAY Latest Ref Range: Not Detected  NA   VANCOMYCIN RESISTANT FILM ARRAY Latest Ref Range: Not Detected  NA   CARBAPENEM RESITANT FILM ARRAY Latest Ref Range: Not Detected  Not Detected   Bottle Type Unknown ANAEROBIC   Source of Blood Culture Unknown PERIPHERAL       HISTOPATHOLOGY. None. TOXICOLOGY. None. ENDOSCOPE STUDIES. None. SURGICAL PROCEDURES. None. CARDIAC PROCEDURES. None. IR PROCEDURES. None. RADIOLOGY. CT abdomen and pelvis with IV contrast-6/25/2022. FINDINGS:  There is a small collection of fluid within the left mid kidney measuring approximately 2.4 x 2.1   cm on axial image 42. There is surrounding strandy opacity. There is also increased attenuation of the renal pelvis. This could be related to thickening of the urothelium versus sequela from debris within the   left renal pelvis. This left mid renal intraparenchymal fluid collection appears new when compared to prior   examination dated 5/29/2022.      Limited evaluation of the lung bases demonstrates mild left basilar atelectasis or scarring   within the lingula.     There is evidence of prior cholecystectomy.     The liver, spleen, and pancreas appear normal.     There is a nodular lesion within the left adrenal gland measuring 1.5 cm. This is incompletely characterized but appears stable when compared to prior CT examination   dated 11/1/2018.     There is no evidence of bowel obstruction.     There is no free air. There is no free fluid.     Mild prominent lymph nodes within the left para-aortic region are demonstrated which may   represent reactive lymph nodes.     No acute osseous findings are seen.      The appendix appears normal.    IMPRESSION:  1.  There is a small collection of fluid within the left mid kidney measuring approximately       2.4 x 2.1 cm on axial image 42. There is surrounding strandy opacity. This is concerning for small abscess within the left kidney. There is also increased attenuation of the renal pelvis. This could be related to thickening of the urothelium versus sequela from debris within       the left renal pelvis. This fluid collection appears new when compared to prior examination dated 5/29/2022.         2. Mild prominent lymph nodes within the left para-aortic region are present which may be       reactive in nature. Recommend continued follow-up CT to document resolution. MRCP-5/31/2022. IMPRESSION:  1. Nonenhancing cystic lesion/collection of the left kidney with minimal perinephric       extension, adjacent perinephric fatty stranding, most likely renal abscess with       perinephric abscess extension, complex cyst containing hemorrhagic or proteinaceous       content is less likely. 2. Hepatomegaly with steatosis. 3. Left adrenal adenoma. 4. Small bilateral pleural effusion with associated compressive Atelectasis. 5. Status post cholecystectomy, no biliary dilatation or choledocholithiasis.     This document has been electronically signed by: Olivier Vargas MD on 05/31/2022   09:29 PM    CT abdomen and pelvis with IV contrast -5/29/2022  IMPRESSION:  1. Indistinct area of decreased perfusion in the left kidney suspicious for pyelonephritis. Clinical and laboratory correlation are required. 2. Mild left-sided hydroureter with suggestion of ureteral wall enhancement, also suspicious       for infection.         Final report electronically signed by Dr. Earnestine Dance on 5/29/2022 6:16 PM.    CT abdomen and pelvis with IV contrast-12/29/2021. IMPRESSION:  1. Partial visualization of a left inferior perianal abscess that measures 3.2 cm in       greatest diameter. 2. No acute process in the abdomen and pelvis.     3. Nonspecific 1.6 cm left adrenal nodule. Statistically this is most likely a benign adenoma which could be confirmed with       adrenal CT or MRI on a nonurgent elective basis. 4. 2 cm right ovarian cyst.         This document has been electronically signed by: Mary Rinaldi. DO Leela on       12/29/2021 04:40 AM.    Dawit Albert. SUMMARY:   Normal left ventricle size and systolic function. Ejection fraction was estimated at 60 %. There were no regional left ventricular wall motion abnormalities and wall   thickness was within normal limits. Doppler parameters were consistent with abnormal left ventricular  relaxation (grade 1 diastolic dysfunction). The left atrium is Mildly dilated. Echocardiogram-11/26/2018. SUMMARY:   Normal left ventricle size and systolic function. Ejection fraction was estimated at 60 %. There were no regional left ventricular wall motion abnormalities and wall thickness   was within normal limits. ASSESSMENT AND  PLAN. 1.CARDIOVASCULAR. Diastolic heart dysfunction w/o CHF exacerbation. Hypertension-controlled on Amlodipine, HCTZ, Lisinopril and Metoprolol. 2.GI. Hepatic steatosis with hepatomegaly    3. RENAL AND ELECTROLYTES. Acute Hyponatremia w/ serum Na 134-IVF w/ NS. 4.ID.     UTI-IV cefepime pending urine cultures. S/p recent E. coli bacteremia and pyelonephritis. Florentino Taveras 5. ENDO. Hyperglycemia due to uncontrolled T 2 DM-sliding scale insulin and Lantus. TSH 1.930 and A1c 12.3.      6.PSYCH. Depression on Zoloft. 7.HEM-ONC. H/o iron deficiency anemia-serum iron 37 on 6/2/2022 -Po Ferrous sulfate    8. UROLOGY. Suspected small left kidney abscess on CT     Urology consult. 9.NUTRITION. Obesity with a BMI 39.33-needs regular exercise diet control for weight loss management. 10.DISPO. Planned d/c to home soon.       Electronically signed by Shawn Smith MD on 6/26/2022 at 8:01 AM

## 2022-06-26 NOTE — PROGRESS NOTES
Hospitalist Progress Note      Patient:  Mohit Stewart    Unit/Bed:5K-03/003-A  YOB: 1970  MRN: [id number]   Acct: [de-identified]   PCP: Isidro Tolbert MD  Date of Admission: 6/25/2022    Assessment/Plan:    1. Left flank pain secondary to suspected L kidney abscess: CT/AP supports evidence of 2.4 cm x 2.1 cm fluid collection within left mid kidney concerning for abscess which is new compared to prior study. Urology consulted. S/p 3 weeks Omnicef s/p sepsis secondary to pyelo. Obtain blood cx x2. Continue on Cefepime for now, deescalate per final cx results / sensitivities. 2. ?UTI: UA shows trace LE, no bacteria, 15-25 WBC. Pt denies dysuria, she does report a hx of inability to know when she needs to be, no incontinence. She does not feel the sensation of her bladder being full. Given recent hx of sepsis 2/2 pyelo and suspected abscess, will continue with abx therapy. On Cefepime, continue and deescalate per final result and sensitivities. 3. Hyponatremia: mild, noted at 134, s/p IVFs, continue and repeat BMP in am   4. IDDM II, uncontrolled: continue with 15 units Lantus and adjust accordingly. SSI, ACCU. Hypoglycemia protocol in place. Carb control diet. 5. Essential HTN / Accelerated HTN: continue with home medications and monitor BP closely. PRN Hydralazine in place for SBP > 170   6. Anxiety / depression: zoloft     Chief Complaint: L flank pain     Initial H and P:-    \"50year-old female patient presented to the ED with left flank pain. Denies any fever or chills.      Recent admission and discharge after treatment for E. coli pyelonephritis and bacteremia   with sepsis on 5/29/2022.     Patient was discharged home on Omnicef x3 weeks. Just completed Omnicef few days ago.       ED spoke with urologist who recommended admission on the hospitalist service and urology evaluation.       Patient clinically stable at the time of interview.     Initial vital signs in the ED temperature 37.1 °C, respiratory 16, heart rate 87, blood pressure 157/92, oxygen saturation 97% on room air.     Initial labs serum sodium 134, potassium 3.8, chloride 97, CO2 24, BUN 25, creatinine 0.8, blood sugar 266, calcium 10.3, osmolality 281.9, albumin 4.4, alkaline phosphatase 100, ALT 17, AST 14, total bilirubin 0.5.     WBC 8.0, hemoglobin 12.3, MCV 90.1, platelets 734. \"    Subjective (past 24 hours):   6/26: L flank pain 4/10, reports her nausea is improved. Moving her bowels, no fever/chills. No CP/SOB. Await urology input. Past medical history, family history, social history and allergies reviewed again and is unchanged since admission. ROS (All review of systems completed. Pertinent positives noted. Otherwise All other systems reviewed and negative.)     Medications:  Reviewed    Infusion Medications    sodium chloride      sodium chloride 100 mL/hr at 06/25/22 2253    dextrose       Scheduled Medications    amLODIPine  10 mg Oral Daily    lisinopril  40 mg Oral Daily    metoprolol  100 mg Oral BID    insulin lispro  0-12 Units SubCUTAneous TID WC    insulin lispro  0-6 Units SubCUTAneous Nightly    sodium chloride flush  10 mL IntraVENous 2 times per day    enoxaparin  40 mg SubCUTAneous Daily    cefepime  1,000 mg IntraVENous Q12H    insulin glargine  15 Units SubCUTAneous BID     PRN Meds: morphine, hydrALAZINE, sodium chloride flush, sodium chloride, ondansetron **OR** ondansetron, polyethylene glycol, acetaminophen **OR** acetaminophen, glucose, dextrose bolus **OR** dextrose bolus, glucagon (rDNA), dextrose      Intake/Output Summary (Last 24 hours) at 6/26/2022 0802  Last data filed at 6/26/2022 0121  Gross per 24 hour   Intake --   Output 650 ml   Net -650 ml       Diet:  ADULT DIET; Regular; 4 carb choices (60 gm/meal);  Low Sodium (2 gm)    Exam:  BP (!) 161/83   Pulse 70   Temp 97.9 °F (36.6 °C) (Oral)   Resp 14   Ht 5' 3\" (1.6 m)   Wt 205 lb 4.8 oz (93.1 kg)   SpO2 97%   BMI 36.37 kg/m²   General appearance: No apparent distress, appears stated age and cooperative. HEENT: Pupils equal, round, and reactive to light. Conjunctivae/corneas clear. Neck: Supple, with full range of motion. No jugular venous distention. Trachea midline. Respiratory:  Normal respiratory effort. Clear to auscultation, bilaterally without Rales/Wheezes/Rhonchi. Cardiovascular: Regular rate and rhythm with normal S1/S2 without murmurs, rubs or gallops. Abdomen: Soft, non-tender, non-distended with normal bowel sounds. Back: + L CVA tenderness  Musculoskeletal: passive and active ROM x 4 extremities. Skin: Skin color, texture, turgor normal.  No rashes or lesions. Neurologic:  Neurovascularly intact without any focal sensory/motor deficits. Cranial nerves: II-XII intact, grossly non-focal.  Psychiatric: Alert and oriented x4, thought content appropriate, normal insight  Capillary Refill: Brisk,< 3 seconds   Peripheral Pulses: +2 palpable, equal bilaterally     Labs:   Recent Labs     06/25/22  1650   WBC 8.0   HGB 12.3   HCT 37.5        Recent Labs     06/25/22  1650   *   K 3.8   CL 97*   CO2 24   BUN 25*   CREATININE 0.8   CALCIUM 10.3   PHOS 4.2     Recent Labs     06/25/22  1650   AST 14   ALT 17   BILITOT 0.5   ALKPHOS 100     No results for input(s): INR in the last 72 hours. No results for input(s): Manuel Snowball in the last 72 hours.     Microbiology:    Blood culture #1:   Lab Results   Component Value Date    [facility]  05/29/2022     sensitivity done-previous specimen [id number] blood culture collected 15:10 5/29/22        Blood culture #2:No results found for: BLOODCULT2    Organism:  Lab Results   Component Value Date    ORG Escherichia coli 05/29/2022       No results found for: LABGRAM    MRSA culture only:No results found for: [address] [place] [address]    Urine culture:   Lab Results   Component Value Date    LABURIN [place] count: >100,000 CFU/mL  05/29/2022       Respiratory culture: No results found for: CULTRESP    Aerobic and Anaerobic :  No results found for: LABAERO  No results found for: LABANAE    Urinalysis:      Lab Results   Component Value Date    NITRU NEGATIVE 06/25/2022    WBCUA 15-25 06/25/2022    BACTERIA NONE SEEN 06/25/2022    RBCUA 0-2 06/25/2022    BLOODU TRACE 06/25/2022    SPECGRAV 1.025 05/29/2022    GLUCOSEU 250 06/25/2022       Radiology:  CT ABDOMEN PELVIS W IV CONTRAST Additional Contrast? None   Final Result   1. There is a small collection of fluid within the left mid kidney measuring approximately 2.4 x 2.1 cm on axial image 42. There is surrounding strandy opacity. This is concerning for small abscess within the left kidney. There is also increased    attenuation of the renal pelvis. This could be related to thickening of the urothelium versus sequela from debris within the left renal pelvis. This fluid collection appears new when compared to prior examination dated 5/29/2022.    2. Mild prominent lymph nodes within the left para-aortic region are present which may be reactive in nature. Recommend continued follow-up CT to document resolution. **This report has been created using voice recognition software. It may contain minor errors which are inherent in voice recognition technology. **      Final report electronically signed by Dr. Alysha Norris on 6/25/2022 6:26 PM        CT ABDOMEN PELVIS W IV CONTRAST Additional Contrast? None    Result Date: 6/25/2022  PROCEDURE: CT ABDOMEN PELVIS W IV CONTRAST CLINICAL INFORMATION: left back pain COMPARISON: 5/29/2022, 12/29/2021 TECHNIQUE:  Axial CT images were obtained through the abdomen and pelvis following the intravenous administration of 80 mL Isovue 370 contrast. Coronal and sagittal reformatted images were rendered.  All CT scans at this facility use dose modulation, iterative reconstruction, and/or weight-based dosing when appropriate to reduce radiation dose to as low as reasonably achievable. FINDINGS: There is a small collection of fluid within the left mid kidney measuring approximately 2.4 x 2.1 cm on axial image 42. There is surrounding strandy opacity. There is also increased attenuation of the renal pelvis. This could be related to thickening of the urothelium versus sequela from debris within the left renal pelvis. This left mid renal intraparenchymal fluid collection appears new when compared to prior examination dated 5/29/2022. Limited evaluation of the lung bases demonstrates mild left basilar atelectasis or scarring within the lingula. There is evidence of prior cholecystectomy. The liver, spleen, and pancreas appear normal. There is a nodular lesion within the left adrenal gland measuring 1.5 cm. This is incompletely characterized but appears stable when compared to prior CT examination dated 11/1/2018. There is no evidence of bowel obstruction. There is no free air. There is no free fluid. Mild prominent lymph nodes within the left para-aortic region are demonstrated which may represent reactive lymph nodes. No acute osseous findings are seen. The appendix appears normal.     1. There is a small collection of fluid within the left mid kidney measuring approximately 2.4 x 2.1 cm on axial image 42. There is surrounding strandy opacity. This is concerning for small abscess within the left kidney. There is also increased attenuation of the renal pelvis. This could be related to thickening of the urothelium versus sequela from debris within the left renal pelvis. This fluid collection appears new when compared to prior examination dated 5/29/2022. 2. Mild prominent lymph nodes within the left para-aortic region are present which may be reactive in nature. Recommend continued follow-up CT to document resolution. **This report has been created using voice recognition software. It may contain minor errors which are inherent in voice recognition technology. ** Final report electronically signed by Dr. Carmen Ritter on 6/25/2022 6:26 PM      Electronically signed by Italo Durbin PA-C on 6/26/2022 at 8:02 AM

## 2022-06-26 NOTE — FLOWSHEET NOTE
06/25/22 2220   Treatment Team Notification   Reason for Communication Medication concern   Team Member Name Dr. Haydee De La Rosa Team Role Attending Provider   Method of Communication Secure Message   Notification Time 2220    pt has allergies to penicillins, cefepime ordered.

## 2022-06-26 NOTE — ED NOTES
Pt medicated per MAR. Dr eRbecca Thibodeaux at bedside with admission update.  [address] [facility],  [address]  06/25/22 2016

## 2022-06-27 LAB
ANION GAP SERPL CALCULATED.3IONS-SCNC: 11 MEQ/L (ref 8–16)
BUN BLDV-MCNC: 11 MG/DL (ref 7–22)
CALCIUM SERPL-MCNC: 9.2 MG/DL (ref 8.5–10.5)
CHLORIDE BLD-SCNC: 104 MEQ/L (ref 98–111)
CO2: 23 MEQ/L (ref 23–33)
CREAT SERPL-MCNC: 0.6 MG/DL (ref 0.4–1.2)
ERYTHROCYTE [DISTWIDTH] IN BLOOD BY AUTOMATED COUNT: 12.3 % (ref 11.5–14.5)
ERYTHROCYTE [DISTWIDTH] IN BLOOD BY AUTOMATED COUNT: 39.8 FL (ref 35–45)
GFR SERPL CREATININE-BSD FRML MDRD: > 90 ML/MIN/1.73M2
GLUCOSE BLD-MCNC: 145 MG/DL (ref 70–108)
GLUCOSE BLD-MCNC: 159 MG/DL (ref 70–108)
GLUCOSE BLD-MCNC: 160 MG/DL (ref 70–108)
GLUCOSE BLD-MCNC: 167 MG/DL (ref 70–108)
GLUCOSE BLD-MCNC: 181 MG/DL (ref 70–108)
GLUCOSE BLD-MCNC: 209 MG/DL (ref 70–108)
HCT VFR BLD CALC: 35.4 % (ref 37–47)
HEMOGLOBIN: 11.4 GM/DL (ref 12–16)
MCH RBC QN AUTO: 28.9 PG (ref 26–33)
MCHC RBC AUTO-ENTMCNC: 32.2 GM/DL (ref 32.2–35.5)
MCV RBC AUTO: 89.8 FL (ref 81–99)
ORGANISM: ABNORMAL
PLATELET # BLD: 205 THOU/MM3 (ref 130–400)
PMV BLD AUTO: 9.3 FL (ref 9.4–12.4)
POTASSIUM SERPL-SCNC: 3.7 MEQ/L (ref 3.5–5.2)
RBC # BLD: 3.94 MILL/MM3 (ref 4.2–5.4)
SODIUM BLD-SCNC: 138 MEQ/L (ref 135–145)
URINE CULTURE REFLEX: ABNORMAL
WBC # BLD: 5.6 THOU/MM3 (ref 4.8–10.8)

## 2022-06-27 PROCEDURE — 36415 COLL VENOUS BLD VENIPUNCTURE: CPT

## 2022-06-27 PROCEDURE — 80048 BASIC METABOLIC PNL TOTAL CA: CPT

## 2022-06-27 PROCEDURE — 1200000000 HC SEMI PRIVATE

## 2022-06-27 PROCEDURE — 82948 REAGENT STRIP/BLOOD GLUCOSE: CPT

## 2022-06-27 PROCEDURE — 6370000000 HC RX 637 (ALT 250 FOR IP): Performed by: INTERNAL MEDICINE

## 2022-06-27 PROCEDURE — 99232 SBSQ HOSP IP/OBS MODERATE 35: CPT

## 2022-06-27 PROCEDURE — 99253 IP/OBS CNSLTJ NEW/EST LOW 45: CPT | Performed by: UROLOGY

## 2022-06-27 PROCEDURE — 6360000002 HC RX W HCPCS: Performed by: INTERNAL MEDICINE

## 2022-06-27 PROCEDURE — 85027 COMPLETE CBC AUTOMATED: CPT

## 2022-06-27 PROCEDURE — 2580000003 HC RX 258: Performed by: INTERNAL MEDICINE

## 2022-06-27 RX ADMIN — ENOXAPARIN SODIUM 40 MG: 100 INJECTION SUBCUTANEOUS at 08:30

## 2022-06-27 RX ADMIN — MORPHINE SULFATE 2 MG: 2 INJECTION, SOLUTION INTRAMUSCULAR; INTRAVENOUS at 08:26

## 2022-06-27 RX ADMIN — INSULIN GLARGINE 15 UNITS: 100 INJECTION, SOLUTION SUBCUTANEOUS at 21:21

## 2022-06-27 RX ADMIN — METOPROLOL TARTRATE 100 MG: 50 TABLET, FILM COATED ORAL at 21:20

## 2022-06-27 RX ADMIN — METOPROLOL TARTRATE 100 MG: 50 TABLET, FILM COATED ORAL at 08:30

## 2022-06-27 RX ADMIN — AMLODIPINE BESYLATE 10 MG: 10 TABLET ORAL at 08:30

## 2022-06-27 RX ADMIN — ONDANSETRON 4 MG: 2 INJECTION INTRAMUSCULAR; INTRAVENOUS at 07:23

## 2022-06-27 RX ADMIN — INSULIN LISPRO 2 UNITS: 100 INJECTION, SOLUTION INTRAVENOUS; SUBCUTANEOUS at 12:28

## 2022-06-27 RX ADMIN — SODIUM CHLORIDE: 9 INJECTION, SOLUTION INTRAVENOUS at 06:43

## 2022-06-27 RX ADMIN — CEFEPIME HYDROCHLORIDE 1000 MG: 1 INJECTION, POWDER, FOR SOLUTION INTRAMUSCULAR; INTRAVENOUS at 01:37

## 2022-06-27 RX ADMIN — INSULIN LISPRO 1 UNITS: 100 INJECTION, SOLUTION INTRAVENOUS; SUBCUTANEOUS at 21:21

## 2022-06-27 RX ADMIN — INSULIN GLARGINE 15 UNITS: 100 INJECTION, SOLUTION SUBCUTANEOUS at 08:31

## 2022-06-27 RX ADMIN — INSULIN LISPRO 2 UNITS: 100 INJECTION, SOLUTION INTRAVENOUS; SUBCUTANEOUS at 08:31

## 2022-06-27 RX ADMIN — CEFEPIME HYDROCHLORIDE 1000 MG: 1 INJECTION, POWDER, FOR SOLUTION INTRAMUSCULAR; INTRAVENOUS at 14:01

## 2022-06-27 RX ADMIN — INSULIN LISPRO 2 UNITS: 100 INJECTION, SOLUTION INTRAVENOUS; SUBCUTANEOUS at 17:15

## 2022-06-27 RX ADMIN — SODIUM CHLORIDE: 9 INJECTION, SOLUTION INTRAVENOUS at 17:53

## 2022-06-27 RX ADMIN — LISINOPRIL 40 MG: 40 TABLET ORAL at 08:30

## 2022-06-27 ASSESSMENT — ENCOUNTER SYMPTOMS
ABDOMINAL DISTENTION: 0
RHINORRHEA: 0
SHORTNESS OF BREATH: 0
COUGH: 0
WHEEZING: 0
SORE THROAT: 0
VOMITING: 0
COLOR CHANGE: 0
CONSTIPATION: 0
EYE PAIN: 0
NAUSEA: 0
EYE DISCHARGE: 0
DIARRHEA: 0

## 2022-06-27 ASSESSMENT — PAIN DESCRIPTION - DESCRIPTORS: DESCRIPTORS: ACHING;SHARP

## 2022-06-27 ASSESSMENT — PAIN DESCRIPTION - PAIN TYPE: TYPE: ACUTE PAIN

## 2022-06-27 ASSESSMENT — PAIN SCALES - GENERAL
PAINLEVEL_OUTOF10: 9
PAINLEVEL_OUTOF10: 9

## 2022-06-27 ASSESSMENT — PAIN DESCRIPTION - FREQUENCY: FREQUENCY: CONTINUOUS

## 2022-06-27 ASSESSMENT — PAIN DESCRIPTION - ORIENTATION: ORIENTATION: LEFT;LOWER

## 2022-06-27 ASSESSMENT — PAIN - FUNCTIONAL ASSESSMENT: PAIN_FUNCTIONAL_ASSESSMENT: ACTIVITIES ARE NOT PREVENTED

## 2022-06-27 ASSESSMENT — PAIN DESCRIPTION - LOCATION
LOCATION: ABDOMEN
LOCATION: ABDOMEN

## 2022-06-27 ASSESSMENT — PAIN DESCRIPTION - ONSET: ONSET: ON-GOING

## 2022-06-27 NOTE — CONSULTS
[address] [address]  Dept: [phone]  Loc: [phone]  Visit Date: 6/25/2022    Urology Consult Note    Reason for Consult:  renal abscess, possible UTI  Requesting Physician:  Rajeev Nathan    History Obtained From:  patient    Chief Complaint: left flank pain    HISTORY OF PRESENT ILLNESS:                The patient is a 50 y.o. female with significant past medical history of see below who presented with left flank pain. Pain began Friday, worsened Sat. Had to leave work due to pain. Found to have possible left renal abscess, which was seen on previous imaging. Urology was consulted for renal abscess, possible UTI.   Seen last admission for ecoli pyelo with sepsis, possible renal abscess 2.8x3.6  CT scan this admission shows fluid collection left mid kidney 2.4x2.1 cm  She reports she completed 3 wks of omnicef    Past Medical History:        Diagnosis Date    COVID-19 11/2021    Diabetes mellitus ([address]     Hypertension      Past Surgical History:        Procedure Laterality Date    CHOLECYSTECTOMY      ENDOMETRIAL ABLATION      TUBAL LIGATION  2001     BREAST NEEDLE BIOPSY RIGHT Right 09/03/2014    benign       Social History     Socioeconomic History    Marital status: Legally      Spouse name: Not on file    Number of children: Not on file    Years of education: Not on file    Highest education level: Not on file   Occupational History    Not on file   Tobacco Use    Smoking status: Never Smoker    Smokeless tobacco: Never Used   Vaping Use    Vaping Use: Never used   Substance and Sexual Activity    Alcohol use: Yes     Comment: occasionally    Drug use: No    Sexual activity: Not on file   Other Topics Concern    Not on file   Social History Narrative    Not on file     Social Determinants of Health     Financial Resource Strain: Low Risk     Difficulty of Paying Living Expenses: Not hard at all Food Insecurity: No Food Insecurity    Worried About Running Out of Food in the Last Year: Never true    Imtiaz of Food in the Last Year: Never true   Transportation Needs:     Lack of Transportation (Medical): Not on file    Lack of Transportation (Non-Medical): Not on file   Physical Activity:     Days of Exercise per Week: Not on file    Minutes of Exercise per Session: Not on file   Stress:     Feeling of Stress : Not on file   Social Connections:     Frequency of Communication with Friends and Family: Not on file    Frequency of Social Gatherings with Friends and Family: Not on file    Attends Voodoo Services: Not on file    Active Member of Clubs or Organizations: Not on file    Attends Club or Organization Meetings: Not on file    Marital Status: Not on file   Intimate Partner Violence:     Fear of Current or Ex-Partner: Not on file    Emotionally Abused: Not on file    Physically Abused: Not on file    Sexually Abused: Not on file   Housing Stability:     Unable to Pay for Housing in the Last Year: Not on file    Number of [place] in the Last Year: Not on file    Unstable Housing in the Last Year: Not on file       AL  Family History   Problem Relation Age of Onset    Bleeding Prob Mother     Clotting Disorder Mother     Depression Mother     Diabetes Mother     Heart Disease Mother     High Blood Pressure Mother     High Cholesterol Mother     Alcohol Abuse Father     Substance Abuse Father     Depression Sister     Diabetes Sister     High Blood Pressure Sister     Miscarriages / Stillbirths Sister        Allergies: Allergies   Allergen Reactions    Penicillins Nausea Only and Swelling       ROS:  Constitutional: Negative for chills, fatigue, fever, or weight loss. Eyes: Denies reported visual changes. ENT: Denies headache, difficulty swallowing, nose bleeds, ringing in ears, or earaches.   Cardiovascular: Negative for chest pain, palpitations, tachycardia or edema. Respiratory: Denies cough or SOB. GI:++ left flank pain  : See HPI  Musculoskeletal: Patient denies low back pain or painful or reduced ROM of the back or extremities. Neurological: The patient denies any symptoms of neurological impairment or TIA's; no history of stroke. Lymphatic: Denies swollen glands in neck, axillary or inguinal areas. Psychiatric: Denies anxiety or depression. Skin: Denies rash or lesions. The remainder of the complete ROS is negative    PHYSICAL EXAM:  VITALS:  /73   Pulse 69   Temp 98.2 °F (36.8 °C) (Oral)   Resp 18   Ht 5' 3\" (1.6 m)   Wt 205 lb 4.8 oz (93.1 kg)   SpO2 97%   BMI 36.37 kg/m² . Nursing note and vitals reviewed. Constitutional:    Alert and oriented times 3, no acute distress and cooperative to examination with appropriate mood and affect. HEENT:   Head:         Normocephalic and atraumatic. Mouth/Throat:          Mucous membranes are normal.   Eyes:         EOM are normal. No scleral icterus. Nose:    The external appearance of the nose is normal  Ears: The ears appear normal to external inspection   Neck:         Supple, symmetrical, trachea midline, no adenopathy, thyroid symmetric, not enlarged and no tenderness. Cardiovascular:        Normal rate, regular rhythm, S1 S2 heart sounds. Pulmonary/Chest:       Chest symmetric with normal A/P diameter, no wheezes, rales, or rhonchi noted. Normal respiratory rate and rhthym. No use of accessory muscles. Abdominal:          Soft. No tenderness. Active bowel sounds. Left CVA   Genitalia: External Genitalia shows no irritation or erythema   Urethral Meatus appears to be normal in size and location   Urethra is normal with no tenderness or masses  Musculoskeletal:    Normal range of motion. She exhibits no edema or tenderness of lower extremities. Extremities:    No cyanosis, clubbing, or edema present. Neurological:    Alert and oriented. No cranial nerve deficit.  There are no focalizing motor or sensory deficits. DATA:  CBC:   Lab Results   Component Value Date    WBC 5.6 06/27/2022    RBC 3.94 06/27/2022    HGB 11.4 06/27/2022    HCT 35.4 06/27/2022    MCV 89.8 06/27/2022    MCH 28.9 06/27/2022    MCHC 32.2 06/27/2022    RDW 13.3 09/14/2017     06/27/2022    MPV 9.3 06/27/2022     BMP:    Lab Results   Component Value Date     06/27/2022    K 3.7 06/27/2022    K 3.8 06/25/2022     06/27/2022    CO2 23 06/27/2022    BUN 11 06/27/2022    CREATININE 0.6 06/27/2022    CALCIUM 9.2 06/27/2022    LABGLOM >90 06/27/2022    GLUCOSE 159 06/27/2022     BUN/Creatinine:    Lab Results   Component Value Date    BUN 11 06/27/2022    CREATININE 0.6 06/27/2022     Magnesium:    Lab Results   Component Value Date    MG 1.7 06/25/2022     Phosphorus:    Lab Results   Component Value Date    PHOS 4.2 06/25/2022     PT/INR:    Lab Results   Component Value Date    INR 1.27 05/30/2022     U/A:    Lab Results   Component Value Date    COLORU YELLOW 06/25/2022    PHUR 5.0 06/25/2022    LABCAST NONE SEEN 01/25/2020    LABCAST NONE SEEN 01/25/2020    WBCUA 15-25 06/25/2022    RBCUA 0-2 06/25/2022    YEAST NONE SEEN 06/25/2022    BACTERIA NONE SEEN 06/25/2022    SPECGRAV 1.025 05/29/2022    LEUKOCYTESUR TRACE 06/25/2022    UROBILINOGEN 0.2 06/25/2022    BILIRUBINUR NEGATIVE 06/25/2022    BLOODU TRACE 06/25/2022    GLUCOSEU 250 06/25/2022       Imaging: The patient has had a CT Without and With Contrast which I have independently reviewed along with its accompanying report. The study demonstrates   Narrative   PROCEDURE: CT ABDOMEN PELVIS W IV CONTRAST       CLINICAL INFORMATION: left back pain        COMPARISON: 5/29/2022, 12/29/2021       TECHNIQUE:  Axial CT images were obtained through the abdomen and pelvis following the intravenous administration of 80 mL Isovue 370 contrast. Coronal and sagittal reformatted images were rendered.  All CT scans at this facility use dose modulation, iterative reconstruction, and/or weight-based dosing when appropriate to reduce radiation dose to as low as reasonably achievable.       FINDINGS:        There is a small collection of fluid within the left mid kidney measuring approximately 2.4 x 2.1 cm on axial image 42. There is surrounding strandy opacity. There is also increased attenuation of the renal pelvis. This could be related to thickening of    the urothelium versus sequela from debris within the left renal pelvis. This left mid renal intraparenchymal fluid collection appears new when compared to prior examination dated 5/29/2022.        Limited evaluation of the lung bases demonstrates mild left basilar atelectasis or scarring within the lingula.       There is evidence of prior cholecystectomy.       The liver, spleen, and pancreas appear normal.       There is a nodular lesion within the left adrenal gland measuring 1.5 cm. This is incompletely characterized but appears stable when compared to prior CT examination dated 11/1/2018.       There is no evidence of bowel obstruction.       There is no free air. There is no free fluid.       Mild prominent lymph nodes within the left para-aortic region are demonstrated which may represent reactive lymph nodes.       No acute osseous findings are seen.        The appendix appears normal.           Impression   1. There is a small collection of fluid within the left mid kidney measuring approximately 2.4 x 2.1 cm on axial image 42. There is surrounding strandy opacity. This is concerning for small abscess within the left kidney. There is also increased    attenuation of the renal pelvis. This could be related to thickening of the urothelium versus sequela from debris within the left renal pelvis. This fluid collection appears new when compared to prior examination dated 5/29/2022.    2. Mild prominent lymph nodes within the left para-aortic region are present which may be reactive in nature.  Recommend

## 2022-06-27 NOTE — PLAN OF CARE
Problem: Pain  Goal: Verbalizes/displays adequate comfort level or baseline comfort level  Outcome: Progressing  Flowsheets (Taken 6/27/2022 1746)  Verbalizes/displays adequate comfort level or baseline comfort level:   Encourage patient to monitor pain and request assistance   Administer analgesics based on type and severity of pain and evaluate response   Assess pain using appropriate pain scale   Implement non-pharmacological measures as appropriate and evaluate response     Problem: Infection - Adult  Goal: Absence of infection during hospitalization  Outcome: Progressing  Flowsheets (Taken 6/27/2022 1746)  Absence of infection during hospitalization:   Assess and monitor for signs and symptoms of infection   Monitor lab/diagnostic results     Problem: Discharge Planning  Goal: Discharge to home or other facility with appropriate resources  Outcome: Progressing  Flowsheets (Taken 6/27/2022 1746)  Discharge to home or other facility with appropriate resources:   Identify barriers to discharge with patient and caregiver   Identify discharge learning needs (meds, wound care, etc)

## 2022-06-27 NOTE — FLOWSHEET NOTE
06/27/22 1030   Encounter Summary   Encounter Overview/Reason  Initial Encounter   Service Provided For: Patient   Last Encounter  06/27/22   Complexity of Encounter Moderate   Begin Time 1022   End Time  1030   Total Time Calculated 8 min   Encounter    Type Initial Screen/Assessment   Spiritual/Emotional needs   Type Spiritual Support   Assessment/Intervention/Outcome   Assessment Coping   Intervention Nurtured Hope   Outcome Comfort;Coping   Assessment: In my encounter with the 50 yr old patient, while rounding  the unit 5K. I provided spiritual care to patient through conversation, I also came to assess the patients spiritual needs present. The pt was admitted due to UTI/ urinary tract infection. Interventions:  I provided prayer, emotional support and words of comfort.  provided a listening presence and encouraged pt to share their beliefs and how they support him during their hospitalization. Outcomes: The patient was encouraged and didnt share any further spiritual needs at this time. Plan:  Chaplains will follow-up at a later time for assessment of any spiritual care needs present.

## 2022-06-27 NOTE — PLAN OF CARE
Problem: Pain  Goal: Verbalizes/displays adequate comfort level or baseline comfort level  6/27/2022 0012 by Carolin Barraza RN  Outcome: Progressing  Flowsheets (Taken 6/27/2022 0012)  Verbalizes/displays adequate comfort level or baseline comfort level:   Encourage patient to monitor pain and request assistance   Assess pain using appropriate pain scale   Administer analgesics based on type and severity of pain and evaluate response   Implement non-pharmacological measures as appropriate and evaluate response   Notify Licensed Independent Practitioner if interventions unsuccessful or patient reports new pain     Problem: Genitourinary - Adult  Goal: Absence of urinary retention  6/27/2022 0012 by Carolin Barraza RN  Outcome: Progressing  Flowsheets (Taken 6/26/2022 2030)  Absence of urinary retention:   Assess patients ability to void and empty bladder   Monitor intake/output and perform bladder scan as needed     Problem: Infection - Adult  Goal: Absence of infection during hospitalization  6/27/2022 0012 by Carolin Barraza RN  Outcome: Progressing  Flowsheets (Taken 6/26/2022 2030)  Absence of infection during hospitalization:   Assess and monitor for signs and symptoms of infection   Monitor lab/diagnostic results   Monitor all insertion sites i.e., indwelling lines, tubes and drains   Administer medications as ordered   Instruct and encourage patient and family to use good hand hygiene technique     Problem: Discharge Planning  Goal: Discharge to home or other facility with appropriate resources  6/27/2022 0012 by Carolin Barraza RN  Outcome: Progressing  Flowsheets (Taken 6/26/2022 2030)  Discharge to home or other facility with appropriate resources:   Identify barriers to discharge with patient and caregiver   Arrange for needed discharge resources and transportation as appropriate   Identify discharge learning needs (meds, wound care, etc)   Refer to discharge planning if patient needs post-hospital services based on physician order or complex needs related to functional status, cognitive ability or social support system   Care plan reviewed with patient. Patient verbalize understanding of the plan of care and contribute to goal setting.

## 2022-06-27 NOTE — CARE COORDINATION
6/27/22, 7:18 AM EDT  DISCHARGE PLANNING EVALUATION:    Edmar Vicente       Admitted: 6/25/2022/ 1640   Hospital day: 2   Location: -03/003-A Reason for admit: UTI (urinary tract infection) [N39.0]  Renal abscess, left [N15.1]  PAZ (acute kidney injury) ([facility] [address] [N17.9]   PMH:  has a past medical history of COVID-19, Diabetes mellitus ([facility] [address] and Hypertension. Procedure: N/A  CT of abdomen:  1. There is a small collection of fluid within the left mid kidney measuring approximately 2.4 x 2.1 cm on axial image 42. There is surrounding strandy opacity. This is concerning for small abscess within the left kidney. There is also increased    attenuation of the renal pelvis. This could be related to thickening of the urothelium versus sequela from debris within the left renal pelvis. This fluid collection appears new when compared to prior examination dated 5/29/2022.    2. Mild prominent lymph nodes within the left para-aortic region are present which may be reactive in nature. Recommend continued follow-up CT to document resolution. Barriers to Discharge:  Patient presents with left flank pain. Urology consult, IV fluids, Maxipime, Lovenox, DM management, prn pain medications and Zofran, carb choice diet, up with assistance. PCP: Luis Yao MD  Readmission Risk Score: 14.2 ( )%  Patient's Healthcare Decision Maker: Patient Declined (Legal Next of Kin Remains as Decision Maker). Explained to patient she could complete advanced directives as she has a friend listed as her health care decision maker. She has three children. Patient Goals/Plan/Treatment Preferences: Met with Daniel Coyne. She resides at home with her boyfriend. She verifies her PCP. She does not have health insurance but she will this coming Monday when she starts her new job. She did receive the paper for financial assistance. Kashmir Aleman [address] for new scripts on discharge.  She will have transportation to home and denies a need for services at discharge. Transportation/Food Security/Housekeeping Addressed:  No issues identified.

## 2022-06-27 NOTE — CARE COORDINATION
06/27/22 0907   Readmission Assessment   Number of Days since last admission? 8-30 days   Previous Disposition Home with Family   Who is being Interviewed Patient   What was the patient's/caregiver's perception as to why they think they needed to return back to the hospital? Could not/did not make appointment with PCP; Other (Comment)  (Her hospital follow-up appointment is scheduled for tomorrow.)   Did you see a specialist, such as Cardiac, Pulmonary, Orthopedic Physician, etc. after you left the hospital? No   Who advised the patient to return to the hospital? Self-referral   Does the patient report anything that got in the way of taking their medications? No   In our efforts to provide the best possible care to you and others like you, can you think of anything that we could have done to help you after you left the hospital the first time, so that you might not have needed to return so soon? Other (Comment)  (Patient does not have health ins.  Kashmir Crowley for new scripts.)

## 2022-06-27 NOTE — PROGRESS NOTES
Hospitalist Progress Note      Patient:  Kiersten Merrill    Unit/Bed:5K-03/003-A  YOB: 1970  MRN: [id number]   Acct: [de-identified]   PCP: Colleen Sarmiento MD  Date of Admission: 6/25/2022    Assessment/Plan:    1. Left flank pain 2/2 suspected L kidney abscess:  · CT/AP supports evidence of 2.4 cm x 2.1 cm fluid collection within the left mid kidneys concerning for abscess which is new compared to prior study. Urology was consulted on admission. S/p 3 weeks Omnicef s/p sepsis 2/2 pyelonephritis. Blood cultures negative thus far. Continue with IV cefepime for now, per urology note patient to possibly undergo needle aspiration tomorrow if pain persist.  N.p.o. at midnight. 2. UTI, ruled out:  · UA on admission with trace LE's, no bacteria, 15-25 WBCs. Patient does not feel the sensation of her bladder being full, therefore inability to know when she needs to urinate. Patient with recent history of sepsis 2/2 pyelonephritis and suspected abscess, cefepime was initiated on admission. Final urine culture revealing growth of contaminants. However, we will continue IV antibiotics in light of #1 pending urology recommendation. 3. IDDM 2, uncontrolled:  · Continue with home Lantus regimen and adjust accordingly. SSI, Accu-Cheks. Hypoglycemia protocol in place, carb controlled diet. 4. Essential hypertension:  · BP stable, continue home medications and monitor closely. Adjust medications as needed. 5. Anxiety/depression:  · Continue home medications    Chief Complaint: Left flank pain    Initial H and P:-    Per chart review: \"50year-old female patient presented to the ED with left flank pain.  Denies any fever or chills.      Recent admission and discharge after treatment for E. coli pyelonephritis and bacteremia   with sepsis on 5/29/2022.     Patient was discharged home on Omnicef x3 weeks.  Just completed Omnicef few days ago.       ED spoke with urologist who recommended admission on the hospitalist service and urology evaluation.      Patient clinically stable at the time of interview.     Initial vital signs in the ED temperature 37.1 °C, respiratory 16, heart rate 87, blood pressure 157/92, oxygen saturation 97% on room air.     Initial labs serum sodium 134, potassium 3.8, chloride 97, CO2 24, BUN 25, creatinine 0.8, blood sugar 266, calcium 10.3, osmolality 281.9, albumin 4.4, alkaline phosphatase 100, ALT 17, AST 14, total bilirubin 0.5.     WBC 8.0, hemoglobin 12.3, MCV 90.1, platelets 653. \"     6/26: L flank pain 4/10, reports her nausea is improved. Moving her bowels, no fever/chills. No CP/SOB. Await urology input. Subjective (past 24 hours):   Patient resting in bed in no acute distress, she reports that she has intermittent left flank pain but denies abdominal pain, nausea, vomiting. Patient denies chest pain or shortness of breath. Urology following patient to have possible needle aspiration performed if pain persist.  N.p.o. at midnight. Past medical history, family history, social history and allergies reviewed again and is unchanged since admission. ROS (All review of systems completed. Pertinent positives noted.  Otherwise All other systems reviewed and negative.)     Medications:  Reviewed    Infusion Medications    sodium chloride      sodium chloride 100 mL/hr at 06/27/22 0643    dextrose       Scheduled Medications    amLODIPine  10 mg Oral Daily    lisinopril  40 mg Oral Daily    metoprolol  100 mg Oral BID    insulin lispro  0-12 Units SubCUTAneous TID WC    insulin lispro  0-6 Units SubCUTAneous Nightly    sodium chloride flush  10 mL IntraVENous 2 times per day    enoxaparin  40 mg SubCUTAneous Daily    cefepime  1,000 mg IntraVENous Q12H    insulin glargine  15 Units SubCUTAneous BID     PRN Meds: morphine, hydrALAZINE, sodium chloride flush, sodium chloride, ondansetron **OR** ondansetron, polyethylene glycol, acetaminophen **OR** acetaminophen, glucose, dextrose bolus **OR** dextrose bolus, glucagon (rDNA), dextrose      Intake/Output Summary (Last 24 hours) at 6/27/2022 1535  Last data filed at 6/27/2022 1005  Gross per 24 hour   Intake 3347.31 ml   Output 300 ml   Net 3047.31 ml       Diet:  ADULT DIET; Regular; 4 carb choices (60 gm/meal); Low Sodium (2 gm)  Diet NPO Exceptions are: Sips of Water with Meds    Exam:  BP (!) 169/90   Pulse 74   Temp 98.1 °F (36.7 °C) (Oral)   Resp 18   Ht 5' 3\" (1.6 m)   Wt 205 lb 4.8 oz (93.1 kg)   SpO2 97%   BMI 36.37 kg/m²   General appearance: No apparent distress, appears stated age and cooperative. HEENT: Pupils equal, round, and reactive to light. Conjunctivae/corneas clear. Neck: Supple, with full range of motion. No jugular venous distention. Trachea midline. Respiratory:  Normal respiratory effort. Clear to auscultation, bilaterally without Rales/Wheezes/Rhonchi. Cardiovascular: Regular rate and rhythm with normal S1/S2 without murmurs, rubs or gallops. Abdomen: Soft, non-tender, non-distended with normal bowel sounds. L CVA tenderness noted. Musculoskeletal: passive and active ROM x 4 extremities. Skin: Skin color, texture, turgor normal.  No rashes or lesions. Neurologic:  Neurovascularly intact without any focal sensory/motor deficits.  Cranial nerves: II-XII intact, grossly non-focal.  Psychiatric: Alert and oriented, thought content appropriate, normal insight  Capillary Refill: Brisk,< 3 seconds   Peripheral Pulses: +2 palpable, equal bilaterally     Labs:   Recent Labs     06/25/22  1650 06/26/22  0828 06/27/22  0513   WBC 8.0 5.4 5.6   HGB 12.3 11.5* 11.4*   HCT 37.5 34.8* 35.4*    192 205     Recent Labs     06/25/22  1650 06/26/22  0828 06/27/22  0513   * 136 138   K 3.8 3.7 3.7   CL 97* 102 104   CO2 24 23 23   BUN 25* 14 11   CREATININE 0.8 0.5 0.6   CALCIUM 10.3 9.1 9.2   PHOS 4.2  --   --      Recent Labs     06/25/22  7704 AST 14   ALT 17   BILITOT 0.5   ALKPHOS 100     No results for input(s): INR in the last 72 hours. No results for input(s): Connee Matar in the last 72 hours. Microbiology:    Blood culture #1:   Lab Results   Component Value Date    BC No growth-preliminary  06/26/2022       Blood culture #2:No results found for: [place]    Organism:  Lab Results   Component Value Date    ORG Growth of Contaminants 06/25/2022       No results found for: LABGRAM    MRSA culture only:No results found for: [facility]    Urine culture:   Lab Results   Component Value Date    LABURIN [place] count: >100,000 CFU/mL  05/29/2022       Respiratory culture: No results found for: CULTRESP    Aerobic and Anaerobic :  No results found for: LABAERO  No results found for: LABANAE    Urinalysis:      Lab Results   Component Value Date    NITRU NEGATIVE 06/25/2022    WBCUA 15-25 06/25/2022    BACTERIA NONE SEEN 06/25/2022    RBCUA 0-2 06/25/2022    BLOODU TRACE 06/25/2022    SPECGRAV 1.025 05/29/2022    GLUCOSEU 250 06/25/2022       Radiology:  CT ABDOMEN PELVIS W IV CONTRAST Additional Contrast? None   Final Result   1. There is a small collection of fluid within the left mid kidney measuring approximately 2.4 x 2.1 cm on axial image 42. There is surrounding strandy opacity. This is concerning for small abscess within the left kidney. There is also increased    attenuation of the renal pelvis. This could be related to thickening of the urothelium versus sequela from debris within the left renal pelvis. This fluid collection appears new when compared to prior examination dated 5/29/2022.    2. Mild prominent lymph nodes within the left para-aortic region are present which may be reactive in nature. Recommend continued follow-up CT to document resolution. **This report has been created using voice recognition software. It may contain minor errors which are inherent in voice recognition technology. **      Final report electronically signed by Dr. Rob Hanks on 6/25/2022 6:26 PM        CT ABDOMEN PELVIS W IV CONTRAST Additional Contrast? None    Result Date: 6/25/2022  PROCEDURE: CT ABDOMEN PELVIS W IV CONTRAST CLINICAL INFORMATION: left back pain COMPARISON: 5/29/2022, 12/29/2021 TECHNIQUE:  Axial CT images were obtained through the abdomen and pelvis following the intravenous administration of 80 mL Isovue 370 contrast. Coronal and sagittal reformatted images were rendered. All CT scans at this facility use dose modulation, iterative reconstruction, and/or weight-based dosing when appropriate to reduce radiation dose to as low as reasonably achievable. FINDINGS: There is a small collection of fluid within the left mid kidney measuring approximately 2.4 x 2.1 cm on axial image 42. There is surrounding strandy opacity. There is also increased attenuation of the renal pelvis. This could be related to thickening of the urothelium versus sequela from debris within the left renal pelvis. This left mid renal intraparenchymal fluid collection appears new when compared to prior examination dated 5/29/2022. Limited evaluation of the lung bases demonstrates mild left basilar atelectasis or scarring within the lingula. There is evidence of prior cholecystectomy. The liver, spleen, and pancreas appear normal. There is a nodular lesion within the left adrenal gland measuring 1.5 cm. This is incompletely characterized but appears stable when compared to prior CT examination dated 11/1/2018. There is no evidence of bowel obstruction. There is no free air. There is no free fluid. Mild prominent lymph nodes within the left para-aortic region are demonstrated which may represent reactive lymph nodes. No acute osseous findings are seen. The appendix appears normal.     1. There is a small collection of fluid within the left mid kidney measuring approximately 2.4 x 2.1 cm on axial image 42. There is surrounding strandy opacity.  This is concerning for small abscess within the left kidney. There is also increased attenuation of the renal pelvis. This could be related to thickening of the urothelium versus sequela from debris within the left renal pelvis. This fluid collection appears new when compared to prior examination dated 5/29/2022. 2. Mild prominent lymph nodes within the left para-aortic region are present which may be reactive in nature. Recommend continued follow-up CT to document resolution. **This report has been created using voice recognition software. It may contain minor errors which are inherent in voice recognition technology. ** Final report electronically signed by Dr. Hao Anand on 6/25/2022 6:26 PM      Electronically signed by Ann Bowman PA-C on 6/27/2022 at 3:35 PM

## 2022-06-28 ENCOUNTER — TELEPHONE (OUTPATIENT)
Dept: UROLOGY | Age: 52
End: 2022-06-28

## 2022-06-28 VITALS
BODY MASS INDEX: 36.38 KG/M2 | DIASTOLIC BLOOD PRESSURE: 83 MMHG | SYSTOLIC BLOOD PRESSURE: 141 MMHG | RESPIRATION RATE: 16 BRPM | WEIGHT: 205.3 LBS | HEART RATE: 70 BPM | TEMPERATURE: 97.8 F | OXYGEN SATURATION: 97 % | HEIGHT: 63 IN

## 2022-06-28 LAB
ANION GAP SERPL CALCULATED.3IONS-SCNC: 11 MEQ/L (ref 8–16)
BUN BLDV-MCNC: 8 MG/DL (ref 7–22)
CALCIUM SERPL-MCNC: 9.3 MG/DL (ref 8.5–10.5)
CHLORIDE BLD-SCNC: 101 MEQ/L (ref 98–111)
CO2: 24 MEQ/L (ref 23–33)
CREAT SERPL-MCNC: 0.5 MG/DL (ref 0.4–1.2)
ERYTHROCYTE [DISTWIDTH] IN BLOOD BY AUTOMATED COUNT: 12.4 % (ref 11.5–14.5)
ERYTHROCYTE [DISTWIDTH] IN BLOOD BY AUTOMATED COUNT: 39 FL (ref 35–45)
GFR SERPL CREATININE-BSD FRML MDRD: > 90 ML/MIN/1.73M2
GLUCOSE BLD-MCNC: 127 MG/DL (ref 70–108)
GLUCOSE BLD-MCNC: 132 MG/DL (ref 70–108)
GLUCOSE BLD-MCNC: 185 MG/DL (ref 70–108)
HCT VFR BLD CALC: 34.1 % (ref 37–47)
HEMOGLOBIN: 11.5 GM/DL (ref 12–16)
MCH RBC QN AUTO: 29.1 PG (ref 26–33)
MCHC RBC AUTO-ENTMCNC: 33.7 GM/DL (ref 32.2–35.5)
MCV RBC AUTO: 86.3 FL (ref 81–99)
PLATELET # BLD: 194 THOU/MM3 (ref 130–400)
PMV BLD AUTO: 9 FL (ref 9.4–12.4)
POTASSIUM SERPL-SCNC: 3.5 MEQ/L (ref 3.5–5.2)
RBC # BLD: 3.95 MILL/MM3 (ref 4.2–5.4)
SODIUM BLD-SCNC: 136 MEQ/L (ref 135–145)
WBC # BLD: 5.8 THOU/MM3 (ref 4.8–10.8)

## 2022-06-28 PROCEDURE — 6360000002 HC RX W HCPCS: Performed by: INTERNAL MEDICINE

## 2022-06-28 PROCEDURE — 6370000000 HC RX 637 (ALT 250 FOR IP): Performed by: INTERNAL MEDICINE

## 2022-06-28 PROCEDURE — 99232 SBSQ HOSP IP/OBS MODERATE 35: CPT | Performed by: UROLOGY

## 2022-06-28 PROCEDURE — 99239 HOSP IP/OBS DSCHRG MGMT >30: CPT

## 2022-06-28 PROCEDURE — 80048 BASIC METABOLIC PNL TOTAL CA: CPT

## 2022-06-28 PROCEDURE — 36415 COLL VENOUS BLD VENIPUNCTURE: CPT

## 2022-06-28 PROCEDURE — 82948 REAGENT STRIP/BLOOD GLUCOSE: CPT

## 2022-06-28 PROCEDURE — 2580000003 HC RX 258: Performed by: INTERNAL MEDICINE

## 2022-06-28 PROCEDURE — 51798 US URINE CAPACITY MEASURE: CPT

## 2022-06-28 PROCEDURE — 85027 COMPLETE CBC AUTOMATED: CPT

## 2022-06-28 RX ORDER — CEFDINIR 300 MG/1
300 CAPSULE ORAL 2 TIMES DAILY
Qty: 10 CAPSULE | Refills: 0 | Status: SHIPPED | OUTPATIENT
Start: 2022-06-28 | End: 2022-07-03

## 2022-06-28 RX ORDER — LACTOBACILLUS RHAMNOSUS GG 10B CELL
1 CAPSULE ORAL
Qty: 30 CAPSULE | Refills: 0 | Status: SHIPPED | OUTPATIENT
Start: 2022-06-29

## 2022-06-28 RX ORDER — OXYBUTYNIN CHLORIDE 10 MG/1
10 TABLET, EXTENDED RELEASE ORAL DAILY
Qty: 30 TABLET | Refills: 3 | Status: SHIPPED | OUTPATIENT
Start: 2022-06-28 | End: 2022-07-14 | Stop reason: SDUPTHER

## 2022-06-28 RX ORDER — LACTOBACILLUS RHAMNOSUS GG 10B CELL
1 CAPSULE ORAL
Status: DISCONTINUED | OUTPATIENT
Start: 2022-06-29 | End: 2022-06-28 | Stop reason: HOSPADM

## 2022-06-28 RX ADMIN — METOPROLOL TARTRATE 100 MG: 50 TABLET, FILM COATED ORAL at 08:08

## 2022-06-28 RX ADMIN — LISINOPRIL 40 MG: 40 TABLET ORAL at 08:08

## 2022-06-28 RX ADMIN — CEFEPIME HYDROCHLORIDE 1000 MG: 1 INJECTION, POWDER, FOR SOLUTION INTRAMUSCULAR; INTRAVENOUS at 02:48

## 2022-06-28 RX ADMIN — INSULIN LISPRO 2 UNITS: 100 INJECTION, SOLUTION INTRAVENOUS; SUBCUTANEOUS at 11:54

## 2022-06-28 RX ADMIN — HYDRALAZINE HYDROCHLORIDE 10 MG: 20 INJECTION INTRAMUSCULAR; INTRAVENOUS at 02:52

## 2022-06-28 RX ADMIN — SODIUM CHLORIDE: 9 INJECTION, SOLUTION INTRAVENOUS at 04:31

## 2022-06-28 RX ADMIN — AMLODIPINE BESYLATE 10 MG: 10 TABLET ORAL at 08:08

## 2022-06-28 NOTE — PROGRESS NOTES
IV removed. Returned patient home medications along with personal belongings. Educated on all discharge instructions, all questions answered at this time. Wheeled down to discharge doors. Chart broken down and placed in yellow bin.

## 2022-06-28 NOTE — PLAN OF CARE
Problem: Pain  Goal: Verbalizes/displays adequate comfort level or baseline comfort level  6/28/2022 0830 by Rosette Roque RN  Outcome: Progressing  Flowsheets (Taken 6/28/2022 0830)  Verbalizes/displays adequate comfort level or baseline comfort level:   Encourage patient to monitor pain and request assistance   Implement non-pharmacological measures as appropriate and evaluate response   Administer analgesics based on type and severity of pain and evaluate response   Assess pain using appropriate pain scale     Problem: Genitourinary - Adult  Goal: Absence of urinary retention  6/28/2022 0830 by Rosette Roque RN  Outcome: Progressing  Flowsheets (Taken 6/28/2022 0830)  Absence of urinary retention:   Assess patients ability to void and empty bladder   Monitor intake/output and perform bladder scan as needed     Problem: Infection - Adult  Goal: Absence of infection during hospitalization  6/28/2022 0830 by Rosette Roque RN  Outcome: Progressing  Flowsheets (Taken 6/28/2022 0830)  Absence of infection during hospitalization:   Assess and monitor for signs and symptoms of infection   Monitor lab/diagnostic results   Administer medications as ordered     Problem: Discharge Planning  Goal: Discharge to home or other facility with appropriate resources  6/28/2022 0830 by Rosette Roque RN  Outcome: Progressing  Flowsheets (Taken 6/28/2022 0830)  Discharge to home or other facility with appropriate resources:   Identify barriers to discharge with patient and caregiver   Identify discharge learning needs (meds, wound care, etc)     Problem: Chronic Conditions and Co-morbidities  Goal: Patient's chronic conditions and co-morbidity symptoms are monitored and maintained or improved  6/28/2022 0830 by Rosette Roque RN  Outcome: Progressing  Flowsheets (Taken 6/28/2022 0830)  Care Plan - Patient's Chronic Conditions and Co-Morbidity Symptoms are Monitored and Maintained or Improved: Monitor and assess patient's chronic conditions and comorbid symptoms for stability, deterioration, or improvement     Care plan reviewed with patient and family. Patient and family verbalize understanding of the plan of care and contribute to goal setting.

## 2022-06-28 NOTE — PLAN OF CARE
Problem: Pain  Goal: Verbalizes/displays adequate comfort level or baseline comfort level  6/28/2022 0205 by Karin Cockayne, RN  Outcome: Progressing  Flowsheets (Taken 6/28/2022 0205)  Verbalizes/displays adequate comfort level or baseline comfort level:   Encourage patient to monitor pain and request assistance   Assess pain using appropriate pain scale  Note: No pain this shift. Problem: Genitourinary - Adult  Goal: Absence of urinary retention  Outcome: Progressing  Note: Absence of urinary retention. Problem: Infection - Adult  Goal: Absence of infection during hospitalization  6/28/2022 0205 by Karin Cockayne, RN  Outcome: Progressing  Flowsheets (Taken 6/28/2022 0205)  Absence of infection during hospitalization:   Assess and monitor for signs and symptoms of infection   Administer medications as ordered  Note: Maxipime given this shift according to [facility] Problem: Discharge Planning  Goal: Discharge to home or other facility with appropriate resources  6/28/2022 0205 by Karin Cockayne, RN  Outcome: Progressing  Note: Patient to return home with .       Problem: Chronic Conditions and Co-morbidities  Goal: Patient's chronic conditions and co-morbidity symptoms are monitored and maintained or improved  Outcome: Progressing  Flowsheets (Taken 6/28/2022 0205)  Care Plan - Patient's Chronic Conditions and Co-Morbidity Symptoms are Monitored and Maintained or Improved: Monitor and assess patient's chronic conditions and comorbid symptoms for stability, deterioration, or improvement

## 2022-06-28 NOTE — PROGRESS NOTES
Urology Progress Note    Reason for Consult:  renal abscess, possible UTI  Requesting Physician:  Avery Arana     History Obtained From:  patient     Chief Complaint: left flank pain    Subjective: \"    Patient is resting in bed, voiding spontaneously, ambulating with assistance, tolerating regular diet, denies any nausea or vomiting. There are complaints of no pain at this time.     Denies pain overnight  Reports having no urge to void, she voids every hr.  Denies incontinence  Ux with growth of contaminants  No fever, no leukocytosis  Ok to eat from Urology standpoint  No emergent need to drain abscess at this time          Vitals:  BP (!) 141/83   Pulse 70   Temp 97.8 °F (36.6 °C) (Oral)   Resp 16   Ht 5' 3\" (1.6 m)   Wt 205 lb 4.8 oz (93.1 kg)   SpO2 97%   BMI 36.37 kg/m²   Temp  Av °F (36.7 °C)  Min: 97.8 °F (36.6 °C)  Max: 98.1 °F (36.7 °C)    Intake/Output Summary (Last 24 hours) at 2022 0934  Last data filed at 2022 0805  Gross per 24 hour   Intake 2009.72 ml   Output 800 ml   Net 1209.72 ml       Social History     Socioeconomic History    Marital status: Legally      Spouse name: Not on file    Number of children: Not on file    Years of education: Not on file    Highest education level: Not on file   Occupational History    Not on file   Tobacco Use    Smoking status: Never Smoker    Smokeless tobacco: Never Used   Vaping Use    Vaping Use: Never used   Substance and Sexual Activity    Alcohol use: Yes     Comment: occasionally    Drug use: No    Sexual activity: Not on file   Other Topics Concern    Not on file   Social History Narrative    Not on file     Social Determinants of Health     Financial Resource Strain: Low Risk     Difficulty of Paying Living Expenses: Not hard at all   Food Insecurity: No Food Insecurity    Worried About Running Out of Food in the Last Year: Never true    Imtiaz of Food in the Last Year: Never true   Transportation Needs:     Lack of Transportation (Medical): Not on file    Lack of Transportation (Non-Medical): Not on file   Physical Activity:     Days of Exercise per Week: Not on file    Minutes of Exercise per Session: Not on file   Stress:     Feeling of Stress : Not on file   Social Connections:     Frequency of Communication with Friends and Family: Not on file    Frequency of Social Gatherings with Friends and Family: Not on file    Attends Jain Services: Not on file    Active Member of [address] [place] [address] [employer] or Organizations: Not on file    Attends Club or Organization Meetings: Not on file    Marital Status: Not on file   Intimate Partner Violence:     Fear of Current or Ex-Partner: Not on file    Emotionally Abused: Not on file    Physically Abused: Not on file    Sexually Abused: Not on file   Housing Stability:     Unable to Pay for Housing in the Last Year: Not on file    Number of [place] in the Last Year: Not on file    Unstable Housing in the Last Year: Not on file     Family History   Problem Relation Age of Onset    Bleeding Prob Mother     Clotting Disorder Mother     Depression Mother     Diabetes Mother     Heart Disease Mother     High Blood Pressure Mother     High Cholesterol Mother     Alcohol Abuse Father     Substance Abuse Father     Depression Sister     Diabetes Sister     High Blood Pressure Sister     Miscarriages / Stillbirths Sister      Allergies   Allergen Reactions    Penicillins Nausea Only and Swelling         Constitutional: Alert and oriented times x3, no acute distress, and cooperative to examination with appropriate mood and affect. HEENT:   Head:         Normocephalic and atraumatic. Mucous membranes are normal.   Eyes:         EOM are normal.  Nose:    The external appearance of the nose is normal  Ears: The ears appear normal to external inspection. Cardiovascular:       Normal rate, regular rhythm. Pulmonary/Chest:  Normal respiratory rate and rhthym. No use of accessory muscles. Lungs clear bilaterally. Abdominal:          Soft. No tenderness. Active bowel sounds. Genitalia:    Voiding spontaneously  Musculoskeletal:    Normal range of motion. He exhibits no edema or tenderness of lower extremities. Extremities:    No cyanosis, clubbing, or edema present. Neurological:    Alert and oriented. Labs:  WBC:    Lab Results   Component Value Date    WBC 5.8 06/28/2022     Hemoglobin/Hematocrit:    Lab Results   Component Value Date    HGB 11.5 06/28/2022    HCT 34.1 06/28/2022     BMP:    Lab Results   Component Value Date     06/28/2022    K 3.5 06/28/2022    K 3.8 06/25/2022     06/28/2022    CO2 24 06/28/2022    BUN 8 06/28/2022    LABALBU 4.4 06/25/2022    CREATININE 0.5 06/28/2022    CALCIUM 9.3 06/28/2022    LABGLOM >90 06/28/2022           Impression/Plan:  Will get bladder scan to ensure emptying  Start on Ditropan for voiding dysfunction  Omnicef for 5 additional days  TAMELA in 4 wks    Denies pain overnight  Reports having no urge to void, she voids every hr.  Denies incontinence  Ux with growth of contaminants  No fever, no leukocytosis  Ok to eat from Urology standpoint  No emergent need to drain abscess at this time    Renal abscess - c/o left flank pain.   Abscess appears smaller on current imaging 2.4 x 2.1 cm, previous TAMELA showed 2.7 x 2.8 cm  Ux - +GOC, on Maxapime    SPENCER Trevino - ROGELIO, SPENCER  06/28/22 9:34 AM  Urology

## 2022-06-29 ENCOUNTER — TELEPHONE (OUTPATIENT)
Dept: UROLOGY | Age: 52
End: 2022-06-29

## 2022-06-29 NOTE — CARE COORDINATION
Late entry, patient discharged to home 6/28/2022 with no services added/requested.  Electronically signed by Lorelei Choe RN on 6/29/22 at 7:29 AM EDT

## 2022-06-29 NOTE — PROGRESS NOTES
CLINICAL PHARMACY NOTE: MEDS TO BEDS    Total # of Prescriptions Filled: 3   The following medications were delivered to the patient:  Oxybutynin er 10mg  Cefdinir 300mg  culturelle    Additional Documentation:

## 2022-06-29 NOTE — TELEPHONE ENCOUNTER
Patient scheduled for US RENAL COMP  at [facility] on 7/18/2022 ARRIVAL OF 345PM FOR A 4PM SCAN. NO CARBONATED BEVERAGES, ARRIVE WELL HYDRATED.     Order mailed with instructions or given to the patient in the office

## 2022-07-01 LAB
BLOOD CULTURE, ROUTINE: NORMAL
BLOOD CULTURE, ROUTINE: NORMAL

## 2022-07-11 ENCOUNTER — TELEPHONE (OUTPATIENT)
Dept: FAMILY MEDICINE CLINIC | Age: 52
End: 2022-07-11

## 2022-07-11 NOTE — TELEPHONE ENCOUNTER
----- Message from Ama Villalba MA sent at 7/11/2022 11:33 AM EDT -----  Subject: Appointment Request    Reason for Call: Established Patient Appointment needed: Routine Hospital   Follow Up    QUESTIONS    Reason for appointment request? Available appointments did not meet   patient need     Additional Information for Provider? Patient following up, left a message   on 7/6/22 to reschedule her hospital follow up, she does not get off of   work until 3:30 pm just started a new job.  Please call back to reschedule.     ---------------------------------------------------------------------------  --------------  [address]  [phone]; OK to leave message on [employer], OK to respond with   electronic message via [employer] portal (only for patients who have   registered [employer] account)  ---------------------------------------------------------------------------  --------------  SCRIPT ANSWERS  COVID Screen: Tere Elliott

## 2022-07-12 ENCOUNTER — OFFICE VISIT (OUTPATIENT)
Dept: FAMILY MEDICINE CLINIC | Age: 52
End: 2022-07-12

## 2022-07-12 VITALS
HEIGHT: 63 IN | WEIGHT: 207.2 LBS | OXYGEN SATURATION: 98 % | DIASTOLIC BLOOD PRESSURE: 76 MMHG | BODY MASS INDEX: 36.71 KG/M2 | TEMPERATURE: 97.5 F | HEART RATE: 87 BPM | RESPIRATION RATE: 14 BRPM | SYSTOLIC BLOOD PRESSURE: 130 MMHG

## 2022-07-12 DIAGNOSIS — I10 ESSENTIAL HYPERTENSION: ICD-10-CM

## 2022-07-12 DIAGNOSIS — R33.9 URINARY RETENTION: ICD-10-CM

## 2022-07-12 DIAGNOSIS — E11.9 TYPE 2 DIABETES MELLITUS WITHOUT COMPLICATION, WITHOUT LONG-TERM CURRENT USE OF INSULIN (HCC): Primary | ICD-10-CM

## 2022-07-12 LAB
BILIRUBIN URINE: NEGATIVE
BLOOD URINE, POC: ABNORMAL
CHARACTER, URINE: CLEAR
COLOR, URINE: YELLOW
GLUCOSE URINE: NEGATIVE MG/DL
KETONES, URINE: NEGATIVE
LEUKOCYTE CLUMPS, URINE: ABNORMAL
NITRITE, URINE: NEGATIVE
PH, URINE: 5.5 (ref 5–9)
PROTEIN, URINE: 30 MG/DL
SPECIFIC GRAVITY, URINE: >= 1.03 (ref 1–1.03)
UROBILINOGEN, URINE: 0.2 EU/DL (ref 0–1)

## 2022-07-12 PROCEDURE — 3046F HEMOGLOBIN A1C LEVEL >9.0%: CPT | Performed by: STUDENT IN AN ORGANIZED HEALTH CARE EDUCATION/TRAINING PROGRAM

## 2022-07-12 PROCEDURE — 81003 URINALYSIS AUTO W/O SCOPE: CPT | Performed by: STUDENT IN AN ORGANIZED HEALTH CARE EDUCATION/TRAINING PROGRAM

## 2022-07-12 PROCEDURE — 99213 OFFICE O/P EST LOW 20 MIN: CPT | Performed by: STUDENT IN AN ORGANIZED HEALTH CARE EDUCATION/TRAINING PROGRAM

## 2022-07-12 RX ORDER — INSULIN GLARGINE 100 [IU]/ML
25 INJECTION, SOLUTION SUBCUTANEOUS NIGHTLY
Qty: 1 PEN | Refills: 1 | Status: SHIPPED | OUTPATIENT
Start: 2022-07-12 | End: 2022-07-14 | Stop reason: ALTCHOICE

## 2022-07-12 ASSESSMENT — ENCOUNTER SYMPTOMS
NAUSEA: 0
SHORTNESS OF BREATH: 0
ABDOMINAL PAIN: 0
COUGH: 0
SORE THROAT: 0

## 2022-07-12 ASSESSMENT — PATIENT HEALTH QUESTIONNAIRE - PHQ9
5. POOR APPETITE OR OVEREATING: 0
9. THOUGHTS THAT YOU WOULD BE BETTER OFF DEAD, OR OF HURTING YOURSELF: 0
1. LITTLE INTEREST OR PLEASURE IN DOING THINGS: 0
10. IF YOU CHECKED OFF ANY PROBLEMS, HOW DIFFICULT HAVE THESE PROBLEMS MADE IT FOR YOU TO DO YOUR WORK, TAKE CARE OF THINGS AT HOME, OR GET ALONG WITH OTHER PEOPLE: 0
SUM OF ALL RESPONSES TO PHQ9 QUESTIONS 1 & 2: 0
3. TROUBLE FALLING OR STAYING ASLEEP: 0
SUM OF ALL RESPONSES TO PHQ QUESTIONS 1-9: 0
SUM OF ALL RESPONSES TO PHQ QUESTIONS 1-9: 0
4. FEELING TIRED OR HAVING LITTLE ENERGY: 0
8. MOVING OR SPEAKING SO SLOWLY THAT OTHER PEOPLE COULD HAVE NOTICED. OR THE OPPOSITE, BEING SO FIGETY OR RESTLESS THAT YOU HAVE BEEN MOVING AROUND A LOT MORE THAN USUAL: 0
2. FEELING DOWN, DEPRESSED OR HOPELESS: 0
7. TROUBLE CONCENTRATING ON THINGS, SUCH AS READING THE NEWSPAPER OR WATCHING TELEVISION: 0
SUM OF ALL RESPONSES TO PHQ QUESTIONS 1-9: 0
6. FEELING BAD ABOUT YOURSELF - OR THAT YOU ARE A FAILURE OR HAVE LET YOURSELF OR YOUR FAMILY DOWN: 0
SUM OF ALL RESPONSES TO PHQ QUESTIONS 1-9: 0

## 2022-07-12 NOTE — Clinical Note
Good morning! This patient is requesting help in paying for her lantus for the next couple weeks until her insurance from her new employer kicks in. Thank you!

## 2022-07-12 NOTE — PROGRESS NOTES
[address] [place] [address]  Dept: [phone]  Loc: [phone]      Please see Resident note for complete HPI. Here for hospital follow up    Found to have LT renal abscess. No drainage, will follow up with urology. Completed antibiotics. Having urinary retention. Does not have urge to go. Has to force urination. Has a schedule. US on Monday. Urology on 07/25. DM  Poorly controlled.  fasting. Currently on lantus. Does not have insurance.      ROS per Resident    Lab Results   Component Value Date    WBC 5.8 06/28/2022    HGB 11.5 (L) 06/28/2022    HCT 34.1 (L) 06/28/2022    MCV 86.3 06/28/2022     06/28/2022     Lab Results   Component Value Date     06/28/2022    K 3.5 06/28/2022     06/28/2022    CO2 24 06/28/2022    BUN 8 06/28/2022    CREATININE 0.5 06/28/2022    GLUCOSE 127 (H) 06/28/2022    CALCIUM 9.3 06/28/2022    PROT 8.1 (H) 06/25/2022    LABALBU 4.4 06/25/2022    BILITOT 0.5 06/25/2022    ALKPHOS 100 06/25/2022    AST 14 06/25/2022    ALT 17 06/25/2022    LABGLOM >90 06/28/2022     Lab Results   Component Value Date    LABA1C 12.3 (H) 05/30/2022     No results found for: EAG  No results found for: LABMICR, [id number]  Lab Results   Component Value Date    TSH 1.930 06/02/2022    T4FREE 0.99 06/02/2022     No results found for: CHOL  No results found for: TRIG  No results found for: HDL  No results found for: LDLCHOLESTEROL, LDLCALC  No results found for: LABVLDL, VLDL  No results found for: CHOLHDLRATIO  No results found for: PSA, PSADIA    Health Maintenance Due   Topic Date Due    COVID-19 Vaccine (1) Never done    Pneumococcal 0-64 years Vaccine (1 - PCV) Never done    Diabetic foot exam  Never done    Lipids  Never done    HIV screen  Never done    Diabetic microalbuminuria test  Never done    Diabetic retinal exam  Never done    Hepatitis B vaccine (1 of 3 - Risk 3-dose series) Never done    Cervical cancer screen  Never done    Colorectal Cancer Screen  Never done    Shingles vaccine (1 of 2) Never done         Physical Exam per Resident       ICD-10-CM    1. Type 2 diabetes mellitus without complication, without long-term current use of insulin (HCC)  E11.9    2. Essential hypertension  I10    3. Urinary retention  R33.9 POCT Urinalysis No Micro (Auto)           Plan  I participated in the discussion and care of this patient     Follow up with urology as scheduled. UA today. Increase lantus. Call with any lows. Patient working on insurance. Care coordinator.

## 2022-07-12 NOTE — PROGRESS NOTES
[address] [place] [address] [address]  Dept: [phone]  Dept Fax: [phone]  Loc: [phone]  PROGRESS NOTE      Visit Date: 7/12/2022    Peter Rodriguez is a 50 y.o. female who presents today for:  Chief Complaint   Patient presents with    Discuss Medications       Impression/Plan:  1. Type 2 diabetes mellitus without complication, without long-term current use of insulin (HCC)  Chronic, uncontrolled  Difficulties affording Lantus as she is self-pay until her [employer] insurance starts in 2 weeks  1 Lantus pen sent today and will forward chart for medication assistance  She will increase her Lantus dose 3 units every 3 days until her fasting blood glucoses below 150    2. Essential hypertension  Chronic, controlled  Continue lisinopril, HCTZ, amlodipine, and metoprolol    3. Urinary retention  Chronic  Ultrasound next week  Urology consultation the week after  Completed antibiotics from hospital admission  UA today shows leukocyte clumps but no other signs of infection  - POCT Urinalysis No Micro (Auto)  - Urinalysis with Microscopic; Standing      Return in about 2 weeks (around 7/26/2022) for T2DM and urinary retention. Subjective:  HPI    Here for hospital follow-up. Left flank pain: Resolved. Ultrasound next week and urology follow-up the week after. Still without urge to urinate. Has to plan out when to urinate. Does not always feel like she is able to fully empty her bladder. Hypertension:  Doing well. IDDM2:  Diabetic Review of Systems - medication compliance: compliant all of the time, home glucose monitoring: fasting values range 300, nonfasting values range 200-300. Anxiety/depression:  Taking meds, mood is good. No further questions of complaints. Review of Systems   Constitutional: Negative for chills and fever. HENT: Negative for congestion and sore throat.     Eyes: Negative for visual disturbance. Respiratory: Negative for cough and shortness of breath. Cardiovascular: Negative for chest pain. Gastrointestinal: Negative for abdominal pain and nausea. Genitourinary: Negative for dysuria. Continues to have urinary retention without urge to urinate   Skin: Negative for rash. Neurological: Negative for dizziness, light-headedness and headaches. Psychiatric/Behavioral: The patient is not nervous/anxious. Patient Active Problem List   Diagnosis    Essential hypertension    SOB (shortness of breath) on exertion    Intermittent palpitations    Hx of Sinus tachycardia    Septicemia ([address]    Sepsis due to Escherichia coli with acute hypercapnic respiratory failure without septic shock (HCC)    Acute pyelonephritis    Acute UTI    Elevated LFTs    Hyperbilirubinemia    Hypokalemia    Normocytic anemia    Thrombocytopenia (HCC)    Hyponatremia    Hydroureter on left    Leukopenia    Type 2 diabetes mellitus with hyperglycemia, with long-term current use of insulin (HCC)    Obesity (BMI 30-39. 9)    UTI (urinary tract infection)    Depression    Hepatic steatosis    Iron deficiency anemia secondary to inadequate dietary iron intake    Diastolic dysfunction without heart failure    Renal abscess, left     Past Medical History:   Diagnosis Date    COVID-19 11/2021    Diabetes mellitus ([address]     Hypertension       Past Surgical History:   Procedure Laterality Date    CHOLECYSTECTOMY      ENDOMETRIAL ABLATION      TUBAL LIGATION  2001    US BREAST NEEDLE BIOPSY RIGHT Right 09/03/2014    benign     Family History   Problem Relation Age of Onset    Bleeding Prob Mother     Clotting Disorder Mother     Depression Mother     Diabetes Mother     Heart Disease Mother     High Blood Pressure Mother     High Cholesterol Mother     Alcohol Abuse Father     Substance Abuse Father     Depression Sister     Diabetes Sister     High Blood Pressure Shingles vaccine (1 of 2) Never done    A1C test (Diabetic or Prediabetic)  08/30/2022    Flu vaccine (1) 09/01/2022    Depression Monitoring  07/12/2023    Breast cancer screen  05/16/2024    DTaP/Tdap/Td vaccine (2 - Td or Tdap) 10/11/2025    Hepatitis C screen  Completed    Hepatitis A vaccine  Aged Out    Hib vaccine  Aged Out    Meningococcal (ACWY) vaccine  Aged Out       LABS  Lab Results   Component Value Date    LABA1C 12.3 (H) 05/30/2022     No results found for: EAG  No components found for: CHLPL  No results found for: TRIG  No results found for: HDL  No results found for: [address]    Chemistry        Component Value Date/Time     06/28/2022 0757    K 3.5 06/28/2022 0757    K 3.8 06/25/2022 1650     06/28/2022 0757    CO2 24 06/28/2022 0757    BUN 8 06/28/2022 0757    CREATININE 0.5 06/28/2022 0757        Component Value Date/Time    CALCIUM 9.3 06/28/2022 0757    ALKPHOS 100 06/25/2022 1650    AST 14 06/25/2022 1650    ALT 17 06/25/2022 1650    BILITOT 0.5 06/25/2022 1650          No results found for: LABMICR, [id number]  Lab Results   Component Value Date    TSH 1.930 06/02/2022     No results found for: PSA  Lab Results   Component Value Date    WBC 5.8 06/28/2022    HGB 11.5 (L) 06/28/2022    HCT 34.1 (L) 06/28/2022    MCV 86.3 06/28/2022     06/28/2022       Objective:  /76 (Site: Left Upper Arm, Position: Sitting, Cuff Size: Large Adult)   Pulse 87   Temp 97.5 °F (36.4 °C) (Skin)   Resp 14   Ht 5' 3\" (1.6 m)   Wt 207 lb 3.2 oz (94 kg)   SpO2 98%   BMI 36.70 kg/m²     Physical Exam  Constitutional:       General: She is not in acute distress. Appearance: Normal appearance. HENT:      Head: Normocephalic. Right Ear: External ear normal.      Left Ear: External ear normal.      Nose: Nose normal.      Mouth/Throat:      Mouth: Mucous membranes are moist.   Eyes:      General: No scleral icterus. Extraocular Movements: Extraocular movements intact. Cardiovascular:      Rate and Rhythm: Normal rate and regular rhythm. Pulses: Normal pulses. Heart sounds: Normal heart sounds. Pulmonary:      Effort: Pulmonary effort is normal.      Breath sounds: Normal breath sounds. Abdominal:      General: Abdomen is flat. There is no distension. Palpations: Abdomen is soft. Musculoskeletal:         General: Normal range of motion. Cervical back: Normal range of motion. Skin:     General: Skin is warm and dry. Neurological:      Mental Status: She is alert. Motor: No weakness. Psychiatric:         Mood and Affect: Mood normal.         They voiced understanding. All questions answered. They agreed with treatment plan. See patient instructions for any educational materials that may have been given. Discussed use, benefit, and side effects of prescribed medications. Reviewed health maintenance.     (Please note that portions of this note may have been completed with a voice recognition program.  Efforts were made to edit the dictation but occasionally words are mis-transcribed.)      Electronically signed by Daniel Long DO on 7/12/2022 at 9:20 PM

## 2022-07-12 NOTE — PROGRESS NOTES
Health Maintenance Due   Topic Date Due    COVID-19 Vaccine (1) Never done    Pneumococcal 0-64 years Vaccine (1 - PCV) Never done    Diabetic foot exam  Never done    Lipids  Never done    HIV screen  Never done    Diabetic microalbuminuria test  Never done    Diabetic retinal exam  Never done    Hepatitis B vaccine (1 of 3 - Risk 3-dose series) Never done    Cervical cancer screen  Never done    Colorectal Cancer Screen  Never done    Shingles vaccine (1 of 2) Never done

## 2022-07-13 ENCOUNTER — CARE COORDINATION (OUTPATIENT)
Dept: CARE COORDINATION | Age: 52
End: 2022-07-13

## 2022-07-13 NOTE — CARE COORDINATION
I left the patient a message to please call me back to discuss her options in getting assistance on her insulin.         [facility]   Medication Assistance  [address] [facility], [facility] [employer]    T) [phone] (Q) [phone]

## 2022-07-13 NOTE — PROGRESS NOTES
Attending Physician Statement  I have discussed the case, including pertinent history and exam findings with the resident. I also have seen the patient and performed key portions of the examination. I agree with the documented assessment and plan as documented by the resident.   GC modifier added to this encounter      Juanjo Anne MD 7/13/2022 5:26 PM

## 2022-07-13 NOTE — PROGRESS NOTES
I left her a message to call me back, she possibly could be eligible for the dispensary of hope at our out patient pharmacy there at [facility].

## 2022-07-14 ENCOUNTER — TELEPHONE (OUTPATIENT)
Dept: FAMILY MEDICINE CLINIC | Age: 52
End: 2022-07-14

## 2022-07-14 RX ORDER — VALSARTAN 160 MG/1
160 TABLET ORAL DAILY
Qty: 30 TABLET | Refills: 3 | Status: SHIPPED | OUTPATIENT
Start: 2022-07-14 | End: 2022-09-01 | Stop reason: SDUPTHER

## 2022-07-14 RX ORDER — FELODIPINE 10 MG/1
10 TABLET, EXTENDED RELEASE ORAL DAILY
Qty: 30 TABLET | Refills: 3 | Status: SHIPPED | OUTPATIENT
Start: 2022-07-14 | End: 2022-09-01 | Stop reason: SDUPTHER

## 2022-07-14 RX ORDER — METFORMIN HYDROCHLORIDE 500 MG/1
500 TABLET, EXTENDED RELEASE ORAL
Qty: 30 TABLET | Refills: 1 | Status: SHIPPED | OUTPATIENT
Start: 2022-07-14 | End: 2022-07-26

## 2022-07-14 RX ORDER — INSULIN GLARGINE 100 [IU]/ML
25 INJECTION, SOLUTION SUBCUTANEOUS NIGHTLY
Qty: 5 PEN | Refills: 3 | Status: SHIPPED | OUTPATIENT
Start: 2022-07-14 | End: 2022-09-01 | Stop reason: SDUPTHER

## 2022-07-14 RX ORDER — HYDROCHLOROTHIAZIDE 50 MG/1
25 TABLET ORAL DAILY
Qty: 15 TABLET | Refills: 3 | Status: SHIPPED | OUTPATIENT
Start: 2022-07-14 | End: 2022-09-01 | Stop reason: SDUPTHER

## 2022-07-14 RX ORDER — METOPROLOL TARTRATE 100 MG/1
100 TABLET ORAL 2 TIMES DAILY
Qty: 60 TABLET | Refills: 1 | Status: SHIPPED | OUTPATIENT
Start: 2022-07-14 | End: 2022-07-28

## 2022-07-14 RX ORDER — OXYBUTYNIN CHLORIDE 5 MG/1
10 TABLET, EXTENDED RELEASE ORAL DAILY
Qty: 60 TABLET | Refills: 3 | Status: SHIPPED | OUTPATIENT
Start: 2022-07-14 | End: 2022-07-26 | Stop reason: ALTCHOICE

## 2022-07-14 NOTE — TELEPHONE ENCOUNTER
Patient to receive prescriptions from [address] [place] [address]. Prescriptions provided by Gita Isaacs to be filled at [address]    Thanks!     KL

## 2022-07-14 NOTE — CARE COORDINATION
Patient is now all set up with Ed Melara at the outpatient pharmacy in [facility], I left her a detailed message with all the information.

## 2022-07-18 ENCOUNTER — TELEPHONE (OUTPATIENT)
Dept: FAMILY MEDICINE CLINIC | Age: 52
End: 2022-07-18

## 2022-07-18 ENCOUNTER — CARE COORDINATION (OUTPATIENT)
Dept: CARE COORDINATION | Age: 52
End: 2022-07-18

## 2022-07-18 ENCOUNTER — HOSPITAL ENCOUNTER (OUTPATIENT)
Dept: ULTRASOUND IMAGING | Age: 52
Discharge: HOME | End: 2022-07-18

## 2022-07-18 DIAGNOSIS — Z79.4 TYPE 2 DIABETES MELLITUS WITH HYPERGLYCEMIA, WITH LONG-TERM CURRENT USE OF INSULIN (HCC): Primary | ICD-10-CM

## 2022-07-18 DIAGNOSIS — L02.91 ABSCESS: ICD-10-CM

## 2022-07-18 DIAGNOSIS — E11.9 TYPE 2 DIABETES MELLITUS WITHOUT COMPLICATION, WITHOUT LONG-TERM CURRENT USE OF INSULIN (HCC): Primary | ICD-10-CM

## 2022-07-18 DIAGNOSIS — E11.65 TYPE 2 DIABETES MELLITUS WITH HYPERGLYCEMIA, WITH LONG-TERM CURRENT USE OF INSULIN (HCC): Primary | ICD-10-CM

## 2022-07-18 PROCEDURE — 76775 US EXAM ABDO BACK WALL LIM: CPT

## 2022-07-18 NOTE — CARE COORDINATION
Patient calling need pen needles called into the [facility] pharmacy, I will message the office.       [facility]   Medication Assistance  [address] [facility], and [employer]    C) [phone] (C) [phone]

## 2022-07-18 NOTE — TELEPHONE ENCOUNTER
Patient needing refill of insulin pen needles sent to [facility]'s outpatient pharmacy. Placed at this time. Routing to care coordination at time of completion.

## 2022-07-20 ENCOUNTER — TELEPHONE (OUTPATIENT)
Dept: UROLOGY | Age: 52
End: 2022-07-20

## 2022-07-20 NOTE — TELEPHONE ENCOUNTER
----- Message from SPENCER Milligan CNP sent at 7/20/2022  9:41 AM EDT -----  TAMELA shows renal abscess has resolved  Keep OV 7/25

## 2022-07-25 ENCOUNTER — OFFICE VISIT (OUTPATIENT)
Dept: UROLOGY | Age: 52
End: 2022-07-25

## 2022-07-25 VITALS
HEIGHT: 63 IN | SYSTOLIC BLOOD PRESSURE: 124 MMHG | DIASTOLIC BLOOD PRESSURE: 70 MMHG | WEIGHT: 209 LBS | BODY MASS INDEX: 37.03 KG/M2

## 2022-07-25 DIAGNOSIS — N39.0 RECURRENT UTI: Primary | ICD-10-CM

## 2022-07-25 PROCEDURE — 99204 OFFICE O/P NEW MOD 45 MIN: CPT | Performed by: UROLOGY

## 2022-07-25 NOTE — PROGRESS NOTES
MD MD [address] [facility] Consultation / New Patient Visit    Patient:  Marii Dahl  YOB: 1970  Date: 7/25/2022  Consult requested from Tom Araujo MD     HISTORY OF PRESENT ILLNESS:   The patient is a 50 y.o. female who presents today for follow-up for the following problem(s): recurrent UTI  Overall the problem(s) : are worsening. Associated Symptoms: No dysuria, gross hematuria. Pain Severity:      Today visit:   7/25/22   She has history of recurrent UTI with pyelonephritis and abscess. She has decreased sensation, history of DM. On oxybutynin for OAB. We reviewed the patients old notes, labs, and imaging independently. Summary of old records:   (Patient's old records, notes and chart reviewed and summarized above.)    Urinalysis today:  No results found for this visit on 07/25/22.     Last BUN and creatinine:  Lab Results   Component Value Date    BUN 8 06/28/2022     Lab Results   Component Value Date    CREATININE 0.5 06/28/2022       Imaging Reviewed during this Office Visit:   (results were independently reviewed by physician and radiology report verified)    PAST MEDICAL, FAMILY AND SOCIAL HISTORY:  Past Medical History:   Diagnosis Date    COVID-19 11/2021    Diabetes mellitus ([address]     Hypertension      Past Surgical History:   Procedure Laterality Date    CHOLECYSTECTOMY      ENDOMETRIAL ABLATION      TUBAL LIGATION  2001    US BREAST NEEDLE BIOPSY RIGHT Right 09/03/2014    benign     Family History   Problem Relation Age of Onset    Bleeding Prob Mother     Clotting Disorder Mother     Depression Mother     Diabetes Mother     Heart Disease Mother     High Blood Pressure Mother     High Cholesterol Mother     Alcohol Abuse Father     Substance Abuse Father     Depression Sister     Diabetes Sister     High Blood Pressure Sister     Miscarriages / Stillbirths Sister      No outpatient medications have been marked as taking for the 7/25/22

## 2022-07-26 ENCOUNTER — OFFICE VISIT (OUTPATIENT)
Dept: FAMILY MEDICINE CLINIC | Age: 52
End: 2022-07-26

## 2022-07-26 VITALS
OXYGEN SATURATION: 98 % | HEIGHT: 63 IN | DIASTOLIC BLOOD PRESSURE: 86 MMHG | BODY MASS INDEX: 36.71 KG/M2 | WEIGHT: 207.2 LBS | TEMPERATURE: 97.9 F | SYSTOLIC BLOOD PRESSURE: 138 MMHG | RESPIRATION RATE: 16 BRPM | HEART RATE: 89 BPM

## 2022-07-26 DIAGNOSIS — Z11.4 ENCOUNTER FOR SCREENING FOR HIV: ICD-10-CM

## 2022-07-26 DIAGNOSIS — Z13.220 LIPID SCREENING: ICD-10-CM

## 2022-07-26 DIAGNOSIS — E11.9 TYPE 2 DIABETES MELLITUS WITHOUT COMPLICATION, WITHOUT LONG-TERM CURRENT USE OF INSULIN (HCC): Primary | ICD-10-CM

## 2022-07-26 PROCEDURE — 3046F HEMOGLOBIN A1C LEVEL >9.0%: CPT | Performed by: STUDENT IN AN ORGANIZED HEALTH CARE EDUCATION/TRAINING PROGRAM

## 2022-07-26 PROCEDURE — 99213 OFFICE O/P EST LOW 20 MIN: CPT | Performed by: STUDENT IN AN ORGANIZED HEALTH CARE EDUCATION/TRAINING PROGRAM

## 2022-07-26 RX ORDER — METFORMIN HYDROCHLORIDE 500 MG/1
1000 TABLET, EXTENDED RELEASE ORAL
Qty: 30 TABLET | Refills: 1 | Status: SHIPPED | OUTPATIENT
Start: 2022-07-26 | End: 2022-07-26

## 2022-07-26 RX ORDER — METFORMIN HYDROCHLORIDE 500 MG/1
1000 TABLET, EXTENDED RELEASE ORAL
Qty: 30 TABLET | Refills: 1 | Status: SHIPPED | OUTPATIENT
Start: 2022-07-26 | End: 2022-07-28

## 2022-07-26 ASSESSMENT — ENCOUNTER SYMPTOMS
ABDOMINAL DISTENTION: 0
WHEEZING: 0
EYE REDNESS: 0
SORE THROAT: 0
COUGH: 0
EYE ITCHING: 0
SINUS PAIN: 0
ABDOMINAL PAIN: 0
BLOOD IN STOOL: 0
NAUSEA: 0
SINUS PRESSURE: 0
RHINORRHEA: 0
SHORTNESS OF BREATH: 0
VOMITING: 0
DIARRHEA: 0
BACK PAIN: 0
CONSTIPATION: 0

## 2022-07-26 NOTE — PROGRESS NOTES
Mac Son is a 50 y. o.female    Chief Complaint   Patient presents with    2 Week Follow-Up     Chronic Conditions       Chief complaint, [place], and all pertinent details of the case reviewed with the resident. Please see resident's note for specific details discussed at today's visit. Pt here for DM follow up. Taking 133 units of lantus and metformin 500XR daily. Denies lows. Sugars running 174-230 fasting in morning. Does not have insurance yet    Patient Active Problem List   Diagnosis    Essential hypertension    SOB (shortness of breath) on exertion    Intermittent palpitations    Hx of Sinus tachycardia    Septicemia (HCC)    Sepsis due to Escherichia coli with acute hypercapnic respiratory failure without septic shock (HCC)    Acute pyelonephritis    Acute UTI    Elevated LFTs    Hyperbilirubinemia    Hypokalemia    Normocytic anemia    Thrombocytopenia (HCC)    Hyponatremia    Hydroureter on left    Leukopenia    Type 2 diabetes mellitus with hyperglycemia, with long-term current use of insulin (HCC)    Obesity (BMI 30-39. 9)    Depression    Hepatic steatosis    Iron deficiency anemia secondary to inadequate dietary iron intake    Diastolic dysfunction without heart failure    Renal abscess, left       Current Outpatient Medications   Medication Sig Dispense Refill    D-Mannose 500 MG CAPS Take 1 tablet by mouth daily 30 capsule 6    Insulin Pen Needle 29G X 12.7MM MISC 1 each by Does not apply route in the morning.  100 each 3    metoprolol (LOPRESSOR) 100 MG tablet Take 1 tablet by mouth 2 times daily 60 tablet 1    metFORMIN (GLUCOPHAGE XR) 500 MG extended release tablet Take 1 tablet by mouth daily (with breakfast) 30 tablet 1    hydroCHLOROthiazide (HYDRODIURIL) 50 MG tablet Take 0.5 tablets by mouth daily 15 tablet 3    insulin glargine (BASAGLAR KWIKPEN) 100 UNIT/ML injection pen Inject 25 Units into the skin nightly 5 pen 3    felodipine (PLENDIL) 10 MG extended release tablet Take 1 tablet by mouth daily 30 tablet 3    valsartan (DIOVAN) 160 MG tablet Take 1 tablet by mouth daily 30 tablet 3    lactobacillus (CULTURELLE) capsule Take 1 capsule by mouth daily (with breakfast) 30 capsule 0    Lancets MISC 1 each by Does not apply route daily 100 each 3    aspirin EC 81 MG EC tablet Take 1 tablet by mouth daily 90 tablet 1     No current facility-administered medications for this visit. Review of Systems per Dr. Balaji Mcdowell     /86 (Site: Right Upper Arm, Position: Sitting, Cuff Size: Large Adult)   Pulse 89   Temp 97.9 °F (36.6 °C) (Temporal)   Resp 16   Ht 5' 3\" (1.6 m)   Wt 207 lb 3.2 oz (94 kg)   SpO2 98%   BMI 36.70 kg/m²   BP Readings from Last 3 Encounters:   07/26/22 138/86   07/25/22 124/70   07/12/22 130/76       Wt Readings from Last 3 Encounters:   07/26/22 207 lb 3.2 oz (94 kg)   07/25/22 209 lb (94.8 kg)   07/12/22 207 lb 3.2 oz (94 kg)     Body mass index is 36.7 kg/m².     Physical Exam per Dr. Randy Dahl Results   Component Value Date    LABA1C 12.3 (H) 05/30/2022       No results found for: CHOL, TRIG, HDL, LDLCALC, LDLDIRECT    The ASCVD Risk score (Pavel Blackmon., et al., 2013) failed to calculate for the following reasons:    Cannot find a previous HDL lab    Cannot find a previous total cholesterol lab    Lab Results   Component Value Date     06/28/2022    K 3.5 06/28/2022     06/28/2022    CO2 24 06/28/2022    BUN 8 06/28/2022    CREATININE 0.5 06/28/2022    GLUCOSE 127 (H) 06/28/2022    CALCIUM 9.3 06/28/2022    PROT 8.1 (H) 06/25/2022    LABALBU 4.4 06/25/2022    BILITOT 0.5 06/25/2022    ALKPHOS 100 06/25/2022    AST 14 06/25/2022    ALT 17 06/25/2022    LABGLOM >90 06/28/2022       Lab Results   Component Value Date    TSH 1.930 06/02/2022    T4FREE 0.99 06/02/2022       Lab Results   Component Value Date    WBC 5.8 06/28/2022    HGB 11.5 (L) 06/28/2022    HCT 34.1 (L) 06/28/2022    MCV 86.3 06/28/2022     06/28/2022         Future Appointments   Date Time Provider Hung Wigginsi   9/7/2022  4:20 PM Pratik Quiroz MD [facility] [place]   11/14/2022  3:00 PM Milo Ramirez  [facility]       ASSESSMENT       Diagnosis Orders   1. Type 2 diabetes mellitus without complication, without long-term current use of insulin (HCC)  Hemoglobin A1C    Microalbumin / Creatinine Urine Ratio      2. Lipid screening  Lipid Panel      3. Encounter for screening for HIV  HIV Screen          PLAN      After discussion with Dr. Kennedi العراقي, we agreed on plan as follows:     Cont lantus  Increase metformin to 2000mg daily  Consider decreasing lantus and adding trulicity or other GLP when patient has insurance  Recheck a1c in [facility]          Attending Physician Statement  I have discussed the case, including pertinent history and exam findings with the resident. I agree with the documented assessment and plan as documented by the resident.   GE modifier added  to this encounter        Electronically signed by Emir Miller MD on 7/26/2022 at 5:11 PM

## 2022-07-26 NOTE — PROGRESS NOTES
Health Maintenance Due   Topic Date Due    COVID-19 Vaccine (1) Never done    Pneumococcal 0-64 years Vaccine (1 - PCV) Never done    Diabetic foot exam  Never done    Lipids  Never done    HIV screen  Never done    Diabetic microalbuminuria test  Never done    Diabetic retinal exam  Never done    Hepatitis B vaccine (1 of 3 - Risk 3-dose series) Never done    Cervical cancer screen  Never done    Colorectal Cancer Screen  Never done    Shingles vaccine (1 of 2) Never done

## 2022-07-26 NOTE — PATIENT INSTRUCTIONS
Thank you   Thank you for trusting us with your healthcare needs. You may receive a survey regarding today's visit. It would help us out if you would take a few moments to provide your feedback. We value your input. Please bring in ALL medications BOTTLES, including inhalers, herbal supplements, over the counter, prescribed & non-prescribed medicine. The office would like actual medication bottles and a list.   Please note our OFFICE POLICIES:   Prior to getting your labs drawn, please check with your insurance company for benefits and eligibility of lab services. Often, insurance companies cover certain tests for preventative visits only. It is patient's responsibility to see what is covered. We are unable to change a diagnosis after the test has been performed. Lab orders will not be re-printed. Please hold onto your original lab orders and take them to your lab to be completed. If you no show your scheduled appointment three times, you will be dismissed from this practice. Reschedules must be completed 24 hours prior to your schedule appointment. If the list below has been completed, PLEASE FAX RECORDS TO OUR OFFICE @ [phone].  Once the records have been received we will update your records at our office:  Health Maintenance Due   Topic Date Due    COVID-19 Vaccine (1) Never done    Pneumococcal 0-64 years Vaccine (1 - PCV) Never done    Diabetic foot exam  Never done    Lipids  Never done    HIV screen  Never done    Diabetic microalbuminuria test  Never done    Diabetic retinal exam  Never done    Hepatitis B vaccine (1 of 3 - Risk 3-dose series) Never done    Cervical cancer screen  Never done    Colorectal Cancer Screen  Never done    Shingles vaccine (1 of 2) Never done

## 2022-07-26 NOTE — PROGRESS NOTES
James Flores (:  1970) is a 50 y.o. female,Established patient, here for evaluation of the following chief complaint(s):  2 Week Follow-Up (Chronic Conditions)         ASSESSMENT/PLAN:  1. Type 2 diabetes mellitus without complication, without long-term current use of insulin (HCC)  -     Hemoglobin A1C; Future  -     Microalbumin / Creatinine Urine Ratio; Future  2. Lipid screening  -     Lipid Panel; Future  3. Encounter for screening for HIV  -     HIV Screen; Future    Return in about 5 weeks (around 2022) for follow up labs. Ok to continue increasing lantus by 3 every 3 days until fasting glucose at 150. Will increase metformin to 1000mg daily at this time with the plan to maximize metformin dosage as patient tolerates. Consider Trulicity as patient gets insurance coverage. Will follow up in one month, pt to get labs prior to visit. (FLP, hgbA1C, urine microalbumin, and HIV screen. Subjective   SUBJECTIVE/OBJECTIVE:  HPI    Did bladder US last week. Sees urology they are going to do it again in November. Is taking lantus 133 at night. Takes metformin xr during morning. Some of the high days she states are from eating after 9. Still having some 230-240. Last lowest was 175 on Friday. She will increase her Lantus dose 3 units every 3 days until her fasting blood glucoses below 150    Review of Systems   Constitutional:  Negative for chills and fever. HENT:  Negative for congestion, rhinorrhea, sinus pressure, sinus pain and sore throat. Eyes:  Negative for redness and itching. Respiratory:  Negative for cough, shortness of breath and wheezing. Cardiovascular:  Negative for chest pain and leg swelling. Gastrointestinal:  Negative for abdominal distention, abdominal pain, blood in stool, constipation, diarrhea, nausea and vomiting. Endocrine: Negative for cold intolerance and heat intolerance. Genitourinary:  Negative for dysuria, frequency and urgency. Musculoskeletal:  Negative for arthralgias, back pain and myalgias. Skin:  Negative for rash. Neurological:  Negative for dizziness, light-headedness and headaches. Psychiatric/Behavioral:  Negative for dysphoric mood and sleep disturbance. Objective   Physical Exam  Constitutional:       Appearance: Normal appearance. She is obese. HENT:      Head: Normocephalic and atraumatic. Right Ear: External ear normal.      Left Ear: External ear normal.      Nose: Nose normal.      Mouth/Throat:      Mouth: Mucous membranes are moist.      Pharynx: Oropharynx is clear. Eyes:      Conjunctiva/sclera: Conjunctivae normal.   Cardiovascular:      Rate and Rhythm: Normal rate and regular rhythm. Pulses: Normal pulses. Heart sounds: Normal heart sounds. Pulmonary:      Effort: Pulmonary effort is normal.      Breath sounds: Normal breath sounds. Abdominal:      General: Abdomen is flat. Bowel sounds are normal.      Palpations: Abdomen is soft. Musculoskeletal:         General: Normal range of motion. Cervical back: Normal range of motion and neck supple. Right lower leg: No edema. Left lower leg: No edema. Skin:     General: Skin is warm. Neurological:      Mental Status: She is alert. Mental status is at baseline. Psychiatric:         Mood and Affect: Mood normal.         Behavior: Behavior normal.         Thought Content: Thought content normal.         Judgment: Judgment normal.              An electronic signature was used to authenticate this note.     --Ruta Meigs, MD

## 2022-07-28 ENCOUNTER — TELEPHONE (OUTPATIENT)
Dept: UROLOGY | Age: 52
End: 2022-07-28

## 2022-07-28 DIAGNOSIS — E11.9 TYPE 2 DIABETES MELLITUS WITHOUT COMPLICATION, WITHOUT LONG-TERM CURRENT USE OF INSULIN (HCC): ICD-10-CM

## 2022-07-28 RX ORDER — METOPROLOL TARTRATE 100 MG/1
TABLET ORAL
Qty: 60 TABLET | Refills: 1 | Status: SHIPPED | OUTPATIENT
Start: 2022-07-28 | End: 2022-09-01 | Stop reason: SDUPTHER

## 2022-07-28 RX ORDER — METFORMIN HYDROCHLORIDE 500 MG/1
TABLET, EXTENDED RELEASE ORAL
Qty: 30 TABLET | Refills: 1 | Status: SHIPPED | OUTPATIENT
Start: 2022-07-28 | End: 2022-09-01 | Stop reason: SDUPTHER

## 2022-07-28 NOTE — TELEPHONE ENCOUNTER
Patient's last appointment was : 7/26/2022  Patient's next appointment is :   Future Appointments   Date Time Provider Hung Giron   9/7/2022  4:20 PM Martín Zelaya MD SRPX [facility] [place]   11/8/2022  4:00 PM STR ULTRASOUND RM 2 STRZ US STR Radiolog   11/8/2022  4:30 PM STR ULTRASOUND RM 2 STRZ US STR Radiolog   11/14/2022  3:00 PM Mary Campbell  [facility]     Last refilled:7/14/22    Lab Results   Component Value Date    LABA1C 12.3 (H) 05/30/2022     No results found for: CHOL, TRIG, HDL, LDLCALC, LDLDIRECT  Lab Results   Component Value Date     06/28/2022    K 3.5 06/28/2022     06/28/2022    CO2 24 06/28/2022    BUN 8 06/28/2022    CREATININE 0.5 06/28/2022    GLUCOSE 127 (H) 06/28/2022    CALCIUM 9.3 06/28/2022    PROT 8.1 (H) 06/25/2022    LABALBU 4.4 06/25/2022    BILITOT 0.5 06/25/2022    ALKPHOS 100 06/25/2022    AST 14 06/25/2022    ALT 17 06/25/2022    LABGLOM >90 06/28/2022     Lab Results   Component Value Date    TSH 1.930 06/02/2022    T4FREE 0.99 06/02/2022     Lab Results   Component Value Date    WBC 5.8 06/28/2022    HGB 11.5 (L) 06/28/2022    HCT 34.1 (L) 06/28/2022    MCV 86.3 06/28/2022     06/28/2022

## 2022-08-01 ENCOUNTER — TELEPHONE (OUTPATIENT)
Dept: FAMILY MEDICINE CLINIC | Age: 52
End: 2022-08-01

## 2022-08-01 DIAGNOSIS — Z79.4 TYPE 2 DIABETES MELLITUS WITH HYPERGLYCEMIA, WITH LONG-TERM CURRENT USE OF INSULIN (HCC): Primary | ICD-10-CM

## 2022-08-01 DIAGNOSIS — E11.65 TYPE 2 DIABETES MELLITUS WITH HYPERGLYCEMIA, WITH LONG-TERM CURRENT USE OF INSULIN (HCC): Primary | ICD-10-CM

## 2022-08-01 NOTE — TELEPHONE ENCOUNTER
Reviewed chart, appears switched to ER after recent hospitalization. Was tolerating metformin in past.   Sent in 1g BID metformin IR due to insurance reasons. Will follow closely for possible side effects. Can adjust regimen at next visit.

## 2022-08-01 NOTE — TELEPHONE ENCOUNTER
Patient is enrolled in the free medication program but they will only cover metformin IR not ER.  Can we switch her to the metformin IR 2 tabs BID so she can get her medication at no cost? Please advise

## 2022-09-01 DIAGNOSIS — E11.9 TYPE 2 DIABETES MELLITUS WITHOUT COMPLICATION, WITHOUT LONG-TERM CURRENT USE OF INSULIN (HCC): ICD-10-CM

## 2022-09-01 NOTE — TELEPHONE ENCOUNTER
Taty Clarke called requesting a refill on the following medications:  Requested Prescriptions     Pending Prescriptions Disp Refills    D-Mannose 500 MG CAPS 30 capsule 6     Sig: Take 1 tablet by mouth daily     Pharmacy verified:  .[email or web address]      Date of last visit: 07/25/2022  Date of next visit (if applicable): 63/94/8075

## 2022-09-02 RX ORDER — HYDROCHLOROTHIAZIDE 50 MG/1
25 TABLET ORAL DAILY
Qty: 15 TABLET | Refills: 3 | Status: SHIPPED | OUTPATIENT
Start: 2022-09-02 | End: 2022-10-02 | Stop reason: SDUPTHER

## 2022-09-02 RX ORDER — INSULIN GLARGINE 100 [IU]/ML
25 INJECTION, SOLUTION SUBCUTANEOUS NIGHTLY
Qty: 7.5 ML | Refills: 3 | Status: SHIPPED | OUTPATIENT
Start: 2022-09-02 | End: 2022-10-02 | Stop reason: SDUPTHER

## 2022-09-02 RX ORDER — METOPROLOL TARTRATE 100 MG/1
100 TABLET ORAL 2 TIMES DAILY
Qty: 60 TABLET | Refills: 2 | Status: SHIPPED | OUTPATIENT
Start: 2022-09-02 | End: 2022-10-02 | Stop reason: SDUPTHER

## 2022-09-02 RX ORDER — METFORMIN HYDROCHLORIDE 500 MG/1
1000 TABLET, EXTENDED RELEASE ORAL
Qty: 60 TABLET | Refills: 2 | Status: SHIPPED | OUTPATIENT
Start: 2022-09-02 | End: 2022-10-02 | Stop reason: SDUPTHER

## 2022-09-02 RX ORDER — FELODIPINE 10 MG/1
10 TABLET, EXTENDED RELEASE ORAL DAILY
Qty: 30 TABLET | Refills: 3 | Status: SHIPPED | OUTPATIENT
Start: 2022-09-02 | End: 2022-10-02 | Stop reason: SDUPTHER

## 2022-09-02 RX ORDER — VALSARTAN 160 MG/1
160 TABLET ORAL DAILY
Qty: 30 TABLET | Refills: 3 | Status: SHIPPED | OUTPATIENT
Start: 2022-09-02 | End: 2022-10-02 | Stop reason: SDUPTHER

## 2022-09-02 NOTE — TELEPHONE ENCOUNTER
Patient's last appointment was : 7/26/2022  Patient's next appointment is :   Future Appointments   Date Time Provider Hung Giron   9/7/2022  4:20 PM Lesli Pathak MD SRPX [facility] - [place]   11/8/2022  4:00 PM STR ULTRASOUND RM 2 STRZ US STR Radiolog   11/8/2022  4:30 PM STR ULTRASOUND RM 2 STRZ US STR Radiolog   11/14/2022  3:00 PM Kiersten Lincoln  [facility]     Last refilled:7/14/22    Lab Results   Component Value Date    LABA1C 12.3 (H) 05/30/2022     No results found for: CHOL, TRIG, HDL, LDLCALC, LDLDIRECT  Lab Results   Component Value Date     06/28/2022    K 3.5 06/28/2022     06/28/2022    CO2 24 06/28/2022    BUN 8 06/28/2022    CREATININE 0.5 06/28/2022    GLUCOSE 127 (H) 06/28/2022    CALCIUM 9.3 06/28/2022    PROT 8.1 (H) 06/25/2022    LABALBU 4.4 06/25/2022    BILITOT 0.5 06/25/2022    ALKPHOS 100 06/25/2022    AST 14 06/25/2022    ALT 17 06/25/2022    LABGLOM >90 06/28/2022     Lab Results   Component Value Date    TSH 1.930 06/02/2022    T4FREE 0.99 06/02/2022     Lab Results   Component Value Date    WBC 5.8 06/28/2022    HGB 11.5 (L) 06/28/2022    HCT 34.1 (L) 06/28/2022    MCV 86.3 06/28/2022     06/28/2022

## 2022-09-02 NOTE — TELEPHONE ENCOUNTER
Patient's last appointment was : 7/26/2022  Patient's next appointment is :   Future Appointments   Date Time Provider Hung Giron   9/7/2022  4:20 PM [facility]   11/8/2022  4:00 PM STR ULTRASOUND RM 2 STRZ US STR Radiolog   11/8/2022  4:30 PM STR ULTRASOUND RM 2 STRZ US STR Radiolog   11/14/2022  3:00 PM Lela Nassar  [facility]     Last refilled:7/28/22    Lab Results   Component Value Date    LABA1C 12.3 (H) 05/30/2022     No results found for: CHOL, TRIG, HDL, LDLCALC, LDLDIRECT  Lab Results   Component Value Date     06/28/2022    K 3.5 06/28/2022     06/28/2022    CO2 24 06/28/2022    BUN 8 06/28/2022    CREATININE 0.5 06/28/2022    GLUCOSE 127 (H) 06/28/2022    CALCIUM 9.3 06/28/2022    PROT 8.1 (H) 06/25/2022    LABALBU 4.4 06/25/2022    BILITOT 0.5 06/25/2022    ALKPHOS 100 06/25/2022    AST 14 06/25/2022    ALT 17 06/25/2022    LABGLOM >90 06/28/2022     Lab Results   Component Value Date    TSH 1.930 06/02/2022    T4FREE 0.99 06/02/2022     Lab Results   Component Value Date    WBC 5.8 06/28/2022    HGB 11.5 (L) 06/28/2022    HCT 34.1 (L) 06/28/2022    MCV 86.3 06/28/2022     06/28/2022

## 2022-09-08 ENCOUNTER — TELEPHONE (OUTPATIENT)
Dept: FAMILY MEDICINE CLINIC | Age: 52
End: 2022-09-08

## 2022-09-08 NOTE — LETTER
[address] [place] [address],[address] [facility]  [address] [facility],[address]  Phone: [phone]  Fax: [phone]    Bruna Farnsworth MD        September 8, 2022     [address] [place] [address] [address]      Dear Remington Kennedy: We missed seeing you for a scheduled appointment at [place] [address] with Bruna Farnsworth MD on 09/07/2022. We're sorry you were unable to keep your appointment and hope that you are doing well. We ask that you please call 24 hours in advance if you are unable to make your appointment, so that we can give that time to another patient in need. We care about you and the management of your healthcare and want to make sure that you follow up as recommended. To provide quality care and timely appointments to all our patients, you may be dismissed from the practice if you do not show for three (3) scheduled appointments within a 12-month period. We would like to continue treating your healthcare needs. Please call the office to reschedule your appointment, if needed.      Sincerely,        Bruna Farnsworth MD

## 2022-10-02 DIAGNOSIS — E11.9 TYPE 2 DIABETES MELLITUS WITHOUT COMPLICATION, WITHOUT LONG-TERM CURRENT USE OF INSULIN (HCC): ICD-10-CM

## 2022-10-03 RX ORDER — FELODIPINE 10 MG/1
10 TABLET, EXTENDED RELEASE ORAL DAILY
Qty: 30 TABLET | Refills: 0 | Status: SHIPPED | OUTPATIENT
Start: 2022-10-03 | End: 2022-11-13 | Stop reason: SDUPTHER

## 2022-10-03 RX ORDER — INSULIN GLARGINE 100 [IU]/ML
25 INJECTION, SOLUTION SUBCUTANEOUS NIGHTLY
Qty: 7.5 ML | Refills: 0 | Status: SHIPPED | OUTPATIENT
Start: 2022-10-03 | End: 2022-11-02

## 2022-10-03 RX ORDER — HYDROCHLOROTHIAZIDE 50 MG/1
25 TABLET ORAL DAILY
Qty: 15 TABLET | Refills: 0 | Status: SHIPPED | OUTPATIENT
Start: 2022-10-03 | End: 2022-11-13 | Stop reason: SDUPTHER

## 2022-10-03 RX ORDER — METFORMIN HYDROCHLORIDE 500 MG/1
1000 TABLET, EXTENDED RELEASE ORAL
Qty: 60 TABLET | Refills: 0 | Status: SHIPPED | OUTPATIENT
Start: 2022-10-03 | End: 2022-11-13 | Stop reason: SDUPTHER

## 2022-10-03 RX ORDER — VALSARTAN 160 MG/1
160 TABLET ORAL DAILY
Qty: 30 TABLET | Refills: 0 | Status: SHIPPED | OUTPATIENT
Start: 2022-10-03 | End: 2022-11-13 | Stop reason: SDUPTHER

## 2022-10-03 RX ORDER — METOPROLOL TARTRATE 100 MG/1
100 TABLET ORAL 2 TIMES DAILY
Qty: 60 TABLET | Refills: 0 | Status: SHIPPED | OUTPATIENT
Start: 2022-10-03 | End: 2022-11-13 | Stop reason: SDUPTHER

## 2022-10-03 NOTE — TELEPHONE ENCOUNTER
Please call patient to schedule an appointment and let her know she has lab work to be completed prior to appointment. Thank you! Cayetano Mead

## 2022-10-03 NOTE — TELEPHONE ENCOUNTER
Patient's last appointment was : 7/26/2022  Patient's next appointment is :   Future Appointments   Date Time Provider Hung Giron   11/8/2022  4:00 PM STR ULTRASOUND RM 2 STRZ US STR Radiolog   11/8/2022  4:30 PM STR ULTRASOUND RM 2 STRZ US STR Radiolog   11/14/2022  3:00 PM MD MARGAUX Seth [id number]     Last refilled:    Lab Results   Component Value Date    LABA1C 12.3 (H) 05/30/2022     No results found for: CHOL, TRIG, HDL, LDLCALC, LDLDIRECT  Lab Results   Component Value Date     06/28/2022    K 3.5 06/28/2022     06/28/2022    CO2 24 06/28/2022    BUN 8 06/28/2022    CREATININE 0.5 06/28/2022    GLUCOSE 127 (H) 06/28/2022    CALCIUM 9.3 06/28/2022    PROT 8.1 (H) 06/25/2022    LABALBU 4.4 06/25/2022    BILITOT 0.5 06/25/2022    ALKPHOS 100 06/25/2022    AST 14 06/25/2022    ALT 17 06/25/2022    LABGLOM >90 06/28/2022     Lab Results   Component Value Date    TSH 1.930 06/02/2022    T4FREE 0.99 06/02/2022     Lab Results   Component Value Date    WBC 5.8 06/28/2022    HGB 11.5 (L) 06/28/2022    HCT 34.1 (L) 06/28/2022    MCV 86.3 06/28/2022     06/28/2022

## 2022-10-10 ENCOUNTER — NURSE ONLY (OUTPATIENT)
Dept: LAB | Age: 52
End: 2022-10-10

## 2022-10-10 ENCOUNTER — OFFICE VISIT (OUTPATIENT)
Dept: FAMILY MEDICINE CLINIC | Age: 52
End: 2022-10-10

## 2022-10-10 VITALS
SYSTOLIC BLOOD PRESSURE: 152 MMHG | HEART RATE: 85 BPM | TEMPERATURE: 97.8 F | OXYGEN SATURATION: 96 % | WEIGHT: 213.8 LBS | BODY MASS INDEX: 37.88 KG/M2 | DIASTOLIC BLOOD PRESSURE: 90 MMHG | HEIGHT: 63 IN | RESPIRATION RATE: 16 BRPM

## 2022-10-10 DIAGNOSIS — R10.9 ACUTE LEFT FLANK PAIN: Primary | ICD-10-CM

## 2022-10-10 DIAGNOSIS — R10.9 ACUTE LEFT FLANK PAIN: ICD-10-CM

## 2022-10-10 LAB
BASOPHILS # BLD: 0.7 %
BASOPHILS ABSOLUTE: 0.1 THOU/MM3 (ref 0–0.1)
BILIRUBIN, POC: NORMAL
BLOOD URINE, POC: NORMAL
C-REACTIVE PROTEIN: 1.26 MG/DL (ref 0–1)
CLARITY, POC: CLEAR
COLOR, POC: NORMAL
EOSINOPHIL # BLD: 2.7 %
EOSINOPHILS ABSOLUTE: 0.3 THOU/MM3 (ref 0–0.4)
ERYTHROCYTE [DISTWIDTH] IN BLOOD BY AUTOMATED COUNT: 12.5 % (ref 11.5–14.5)
ERYTHROCYTE [DISTWIDTH] IN BLOOD BY AUTOMATED COUNT: 40.9 FL (ref 35–45)
GLUCOSE URINE, POC: NORMAL
HCT VFR BLD CALC: 39.5 % (ref 37–47)
HEMOGLOBIN: 13.2 GM/DL (ref 12–16)
IMMATURE GRANS (ABS): 0.04 THOU/MM3 (ref 0–0.07)
IMMATURE GRANULOCYTES: 0.4 %
KETONES, POC: NORMAL
LEUKOCYTE EST, POC: NORMAL
LYMPHOCYTES # BLD: 28.1 %
LYMPHOCYTES ABSOLUTE: 3 THOU/MM3 (ref 1–4.8)
MCH RBC QN AUTO: 29.8 PG (ref 26–33)
MCHC RBC AUTO-ENTMCNC: 33.4 GM/DL (ref 32.2–35.5)
MCV RBC AUTO: 89.2 FL (ref 81–99)
MONOCYTES # BLD: 5 %
MONOCYTES ABSOLUTE: 0.5 THOU/MM3 (ref 0.4–1.3)
NITRITE, POC: NORMAL
NUCLEATED RED BLOOD CELLS: 0 /100 WBC
PH, POC: 5.5
PLATELET # BLD: 226 THOU/MM3 (ref 130–400)
PMV BLD AUTO: 10 FL (ref 9.4–12.4)
PROTEIN, POC: NORMAL
RBC # BLD: 4.43 MILL/MM3 (ref 4.2–5.4)
SEDIMENTATION RATE, ERYTHROCYTE: 20 MM/HR (ref 0–20)
SEG NEUTROPHILS: 63.1 %
SEGMENTED NEUTROPHILS ABSOLUTE COUNT: 6.7 THOU/MM3 (ref 1.8–7.7)
SPECIFIC GRAVITY, POC: >=1.03
UROBILINOGEN, POC: NORMAL
WBC # BLD: 10.6 THOU/MM3 (ref 4.8–10.8)

## 2022-10-10 PROCEDURE — 99213 OFFICE O/P EST LOW 20 MIN: CPT | Performed by: STUDENT IN AN ORGANIZED HEALTH CARE EDUCATION/TRAINING PROGRAM

## 2022-10-10 PROCEDURE — 81002 URINALYSIS NONAUTO W/O SCOPE: CPT | Performed by: STUDENT IN AN ORGANIZED HEALTH CARE EDUCATION/TRAINING PROGRAM

## 2022-10-10 ASSESSMENT — ENCOUNTER SYMPTOMS
ABDOMINAL PAIN: 0
COUGH: 0
DIARRHEA: 0
SHORTNESS OF BREATH: 0

## 2022-10-10 NOTE — PROGRESS NOTES
[address] [facility] [address] [address]  Dept: [phone]  Dept Fax: [phone]: [phone]      Assessment & Plan   1. Acute left flank pain  U/A in office was negative. Afebrile. No dysuria. Results still sent for culture still given recent history of similar presenting flank pain w/ pyelonephritis. Hold abx for now. - patient asked to continue monitoring for constitutional symptoms and evolving pain  - obtain CBC and inflammatory markers  - Follow-up on urine culture      Return if symptoms worsen or fail to improve. History of Present Illness   Reason for Appointment:   Chief Complaint   Patient presents with    Lower Back Pain     Started this morning       Yun Faye is a 50 y.o. female who presents today for:    Mild left lower back pain/flank area. Denies fevers or chills or dysuria. Patient felt she might have a UTI w/ Pyelo as pain is somewhat similar to previous pyelo flank pain. U/A in office was negative for UTI today. Pyelonephritis was in early June, when patient was admitted and treated with iv abx and then a 3-week course of cefdinir for E. coli positive urine and blood cultures. Subsequently found to have bilateral renal abscess. Repeat ultrasound 1 month later showed resolution of abscess. Follows with urology. Has repeat ultrasound coming up and follow-up office visit in November. Review of Systems   Constitutional:  Negative for chills, fatigue and fever. Respiratory:  Negative for cough and shortness of breath. Cardiovascular:  Negative for chest pain. Gastrointestinal:  Negative for abdominal pain and diarrhea. Endocrine: Negative for polyuria. Genitourinary:  Positive for flank pain. Negative for difficulty urinating, frequency, hematuria, urgency, vaginal bleeding and vaginal discharge.       Future Appointments   Date Time Provider Hung Giron   11/8/2022 4:00 PM STR ULTRASOUND RM 2 STRZ US STR Radiolog   11/8/2022  4:30 PM STR ULTRASOUND RM 2 STRZ US STR Radiolog   11/14/2022  3:00 PM Jaydon Hdz  [facility]    Patient Active Problem List    Diagnosis Date Noted    Renal abscess, left      Priority: Medium    Depression 06/26/2022     Priority: Medium    Hepatic steatosis 06/26/2022     Priority: Medium    Iron deficiency anemia secondary to inadequate dietary iron intake 06/26/2022     Priority: Medium    Diastolic dysfunction without heart failure 06/26/2022     Priority: Medium    Sepsis due to Escherichia coli with acute hypercapnic respiratory failure without septic shock (HCC)      Priority: Medium    Acute pyelonephritis      Priority: Medium    Acute UTI      Priority: Medium    Elevated LFTs      Priority: Medium    Hyperbilirubinemia      Priority: Medium    Hypokalemia      Priority: Medium    Normocytic anemia      Priority: Medium    Thrombocytopenia (HCC)      Priority: Medium    Hyponatremia      Priority: Medium    Hydroureter on left      Priority: Medium    Leukopenia      Priority: Medium    Type 2 diabetes mellitus with hyperglycemia, with long-term current use of insulin (HCC)      Priority: Medium    Obesity (BMI 30-39. 9)      Priority: Medium    Septicemia ([address] 05/29/2022     Priority: Medium    Intermittent palpitations 02/04/2020    Hx of Sinus tachycardia 02/04/2020    Essential hypertension 11/14/2018    SOB (shortness of breath) on exertion 11/14/2018       [facility]        Procedure Laterality Date    CHOLECYSTECTOMY      ENDOMETRIAL ABLATION      TUBAL LIGATION  2001    US BREAST NEEDLE BIOPSY RIGHT Right 09/03/2014    benign       Meds    Prior to Admission medications    Medication Sig Start Date End Date Taking?  Authorizing Provider   metFORMIN (GLUCOPHAGE-XR) 500 MG extended release tablet Take 2 tablets by mouth daily (with breakfast) 10/3/22 11/2/22 Yes Jennifer Whyte MD   metoprolol (LOPRESSOR) 100 MG tablet Take 1 tablet by mouth 2 times daily 10/3/22  Yes Dorita Washburn MD   hydroCHLOROthiazide (HYDRODIURIL) 50 MG tablet Take 0.5 tablets by mouth daily 10/3/22 11/2/22 Yes Dorita Washburn MD   insulin glargine [facility]) 100 UNIT/ML injection pen Inject 25 Units into the skin nightly 10/3/22 11/2/22 Yes Dorita Washburn MD   felodipine (PLENDIL) 10 MG extended release tablet Take 1 tablet by mouth daily 10/3/22  Yes Dorita Washburn MD   valsartan (DIOVAN) 160 MG tablet Take 1 tablet by mouth daily 10/3/22  Yes Dorita Washburn MD   D-Mannose 500 MG CAPS Take 1 tablet by mouth daily 9/1/22 10/10/22 Yes Kobe Chung PA-C   Insulin Pen Needle 29G X 12.7MM MISC 1 each by Does not apply route in the morning. 7/18/22  Yes Greer Dodson DO   lactobacillus (CULTURELLE) capsule Take 1 capsule by mouth daily (with breakfast) 6/29/22  Yes Bernabe Washburn PA-C   Lancets MISC 1 each by Does not apply route daily 6/21/22  Yes Dorita Washburn MD   aspirin EC 81 MG EC tablet Take 1 tablet by mouth daily 12/30/21  Yes Dorita Washburn MD        Allergies    Penicillins    Social    Social History     Tobacco Use    Smoking status: Never    Smokeless tobacco: Never   Vaping Use    Vaping Use: Never used   Substance Use Topics    Alcohol use: Yes     Comment: occasionally    Drug use: No       Objective     Vitals:    10/10/22 1604   BP: (!) 152/90   Pulse:    Resp:    Temp:    SpO2:        Physical Exam:  GENERAL: Alert and oriented. No distress. EYES: No erythema or icterus. ENT: No thyromegaly or cervical lymphadenopathy. No JVD. HEART: Normal S1/S2. No murmur, rub or gallop. LUNGS: Clear to auscultation. ABDOMEN: Soft and non-tender. Mild tenderness to palpation in left lower back.   EXTREMITIES: no edema  NEUROLOGICAL: Grossly normal.  SKIN: no rashes    Electronically signed by Mauro Zamudio MD on 10/10/2022 at 9:39 PM

## 2022-10-10 NOTE — PATIENT INSTRUCTIONS
Where to get your labs:  3 locations: If you have imaging studies to do, go to the third option. [facility] stand-alone building with parking out front  [address] [facility]. ([employer], just across the street from [address])  [facility], [address]  P: [phone]    Hours:  Elieser Linares 6:00AM-6:00PM  Saturday 6:30AM-12:00PM      [address] [facility]  Lab on the second floor of same building where you had your family medicine appointment  [address] [facility] [address] [facility], [address]  P: [phone]    Hours: (Note restricted hours)  Mon - Thu 8:00AM-3:00PM  Closed for lunch - 12:00pm - 1:00PM  Fri - Closed    [facility] - can also get x-ray, CT scans, other imaging here  [address]  [facility], [address]  Tel: [phone]    Hours:  Kp Jesus - Fri 6am to 5pm  Saturday - 6:00 am - 12:00 pm *xray,ct, other imaging services not available at this location on weekends.   Sunday - Closed

## 2022-10-10 NOTE — PROGRESS NOTES
Health Maintenance Due   Topic Date Due    COVID-19 Vaccine (1) Never done    Pneumococcal 0-64 years Vaccine (1 - PCV) Never done    Diabetic foot exam  Never done    Lipids  Never done    HIV screen  Never done    Diabetic microalbuminuria test  Never done    Diabetic retinal exam  Never done    Hepatitis B vaccine (1 of 3 - Risk 3-dose series) Never done    Cervical cancer screen  Never done    Colorectal Cancer Screen  Never done    Shingles vaccine (1 of 2) Never done    Flu vaccine (1) Never done    A1C test (Diabetic or Prediabetic)  08/30/2022

## 2022-10-10 NOTE — PROGRESS NOTES
S: 50 y.o. female with   Chief Complaint   Patient presents with    Lower Back Pain     Started this morning       HPI: please see resident note for HPI and ROS. BP Readings from Last 3 Encounters:   10/10/22 (!) 152/90   07/26/22 138/86   07/25/22 124/70     Wt Readings from Last 3 Encounters:   10/10/22 213 lb 12.8 oz (97 kg)   07/26/22 207 lb 3.2 oz (94 kg)   07/25/22 209 lb (94.8 kg)       O: VS:  height is 5' 3\" (1.6 m) and weight is 213 lb 12.8 oz (97 kg). Her oral temperature is 97.8 °F (36.6 °C). Her blood pressure is 152/90 (abnormal) and her pulse is 85. Her respiration is 16 and oxygen saturation is 96%. AAO/NAD, appropriate affect for mood  CV:  RRR, no murmur  Resp: CTAB     Diagnosis Orders   1. Acute left flank pain  CBC with Auto Differential    C-Reactive Protein    Sedimentation Rate    Culture, Urine          Plan:  Please refer to resident note for full plan. 82-year-old female presents the office for concerns of new onset left flank pain. Patient does have a pertinent history of pyelonephritis with renal abscess treated in June 2022. Patient was initially hospitalized treated with 30 days of Omnicef and then returned to the hospital secondary to renal abscesses treated with IV cefepime and finished off p.o. Omnicef. Since that time symptoms have resolved and patient doing well. With past couple days patient has noticed new onset left flank pain that is starting to feel similar to that it did previously. Patient plans on following up with urology next month but since symptoms started she is interested in being evaluated today. Patient's point-of-care urinalysis in the office negative. We will send urine for culture and obtain blood work to check for inflammation/white count. We will call patient with results. At this time we will hold off on initiation of antibiotic therapy.       Health Maintenance Due   Topic Date Due    COVID-19 Vaccine (1) Never done    Pneumococcal 0-64 years Vaccine (1 - PCV) Never done    Diabetic foot exam  Never done    Lipids  Never done    HIV screen  Never done    Diabetic microalbuminuria test  Never done    Diabetic retinal exam  Never done    Hepatitis B vaccine (1 of 3 - Risk 3-dose series) Never done    Cervical cancer screen  Never done    Colorectal Cancer Screen  Never done    Shingles vaccine (1 of 2) Never done    Flu vaccine (1) Never done    A1C test (Diabetic or Prediabetic)  08/30/2022       Attending Physician Statement  I have discussed the case, including pertinent history and exam findings with the resident. I agree with the documented assessment and plan as documented by the resident.   LISA Lundberg DO 10/12/2022 8:01 AM

## 2022-10-11 LAB
ORGANISM: ABNORMAL
URINE CULTURE, ROUTINE: ABNORMAL

## 2022-10-24 ENCOUNTER — HOSPITAL ENCOUNTER (EMERGENCY)
Age: 52
Discharge: HOME | End: 2022-10-24

## 2022-10-24 VITALS
SYSTOLIC BLOOD PRESSURE: 145 MMHG | RESPIRATION RATE: 20 BRPM | TEMPERATURE: 98.3 F | OXYGEN SATURATION: 97 % | DIASTOLIC BLOOD PRESSURE: 84 MMHG | HEART RATE: 74 BPM

## 2022-10-24 DIAGNOSIS — L60.0 INGROWING NAIL, LEFT GREAT TOE: Primary | ICD-10-CM

## 2022-10-24 DIAGNOSIS — L08.9 TOE INFECTION: ICD-10-CM

## 2022-10-24 PROCEDURE — 99283 EMERGENCY DEPT VISIT LOW MDM: CPT

## 2022-10-24 PROCEDURE — 6370000000 HC RX 637 (ALT 250 FOR IP): Performed by: NURSE PRACTITIONER

## 2022-10-24 RX ORDER — SULFAMETHOXAZOLE AND TRIMETHOPRIM 800; 160 MG/1; MG/1
1 TABLET ORAL 2 TIMES DAILY
Qty: 14 TABLET | Refills: 0 | Status: SHIPPED | OUTPATIENT
Start: 2022-10-24 | End: 2022-10-31

## 2022-10-24 RX ORDER — CEPHALEXIN 500 MG/1
500 CAPSULE ORAL 4 TIMES DAILY
Qty: 28 CAPSULE | Refills: 0 | Status: SHIPPED | OUTPATIENT
Start: 2022-10-24 | End: 2022-10-31

## 2022-10-24 RX ORDER — SULFAMETHOXAZOLE AND TRIMETHOPRIM 800; 160 MG/1; MG/1
1 TABLET ORAL ONCE
Status: COMPLETED | OUTPATIENT
Start: 2022-10-24 | End: 2022-10-24

## 2022-10-24 RX ORDER — CEPHALEXIN 250 MG/1
500 CAPSULE ORAL ONCE
Status: COMPLETED | OUTPATIENT
Start: 2022-10-24 | End: 2022-10-24

## 2022-10-24 RX ADMIN — SULFAMETHOXAZOLE AND TRIMETHOPRIM 1 TABLET: 800; 160 TABLET ORAL at 14:48

## 2022-10-24 RX ADMIN — CEPHALEXIN 500 MG: 250 CAPSULE ORAL at 14:48

## 2022-10-24 ASSESSMENT — PAIN DESCRIPTION - DESCRIPTORS: DESCRIPTORS: ACHING

## 2022-10-24 ASSESSMENT — PAIN DESCRIPTION - LOCATION: LOCATION: TOE (COMMENT WHICH ONE)

## 2022-10-24 ASSESSMENT — ENCOUNTER SYMPTOMS
NAUSEA: 0
VOMITING: 0
COLOR CHANGE: 1

## 2022-10-24 ASSESSMENT — PAIN - FUNCTIONAL ASSESSMENT: PAIN_FUNCTIONAL_ASSESSMENT: 0-10

## 2022-10-24 ASSESSMENT — PAIN SCALES - GENERAL: PAINLEVEL_OUTOF10: 5

## 2022-10-24 NOTE — ED NOTES
Patient to ED for left ingrown toe nail on her big toe.  patient states it started hurting a week      Jennifer Pedraza RN  10/24/22 0662

## 2022-10-24 NOTE — Clinical Note
Nakia Ortiz was seen and treated in our emergency department on 10/24/2022. She may return to work on 10/26/2022. If you have any questions or concerns, please don't hesitate to call.       Segundo Goddard, APRN - CNP

## 2022-10-24 NOTE — CONSULTS
Podiatric Surgery Consult    Patient [address] [facility] RECORD NUMBER:  [id number]  AGE: 50 y.o. GENDER: female  : 1970  EPISODE DATE:  10/24/2022    Reason for Consult:  Ingrown toenail, left 1st toe    Requesting Physician:  MARILOU Munguia    CHIEF COMPLAINT:  Ingrown toenail    HISTORY OF PRESENT ILLNESS:                The patient is a 50 y.o. female with significant past medical history of diabetes who is being seen at bedside on behalf of Dr Dianelys Quinonez. Patient states symptoms began about 1 week ago after trimming her left big toenail. Denies any causative trauma. Wears steel toed boots for work, which exacerbates her symptoms. Relates some pain and periodic purulent drainage, and notes that redness has worsened since 3-4 days ago. Denies any pain currently while at rest. Has been soaking with Epsom salts to some mild improvement. Denies any nausea, vomiting, fever, chills, chest pain, or shortness of breath. No other pedal concerns at this point in time. Past Medical History:    Past Medical History:   Diagnosis Date    COVID-19 2021    Diabetes mellitus ([address]     Hypertension         Past Surgical History:    Past Surgical History:   Procedure Laterality Date    CHOLECYSTECTOMY      ENDOMETRIAL ABLATION      TUBAL LIGATION       BREAST NEEDLE BIOPSY RIGHT Right 2014    benign        Current Medications:    No current facility-administered medications for this encounter.     Allergies:  Penicillins    Social History:    Social History     Socioeconomic History    Marital status: Legally    Tobacco Use    Smoking status: Never    Smokeless tobacco: Never   Vaping Use    Vaping Use: Never used   Substance and Sexual Activity    Alcohol use: Yes     Comment: occasionally    Drug use: No     Social Determinants of Health     Financial Resource Strain: Low Risk     Difficulty of Paying Living Expenses: Not hard at all   Food Insecurity: No Food Insecurity    Worried About Running Out of Food in the Last Year: Never true    Ran Out of Food in the Last Year: Never true       Family History:   family history includes Alcohol Abuse in her father; Bleeding Prob in her mother; Clotting Disorder in her mother; Depression in her mother and sister; Diabetes in her mother and sister; Heart Disease in her mother; High Blood Pressure in her mother and sister; High Cholesterol in her mother; Harolyn Jadynnen / [place] in her sister; Substance Abuse in her father. REVIEW OF SYSTEMS:    ROS as noted in HPI    PHYSICAL EXAM:      Vitals:    BP (!) 145/84   Pulse 74   Temp 98.3 °F (36.8 °C)   Resp 20   SpO2 97%     Exam:     Vascular: Dorsalis pedis and posterior tibial pulses are palpable bilaterally. Skin temperature is warm to warm from proximal tibial tuberosity to distal digits. CFT brisk to exposed digits. Edema present to left 1st toe. Quality of skin normotrophic. Dermatologic: Left 1st toenail is incurvated, with more significant curvature on the lateral border compared to the medial border. Small, dry eschar noted to lateral border with surrounding hyperkeratosis, erythema, and edema. No active drainage noted. Wound does not probe to bone. Neurovascular: Numbness noted to distal aspect of left 1st toe, extending proximally to interphalangeal joint. Light touch and gross sensation intact elsewhere to the lower extremities. Musculoskeletal: Muscle strength 5/5 for all four pedal groups. ROM within normal limits. Mild pain on palpation to left 1st toe. IMAGING    No orders to display        LABS: No results for input(s): WBC, HGB, HCT, PLT in the last 72 hours. No results for input(s): NA, K, CL, CO2, PHOS, BUN, CREATININE, CA in the last 72 hours. No results for input(s): PROT, INR, APTT in the last 72 hours. No results for input(s): CKTOTAL, CKMB, CKMBINDEX, TROPONINI in the last 72 hours.     Treatment:   Orders Placed This Encounter   Procedures [employer] Post Op Shoe, Unisex - Left; MD (M7.5-9/F8.5-10)     Standing Status:   Standing     Number of Occurrences:   1     Order Specific Question:   Equipment:     Answer:   Post Op Shoe, Unisex - Left     Order Specific Question:   Size     Answer:   MD (M7.5-9/F8.5-10)       Assessment: 50 y.o. female with:    Chronic  Patient Active Problem List   Diagnosis    Essential hypertension    SOB (shortness of breath) on exertion    Intermittent palpitations    Hx of Sinus tachycardia    Septicemia (HCC)    Sepsis due to Escherichia coli with acute hypercapnic respiratory failure without septic shock (HCC)    Acute pyelonephritis    Acute UTI    Elevated LFTs    Hyperbilirubinemia    Hypokalemia    Normocytic anemia    Thrombocytopenia (HCC)    Hyponatremia    Hydroureter on left    Leukopenia    Type 2 diabetes mellitus with hyperglycemia, with long-term current use of insulin (HCC)    Obesity (BMI 30-39. 9)    Depression    Hepatic steatosis    Iron deficiency anemia secondary to inadequate dietary iron intake    Diastolic dysfunction without heart failure    Renal abscess, left     Principle:  Ingrown toenail, left 1st toe    Plan    - Patient initially examined and evaluated  - Patient afebrile  - PO keflex and bactrim  - Weightbearing as tolerated in post-op shoe  - Follow up outpatient with Dr Dianelys Quinonez  - Patient verbalized understanding and agreement with the treatment plan as stated. All of their questions were answered to their satisfaction. DISPO: OK to discharge, call to follow up in clinic tomorrow with Dr Dianelys Quinonez. Thank you for the consultation allowing podiatry to assist in the medical welfare of this patient. Podiatry will continue to follow this patient throughout the duration of hospitalization.      Tracy Arizmendi DPM -PGY1  10/24/2022 2:57 PM

## 2022-10-24 NOTE — ED PROVIDER NOTES
[facility] [address] [place] [address]       Chief Complaint   Patient presents with    Ingrown Toenail     Left foot       Nurses Notes reviewed and I agree except as noted in the HPI. HISTORY OF PRESENT ILLNESS    Ulysses Kauffman is a 50 y.o. female who presents to the ED for evaluation of ingrown toenail. Patient notes symptoms began about a week ago. She noted pain to the left big toe. Noted ingrown toenail. Notes that she wears steel toed boots. Notes that symptoms seem to improve when boots were not being worn, she did not have to wear them throughout the weekend. When she went back to work today symptoms increased. She notes she has been using Epson salts, with mild improvement. She notes redness to the big toe. And some periodic drainage. She notes a history of diabetes but she denies any diabetic neuropathy. She denies any traumatic injury to the toe. She denies any systemic symptoms associated with infection. HPI was provided by the patient. REVIEW OF SYSTEMS     Review of Systems   Constitutional:  Negative for activity change, chills, fatigue and fever. Gastrointestinal:  Negative for nausea and vomiting. Genitourinary:  Negative for decreased urine volume. Musculoskeletal:  Negative for arthralgias, gait problem and joint swelling. Skin:  Positive for color change. Negative for wound. Allergic/Immunologic: Negative for immunocompromised state. Neurological:  Negative for weakness and light-headedness. Hematological:  Does not bruise/bleed easily. Psychiatric/Behavioral:  Negative for agitation, behavioral problems and confusion. PAST MEDICAL HISTORY     Past Medical History:   Diagnosis Date    COVID-19 11/2021    Diabetes mellitus ([facility] [address]     Hypertension        SURGICALHISTORY      has a past surgical history that includes Cholecystectomy;  Endometrial ablation; US BREAST BIOPSY W LOC DEVICE 1ST LESION RIGHT (Right, 09/03/2014); and Tubal ligation (). CURRENT MEDICATIONS       Discharge Medication List as of 10/24/2022  2:55 PM        CONTINUE these medications which have NOT CHANGED    Details   metFORMIN (GLUCOPHAGE-XR) 500 MG extended release tablet Take 2 tablets by mouth daily (with breakfast), Disp-60 tablet, R-0Normal      metoprolol (LOPRESSOR) 100 MG tablet Take 1 tablet by mouth 2 times daily, Disp-60 tablet, R-0Normal      hydroCHLOROthiazide (HYDRODIURIL) 50 MG tablet Take 0.5 tablets by mouth daily, Disp-15 tablet, R-0Normal      insulin glargine (BASAGLAR KWIKPEN) 100 UNIT/ML injection pen Inject 25 Units into the skin nightly, Disp-7.5 mL, R-0Normal      felodipine (PLENDIL) 10 MG extended release tablet Take 1 tablet by mouth daily, Disp-30 tablet, R-0Normal      valsartan (DIOVAN) 160 MG tablet Take 1 tablet by mouth daily, Disp-30 tablet, R-0Normal      D-Mannose 500 MG CAPS Take 1 tablet by mouth daily, Disp-30 capsule, R-0Normal      Insulin Pen Needle 29G X 12.7MM MISC DAILY Starting Mon 2022, Disp-100 each, R-3, Normal      lactobacillus (CULTURELLE) capsule Take 1 capsule by mouth daily (with breakfast), Disp-30 capsule, R-0Normal      Lancets MISC DAILY Starting Tue 2022, Disp-100 each, R-3, Normal      aspirin EC 81 MG EC tablet Take 1 tablet by mouth daily, Disp-90 tablet, R-1Normal             ALLERGIES     is allergic to penicillins. FAMILY HISTORY     She indicated that her mother is . She indicated that her father is alive. She indicated that her sister is alive. family history includes Alcohol Abuse in her father; Bleeding Prob in her mother; Clotting Disorder in her mother; Depression in her mother and sister; Diabetes in her mother and sister; Heart Disease in her mother; High Blood Pressure in her mother and sister; High Cholesterol in her mother; Charles Mowers / Sandria Gosling in her sister; Substance Abuse in her father.     SOCIAL HISTORY       Social History     Socioeconomic History Marital status: Legally      Spouse name: Not on file    Number of children: Not on file    Years of education: Not on file    Highest education level: Not on file   Occupational History    Not on file   Tobacco Use    Smoking status: Never    Smokeless tobacco: Never   Vaping Use    Vaping Use: Never used   Substance and Sexual Activity    Alcohol use: Yes     Comment: occasionally    Drug use: No    Sexual activity: Not on file   Other Topics Concern    Not on file   Social History Narrative    Not on file     Social Determinants of Health     Financial Resource Strain: Low Risk     Difficulty of Paying Living Expenses: Not hard at all   Food Insecurity: No Food Insecurity    Worried About Running Out of Food in the Last Year: Never true    Ran Out of Food in the Last Year: Never true   Transportation Needs: Not on file   Physical Activity: Not on file   Stress: Not on file   Social Connections: Not on file   Intimate Partner Violence: Not on file   Housing Stability: Not on file       PHYSICAL EXAM     INITIAL VITALS:  temperature is 98.3 °F (36.8 °C). Her blood pressure is 145/84 (abnormal) and her pulse is 74. Her respiration is 20 and oxygen saturation is 97%. Physical Exam  Vitals and nursing note reviewed. Constitutional:       Appearance: Normal appearance. She is well-developed. She is obese. HENT:      Head: Normocephalic. Mouth/Throat:      Pharynx: Uvula midline. Eyes:      Conjunctiva/sclera: Conjunctivae normal.   Cardiovascular:      Rate and Rhythm: Normal rate. Pulmonary:      Effort: Pulmonary effort is normal.   Musculoskeletal:         General: Normal range of motion. Cervical back: Normal range of motion and neck supple. Left foot: Normal range of motion and normal capillary refill. Tenderness present. Comments: Ingrown toenail with surrounding erythema to left big toe   Lymphadenopathy:      Cervical: No cervical adenopathy.    Skin:     General: Skin is warm and dry. Findings: Erythema present. Neurological:      Mental Status: She is alert and oriented to person, place, and time. Psychiatric:         Speech: Speech normal.         Behavior: Behavior normal.         Thought Content: Thought content normal.         DIFFERENTIAL DIAGNOSIS:   Ingrown toenail, cellulitis, diabetic foot ulcer, gangrene, osteonecrosis    DIAGNOSTIC RESULTS       RADIOLOGY: non-plainfilm images(s) such as CT, Ultrasound and MRI are read by the radiologist.  Plain radiographic images are visualized and preliminarily interpreted by the emergency physician unless otherwise stated below. No orders to display         LABS:   Labs Reviewed - No data to display    EMERGENCY DEPARTMENT COURSE:   Vitals:    Vitals:    10/24/22 1338   BP: (!) 145/84   Pulse: 74   Resp: 20   Temp: 98.3 °F (36.8 °C)   SpO2: 97%       MDM    Patient was seen and evaluated in the emergency department, patient appeared to be in no acute distress, vital signs reviewed, no significant findings are noted. Physical exam was completed, redness and numbness noted to left big toe, appears to be consistent with a ingrown toenail. Concerning due to numbness to toe. Discussed the case with on-call podiatrist resident Rudie Felty NG, he came and evaluated the patient. They will see in office tomorrow. Will place patient on Keflex and Bactrim in the meantime. Patient is agreeable with this plan of care. She is also placed in a postop shoe for protection of the toe. Medications   sulfamethoxazole-trimethoprim (BACTRIM DS;SEPTRA DS) 800-160 MG per tablet 1 tablet (1 tablet Oral Given 10/24/22 1448)   cephALEXin (KEFLEX) capsule 500 mg (500 mg Oral Given 10/24/22 1448)       Patient was seenindependently by myself. The patient's final impression and disposition and plan was determined by myself. CRITICAL CARE:   None    CONSULTS:  Podiatry    PROCEDURES:  None    FINAL IMPRESSION     1.  Ingrowing nail, left great toe 2. Toe infection          DISPOSITION/PLAN   Patient discharged    PATIENT REFERREDTO:  Irving Burns [address]  [facility] [address] [address]  [phone]    Go in 1 day  For follow up and evaluation    DISCHARGE MEDICATIONS:  Discharge Medication List as of 10/24/2022  2:55 PM        START taking these medications    Details   cephALEXin (KEFLEX) 500 MG capsule Take 1 capsule by mouth 4 times daily for 7 days, Disp-28 capsule, R-0Normal      sulfamethoxazole-trimethoprim (BACTRIM DS) 800-160 MG per tablet Take 1 tablet by mouth 2 times daily for 7 days, Disp-14 tablet, R-0Normal             (Please note that portions of this note were completed with a voice recognition program.  Efforts were made to edit the dictations but occasionally words are mis-transcribed.)        Provider:  I personally performed the services described in the documentation,reviewed and edited the documentation which was dictated to the scribe in my presence, and it accurately records my words and actions.     Segundo Goddard CNP 10/24/22 8:28 PM    aPdilla Goddard, APRN - CNP         [place] Runway Thief, APRN - CNP  10/24/22 2029

## 2022-11-13 DIAGNOSIS — E11.9 TYPE 2 DIABETES MELLITUS WITHOUT COMPLICATION, WITHOUT LONG-TERM CURRENT USE OF INSULIN (HCC): ICD-10-CM

## 2022-11-14 RX ORDER — METFORMIN HYDROCHLORIDE 500 MG/1
1000 TABLET, EXTENDED RELEASE ORAL
Qty: 60 TABLET | Refills: 0 | Status: SHIPPED | OUTPATIENT
Start: 2022-11-14 | End: 2022-12-14

## 2022-11-14 RX ORDER — FELODIPINE 10 MG/1
10 TABLET, EXTENDED RELEASE ORAL DAILY
Qty: 30 TABLET | Refills: 0 | Status: SHIPPED | OUTPATIENT
Start: 2022-11-14

## 2022-11-14 RX ORDER — VALSARTAN 160 MG/1
160 TABLET ORAL DAILY
Qty: 30 TABLET | Refills: 0 | Status: SHIPPED | OUTPATIENT
Start: 2022-11-14

## 2022-11-14 RX ORDER — METOPROLOL TARTRATE 100 MG/1
100 TABLET ORAL 2 TIMES DAILY
Qty: 60 TABLET | Refills: 0 | Status: SHIPPED | OUTPATIENT
Start: 2022-11-14

## 2022-11-14 RX ORDER — HYDROCHLOROTHIAZIDE 50 MG/1
25 TABLET ORAL DAILY
Qty: 15 TABLET | Refills: 0 | Status: SHIPPED | OUTPATIENT
Start: 2022-11-14 | End: 2022-12-14

## 2022-11-14 NOTE — TELEPHONE ENCOUNTER
Patient's last appointment was : 10/10/2022  Patient's next appointment is :   Future Appointments   Date Time Provider Hung Giron   11/14/2022  3:00 PM Akilah Nettles, [address] [facility] [address]     Last refilled:    Lab Results   Component Value Date    LABA1C 12.3 (H) 05/30/2022     No results found for: CHOL, TRIG, HDL, LDLCALC, LDLDIRECT  Lab Results   Component Value Date     06/28/2022    K 3.5 06/28/2022     06/28/2022    CO2 24 06/28/2022    BUN 8 06/28/2022    CREATININE 0.5 06/28/2022    GLUCOSE 127 (H) 06/28/2022    CALCIUM 9.3 06/28/2022    PROT 8.1 (H) 06/25/2022    LABALBU 4.4 06/25/2022    BILITOT 0.5 06/25/2022    ALKPHOS 100 06/25/2022    AST 14 06/25/2022    ALT 17 06/25/2022    LABGLOM >90 06/28/2022     Lab Results   Component Value Date    TSH 1.930 06/02/2022    T4FREE 0.99 06/02/2022     Lab Results   Component Value Date    WBC 10.6 10/10/2022    HGB 13.2 10/10/2022    HCT 39.5 10/10/2022    MCV 89.2 10/10/2022     10/10/2022

## 2022-12-21 ENCOUNTER — OFFICE VISIT (OUTPATIENT)
Dept: FAMILY MEDICINE CLINIC | Age: 52
End: 2022-12-21

## 2022-12-21 VITALS
HEIGHT: 63 IN | RESPIRATION RATE: 16 BRPM | WEIGHT: 216.4 LBS | OXYGEN SATURATION: 98 % | TEMPERATURE: 97 F | DIASTOLIC BLOOD PRESSURE: 90 MMHG | SYSTOLIC BLOOD PRESSURE: 152 MMHG | BODY MASS INDEX: 38.34 KG/M2 | HEART RATE: 85 BPM

## 2022-12-21 DIAGNOSIS — I10 ESSENTIAL HYPERTENSION: ICD-10-CM

## 2022-12-21 DIAGNOSIS — R53.83 OTHER FATIGUE: ICD-10-CM

## 2022-12-21 DIAGNOSIS — Z12.11 SCREENING FOR MALIGNANT NEOPLASM OF COLON: ICD-10-CM

## 2022-12-21 DIAGNOSIS — E11.9 TYPE 2 DIABETES MELLITUS WITHOUT COMPLICATION, WITHOUT LONG-TERM CURRENT USE OF INSULIN (HCC): Primary | ICD-10-CM

## 2022-12-21 PROCEDURE — 83036 HEMOGLOBIN GLYCOSYLATED A1C: CPT | Performed by: STUDENT IN AN ORGANIZED HEALTH CARE EDUCATION/TRAINING PROGRAM

## 2022-12-21 PROCEDURE — 3078F DIAST BP <80 MM HG: CPT | Performed by: STUDENT IN AN ORGANIZED HEALTH CARE EDUCATION/TRAINING PROGRAM

## 2022-12-21 PROCEDURE — 3074F SYST BP LT 130 MM HG: CPT | Performed by: STUDENT IN AN ORGANIZED HEALTH CARE EDUCATION/TRAINING PROGRAM

## 2022-12-21 PROCEDURE — 99214 OFFICE O/P EST MOD 30 MIN: CPT | Performed by: STUDENT IN AN ORGANIZED HEALTH CARE EDUCATION/TRAINING PROGRAM

## 2022-12-21 PROCEDURE — 3046F HEMOGLOBIN A1C LEVEL >9.0%: CPT | Performed by: STUDENT IN AN ORGANIZED HEALTH CARE EDUCATION/TRAINING PROGRAM

## 2022-12-21 RX ORDER — INSULIN GLARGINE 100 [IU]/ML
30 INJECTION, SOLUTION SUBCUTANEOUS NIGHTLY
Qty: 9 ML | Refills: 3 | Status: SHIPPED | OUTPATIENT
Start: 2022-12-21 | End: 2023-04-20

## 2022-12-21 RX ORDER — VALSARTAN 160 MG/1
160 TABLET ORAL DAILY
Qty: 30 TABLET | Refills: 0 | Status: SHIPPED | OUTPATIENT
Start: 2022-12-21 | End: 2022-12-21

## 2022-12-21 RX ORDER — METOPROLOL TARTRATE 100 MG/1
100 TABLET ORAL 2 TIMES DAILY
Qty: 60 TABLET | Refills: 0 | Status: SHIPPED | OUTPATIENT
Start: 2022-12-21

## 2022-12-21 RX ORDER — VALSARTAN 320 MG/1
320 TABLET ORAL DAILY
Qty: 30 TABLET | Refills: 5 | Status: SHIPPED | OUTPATIENT
Start: 2022-12-21 | End: 2023-06-19

## 2022-12-21 RX ORDER — METFORMIN HYDROCHLORIDE 500 MG/1
1000 TABLET, EXTENDED RELEASE ORAL
Qty: 60 TABLET | Refills: 0 | Status: SHIPPED | OUTPATIENT
Start: 2022-12-21 | End: 2023-01-20

## 2022-12-21 RX ORDER — HYDROCHLOROTHIAZIDE 50 MG/1
50 TABLET ORAL DAILY
Qty: 30 TABLET | Refills: 3 | Status: SHIPPED | OUTPATIENT
Start: 2022-12-21 | End: 2022-12-21 | Stop reason: CLARIF

## 2022-12-21 RX ORDER — FELODIPINE 10 MG/1
10 TABLET, EXTENDED RELEASE ORAL DAILY
Qty: 30 TABLET | Refills: 0 | Status: SHIPPED | OUTPATIENT
Start: 2022-12-21

## 2022-12-21 ASSESSMENT — ENCOUNTER SYMPTOMS
SHORTNESS OF BREATH: 0
NAUSEA: 0
ABDOMINAL PAIN: 0
COUGH: 0
SORE THROAT: 0

## 2022-12-21 NOTE — PROGRESS NOTES
[address] [facility] [address] [facility]  Dept: [phone]  Dept Fax: [phone]  Loc: [phone]  PROGRESS NOTE      Visit Date: 12/21/2022    Margarita Sharp is a 50 y.o. female who presents today for:  Chief Complaint   Patient presents with    Follow-up     Medication refills       Impression/Plan:  1. Type 2 diabetes mellitus without complication, without long-term current use of insulin (HCC)  Chronic, uncontrolled, but improving  Hemoglobin A1c 7.7 today, down from greater than 12  Increase Basaglar to 33 units nightly  Plan to increase by 3 units every 3 days until fasting blood glucose is consistently under 150  She will let us know in the next 7 to 10 days for her fasting blood glucose and current insulin doses  - metFORMIN (GLUCOPHAGE-XR) 500 MG extended release tablet; Take 2 tablets by mouth daily (with breakfast)  Dispense: 60 tablet; Refill: 0  - insulin glargine (BASAGLAR KWIKPEN) 100 UNIT/ML injection pen; Inject 30 Units into the skin nightly  Dispense: 9 mL; Refill: 3  - POCT glycosylated hemoglobin (Hb A1C)    2. Essential hypertension  Chronic, uncontrolled  Continue felodipine, metoprolol, valsartan, HCTZ  Will increase valsartan to 320 mg daily today  Consider secondary causes of hypertension in the future  Sleep center  - felodipine (PLENDIL) 10 MG extended release tablet; Take 1 tablet by mouth daily  Dispense: 30 tablet; Refill: 0  - metoprolol (LOPRESSOR) 100 MG tablet; Take 1 tablet by mouth 2 times daily  Dispense: 60 tablet; Refill: 0  - valsartan (DIOVAN) 320 MG tablet; Take 1 tablet by mouth daily  Dispense: 30 tablet; Refill: 5    3. Other fatigue  Chronic  Very fatigued on waking up in the mornings  She is not sure if she snores  Has persistent hypertension despite numerous agents  Sleep medicine referral made today  - POCT glycosylated hemoglobin (Hb A1C)  - [facility]    4. Screening for malignant neoplasm of colon  Cologuard ordered today  - Fecal DNA Colorectal cancer screening (Cologuard)      Return in about 4 weeks (around 1/18/2023) for hyeprtnesion med adjustment check and DM. Subjective:  HPI    Here for medication refills and follow-up of chronic conditions. T2DM: She does not check her blood sugars at home. However, she is compliant all of her medications. She denies any symptoms of hyper or hypoglycemia. Weight is stable. Denies any neuropathy. Diabetes Health Maintenance    A1C - 7.7 today, improved from greater than 12  ACE/ARB - valsartan  Eye - appointment after January 1  Foot - just saw podiatrist, foot exam completed today  ASA - yes  Hep B -declines today  Microal/Cr -has not completed, will obtain at next visit  eGFR -greater than 90  Statin -not taking, add in the future when she has had a chance to obtain lipid profile    Hypertension: Compliant to medications. Does not check blood pressure at home. She denies any headaches, lightheadedness, vision changes, shortness of breath, or chest pain. She has never been worked up for secondary causes of hypertension. She does endorse daytime fatigue, especially in the morning when she first wakes up. She was adopted, so does not know her other family history. Other preventative care:  Pap smear a few years ago, used to go with [facility].  Mammogram 5/16/2022  Cologuard ordered today    No further questions of complaints. Review of Systems   Constitutional:  Positive for fatigue (Especially in the morning). Negative for chills and fever. HENT:  Negative for congestion and sore throat. Eyes:  Negative for visual disturbance. Respiratory:  Negative for cough and shortness of breath. Cardiovascular:  Negative for chest pain. Gastrointestinal:  Negative for abdominal pain and nausea. Genitourinary:  Negative for dysuria. Skin:  Negative for rash.    Neurological:  Negative for dizziness, light-headedness and headaches. Psychiatric/Behavioral:  The patient is not nervous/anxious. Patient Active Problem List   Diagnosis    Essential hypertension    SOB (shortness of breath) on exertion    Intermittent palpitations    Hx of Sinus tachycardia    Septicemia (HCC)    Sepsis due to Escherichia coli with acute hypercapnic respiratory failure without septic shock (HCC)    Acute pyelonephritis    Acute UTI    Elevated LFTs    Hyperbilirubinemia    Hypokalemia    Normocytic anemia    Thrombocytopenia (HCC)    Hyponatremia    Hydroureter on left    Leukopenia    Type 2 diabetes mellitus with hyperglycemia, with long-term current use of insulin (HCC)    Obesity (BMI 30-39. 9)    Depression    Hepatic steatosis    Iron deficiency anemia secondary to inadequate dietary iron intake    Diastolic dysfunction without heart failure    Renal abscess, left     Past Medical History:   Diagnosis Date    COVID-19 11/2021    Diabetes mellitus ([address]     Hypertension       Past Surgical History:   Procedure Laterality Date    CHOLECYSTECTOMY      ENDOMETRIAL ABLATION      TUBAL LIGATION  2001     BREAST NEEDLE BIOPSY RIGHT Right 09/03/2014    benign     Family History   Problem Relation Age of Onset    Bleeding Prob Mother     Clotting Disorder Mother     Depression Mother     Diabetes Mother     Heart Disease Mother     High Blood Pressure Mother     High Cholesterol Mother     Alcohol Abuse Father     Substance Abuse Father     Depression Sister     Diabetes Sister     High Blood Pressure Sister     Miscarriages / [place] Sister      Social History     Tobacco Use    Smoking status: Never    Smokeless tobacco: Never   Substance Use Topics    Alcohol use: Yes     Comment: occasionally      Current Outpatient Medications   Medication Sig Dispense Refill    metFORMIN (GLUCOPHAGE-XR) 500 MG extended release tablet Take 2 tablets by mouth daily (with breakfast) 60 tablet 0    felodipine (PLENDIL) 10 MG extended release tablet Take 1 tablet by mouth daily 30 tablet 0    insulin glargine (BASAGLAR KWIKPEN) 100 UNIT/ML injection pen Inject 30 Units into the skin nightly 9 mL 3    metoprolol (LOPRESSOR) 100 MG tablet Take 1 tablet by mouth 2 times daily 60 tablet 0    valsartan (DIOVAN) 320 MG tablet Take 1 tablet by mouth daily 30 tablet 5    D-Mannose 500 MG CAPS Take 1 tablet by mouth daily 30 capsule 0    Insulin Pen Needle 29G X 12.7MM MISC 1 each by Does not apply route in the morning. 100 each 3    lactobacillus (CULTURELLE) capsule Take 1 capsule by mouth daily (with breakfast) 30 capsule 0    Lancets MISC 1 each by Does not apply route daily 100 each 3    aspirin EC 81 MG EC tablet Take 1 tablet by mouth daily 90 tablet 1     No current facility-administered medications for this visit.      Allergies   Allergen Reactions    Penicillins Nausea Only and Swelling       Immunization History   Administered Date(s) Administered    Hepatitis A Adult (Havrix, Vaqta) 08/14/2018, 02/15/2019    Tdap (Boostrix, Adacel) 10/11/2015     Health Maintenance   Topic Date Due    COVID-19 Vaccine (1) Never done    Pneumococcal 0-64 years Vaccine (1 - PCV) Never done    Diabetic foot exam  Never done    Lipids  Never done    HIV screen  Never done    Diabetic microalbuminuria test  Never done    Diabetic retinal exam  Never done    Hepatitis B vaccine (1 of 3 - Risk 3-dose series) Never done    Cervical cancer screen  Never done    Colorectal Cancer Screen  Never done    Shingles vaccine (1 of 2) Never done    Flu vaccine (1) Never done    A1C test (Diabetic or Prediabetic)  08/30/2022    Depression Monitoring  07/12/2023    Breast cancer screen  05/16/2024    DTaP/Tdap/Td vaccine (2 - Td or Tdap) 10/11/2025    Hepatitis C screen  Completed    Hepatitis A vaccine  Aged Out    Hib vaccine  Aged Out    Meningococcal (ACWY) vaccine  Aged Out       LABS  Lab Results   Component Value Date    LABA1C 12.3 (H) 05/30/2022     No results found for: EAG  No components found for: CHLPL  No results found for: TRIG  No results found for: HDL  No results found for: [address]    Chemistry        Component Value Date/Time     06/28/2022 0757    K 3.5 06/28/2022 0757    K 3.8 06/25/2022 1650     06/28/2022 0757    CO2 24 06/28/2022 0757    BUN 8 06/28/2022 0757    CREATININE 0.5 06/28/2022 0757        Component Value Date/Time    CALCIUM 9.3 06/28/2022 0757    ALKPHOS 100 06/25/2022 1650    AST 14 06/25/2022 1650    ALT 17 06/25/2022 1650    BILITOT 0.5 06/25/2022 1650          No results found for: Virgel Dk  Lab Results   Component Value Date    TSH 1.930 06/02/2022     No results found for: PSA  Lab Results   Component Value Date    WBC 10.6 10/10/2022    HGB 13.2 10/10/2022    HCT 39.5 10/10/2022    MCV 89.2 10/10/2022     10/10/2022       Objective:  BP (!) 152/90 (Site: Left Upper Arm, Position: Sitting, Cuff Size: Medium Adult)   Pulse 85   Temp 97 °F (36.1 °C) (Temporal)   Resp 16   Ht 5' 3\" (1.6 m)   Wt 216 lb 6.4 oz (98.2 kg)   SpO2 98%   BMI 38.33 kg/m²     Physical Exam  Constitutional:       General: She is not in acute distress. Appearance: Normal appearance. She is obese. HENT:      Head: Normocephalic. Right Ear: External ear normal.      Left Ear: External ear normal.      Nose: Nose normal.      Mouth/Throat:      Mouth: Mucous membranes are moist.   Eyes:      General: No scleral icterus. Extraocular Movements: Extraocular movements intact. Cardiovascular:      Rate and Rhythm: Normal rate and regular rhythm. Pulses: Normal pulses. Heart sounds: Normal heart sounds. Pulmonary:      Effort: Pulmonary effort is normal.      Breath sounds: Normal breath sounds. Abdominal:      General: Abdomen is flat. There is no distension. Palpations: Abdomen is soft. Musculoskeletal:         General: Normal range of motion. Cervical back: Normal range of motion.       Right lower leg: No edema. Left lower leg: No edema. Skin:     General: Skin is warm and dry. Neurological:      Mental Status: She is alert. Motor: No weakness. Psychiatric:         Mood and Affect: Mood normal.     Visual inspection:  Deformity/amputation: absent  Skin lesions/pre-ulcerative calluses: absent  Edema: right- negative, left- negative    Sensory exam:  Monofilament sensation: normal  (minimum of 5 random plantar locations tested, avoiding callused areas - > 1 area with absence of sensation is + for neuropathy)    Plus at least one of the following:  Pulses: normal,      They voiced understanding. All questions answered. They agreed with treatment plan. See patient instructions for any educational materials that may have been given. Discussed use, benefit, and side effects of prescribed medications. Reviewed health maintenance.     (Please note that portions of this note may have been completed with a voice recognition program.  Efforts were made to edit the dictation but occasionally words are mis-transcribed.)      Electronically signed by Maria Del Rosario Prince DO on 12/21/2022 at 8:26 PM

## 2022-12-21 NOTE — PATIENT INSTRUCTIONS
Increase insulin 3 units every 3 days until blood glucose is consistently below 150. Please message us in 7-10 days to let us know current dose of insulin and blood sugars    Thank you   Thank you for trusting us with your healthcare needs. You may receive a survey regarding today's visit. It would help us out if you would take a few moments to provide your feedback. We value your input. Please bring in ALL medications BOTTLES, including inhalers, herbal supplements, over the counter, prescribed & non-prescribed medicine. The office would like actual medication bottles and a list.   Please note our OFFICE POLICIES:   Prior to getting your labs drawn, please check with your insurance company for benefits and eligibility of lab services. Often, insurance companies cover certain tests for preventative visits only. It is patient's responsibility to see what is covered. We are unable to change a diagnosis after the test has been performed. Lab orders will not be re-printed. Please hold onto your original lab orders and take them to your lab to be completed. If you no show your scheduled appointment three times, you will be dismissed from this practice. Reschedules must be completed 24 hours prior to your schedule appointment. If the list below has been completed, PLEASE FAX RECORDS TO OUR OFFICE @ [phone].  Once the records have been received we will update your records at our office:  Health Maintenance Due   Topic Date Due    COVID-19 Vaccine (1) Never done    Pneumococcal 0-64 years Vaccine (1 - PCV) Never done    Diabetic foot exam  Never done    Lipids  Never done    HIV screen  Never done    Diabetic microalbuminuria test  Never done    Diabetic retinal exam  Never done    Hepatitis B vaccine (1 of 3 - Risk 3-dose series) Never done    Cervical cancer screen  Never done    Colorectal Cancer Screen  Never done    Shingles vaccine (1 of 2) Never done    Flu vaccine (1) Never done    A1C test (Diabetic or Prediabetic)  08/30/2022

## 2022-12-21 NOTE — PROGRESS NOTES
S: 50 y.o. female with   Chief Complaint   Patient presents with    Follow-up     Medication refills       49 yo female here for medication refills     Reviewed hx and ROS in detail with resident prior to exam.  See notes for additional details. BP Readings from Last 3 Encounters:   12/21/22 (!) 152/90   10/24/22 (!) 145/84   10/10/22 (!) 152/90     Wt Readings from Last 3 Encounters:   12/21/22 216 lb 6.4 oz (98.2 kg)   10/10/22 213 lb 12.8 oz (97 kg)   07/26/22 207 lb 3.2 oz (94 kg)           O: VS:  height is 5' 3\" (1.6 m) and weight is 216 lb 6.4 oz (98.2 kg). Her temporal temperature is 97 °F (36.1 °C). Her blood pressure is 152/90 (abnormal) and her pulse is 85. Her respiration is 16 and oxygen saturation is 98%. AAO/NAD, appropriate affect for mood  CV:  RRR, no murmur  Resp: CTAB       Diagnosis Orders   1. Type 2 diabetes mellitus without complication, without long-term current use of insulin (HCC)  metFORMIN (GLUCOPHAGE-XR) 500 MG extended release tablet    insulin glargine (BASAGLAR KWIKPEN) 100 UNIT/ML injection pen    POCT glycosylated hemoglobin (Hb A1C)      2. Essential hypertension  felodipine (PLENDIL) 10 MG extended release tablet    hydroCHLOROthiazide (HYDRODIURIL) 50 MG tablet    metoprolol (LOPRESSOR) 100 MG tablet    valsartan (DIOVAN) 160 MG tablet      3. Other fatigue  POCT glycosylated hemoglobin (Hb A1C)    [address]      4. Screening for malignant neoplasm of colon  Fecal DNA Colorectal cancer screening ([facility])          Plan    DM2-A1c in office today- on basaglar 30 U HS with metformin, consider SGLT2 given BMI 38 and heart history. HTN- resistant to multiple agents- titrate diovan to 320 mg daily and monitor ambulatory readings. Assess for PETE with referral for sleep study.     [facility]  Pap report to be obtained      Health Maintenance Due   Topic Date Due    COVID-19 Vaccine (1) Never done    Pneumococcal 0-64 years Vaccine (1 - PCV) Never done    Diabetic foot exam  Never done    Lipids  Never done    HIV screen  Never done    Diabetic microalbuminuria test  Never done    Diabetic retinal exam  Never done    Hepatitis B vaccine (1 of 3 - Risk 3-dose series) Never done    Cervical cancer screen  Never done    Colorectal Cancer Screen  Never done    Shingles vaccine (1 of 2) Never done    Flu vaccine (1) Never done    A1C test (Diabetic or Prediabetic)  08/30/2022         Attending Physician Statement  I have discussed the case, including pertinent history and exam findings with the resident. I also have seen the patient and performed key portions of the examination. I agree with the documented assessment and plan as documented by the resident.   GC modifier added to this encounter      Kraig Mccabe MD 12/21/2022 5:02 PM

## 2022-12-22 ENCOUNTER — TELEPHONE (OUTPATIENT)
Dept: FAMILY MEDICINE CLINIC | Age: 52
End: 2022-12-22

## 2022-12-22 DIAGNOSIS — I10 ESSENTIAL HYPERTENSION: Primary | ICD-10-CM

## 2022-12-22 RX ORDER — HYDROCHLOROTHIAZIDE 25 MG/1
25 TABLET ORAL EVERY MORNING
Qty: 30 TABLET | Refills: 5 | Status: SHIPPED | OUTPATIENT
Start: 2022-12-22

## 2022-12-22 NOTE — TELEPHONE ENCOUNTER
[address] called needing clarification on patient's script for Valsartan. Pharmacy stated they received two scripts, one was 320 mg, and the other was 160 mg. They need to know which one is correct. Please advise.

## 2023-01-05 ENCOUNTER — TELEPHONE (OUTPATIENT)
Dept: FAMILY MEDICINE CLINIC | Age: 53
End: 2023-01-05

## 2023-01-05 NOTE — TELEPHONE ENCOUNTER
Patient reports blood sugar is 377 this AM. She reports feeling off right now. All she ate was a handful of peanuts and is now drinking more water to help it go down. Should she take any more insulin? Please advise thank you.

## 2023-01-05 NOTE — TELEPHONE ENCOUNTER
MD Liyah Pendleton called back and was wondering what you would like her to do about taking more insulin. Please Advise.

## 2023-01-05 NOTE — TELEPHONE ENCOUNTER
Have patient increase Basaglar to 40 U nightly tonight. Continue checking glucose and keep log. Please schedule patient for follow-up in office tomorrow if possible for further evaluation. She will likely need to add short acting insulin. Thank you!

## 2023-01-09 ENCOUNTER — OFFICE VISIT (OUTPATIENT)
Dept: FAMILY MEDICINE CLINIC | Age: 53
End: 2023-01-09

## 2023-01-09 VITALS
HEART RATE: 72 BPM | RESPIRATION RATE: 18 BRPM | BODY MASS INDEX: 38.52 KG/M2 | OXYGEN SATURATION: 98 % | WEIGHT: 217.4 LBS | TEMPERATURE: 98.1 F | SYSTOLIC BLOOD PRESSURE: 154 MMHG | DIASTOLIC BLOOD PRESSURE: 96 MMHG | HEIGHT: 63 IN

## 2023-01-09 DIAGNOSIS — I10 RESISTANT HYPERTENSION: ICD-10-CM

## 2023-01-09 DIAGNOSIS — E11.9 TYPE 2 DIABETES MELLITUS WITHOUT COMPLICATION, WITHOUT LONG-TERM CURRENT USE OF INSULIN (HCC): ICD-10-CM

## 2023-01-09 PROCEDURE — 3077F SYST BP >= 140 MM HG: CPT | Performed by: STUDENT IN AN ORGANIZED HEALTH CARE EDUCATION/TRAINING PROGRAM

## 2023-01-09 PROCEDURE — 99214 OFFICE O/P EST MOD 30 MIN: CPT | Performed by: STUDENT IN AN ORGANIZED HEALTH CARE EDUCATION/TRAINING PROGRAM

## 2023-01-09 PROCEDURE — 3079F DIAST BP 80-89 MM HG: CPT | Performed by: STUDENT IN AN ORGANIZED HEALTH CARE EDUCATION/TRAINING PROGRAM

## 2023-01-09 RX ORDER — INSULIN GLARGINE 100 [IU]/ML
50 INJECTION, SOLUTION SUBCUTANEOUS NIGHTLY
Qty: 9 ML | Refills: 3 | Status: SHIPPED | OUTPATIENT
Start: 2023-01-09 | End: 2023-03-22

## 2023-01-09 RX ORDER — HYDROCHLOROTHIAZIDE 50 MG/1
50 TABLET ORAL EVERY MORNING
Qty: 30 TABLET | Refills: 1 | Status: CANCELLED | OUTPATIENT
Start: 2023-01-09 | End: 2023-03-10

## 2023-01-09 RX ORDER — SPIRONOLACTONE 25 MG/1
25 TABLET ORAL DAILY
Qty: 30 TABLET | Refills: 3 | Status: SHIPPED | OUTPATIENT
Start: 2023-01-09

## 2023-01-09 SDOH — ECONOMIC STABILITY: FOOD INSECURITY: WITHIN THE PAST 12 MONTHS, THE FOOD YOU BOUGHT JUST DIDN'T LAST AND YOU DIDN'T HAVE MONEY TO GET MORE.: NEVER TRUE

## 2023-01-09 SDOH — ECONOMIC STABILITY: FOOD INSECURITY: WITHIN THE PAST 12 MONTHS, YOU WORRIED THAT YOUR FOOD WOULD RUN OUT BEFORE YOU GOT MONEY TO BUY MORE.: NEVER TRUE

## 2023-01-09 ASSESSMENT — ENCOUNTER SYMPTOMS
VOMITING: 0
NAUSEA: 0
DIARRHEA: 0
COUGH: 0
CONSTIPATION: 0
ABDOMINAL PAIN: 0
SHORTNESS OF BREATH: 0

## 2023-01-09 ASSESSMENT — SOCIAL DETERMINANTS OF HEALTH (SDOH): HOW HARD IS IT FOR YOU TO PAY FOR THE VERY BASICS LIKE FOOD, HOUSING, MEDICAL CARE, AND HEATING?: NOT VERY HARD

## 2023-01-09 NOTE — PROGRESS NOTES
S: 50 y.o. female with   Chief Complaint   Patient presents with    Blood Sugar Problem     Blood sugar been running high  1/5/23 = 377, 286, 267, 286, 248, 225, 219, 290  1/6/23 = 269, 236, 370, 341, 187  1/7/23 = 243  1/8/23 = 176, 267  1/9/23 = 220, 238       51 yo female here for hyperglycemia currently taking basaglar 40 HS, metformin 500 XL d/t GI upset on higher dosing. Reviewed hx and ROS in detail with resident prior to exam.  See notes for additional details. BP Readings from Last 3 Encounters:   01/09/23 (!) 154/96   12/21/22 (!) 152/90   10/24/22 (!) 145/84     Wt Readings from Last 3 Encounters:   01/09/23 217 lb 6.4 oz (98.6 kg)   12/21/22 216 lb 6.4 oz (98.2 kg)   10/10/22 213 lb 12.8 oz (97 kg)           O: VS:  height is 5' 3\" (1.6 m) and weight is 217 lb 6.4 oz (98.6 kg). Her oral temperature is 98.1 °F (36.7 °C). Her blood pressure is 154/96 (abnormal) and her pulse is 72. Her respiration is 18 and oxygen saturation is 98%. AAO/NAD, appropriate affect for mood       Diagnosis Orders   1. Essential hypertension            Plan    HTN- add aldactone and monitor pressures, follow up next week. Consider further w/u for secondary causes of HTN given age and number of meds    DM2- titrate basaglar to 50 u HS, intolerant of higher doses of metformin. Previously discussed adding SGLT2 or GLP1 agent, however insurance coverage is lapsed this week and will not kick in again until next week. Would also benefit from considering mealtime insulin. Follow up for review of glucose as scheduled in 1 wk.        Health Maintenance Due   Topic Date Due    COVID-19 Vaccine (1) Never done    Pneumococcal 0-64 years Vaccine (1 - PCV) Never done    Lipids  Never done    HIV screen  Never done    Diabetic Alb to Cr ratio (uACR) test  Never done    Diabetic retinal exam  Never done    Hepatitis B vaccine (1 of 3 - Risk 3-dose series) Never done    Cervical cancer screen  Never done    Colorectal Cancer Screen Never done    Shingles vaccine (1 of 2) Never done    Flu vaccine (1) Never done    A1C test (Diabetic or Prediabetic)  08/30/2022         Attending Physician Statement  I have discussed the case, including pertinent history and exam findings with the resident. I also have seen the patient and performed key portions of the examination. I agree with the documented assessment and plan as documented by the resident.   GC modifier added to this encounter      Ayah Leach MD 1/9/2023 5:09 PM

## 2023-01-09 NOTE — PROGRESS NOTES
[id number] [facility] [address] [facility] [address]  Dept: [phone]  Loc: [phone]     Yoel Silva is a 50 y.o. female who presents today for:  Chief Complaint   Patient presents with    Blood Sugar Problem     Blood sugar been running high  1/5/23 = 377, 286, 267, 286, 248, 225, 219, 290  1/6/23 = 269, 236, 370, 341, 187  1/7/23 = 243  1/8/23 = 176, 267  1/9/23 = 220, 238        Goals    None         HPI:     HPI  61-year-old female here for elevated blood sugars. Patient is on Metformin XR 1000mg daily with breakfast, on Basaglar 40 units nightly  Is in the middle of getting insurance through work through [employer]. ([employer]) - should start in the next week    Patient is checking blood sugars multiple times per week. Had two sugars > 300. 377 was the highest - symptoms \"felt off. \" Most of hte other sugars were in the 200s. Last A1C 7.7 (12/21). Previously was 12.3 in June. Sugars are usually highest in the morning. States she did not feel like she ate worse over the holidays. Denies any hypoglycemia. Hypertension - on 4 antihypertensive agents. Referral to sleep center placed at last visit. Not checking blood pressures at home.        Current Outpatient Medications   Medication Sig Dispense Refill    spironolactone (ALDACTONE) 25 MG tablet Take 1 tablet by mouth daily 30 tablet 3    insulin glargine (BASAGLAR KWIKPEN) 100 UNIT/ML injection pen Inject 50 Units into the skin nightly 9 mL 3    hydroCHLOROthiazide (HYDRODIURIL) 25 MG tablet Take 1 tablet by mouth every morning 30 tablet 5    metFORMIN (GLUCOPHAGE-XR) 500 MG extended release tablet Take 2 tablets by mouth daily (with breakfast) 60 tablet 0    felodipine (PLENDIL) 10 MG extended release tablet Take 1 tablet by mouth daily 30 tablet 0    metoprolol (LOPRESSOR) 100 MG tablet Take 1 tablet by mouth 2 times daily 60 tablet 0    valsartan (DIOVAN) 320 MG tablet Take 1 tablet by mouth daily 30 tablet 5    Insulin Pen Needle 29G X 12.7MM MISC 1 each by Does not apply route in the morning. 100 each 3    lactobacillus (CULTURELLE) capsule Take 1 capsule by mouth daily (with breakfast) 30 capsule 0    Lancets MISC 1 each by Does not apply route daily 100 each 3    aspirin EC 81 MG EC tablet Take 1 tablet by mouth daily 90 tablet 1    D-Mannose 500 MG CAPS Take 1 tablet by mouth daily 30 capsule 0     No current facility-administered medications for this visit. Food Insecurity: No Food Insecurity    Worried About Running Out of Food in the Last Year: Never true    Ran Out of Food in the Last Year: Never true       Health Maintenance   Topic Date Due    COVID-19 Vaccine (1) Never done    Pneumococcal 0-64 years Vaccine (1 - PCV) Never done    Lipids  Never done    HIV screen  Never done    Diabetic Alb to Cr ratio (uACR) test  Never done    Diabetic retinal exam  Never done    Hepatitis B vaccine (1 of 3 - Risk 3-dose series) Never done    Cervical cancer screen  Never done    Colorectal Cancer Screen  Never done    Shingles vaccine (1 of 2) Never done    Flu vaccine (1) Never done    A1C test (Diabetic or Prediabetic)  08/30/2022    GFR test (Diabetes, CKD 3-4, OR last GFR 15-59)  06/28/2023    Depression Monitoring  07/12/2023    Diabetic foot exam  12/21/2023    Breast cancer screen  05/16/2024    DTaP/Tdap/Td vaccine (2 - Td or Tdap) 10/11/2025    Hepatitis C screen  Completed    Hepatitis A vaccine  Aged Out    Hib vaccine  Aged Out    Meningococcal (ACWY) vaccine  Aged Out       ROS:      Review of Systems   Constitutional:  Negative for chills, fatigue and fever. HENT:  Negative for congestion. Eyes:  Negative for visual disturbance. Respiratory:  Negative for cough and shortness of breath. Cardiovascular:  Negative for chest pain and palpitations. Gastrointestinal:  Negative for abdominal pain, constipation, diarrhea, nausea and vomiting. Genitourinary:  Negative for dysuria and menstrual problem. Musculoskeletal:  Negative for arthralgias. Skin:  Negative for rash. Neurological:  Negative for dizziness, light-headedness and headaches. Psychiatric/Behavioral:  Negative for dysphoric mood. The patient is not nervous/anxious. Objective:     Vitals:    01/09/23 1643 01/09/23 1646   BP: (!) 144/84 (!) 154/96   Site: Left Upper Arm Right Upper Arm   Position: Sitting Sitting   Cuff Size: Medium Adult Medium Adult   Pulse: 72    Resp: 18    Temp: 98.1 °F (36.7 °C)    TempSrc: Oral    SpO2: 98%    Weight: 217 lb 6.4 oz (98.6 kg)    Height: 5' 3\" (1.6 m)        Body mass index is 38.51 kg/m². Wt Readings from Last 3 Encounters:   01/09/23 217 lb 6.4 oz (98.6 kg)   12/21/22 216 lb 6.4 oz (98.2 kg)   10/10/22 213 lb 12.8 oz (97 kg)     BP Readings from Last 3 Encounters:   01/09/23 (!) 154/96   12/21/22 (!) 152/90   10/24/22 (!) 145/84       Physical Exam  Vitals reviewed. Constitutional:       General: She is not in acute distress. Appearance: Normal appearance. She is obese. She is not ill-appearing. HENT:      Head: Normocephalic and atraumatic. Right Ear: External ear normal.      Left Ear: External ear normal.      Nose: Nose normal.      Mouth/Throat:      Mouth: Mucous membranes are moist.   Eyes:      General:         Right eye: No discharge. Left eye: No discharge. Conjunctiva/sclera: Conjunctivae normal.   Cardiovascular:      Rate and Rhythm: Normal rate and regular rhythm. Pulses: Normal pulses. Heart sounds: Normal heart sounds. No murmur heard. Pulmonary:      Effort: Pulmonary effort is normal. No respiratory distress. Breath sounds: Normal breath sounds. Abdominal:      General: Abdomen is flat. Bowel sounds are normal.      Palpations: Abdomen is soft. Skin:     General: Skin is warm and dry. Findings: No rash. Neurological:      General: No focal deficit present. Mental Status: She is alert. Psychiatric:         Mood and Affect: Mood normal.         Behavior: Behavior normal.       Assessment / Plan:     1. Resistant hypertension  - Patient now maxed out on 4 antihypertensive agents - HCTZ 25 mg PO daily, Plendil 10 mg PO daily, Lopressor 100 mg PO BID, Diovan 320 mg PO daily. Will add Aldactone 25 mg PO daily. Will need to start workup for secondary causes - consider sleep study, CTA abdomen study for fibromuscular dysplasia, renin/aldosterone for Conn syndrome. - spironolactone (ALDACTONE) 25 MG tablet; Take 1 tablet by mouth daily  Dispense: 30 tablet; Refill: 3    2. Type 2 diabetes mellitus without complication, without long-term current use of insulin (HCC)  - chronic, uncontrolled. Increase Basaglar to 50 units QHS. Continue Metformin XR 1000 mg PO daily. Continue to check sugars 4x daily. Patient to obtain health insurance in the next week. Follow-up with Dr. Adrian Lopez next week - recommend starting GLP-1 at that time once insurance sorted out.  - insulin glargine (BASAGLAR KWIKPEN) 100 UNIT/ML injection pen; Inject 50 Units into the skin nightly  Dispense: 9 mL; Refill: 3    Health Maintenance Due   Topic Date Due    COVID-19 Vaccine (1) Never done    Pneumococcal 0-64 years Vaccine (1 - PCV) Never done    Lipids  Never done    HIV screen  Never done    Diabetic Alb to Cr ratio (uACR) test  Never done    Diabetic retinal exam  Never done    Hepatitis B vaccine (1 of 3 - Risk 3-dose series) Never done    Cervical cancer screen  Never done    Colorectal Cancer Screen  Never done    Shingles vaccine (1 of 2) Never done    Flu vaccine (1) Never done    A1C test (Diabetic or Prediabetic)  08/30/2022           Return if symptoms worsen or fail to improve.       Medications Prescribed:  Orders Placed This Encounter   Medications    spironolactone (ALDACTONE) 25 MG tablet     Sig: Take 1 tablet by mouth daily     Dispense:  30 tablet     Refill:  3    insulin glargine [facility]) 100 UNIT/ML injection pen     Sig: Inject 50 Units into the skin nightly     Dispense:  9 mL     Refill:  3         Future Appointments   Date Time Provider Hung Giron   1/19/2023  4:20 PM Elier Hwang MD SRP[facility]EN[facility] [address]   1/31/2023  4:40 PM DO RICHMOND Scales [facility] [address]  [address]       Patient given educational materials - see patient instructions. Discussed use, benefit, and side effects of prescribed medications. All patient questions answered. Patient voiced understanding. Reviewed health maintenance. Instructed to continue current medications, diet and exercise. Patient agreed with treatment plan. Follow up as directed.      Electronically signed by Dallin Westfall MD on 1/9/2023 at 8:24 PM

## 2023-01-09 NOTE — PROGRESS NOTES
[id number] [facility] [address] [address]  Dept: [phone]  Loc: [phone]    Patient presents for f/u diabetes/htn    Please see Resident note for complete HPI. ROS per Resident    Lab Results   Component Value Date    WBC 10.6 10/10/2022    HGB 13.2 10/10/2022    HCT 39.5 10/10/2022    MCV 89.2 10/10/2022     10/10/2022     Lab Results   Component Value Date     06/28/2022    K 3.5 06/28/2022     06/28/2022    CO2 24 06/28/2022    BUN 8 06/28/2022    CREATININE 0.5 06/28/2022    GLUCOSE 127 (H) 06/28/2022    CALCIUM 9.3 06/28/2022    PROT 8.1 (H) 06/25/2022    LABALBU 4.4 06/25/2022    BILITOT 0.5 06/25/2022    ALKPHOS 100 06/25/2022    AST 14 06/25/2022    ALT 17 06/25/2022    LABGLOM >90 06/28/2022     Lab Results   Component Value Date    LABA1C 12.3 (H) 05/30/2022     No results found for: EAG  No results found for: LABMICR, [id number]  Lab Results   Component Value Date    TSH 1.930 06/02/2022    T4FREE 0.99 06/02/2022     No results found for: CHOL  No results found for: TRIG  No results found for: HDL  No results found for: LDLCHOLESTEROL, LDLCALC  No results found for: LABVLDL, VLDL  No results found for: CHOLHDLRATIO  No results found for: PSA, PSADIA    Health Maintenance Due   Topic Date Due    COVID-19 Vaccine (1) Never done    Pneumococcal 0-64 years Vaccine (1 - PCV) Never done    Lipids  Never done    HIV screen  Never done    Diabetic Alb to Cr ratio (uACR) test  Never done    Diabetic retinal exam  Never done    Hepatitis B vaccine (1 of 3 - Risk 3-dose series) Never done    Cervical cancer screen  Never done    Colorectal Cancer Screen  Never done    Shingles vaccine (1 of 2) Never done    Flu vaccine (1) Never done    A1C test (Diabetic or Prediabetic)  08/30/2022         Physical Exam per Resident       ICD-10-CM    1.  Essential hypertension  I10               Plan  I participated in the discussion and care of this patient   Full plan per primary resident.

## 2023-01-13 DIAGNOSIS — E11.9 TYPE 2 DIABETES MELLITUS WITHOUT COMPLICATION, WITHOUT LONG-TERM CURRENT USE OF INSULIN (HCC): ICD-10-CM

## 2023-01-13 DIAGNOSIS — I10 ESSENTIAL HYPERTENSION: ICD-10-CM

## 2023-01-13 RX ORDER — VALSARTAN 320 MG/1
320 TABLET ORAL DAILY
Qty: 30 TABLET | Refills: 5 | Status: SHIPPED | OUTPATIENT
Start: 2023-01-13 | End: 2023-07-12

## 2023-01-13 RX ORDER — METFORMIN HYDROCHLORIDE 500 MG/1
1000 TABLET, EXTENDED RELEASE ORAL
Qty: 60 TABLET | Refills: 0 | Status: SHIPPED | OUTPATIENT
Start: 2023-01-13 | End: 2023-02-12

## 2023-01-13 RX ORDER — FELODIPINE 10 MG/1
10 TABLET, EXTENDED RELEASE ORAL DAILY
Qty: 30 TABLET | Refills: 0 | Status: SHIPPED | OUTPATIENT
Start: 2023-01-13

## 2023-01-13 RX ORDER — METOPROLOL TARTRATE 100 MG/1
100 TABLET ORAL 2 TIMES DAILY
Qty: 60 TABLET | Refills: 0 | Status: SHIPPED | OUTPATIENT
Start: 2023-01-13

## 2023-01-13 RX ORDER — HYDROCHLOROTHIAZIDE 25 MG/1
25 TABLET ORAL EVERY MORNING
Qty: 30 TABLET | Refills: 5 | Status: SHIPPED | OUTPATIENT
Start: 2023-01-13

## 2023-01-13 RX ORDER — INSULIN GLARGINE 100 [IU]/ML
50 INJECTION, SOLUTION SUBCUTANEOUS NIGHTLY
Qty: 9 ML | Refills: 3 | OUTPATIENT
Start: 2023-01-13 | End: 2023-03-26

## 2023-01-13 RX ORDER — LANCETS 30 GAUGE
1 EACH MISCELLANEOUS DAILY
Qty: 100 EACH | Refills: 3 | Status: SHIPPED | OUTPATIENT
Start: 2023-01-13

## 2023-01-13 NOTE — TELEPHONE ENCOUNTER
Patient's last appointment was : 1/9/2023  Patient's next appointment is :  01/19/2023  Last refilled: 12/21/2022    Lab Results   Component Value Date    LABA1C 12.3 (H) 05/30/2022     No results found for: CHOL, TRIG, HDL, LDLCALC, LDLDIRECT  Lab Results   Component Value Date     06/28/2022    K 3.5 06/28/2022     06/28/2022    CO2 24 06/28/2022    BUN 8 06/28/2022    CREATININE 0.5 06/28/2022    GLUCOSE 127 (H) 06/28/2022    CALCIUM 9.3 06/28/2022    PROT 8.1 (H) 06/25/2022    LABALBU 4.4 06/25/2022    BILITOT 0.5 06/25/2022    ALKPHOS 100 06/25/2022    AST 14 06/25/2022    ALT 17 06/25/2022    LABGLOM >90 06/28/2022     Lab Results   Component Value Date    TSH 1.930 06/02/2022    T4FREE 0.99 06/02/2022     Lab Results   Component Value Date    WBC 10.6 10/10/2022    HGB 13.2 10/10/2022    HCT 39.5 10/10/2022    MCV 89.2 10/10/2022     10/10/2022

## 2023-01-13 NOTE — TELEPHONE ENCOUNTER
Patient's last appointment was : 1/9/2023  Patient's next appointment is :  01/19/2023  Last refilled: 01/09/2023    Lab Results   Component Value Date    LABA1C 12.3 (H) 05/30/2022     No results found for: CHOL, TRIG, HDL, LDLCALC, LDLDIRECT  Lab Results   Component Value Date     06/28/2022    K 3.5 06/28/2022     06/28/2022    CO2 24 06/28/2022    BUN 8 06/28/2022    CREATININE 0.5 06/28/2022    GLUCOSE 127 (H) 06/28/2022    CALCIUM 9.3 06/28/2022    PROT 8.1 (H) 06/25/2022    LABALBU 4.4 06/25/2022    BILITOT 0.5 06/25/2022    ALKPHOS 100 06/25/2022    AST 14 06/25/2022    ALT 17 06/25/2022    LABGLOM >90 06/28/2022     Lab Results   Component Value Date    TSH 1.930 06/02/2022    T4FREE 0.99 06/02/2022     Lab Results   Component Value Date    WBC 10.6 10/10/2022    HGB 13.2 10/10/2022    HCT 39.5 10/10/2022    MCV 89.2 10/10/2022     10/10/2022

## 2023-01-13 NOTE — TELEPHONE ENCOUNTER
Patient's last appointment was : 1/9/2023  Patient's next appointment is :  01/19/2023  Last refilled: 06/21/2022    Lab Results   Component Value Date    LABA1C 12.3 (H) 05/30/2022     No results found for: CHOL, TRIG, HDL, LDLCALC, LDLDIRECT  Lab Results   Component Value Date     06/28/2022    K 3.5 06/28/2022     06/28/2022    CO2 24 06/28/2022    BUN 8 06/28/2022    CREATININE 0.5 06/28/2022    GLUCOSE 127 (H) 06/28/2022    CALCIUM 9.3 06/28/2022    PROT 8.1 (H) 06/25/2022    LABALBU 4.4 06/25/2022    BILITOT 0.5 06/25/2022    ALKPHOS 100 06/25/2022    AST 14 06/25/2022    ALT 17 06/25/2022    LABGLOM >90 06/28/2022     Lab Results   Component Value Date    TSH 1.930 06/02/2022    T4FREE 0.99 06/02/2022     Lab Results   Component Value Date    WBC 10.6 10/10/2022    HGB 13.2 10/10/2022    HCT 39.5 10/10/2022    MCV 89.2 10/10/2022     10/10/2022

## 2023-01-19 ENCOUNTER — OFFICE VISIT (OUTPATIENT)
Dept: FAMILY MEDICINE CLINIC | Age: 53
End: 2023-01-19

## 2023-01-19 VITALS
SYSTOLIC BLOOD PRESSURE: 152 MMHG | TEMPERATURE: 97.2 F | DIASTOLIC BLOOD PRESSURE: 86 MMHG | HEART RATE: 90 BPM | WEIGHT: 219 LBS | BODY MASS INDEX: 38.8 KG/M2 | HEIGHT: 63 IN | OXYGEN SATURATION: 97 %

## 2023-01-19 DIAGNOSIS — E11.69 TYPE 2 DIABETES MELLITUS WITH OTHER SPECIFIED COMPLICATION, UNSPECIFIED WHETHER LONG TERM INSULIN USE (HCC): Primary | ICD-10-CM

## 2023-01-19 DIAGNOSIS — I10 RESISTANT HYPERTENSION: ICD-10-CM

## 2023-01-19 LAB
HBA1C MFR BLD: 8.2 %
HBA1C MFR BLD: 8.2 % (ref 4.3–5.7)

## 2023-01-19 RX ORDER — METFORMIN HYDROCHLORIDE 500 MG/1
TABLET, EXTENDED RELEASE ORAL
Qty: 60 TABLET | Refills: 0
Start: 2023-01-19

## 2023-01-19 RX ORDER — DULAGLUTIDE 0.75 MG/.5ML
0.75 INJECTION, SOLUTION SUBCUTANEOUS WEEKLY
Qty: 4 ADJUSTABLE DOSE PRE-FILLED PEN SYRINGE | Refills: 1 | Status: SHIPPED | OUTPATIENT
Start: 2023-01-19

## 2023-01-19 RX ORDER — SPIRONOLACTONE 50 MG/1
50 TABLET, FILM COATED ORAL DAILY
Qty: 90 TABLET | Refills: 1 | Status: SHIPPED | OUTPATIENT
Start: 2023-01-19

## 2023-01-19 ASSESSMENT — PATIENT HEALTH QUESTIONNAIRE - PHQ9
3. TROUBLE FALLING OR STAYING ASLEEP: 1
SUM OF ALL RESPONSES TO PHQ QUESTIONS 1-9: 5
SUM OF ALL RESPONSES TO PHQ QUESTIONS 1-9: 5
4. FEELING TIRED OR HAVING LITTLE ENERGY: 1
6. FEELING BAD ABOUT YOURSELF - OR THAT YOU ARE A FAILURE OR HAVE LET YOURSELF OR YOUR FAMILY DOWN: 0
10. IF YOU CHECKED OFF ANY PROBLEMS, HOW DIFFICULT HAVE THESE PROBLEMS MADE IT FOR YOU TO DO YOUR WORK, TAKE CARE OF THINGS AT HOME, OR GET ALONG WITH OTHER PEOPLE: 1
SUM OF ALL RESPONSES TO PHQ QUESTIONS 1-9: 5
9. THOUGHTS THAT YOU WOULD BE BETTER OFF DEAD, OR OF HURTING YOURSELF: 0
2. FEELING DOWN, DEPRESSED OR HOPELESS: 0
SUM OF ALL RESPONSES TO PHQ QUESTIONS 1-9: 5
SUM OF ALL RESPONSES TO PHQ9 QUESTIONS 1 & 2: 1
7. TROUBLE CONCENTRATING ON THINGS, SUCH AS READING THE NEWSPAPER OR WATCHING TELEVISION: 1
8. MOVING OR SPEAKING SO SLOWLY THAT OTHER PEOPLE COULD HAVE NOTICED. OR THE OPPOSITE, BEING SO FIGETY OR RESTLESS THAT YOU HAVE BEEN MOVING AROUND A LOT MORE THAN USUAL: 1
5. POOR APPETITE OR OVEREATING: 0
1. LITTLE INTEREST OR PLEASURE IN DOING THINGS: 1

## 2023-01-19 ASSESSMENT — ENCOUNTER SYMPTOMS
DIARRHEA: 0
NAUSEA: 0
CONSTIPATION: 0
ABDOMINAL PAIN: 0
WHEEZING: 0
COUGH: 0
BACK PAIN: 0
VOMITING: 0
SHORTNESS OF BREATH: 0

## 2023-01-19 NOTE — PROGRESS NOTES
Kaley Dickens is a 50 y.o. female who presents today for:  Chief Complaint   Patient presents with    Follow-up     Hypertension, medication check, & DM         HPI:   Kaley Dickens is 50 y.o. who presents today for follow-up of diabetes and hypertension. DM: Currently taking Basaglar 50 U nightly. She is also on metformin 500 mg BID. Dose of metformin limited due to patient's nausea with 1000 mg taken in the morning. She has been checking her blood sugars, and they are running 140-210s. Hemoglobin A1c 8.2 today. HTN: BP remains elevated, 152/86 today. She is taking felodipine 10 mg daily, HCTZ 25 mg daily, Lopressor 100 mg daily, spironolactone 25 mg daily, and valsartan 320 mg daily. Patient referred to sleep center for further evaluation at previous visit, but has not been able to be seen yet. Patient recently started new job and now has insurance coverage.       Objective:     Vitals:    01/19/23 1631 01/19/23 1632   BP: (!) 150/88 (!) 152/86   Site: Left Upper Arm Right Upper Arm   Position: Sitting Sitting   Cuff Size: Medium Adult Medium Adult   Pulse: 90    Temp: 97.2 °F (36.2 °C)    TempSrc: Temporal    SpO2: 97%    Weight: 219 lb (99.3 kg)    Height: 5' 3\" (1.6 m)        Wt Readings from Last 3 Encounters:   01/19/23 219 lb (99.3 kg)   01/09/23 217 lb 6.4 oz (98.6 kg)   12/21/22 216 lb 6.4 oz (98.2 kg)       BP Readings from Last 3 Encounters:   01/19/23 (!) 152/86   01/09/23 (!) 154/96   12/21/22 (!) 152/90       Lab Results   Component Value Date    WBC 10.6 10/10/2022    HGB 13.2 10/10/2022    HCT 39.5 10/10/2022    MCV 89.2 10/10/2022     10/10/2022     Lab Results   Component Value Date     06/28/2022    K 3.5 06/28/2022     06/28/2022    CO2 24 06/28/2022    BUN 8 06/28/2022    CREATININE 0.5 06/28/2022    GLUCOSE 127 (H) 06/28/2022    CALCIUM 9.3 06/28/2022    PROT 8.1 (H) 06/25/2022    LABALBU 4.4 06/25/2022    BILITOT 0.5 06/25/2022    ALKPHOS 100 06/25/2022 AST 14 06/25/2022    ALT 17 06/25/2022    LABGLOM >90 06/28/2022     Lab Results   Component Value Date    TSH 1.930 06/02/2022    T4FREE 0.99 06/02/2022     Lab Results   Component Value Date    LABA1C 8.2 01/19/2023     No results found for: EAG  No results found for: CHOL  No results found for: TRIG  No results found for: HDL  No results found for: LDLCHOLESTEROL, LDLCALC    No results found for: LABMICR, [id number]    Review of Systems   Constitutional:  Negative for activity change, chills, fatigue and fever. HENT:  Negative for congestion. Eyes:  Negative for visual disturbance. Respiratory:  Negative for cough, shortness of breath and wheezing. Cardiovascular:  Negative for chest pain and palpitations. Gastrointestinal:  Negative for abdominal pain, constipation, diarrhea, nausea and vomiting. Genitourinary:  Negative for dysuria. Musculoskeletal:  Negative for back pain. Neurological:  Negative for dizziness, syncope, light-headedness and headaches. Psychiatric/Behavioral:  Negative for dysphoric mood. The patient is not nervous/anxious. Physical Exam  Vitals and nursing note reviewed. Constitutional:       Appearance: Normal appearance. She is normal weight. HENT:      Head: Normocephalic and atraumatic. Nose: Nose normal.      Mouth/Throat:      Mouth: Mucous membranes are moist.      Pharynx: No oropharyngeal exudate or posterior oropharyngeal erythema. Eyes:      Extraocular Movements: Extraocular movements intact. Conjunctiva/sclera: Conjunctivae normal.      Pupils: Pupils are equal, round, and reactive to light. Cardiovascular:      Rate and Rhythm: Normal rate and regular rhythm. Pulses: Normal pulses. Heart sounds: No murmur heard. Pulmonary:      Effort: Pulmonary effort is normal. No respiratory distress. Breath sounds: Normal breath sounds. No wheezing. Abdominal:      General: Bowel sounds are normal. There is no distension. Palpations: Abdomen is soft. There is no mass. Tenderness: There is no abdominal tenderness. Musculoskeletal:      Cervical back: Neck supple. Skin:     General: Skin is warm and dry. Neurological:      General: No focal deficit present. Mental Status: She is alert and oriented to person, place, and time. Psychiatric:         Mood and Affect: Mood normal.         Behavior: Behavior normal.       Immunization History   Administered Date(s) Administered    Hepatitis A Adult (Havrix, Vaqta) 08/14/2018, 02/15/2019    Tdap (Boostrix, Adacel) 10/11/2015       Health Maintenance Due   Topic Date Due    COVID-19 Vaccine (1) Never done    Pneumococcal 0-64 years Vaccine (1 - PCV) Never done    Lipids  Never done    HIV screen  Never done    Diabetic Alb to Cr ratio (uACR) test  Never done    Diabetic retinal exam  Never done    Hepatitis B vaccine (1 of 3 - Risk 3-dose series) Never done    Cervical cancer screen  Never done    Colorectal Cancer Screen  Never done    Shingles vaccine (1 of 2) Never done    Flu vaccine (1) Never done       Food Insecurity: No Food Insecurity    Worried About Running Out of Food in the Last Year: Never true    Ran Out of Food in the Last Year: Never true       Assessment / Plan:   1. Type 2 diabetes mellitus with other specified complication, unspecified whether long term insulin use (HCC)  Chronic, uncontrolled. A1c 8.2 in office today.  -Continue Basaglar 50 units nightly  -Increase metformin to 500 mg in the morning, 1000 mg at night  -Will initiate Trulicity  -Monitor blood sugars, follow-up in 2-4 weeks for further medication management  - POCT glycosylated hemoglobin (Hb A1C)  - Dulaglutide (TRULICITY) 0.48 GC/6.1ZN SOPN; Inject 0.75 mg into the skin once a week  Dispense: 4 Adjustable Dose Pre-filled Pen Syringe; Refill: 1  - metFORMIN (GLUCOPHAGE-XR) 500 MG extended release tablet; Take 1 tablet in the morning, take 2 tablets at night  Dispense: 60 tablet;  Refill: 0    2. Resistant hypertension  Chronic, uncontrolled. BP remains elevated despite therapy with 5 different agents. Previous secondary causes of hypertension work-up was negative.  -Will increase Aldactone to 50 mg daily  -Encourage patient to have previously ordered labs completed prior to next visit  -Strongly encouraged patient to follow-up with Sleep Center for further evaluation of possible contributing PETE  -Monitor BP daily, call office with update in 1 week  -Follow-up in office in 2-4 weeks. - spironolactone (ALDACTONE) 50 MG tablet; Take 1 tablet by mouth daily  Dispense: 90 tablet; Refill: 1       Return in about 2 weeks (around 2/2/2023) for DM & HTN. Medications Prescribed:  Orders Placed This Encounter   Medications    Dulaglutide (TRULICITY) 1.27 DG/7.5OW SOPN     Sig: Inject 0.75 mg into the skin once a week     Dispense:  4 Adjustable Dose Pre-filled Pen Syringe     Refill:  1    spironolactone (ALDACTONE) 50 MG tablet     Sig: Take 1 tablet by mouth daily     Dispense:  90 tablet     Refill:  1    metFORMIN (GLUCOPHAGE-XR) 500 MG extended release tablet     Sig: Take 1 tablet in the morning, take 2 tablets at night     Dispense:  60 tablet     Refill:  0         Future Appointments   Date Time Provider Hung Wigginsi   2/2/2023  4:20 PM Michelle Yan MD [facility] [address] [facility]       Patient given educational materials - see patient instructions. Discussed use, benefit, and sideeffects of prescribed medications. All patient questions answered. Pt voiced understanding. Reviewed health maintenance. Instructed to continue current medications, diet and exercise. Patient agreed with treatment plan. Follow up as directed.      Electronically signed by Michelle Yan MD on 1/19/2023 at 11:00 PM

## 2023-01-19 NOTE — PROGRESS NOTES
S: 50 y.o. female with   Chief Complaint   Patient presents with    Follow-up     Hypertension, medication check, & DM       HPI: please see resident note for HPI and ROS. 61-year-old female here for follow-up on hypertension and diabetes. Patient is taking metformin and Basaglar 50 units nightly for diabetes. A1c today is 8.2%. Patient also has uncontrolled hypertension with BP 150s/80s today. She is taking felodipine, hydrochlorothiazide, spironolactone, and valsartan. Has had thorough work-up of secondary causes of hypertension that was normal in the past.  Due to follow-up with sleep center for sleep study. BP Readings from Last 3 Encounters:   01/19/23 (!) 152/86   01/09/23 (!) 154/96   12/21/22 (!) 152/90     Wt Readings from Last 3 Encounters:   01/19/23 219 lb (99.3 kg)   01/09/23 217 lb 6.4 oz (98.6 kg)   12/21/22 216 lb 6.4 oz (98.2 kg)       O: VS:  height is 5' 3\" (1.6 m) and weight is 219 lb (99.3 kg). Her temporal temperature is 97.2 °F (36.2 °C). Her blood pressure is 152/86 (abnormal) and her pulse is 90. Her oxygen saturation is 97%. AAO/NAD, appropriate affect for mood       Diagnosis Orders   1. Type 2 diabetes mellitus with other specified complication, unspecified whether long term insulin use (HCC)  POCT glycosylated hemoglobin (Hb A1C)    Dulaglutide (TRULICITY) 9.48 KX/0.8DE SOPN    metFORMIN (GLUCOPHAGE-XR) 500 MG extended release tablet      2. Resistant hypertension  spironolactone (ALDACTONE) 50 MG tablet          Plan:  Please refer to resident note for full plan. Type 2 diabetes: Chronic, uncontrolled. Hemoglobin A1c 8.2% today. Agree with continuing Basaglar 50 units nightly. Plan to increase metformin to 1000 mg at night and continue 500 mg in the morning. Resident physician is also planning to start patient on Trulicity. Plan to follow-up in 2-4 weeks for recheck. Hypertension: Chronic, uncontrolled. /86 in the office today.   Plan to continue felodipine, hydrochlorothiazide, and valsartan as prescribed. Agree with increasing spironolactone to 50 mg daily. Advised to follow-up with sleep center for consultation to rule out PETE. Follow-up in 2-4 weeks for recheck on blood pressure. Health Maintenance Due   Topic Date Due    COVID-19 Vaccine (1) Never done    Pneumococcal 0-64 years Vaccine (1 - PCV) Never done    Lipids  Never done    HIV screen  Never done    Diabetic Alb to Cr ratio (uACR) test  Never done    Diabetic retinal exam  Never done    Hepatitis B vaccine (1 of 3 - Risk 3-dose series) Never done    Cervical cancer screen  Never done    Colorectal Cancer Screen  Never done    Shingles vaccine (1 of 2) Never done    Flu vaccine (1) Never done       Attending Physician Statement  I have discussed the case, including pertinent history and exam findings with the resident. I also have seen the patient and performed key portions of the examination. I agree with the documented assessment and plan as documented by the resident.         Slime Miles,  1/20/2023 1:15 PM

## 2023-01-23 ENCOUNTER — PATIENT MESSAGE (OUTPATIENT)
Dept: FAMILY MEDICINE CLINIC | Age: 53
End: 2023-01-23

## 2023-01-23 NOTE — TELEPHONE ENCOUNTER
From: Lisa Bettencourt  To:  Shauna Mckeon MD  Sent: 1/23/2023 1:38 PM EST  Subject: One touch    Hello   I need a script for my one touch strips   Please and thank you  Isela Stevens

## 2023-01-23 NOTE — TELEPHONE ENCOUNTER
Patient's last appointment was : 1/19/23  Patient's next appointment is :   2/2/23  Last refilled: n/a  [facility] Verified    Lab Results   Component Value Date    LABA1C 8.2 01/19/2023     No results found for: CHOL, TRIG, HDL, LDLCALC, LDLDIRECT  Lab Results   Component Value Date     06/28/2022    K 3.5 06/28/2022     06/28/2022    CO2 24 06/28/2022    BUN 8 06/28/2022    CREATININE 0.5 06/28/2022    GLUCOSE 127 (H) 06/28/2022    CALCIUM 9.3 06/28/2022    PROT 8.1 (H) 06/25/2022    LABALBU 4.4 06/25/2022    BILITOT 0.5 06/25/2022    ALKPHOS 100 06/25/2022    AST 14 06/25/2022    ALT 17 06/25/2022    LABGLOM >90 06/28/2022     Lab Results   Component Value Date    TSH 1.930 06/02/2022    T4FREE 0.99 06/02/2022     Lab Results   Component Value Date    WBC 10.6 10/10/2022    HGB 13.2 10/10/2022    HCT 39.5 10/10/2022    MCV 89.2 10/10/2022     10/10/2022

## 2023-01-24 ENCOUNTER — TELEPHONE (OUTPATIENT)
Dept: FAMILY MEDICINE CLINIC | Age: 53
End: 2023-01-24

## 2023-01-24 NOTE — TELEPHONE ENCOUNTER
Patient called and stated she started with stomach pain and left side cramps yesterday. She also said she has vomited  2 times this morning and feels very weak and tired she wonder this might have been caused by her metformin.

## 2023-01-24 NOTE — TELEPHONE ENCOUNTER
Please advise patient to continue pushing fluids to help with fatigue. It is possible patient has a GI virus causing symptoms, but cannot rule out that it is related to Metformin. Would advise continuing Metformin today, monitor symptoms. If symptoms continue or worsen - then decrease Metformin back to 500 mg BID tomorrow. Thank you!

## 2023-02-02 ENCOUNTER — OFFICE VISIT (OUTPATIENT)
Dept: FAMILY MEDICINE CLINIC | Age: 53
End: 2023-02-02

## 2023-02-02 VITALS
SYSTOLIC BLOOD PRESSURE: 138 MMHG | DIASTOLIC BLOOD PRESSURE: 88 MMHG | BODY MASS INDEX: 39.09 KG/M2 | HEIGHT: 63 IN | HEART RATE: 85 BPM | WEIGHT: 220.6 LBS | RESPIRATION RATE: 18 BRPM | TEMPERATURE: 97.5 F | OXYGEN SATURATION: 98 %

## 2023-02-02 DIAGNOSIS — E11.69 TYPE 2 DIABETES MELLITUS WITH OTHER SPECIFIED COMPLICATION, UNSPECIFIED WHETHER LONG TERM INSULIN USE (HCC): Primary | ICD-10-CM

## 2023-02-02 DIAGNOSIS — I10 RESISTANT HYPERTENSION: ICD-10-CM

## 2023-02-02 RX ORDER — METFORMIN HYDROCHLORIDE 500 MG/1
1000 TABLET, EXTENDED RELEASE ORAL 2 TIMES DAILY
Qty: 120 TABLET | Refills: 0
Start: 2023-02-02

## 2023-02-02 SDOH — ECONOMIC STABILITY: INCOME INSECURITY: HOW HARD IS IT FOR YOU TO PAY FOR THE VERY BASICS LIKE FOOD, HOUSING, MEDICAL CARE, AND HEATING?: NOT VERY HARD

## 2023-02-02 SDOH — ECONOMIC STABILITY: HOUSING INSECURITY
IN THE LAST 12 MONTHS, WAS THERE A TIME WHEN YOU DID NOT HAVE A STEADY PLACE TO SLEEP OR SLEPT IN A SHELTER (INCLUDING NOW)?: NO

## 2023-02-02 SDOH — ECONOMIC STABILITY: FOOD INSECURITY: WITHIN THE PAST 12 MONTHS, THE FOOD YOU BOUGHT JUST DIDN'T LAST AND YOU DIDN'T HAVE MONEY TO GET MORE.: NEVER TRUE

## 2023-02-02 SDOH — ECONOMIC STABILITY: FOOD INSECURITY: WITHIN THE PAST 12 MONTHS, YOU WORRIED THAT YOUR FOOD WOULD RUN OUT BEFORE YOU GOT MONEY TO BUY MORE.: NEVER TRUE

## 2023-02-02 ASSESSMENT — ENCOUNTER SYMPTOMS
BACK PAIN: 0
ABDOMINAL PAIN: 0
VOMITING: 0
NAUSEA: 0
WHEEZING: 0
CONSTIPATION: 0
DIARRHEA: 0
SHORTNESS OF BREATH: 0
COUGH: 0

## 2023-02-02 NOTE — PROGRESS NOTES
Mila Elkins is a 48 y.o. female who presents today for:  Chief Complaint   Patient presents with    Follow-up     Pt presents for a 2 week f/u for DM and HTN. Pt states she has been doing good and has no other complaints at this time. HPI:   Mila Elkins is 48 y.o. who presents today for 2 week follow-up. DM: Patient has not been checking blood sugars recently. However, no sign of highs or lows. She continues to take her insulin as prescribed. She has obtained Trulicity, and has taken 1 dose without problems. She also increase metformin to 500 mg in the morning, 1000 mg in the evening. She is doing well with this, denies side effects with any medications. HTN: Patient has not been checking BP at home. However, improved in the office today. She continues to take felodipine, HCTZ, Lopressor, valsartan, and increased dose of spironolactone since last visit. Patient doing well with medication, denies side effects. Per report, patient has no signs or symptoms indicating that her blood pressure is elevated at home.       Objective:     Vitals:    02/02/23 1646   BP: 138/88   Pulse: 85   Resp: 18   Temp: 97.5 °F (36.4 °C)   SpO2: 98%   Weight: 220 lb 9.6 oz (100.1 kg)   Height: 5' 3\" (1.6 m)       Wt Readings from Last 3 Encounters:   02/02/23 220 lb 9.6 oz (100.1 kg)   01/19/23 219 lb (99.3 kg)   01/09/23 217 lb 6.4 oz (98.6 kg)       BP Readings from Last 3 Encounters:   02/02/23 138/88   01/19/23 (!) 152/86   01/09/23 (!) 154/96       Lab Results   Component Value Date    WBC 10.6 10/10/2022    HGB 13.2 10/10/2022    HCT 39.5 10/10/2022    MCV 89.2 10/10/2022     10/10/2022     Lab Results   Component Value Date     06/28/2022    K 3.5 06/28/2022     06/28/2022    CO2 24 06/28/2022    BUN 8 06/28/2022    CREATININE 0.5 06/28/2022    GLUCOSE 127 (H) 06/28/2022    CALCIUM 9.3 06/28/2022    PROT 8.1 (H) 06/25/2022    LABALBU 4.4 06/25/2022    BILITOT 0.5 06/25/2022    ALKPHOS 100 06/25/2022    AST 14 06/25/2022    ALT 17 06/25/2022    LABGLOM >90 06/28/2022     Lab Results   Component Value Date    TSH 1.930 06/02/2022    T4FREE 0.99 06/02/2022     Lab Results   Component Value Date    LABA1C 8.2 01/19/2023     No results found for: EAG  No results found for: CHOL  No results found for: TRIG  No results found for: HDL  No results found for: LDLCHOLESTEROL, LDLCALC    No results found for: LABMICR, [id number]    Review of Systems   Constitutional:  Negative for activity change, chills, fatigue and fever. HENT:  Negative for congestion. Eyes:  Negative for visual disturbance. Respiratory:  Negative for cough, shortness of breath and wheezing. Cardiovascular:  Negative for chest pain and palpitations. Gastrointestinal:  Negative for abdominal pain, constipation, diarrhea, nausea and vomiting. Genitourinary:  Negative for dysuria. Musculoskeletal:  Negative for back pain. Neurological:  Negative for dizziness, syncope, light-headedness and headaches. Psychiatric/Behavioral:  Negative for dysphoric mood. The patient is not nervous/anxious. Physical Exam  Vitals and nursing note reviewed. Constitutional:       Appearance: Normal appearance. She is normal weight. HENT:      Head: Normocephalic and atraumatic. Nose: Nose normal.      Mouth/Throat:      Mouth: Mucous membranes are moist.   Eyes:      Extraocular Movements: Extraocular movements intact. Conjunctiva/sclera: Conjunctivae normal.      Pupils: Pupils are equal, round, and reactive to light. Cardiovascular:      Rate and Rhythm: Normal rate and regular rhythm. Pulses: Normal pulses. Heart sounds: No murmur heard. Pulmonary:      Effort: Pulmonary effort is normal. No respiratory distress. Breath sounds: Normal breath sounds. No wheezing. Abdominal:      General: Bowel sounds are normal. There is no distension. Palpations: Abdomen is soft. There is no mass.       Tenderness: There is no abdominal tenderness. Musculoskeletal:      Cervical back: Neck supple. Skin:     General: Skin is warm and dry. Neurological:      General: No focal deficit present. Mental Status: She is alert and oriented to person, place, and time. Psychiatric:         Mood and Affect: Mood normal.         Behavior: Behavior normal.       Immunization History   Administered Date(s) Administered    Hepatitis A Adult (Havrix, Vaqta) 08/14/2018, 02/15/2019    Tdap (Boostrix, Adacel) 10/11/2015       Health Maintenance Due   Topic Date Due    COVID-19 Vaccine (1) Never done    Pneumococcal 0-64 years Vaccine (1 - PCV) Never done    Lipids  Never done    HIV screen  Never done    Diabetic Alb to Cr ratio (uACR) test  Never done    Diabetic retinal exam  Never done    Hepatitis B vaccine (1 of 3 - Risk 3-dose series) Never done    Cervical cancer screen  Never done    Colorectal Cancer Screen  Never done    Shingles vaccine (1 of 2) Never done    Flu vaccine (1) Never done       Food Insecurity: No Food Insecurity    Worried About Running Out of Food in the Last Year: Never true    Ran Out of Food in the Last Year: Never true       Assessment / Plan:   1. Type 2 diabetes mellitus with other specified complication, unspecified whether long term insulin use (HCC)  Chronic. Continue current dose of Basaglar 50 units nightly.  -Increase metformin to 1000 mg twice daily  -Continue Trulicity once weekly as prescribed  -Encourage patient to monitor fasting blood sugars  -Follow-up in 3 weeks, consider increasing Trulicity at that time, attempting to decrease Basaglar.  - metFORMIN (GLUCOPHAGE-XR) 500 MG extended release tablet; Take 2 tablets by mouth in the morning and at bedtime Take 1 tablet in the morning, take 2 tablets at night  Dispense: 120 tablet; Refill: 0    2. Resistant hypertension  Chronic, improving. BP within normal limits in office today.   Therefore, we will continue current dose of felodipine, HCTZ, metoprolol, spironolactone, valsartan.  -Advised patient to monitor blood pressure at home, bring to next appointment    -Patient instructed to have previously ordered labs completed prior to next visit. Return in about 3 weeks (around 2/23/2023). Medications Prescribed:  Orders Placed This Encounter   Medications    metFORMIN (GLUCOPHAGE-XR) 500 MG extended release tablet     Sig: Take 2 tablets by mouth in the morning and at bedtime Take 1 tablet in the morning, take 2 tablets at night     Dispense:  120 tablet     Refill:  0         Future Appointments   Date Time Provider Hung Giron   3/1/2023  4:00 PM Anthony Fischer MD [address] [place] [address] [place]       Patient given educational materials - see patient instructions. Discussed use, benefit, and sideeffects of prescribed medications. All patient questions answered. Pt voiced understanding. Reviewed health maintenance. Instructed to continue current medications, diet and exercise. Patient agreed with treatment plan. Follow up as directed.      Electronically signed by Anthony Fischer MD on 2/2/2023 at 11:02 PM

## 2023-02-02 NOTE — PROGRESS NOTES
S: 48 y.o. female with   Chief Complaint   Patient presents with    Follow-up     Pt presents for a 2 week f/u for DM and HTN. Pt states she has been doing good and has no other complaints at this time. HPI: please see resident note for HPI and ROS. 43-year-old female presenting to the office for follow-up on hypertension and type 2 diabetes. Hypertension is stable on increased dose of spironolactone. Patient started Trulicity for type 2 diabetes and is tolerating well. Also tolerating increased dose of metformin. BP Readings from Last 3 Encounters:   02/02/23 138/88   01/19/23 (!) 152/86   01/09/23 (!) 154/96     Wt Readings from Last 3 Encounters:   02/02/23 220 lb 9.6 oz (100.1 kg)   01/19/23 219 lb (99.3 kg)   01/09/23 217 lb 6.4 oz (98.6 kg)       O: VS:  height is 5' 3\" (1.6 m) and weight is 220 lb 9.6 oz (100.1 kg). Her temperature is 97.5 °F (36.4 °C). Her blood pressure is 138/88 and her pulse is 85. Her respiration is 18 and oxygen saturation is 98%. AAO/NAD, appropriate affect for mood       Diagnosis Orders   1. Type 2 diabetes mellitus with other specified complication, unspecified whether long term insulin use (HCC)  metFORMIN (GLUCOPHAGE-XR) 500 MG extended release tablet      2. Resistant hypertension            Plan:  Please refer to resident note for full plan. Hypertension: Chronic, stable. Continue spironolactone, valsartan, metoprolol, and hydrochlorothiazide as prescribed. Continue to monitor blood pressures closely. Type 2 diabetes: Chronic, uncontrolled. Last hemoglobin A1c 8.2% on 1/19/2023. Agree with continuing Trulicity and will increase metformin to max dose. Continue insulin as prescribed. Follow-up in 3 weeks for recheck.     Health Maintenance Due   Topic Date Due    COVID-19 Vaccine (1) Never done    Pneumococcal 0-64 years Vaccine (1 - PCV) Never done    Lipids  Never done    HIV screen  Never done    Diabetic Alb to Cr ratio (uACR) test  Never done Diabetic retinal exam  Never done    Hepatitis B vaccine (1 of 3 - Risk 3-dose series) Never done    Cervical cancer screen  Never done    Colorectal Cancer Screen  Never done    Shingles vaccine (1 of 2) Never done    Flu vaccine (1) Never done       Attending Physician Statement  I have discussed the case, including pertinent history and exam findings with the resident. I also have seen the patient and performed key portions of the examination. I agree with the documented assessment and plan as documented by the resident.         Meghana Leslie,  2/3/2023 12:02 PM

## 2023-02-06 ENCOUNTER — TELEPHONE (OUTPATIENT)
Dept: FAMILY MEDICINE CLINIC | Age: 53
End: 2023-02-06

## 2023-02-06 NOTE — TELEPHONE ENCOUNTER
Please ask patient if she had eaten yet this morning when she had low blood sugar reading? How was she feeling with this - any nausea, vomiting, dizziness, or lightheadedness? Please have patient decrease Basaglar to 45 units nightly. Recommend continue monitoring blood sugar, advise if lows persist. Thank you!

## 2023-02-06 NOTE — TELEPHONE ENCOUNTER
Patient called and said she having issues with  blood sugars,  said  it was low this morning round 67. She got it back up,so she went to work, now  her blood sugar are in the 300 every time she starts walking or working. Patient said she took all her medication and her insulin   as well. She also said blood sugar do come down when she is sitting and resting.

## 2023-02-06 NOTE — TELEPHONE ENCOUNTER
Patient didn't have anything to eat, did go home and had some crackers. She said her blood sugar went back done to 145, said she will decrease her insulin tonight. She also had no other symptoms

## 2023-02-07 ENCOUNTER — OFFICE VISIT (OUTPATIENT)
Dept: FAMILY MEDICINE CLINIC | Age: 53
End: 2023-02-07
Payer: COMMERCIAL

## 2023-02-07 VITALS
SYSTOLIC BLOOD PRESSURE: 102 MMHG | HEART RATE: 88 BPM | RESPIRATION RATE: 14 BRPM | DIASTOLIC BLOOD PRESSURE: 76 MMHG | HEIGHT: 63 IN | TEMPERATURE: 97.7 F | BODY MASS INDEX: 37.81 KG/M2 | WEIGHT: 213.4 LBS | OXYGEN SATURATION: 97 %

## 2023-02-07 DIAGNOSIS — R73.9 HYPERGLYCEMIA: ICD-10-CM

## 2023-02-07 DIAGNOSIS — E11.69 TYPE 2 DIABETES MELLITUS WITH OTHER SPECIFIED COMPLICATION, UNSPECIFIED WHETHER LONG TERM INSULIN USE (HCC): Primary | ICD-10-CM

## 2023-02-07 DIAGNOSIS — R19.7 DIARRHEA, UNSPECIFIED TYPE: ICD-10-CM

## 2023-02-07 PROCEDURE — G8427 DOCREV CUR MEDS BY ELIG CLIN: HCPCS | Performed by: STUDENT IN AN ORGANIZED HEALTH CARE EDUCATION/TRAINING PROGRAM

## 2023-02-07 PROCEDURE — 3052F HG A1C>EQUAL 8.0%<EQUAL 9.0%: CPT | Performed by: STUDENT IN AN ORGANIZED HEALTH CARE EDUCATION/TRAINING PROGRAM

## 2023-02-07 PROCEDURE — 99214 OFFICE O/P EST MOD 30 MIN: CPT | Performed by: STUDENT IN AN ORGANIZED HEALTH CARE EDUCATION/TRAINING PROGRAM

## 2023-02-07 PROCEDURE — G8484 FLU IMMUNIZE NO ADMIN: HCPCS | Performed by: STUDENT IN AN ORGANIZED HEALTH CARE EDUCATION/TRAINING PROGRAM

## 2023-02-07 PROCEDURE — G8417 CALC BMI ABV UP PARAM F/U: HCPCS | Performed by: STUDENT IN AN ORGANIZED HEALTH CARE EDUCATION/TRAINING PROGRAM

## 2023-02-07 PROCEDURE — 3074F SYST BP LT 130 MM HG: CPT | Performed by: STUDENT IN AN ORGANIZED HEALTH CARE EDUCATION/TRAINING PROGRAM

## 2023-02-07 PROCEDURE — 3078F DIAST BP <80 MM HG: CPT | Performed by: STUDENT IN AN ORGANIZED HEALTH CARE EDUCATION/TRAINING PROGRAM

## 2023-02-07 PROCEDURE — 3017F COLORECTAL CA SCREEN DOC REV: CPT | Performed by: STUDENT IN AN ORGANIZED HEALTH CARE EDUCATION/TRAINING PROGRAM

## 2023-02-07 PROCEDURE — 2022F DILAT RTA XM EVC RTNOPTHY: CPT | Performed by: STUDENT IN AN ORGANIZED HEALTH CARE EDUCATION/TRAINING PROGRAM

## 2023-02-07 PROCEDURE — 1036F TOBACCO NON-USER: CPT | Performed by: STUDENT IN AN ORGANIZED HEALTH CARE EDUCATION/TRAINING PROGRAM

## 2023-02-07 ASSESSMENT — ENCOUNTER SYMPTOMS
BLOOD IN STOOL: 0
NAUSEA: 1
VOMITING: 1
DIARRHEA: 1
ABDOMINAL DISTENTION: 1
SHORTNESS OF BREATH: 0
ABDOMINAL PAIN: 1

## 2023-02-07 NOTE — PROGRESS NOTES
[id number] [facility] [address] [id number]  Dept: [phone]  Dept Fax: [phone]  Loc: [phone]      Richard Cm is a 48 y.o. female who presents today for:  Chief Complaint   Patient presents with    Bloated     Diarrhea, gas, nausea, vomiting, abdomen is hard  Now is taking 2,000mg of metformin started last week  + last week and was sent home from work for a high BS. Fatigued and not sleeping. HPI:   Richard Cm is 48 y.o. who presents today for high blood sugars and GI symptoms. States has been experiencing GI symptoms since Sunday night, states was having symptoms the week before last as well. Thought had GI bug. Has been having stomach cramps, diarrhea (watery), nausea, vomiting. Has not been having any PO intake, has not drank water since this morning when taking pills. Did increase metformin two weeks ago, backed off and still had GI symptoms and then increased back last week. Yesterday blood sugar was 314 at one point, and down to 148 at 7 o clock at night. States was not 67 in the morning yesterday, was 100s, was up at work in the 200s and was sent home from work. Decreased the basaglar last night to 45 units and unsure of where blood sugar was this morning because test strips fell in the toilet and could not check blood sugar. Was previously taking metformin 2 at night and one in the morning.        Objective:     Vitals:    02/07/23 1505   BP: 102/76   Pulse: 88   Resp: 14   Temp: 97.7 °F (36.5 °C)   SpO2: 97%         Wt Readings from Last 3 Encounters:   02/07/23 213 lb 6.4 oz (96.8 kg)   02/02/23 220 lb 9.6 oz (100.1 kg)   01/19/23 219 lb (99.3 kg)       BP Readings from Last 3 Encounters:   02/07/23 102/76   02/02/23 138/88   01/19/23 (!) 152/86       Lab Results   Component Value Date    WBC 10.6 10/10/2022    HGB 13.2 10/10/2022    HCT 39.5 10/10/2022    MCV 89.2 10/10/2022     10/10/2022     Lab Results   Component Value Date     06/28/2022    K 3.5 06/28/2022     06/28/2022    CO2 24 06/28/2022    BUN 8 06/28/2022    CREATININE 0.5 06/28/2022    GLUCOSE 127 (H) 06/28/2022    CALCIUM 9.3 06/28/2022    PROT 8.1 (H) 06/25/2022    LABALBU 4.4 06/25/2022    BILITOT 0.5 06/25/2022    ALKPHOS 100 06/25/2022    AST 14 06/25/2022    ALT 17 06/25/2022    LABGLOM >90 06/28/2022     Lab Results   Component Value Date    TSH 1.930 06/02/2022    T4FREE 0.99 06/02/2022     Lab Results   Component Value Date    LABA1C 8.2 01/19/2023     No results found for: EAG  No results found for: CHOL  No results found for: TRIG  No results found for: HDL  No results found for: LDLCHOLESTEROL, LDLCALC  No results found for: PSA, PSADIA  No results found for: [id number]  No results found for: VITD25       No results found for: LABMICR, [id number]    Review of Systems   Constitutional:  Positive for fatigue. Negative for chills and fever. Respiratory:  Negative for shortness of breath. Cardiovascular:  Negative for chest pain and palpitations. Gastrointestinal:  Positive for abdominal distention, abdominal pain, diarrhea, nausea and vomiting. Negative for blood in stool. Genitourinary:  Negative for dysuria, hematuria and urgency. Neurological:  Negative for dizziness and light-headedness. Physical Exam  Constitutional:       Appearance: Normal appearance. She is not ill-appearing. HENT:      Head: Normocephalic and atraumatic. Mouth/Throat:      Mouth: Mucous membranes are moist.   Cardiovascular:      Rate and Rhythm: Normal rate and regular rhythm. Heart sounds: Normal heart sounds. No murmur heard. No gallop. Pulmonary:      Effort: Pulmonary effort is normal. No respiratory distress. Breath sounds: Normal breath sounds. No wheezing. Abdominal:      General: Abdomen is flat. Bowel sounds are normal. There is no distension. Palpations: Abdomen is soft. Tenderness: There is abdominal tenderness (Diffusely, LUQ). Skin:     General: Skin is warm. Capillary Refill: Capillary refill takes less than 2 seconds. Neurological:      General: No focal deficit present. Mental Status: She is alert and oriented to person, place, and time. Immunization History   Administered Date(s) Administered    Hepatitis A Adult (Havrix, Vaqta) 08/14/2018, 02/15/2019    Tdap (Boostrix, Adacel) 10/11/2015       Health Maintenance Due   Topic Date Due    COVID-19 Vaccine (1) Never done    Pneumococcal 0-64 years Vaccine (1 - PCV) Never done    Lipids  Never done    HIV screen  Never done    Diabetic Alb to Cr ratio (uACR) test  Never done    Diabetic retinal exam  Never done    Hepatitis B vaccine (1 of 3 - Risk 3-dose series) Never done    Cervical cancer screen  Never done    Colorectal Cancer Screen  Never done    Shingles vaccine (1 of 2) Never done    Flu vaccine (1) Never done       Food Insecurity: No Food Insecurity    Worried About Running Out of Food in the Last Year: Never true    Ran Out of Food in the Last Year: Never true       Assessment / Plan:   1. Type 2 diabetes mellitus with other specified complication, unspecified whether long term insulin use ([facility] [address]  - Suspect recent dose adjustment of metformin as cause of GI symptoms, however patient is reporting elevated blood sugars at home  - Decreased metformin to 500mg BID and will increase basaglar insulin back to 50 units   - Instructed to check blood sugars at home after appointment and to check again in the morning after increase of basaglar, call office if noting sugars over 300   - metFORMIN (GLUCOPHAGE-XR) 500 MG extended release tablet; Take 1 tablet by mouth in the morning and at bedtime  Dispense: 120 tablet; Refill: 0    2. Hyperglycemia  - Plan as above     3.  Diarrhea, unspecified type  - Suspect secondary to recent increase in metformin dose, recurrence of symptoms associated with time frames of metformin increased dose. Did also recently begin GLP-1 therapy per patient, consider additionally as possible cause of GI symptoms vs infectious process  - Instructed to decrease metformin dose to 500mg BID from 1000mg BID and assess for improvement of GI symptoms                 Return if symptoms worsen or fail to improve. Future Appointments   Date Time Provider Hung Giron   3/1/2023  4:00 PM Tar[facility] Dexter [facility]       Patient given educational materials - see patient instructions. Discussed use, benefit, and sideeffects of prescribed medications. All patient questions answered. Pt voiced understanding. Reviewed health maintenance. Instructed to continue current medications, diet and exercise. Patient agreed with treatment plan. Follow up as directed.      Electronically signed by Marito Trent DO on 2/8/2023 at 12:30 PM

## 2023-02-07 NOTE — LETTER
[facility] [address],[address] [facility] [address] [place] [address]  Phone: [phone]  Fax: [facility] [address], DO        February 7, 2023     Patient: Akilah Dao   YOB: 1970   Date of Visit: 2/7/2023       To Whom it May Concern:    Doris Reed was seen in my clinic on 2/7/2023. She may return to work on 2/8/2023. If you have any questions or concerns, please don't hesitate to call.     Sincerely,         Carlos Avelar, DO

## 2023-02-07 NOTE — PROGRESS NOTES
Health Maintenance Due   Topic Date Due    COVID-19 Vaccine (1) Never done    Pneumococcal 0-64 years Vaccine (1 - PCV) Never done    Lipids  Never done    HIV screen  Never done    Diabetic Alb to Cr ratio (uACR) test  Never done    Diabetic retinal exam  Never done    Hepatitis B vaccine (1 of 3 - Risk 3-dose series) Never done    Cervical cancer screen  Never done    Colorectal Cancer Screen  Never done    Shingles vaccine (1 of 2) Never done    Flu vaccine (1) Never done

## 2023-02-07 NOTE — PROGRESS NOTES
S: 48 y.o. female with   Chief Complaint   Patient presents with    Bloated     Diarrhea, gas, nausea, vomiting, abdomen is hard  Now is taking 2,000mg of metformin started last week  + last week and was sent home from work for a high BS. Fatigued and not sleeping. HPI: please see resident note for HPI and ROS. 61-year-old female presenting to the office with concerns of diarrhea, nausea, gassiness since increase of metformin to 2000 mg daily last week. She also complains of elevated blood sugar readings up into the 300s. States that her [place] was decreased from 50 units to 45 units last night --it appears that PCP was updated that patient had a low blood glucose reading of 67, though patient denies any low blood sugars. Patient was unable to check her blood sugar this morning. BP Readings from Last 3 Encounters:   02/07/23 102/76   02/02/23 138/88   01/19/23 (!) 152/86     Wt Readings from Last 3 Encounters:   02/07/23 213 lb 6.4 oz (96.8 kg)   02/02/23 220 lb 9.6 oz (100.1 kg)   01/19/23 219 lb (99.3 kg)       O: VS:  height is 5' 3\" (1.6 m) and weight is 213 lb 6.4 oz (96.8 kg). Her oral temperature is 97.7 °F (36.5 °C). Her blood pressure is 102/76 and her pulse is 88. Her respiration is 14 and oxygen saturation is 97%. AAO/NAD, appropriate affect for mood       Diagnosis Orders   1. Type 2 diabetes mellitus with other specified complication, unspecified whether long term insulin use (HCC)  metFORMIN (GLUCOPHAGE-XR) 500 MG extended release tablet      2. Hyperglycemia        3. Diarrhea, unspecified type            Plan:  Please refer to resident note for full plan. Type 2 diabetes with hyperglycemia: Agree with decreasing metformin to 500 mg twice daily (cannot tolerate due to GI symptoms) and increasing Basaglar to 50 units nightly. Advised to continue Trulicity as prescribed.   Recommended to check her blood sugars regularly and contact the office with any further issues or concerns. Diarrhea, GI upset: Acute, likely secondary to higher dose of metformin. Agree with decreasing metformin to 500 mg twice daily to see if this helps resolve symptoms. Patient advised to contact the office if GI symptoms persist or worsen. Follow-up as scheduled on 3/1/2023 or sooner if needed. Health Maintenance Due   Topic Date Due    COVID-19 Vaccine (1) Never done    Pneumococcal 0-64 years Vaccine (1 - PCV) Never done    Lipids  Never done    HIV screen  Never done    Diabetic Alb to Cr ratio (uACR) test  Never done    Diabetic retinal exam  Never done    Hepatitis B vaccine (1 of 3 - Risk 3-dose series) Never done    Cervical cancer screen  Never done    Colorectal Cancer Screen  Never done    Shingles vaccine (1 of 2) Never done    Flu vaccine (1) Never done       Attending Physician Statement  I have discussed the case, including pertinent history and exam findings with the resident. I also have seen the patient and performed key portions of the examination. I agree with the documented assessment and plan as documented by the resident.         Mirtha Cowan, DO 2/9/2023 7:32 AM

## 2023-02-08 RX ORDER — METFORMIN HYDROCHLORIDE 500 MG/1
500 TABLET, EXTENDED RELEASE ORAL 2 TIMES DAILY
Qty: 120 TABLET | Refills: 0 | Status: SHIPPED | OUTPATIENT
Start: 2023-02-08

## 2023-02-22 DIAGNOSIS — E11.69 TYPE 2 DIABETES MELLITUS WITH OTHER SPECIFIED COMPLICATION, UNSPECIFIED WHETHER LONG TERM INSULIN USE (HCC): ICD-10-CM

## 2023-02-22 DIAGNOSIS — I10 RESISTANT HYPERTENSION: ICD-10-CM

## 2023-02-22 DIAGNOSIS — I10 ESSENTIAL HYPERTENSION: ICD-10-CM

## 2023-02-22 RX ORDER — DULAGLUTIDE 0.75 MG/.5ML
0.75 INJECTION, SOLUTION SUBCUTANEOUS WEEKLY
Qty: 4 ADJUSTABLE DOSE PRE-FILLED PEN SYRINGE | Refills: 1 | Status: CANCELLED | OUTPATIENT
Start: 2023-02-22

## 2023-02-22 NOTE — TELEPHONE ENCOUNTER
Patient's last appointment was : 02/07/2023  Patient's next appointment is :  03/01/2023  Last refilled: 02/08/2023  [id number] Verified    Lab Results   Component Value Date    LABA1C 8.2 01/19/2023     No results found for: CHOL, TRIG, HDL, LDLCALC, LDLDIRECT  Lab Results   Component Value Date     06/28/2022    K 3.5 06/28/2022     06/28/2022    CO2 24 06/28/2022    BUN 8 06/28/2022    CREATININE 0.5 06/28/2022    GLUCOSE 127 (H) 06/28/2022    CALCIUM 9.3 06/28/2022    PROT 8.1 (H) 06/25/2022    LABALBU 4.4 06/25/2022    BILITOT 0.5 06/25/2022    ALKPHOS 100 06/25/2022    AST 14 06/25/2022    ALT 17 06/25/2022    LABGLOM >90 06/28/2022     Lab Results   Component Value Date    TSH 1.930 06/02/2022    T4FREE 0.99 06/02/2022     Lab Results   Component Value Date    WBC 10.6 10/10/2022    HGB 13.2 10/10/2022    HCT 39.5 10/10/2022    MCV 89.2 10/10/2022     10/10/2022

## 2023-02-24 RX ORDER — SPIRONOLACTONE 50 MG/1
50 TABLET, FILM COATED ORAL DAILY
Qty: 90 TABLET | Refills: 3 | Status: SHIPPED | OUTPATIENT
Start: 2023-02-24

## 2023-02-24 RX ORDER — VALSARTAN 320 MG/1
320 TABLET ORAL DAILY
Qty: 90 TABLET | Refills: 3 | Status: SHIPPED | OUTPATIENT
Start: 2023-02-24

## 2023-02-24 RX ORDER — METOPROLOL TARTRATE 100 MG/1
100 TABLET ORAL 2 TIMES DAILY
Qty: 180 TABLET | Refills: 3 | Status: SHIPPED | OUTPATIENT
Start: 2023-02-24

## 2023-02-24 RX ORDER — METFORMIN HYDROCHLORIDE 500 MG/1
500 TABLET, EXTENDED RELEASE ORAL 2 TIMES DAILY
Qty: 120 TABLET | Refills: 0 | Status: SHIPPED | OUTPATIENT
Start: 2023-02-24

## 2023-02-24 RX ORDER — HYDROCHLOROTHIAZIDE 25 MG/1
25 TABLET ORAL EVERY MORNING
Qty: 90 TABLET | Refills: 3 | Status: SHIPPED | OUTPATIENT
Start: 2023-02-24

## 2023-05-16 ENCOUNTER — APPOINTMENT (OUTPATIENT)
Dept: CT IMAGING | Age: 53
End: 2023-05-16
Payer: COMMERCIAL

## 2023-05-16 ENCOUNTER — HOSPITAL ENCOUNTER (EMERGENCY)
Age: 53
Discharge: HOME | End: 2023-05-16
Attending: EMERGENCY MEDICINE
Payer: COMMERCIAL

## 2023-05-16 ENCOUNTER — TELEPHONE (OUTPATIENT)
Dept: FAMILY MEDICINE CLINIC | Age: 53
End: 2023-05-16

## 2023-05-16 VITALS
SYSTOLIC BLOOD PRESSURE: 161 MMHG | DIASTOLIC BLOOD PRESSURE: 86 MMHG | HEART RATE: 72 BPM | BODY MASS INDEX: 37.21 KG/M2 | WEIGHT: 210 LBS | RESPIRATION RATE: 16 BRPM | HEIGHT: 63 IN | TEMPERATURE: 98.3 F | OXYGEN SATURATION: 98 %

## 2023-05-16 DIAGNOSIS — I16.0 HYPERTENSIVE URGENCY: Primary | ICD-10-CM

## 2023-05-16 DIAGNOSIS — R42 DIZZINESS: ICD-10-CM

## 2023-05-16 LAB
ANION GAP SERPL CALC-SCNC: 14 MEQ/L (ref 8–16)
BACTERIA URNS QL MICRO: ABNORMAL /HPF
BASOPHILS ABSOLUTE: 0 THOU/MM3 (ref 0–0.1)
BASOPHILS NFR BLD AUTO: 0.5 %
BILIRUB UR QL STRIP.AUTO: NEGATIVE
BUN SERPL-MCNC: 12 MG/DL (ref 7–22)
CALCIUM SERPL-MCNC: 9 MG/DL (ref 8.5–10.5)
CASTS #/AREA URNS LPF: ABNORMAL /LPF
CASTS 2: ABNORMAL /LPF
CHARACTER UR: CLEAR
CHLORIDE SERPL-SCNC: 99 MEQ/L (ref 98–111)
CO2 SERPL-SCNC: 21 MEQ/L (ref 23–33)
COLOR: YELLOW
CREAT SERPL-MCNC: 0.5 MG/DL (ref 0.4–1.2)
CRYSTALS URNS MICRO: ABNORMAL
DEPRECATED RDW RBC AUTO: 39.5 FL (ref 35–45)
EOSINOPHIL NFR BLD AUTO: 2.9 %
EOSINOPHILS ABSOLUTE: 0.2 THOU/MM3 (ref 0–0.4)
EPITHELIAL CELLS, UA: ABNORMAL /HPF
ERYTHROCYTE [DISTWIDTH] IN BLOOD BY AUTOMATED COUNT: 12 % (ref 11.5–14.5)
GFR SERPL CREATININE-BSD FRML MDRD: > 60 ML/MIN/1.73M2
GLUCOSE SERPL-MCNC: 248 MG/DL (ref 70–108)
GLUCOSE UR QL STRIP.AUTO: >= 1000 MG/DL
HCT VFR BLD AUTO: 37.6 % (ref 37–47)
HGB BLD-MCNC: 12.5 GM/DL (ref 12–16)
HGB UR QL STRIP.AUTO: NEGATIVE
IMM GRANULOCYTES # BLD AUTO: 0.13 THOU/MM3 (ref 0–0.07)
IMM GRANULOCYTES NFR BLD AUTO: 1.7 %
KETONES UR QL STRIP.AUTO: ABNORMAL
LYMPHOCYTES ABSOLUTE: 2.1 THOU/MM3 (ref 1–4.8)
LYMPHOCYTES NFR BLD AUTO: 28.5 %
MCH RBC QN AUTO: 30 PG (ref 26–33)
MCHC RBC AUTO-ENTMCNC: 33.2 GM/DL (ref 32.2–35.5)
MCV RBC AUTO: 90.4 FL (ref 81–99)
MISCELLANEOUS 2: ABNORMAL
MONOCYTES ABSOLUTE: 0.4 THOU/MM3 (ref 0.4–1.3)
MONOCYTES NFR BLD AUTO: 4.8 %
NEUTROPHILS NFR BLD AUTO: 61.6 %
NITRITE UR QL STRIP: NEGATIVE
NRBC BLD AUTO-RTO: 0 /100 WBC
OSMOLALITY SERPL CALC.SUM OF ELEC: 276.3 MOSMOL/KG (ref 275–300)
PH UR STRIP.AUTO: 5.5 [PH] (ref 5–9)
PLATELET # BLD AUTO: 212 THOU/MM3 (ref 130–400)
PMV BLD AUTO: 9.8 FL (ref 9.4–12.4)
POTASSIUM SERPL-SCNC: 4 MEQ/L (ref 3.5–5.2)
PROT UR STRIP.AUTO-MCNC: 30 MG/DL
RBC # BLD AUTO: 4.16 MILL/MM3 (ref 4.2–5.4)
RBC URINE: ABNORMAL /HPF
RENAL EPI CELLS #/AREA URNS HPF: ABNORMAL /[HPF]
SEGMENTED NEUTROPHILS ABSOLUTE COUNT: 4.6 THOU/MM3 (ref 1.8–7.7)
SODIUM SERPL-SCNC: 134 MEQ/L (ref 135–145)
SP GR UR REFRACT.AUTO: 1.02 (ref 1–1.03)
TROPONIN T: < 0.01 NG/ML
UROBILINOGEN, URINE: 0.2 EU/DL (ref 0–1)
WBC # BLD AUTO: 7.5 THOU/MM3 (ref 4.8–10.8)
WBC #/AREA URNS HPF: ABNORMAL /HPF
WBC #/AREA URNS HPF: ABNORMAL /[HPF]
YEAST LIKE FUNGI URNS QL MICRO: ABNORMAL

## 2023-05-16 PROCEDURE — 96360 HYDRATION IV INFUSION INIT: CPT

## 2023-05-16 PROCEDURE — 93010 ELECTROCARDIOGRAM REPORT: CPT | Performed by: INTERNAL MEDICINE

## 2023-05-16 PROCEDURE — 81001 URINALYSIS AUTO W/SCOPE: CPT

## 2023-05-16 PROCEDURE — 70450 CT HEAD/BRAIN W/O DYE: CPT

## 2023-05-16 PROCEDURE — 36415 COLL VENOUS BLD VENIPUNCTURE: CPT

## 2023-05-16 PROCEDURE — 85025 COMPLETE CBC W/AUTO DIFF WBC: CPT

## 2023-05-16 PROCEDURE — 96361 HYDRATE IV INFUSION ADD-ON: CPT

## 2023-05-16 PROCEDURE — 80048 BASIC METABOLIC PNL TOTAL CA: CPT

## 2023-05-16 PROCEDURE — 2580000003 HC RX 258: Performed by: STUDENT IN AN ORGANIZED HEALTH CARE EDUCATION/TRAINING PROGRAM

## 2023-05-16 PROCEDURE — 93005 ELECTROCARDIOGRAM TRACING: CPT | Performed by: STUDENT IN AN ORGANIZED HEALTH CARE EDUCATION/TRAINING PROGRAM

## 2023-05-16 PROCEDURE — 99284 EMERGENCY DEPT VISIT MOD MDM: CPT

## 2023-05-16 PROCEDURE — 84484 ASSAY OF TROPONIN QUANT: CPT

## 2023-05-16 RX ORDER — 0.9 % SODIUM CHLORIDE 0.9 %
1000 INTRAVENOUS SOLUTION INTRAVENOUS ONCE
Status: COMPLETED | OUTPATIENT
Start: 2023-05-16 | End: 2023-05-16

## 2023-05-16 RX ADMIN — SODIUM CHLORIDE 1000 ML: 9 INJECTION, SOLUTION INTRAVENOUS at 09:37

## 2023-05-16 ASSESSMENT — PAIN - FUNCTIONAL ASSESSMENT: PAIN_FUNCTIONAL_ASSESSMENT: NONE - DENIES PAIN

## 2023-05-16 NOTE — TELEPHONE ENCOUNTER
Spoke with pt, pt states she left work with a BP of 240/? Before going to the ED. Pt does not remember what BP was at the ED. Pt is needing an appointment for medication changes, work has pt off until 5/18. Pt needed an appointment prior to going back. Scheduled appointment for 5/17/23 pt verbalized understanding.

## 2023-05-16 NOTE — TELEPHONE ENCOUNTER
----- Message from Celergo  sent at 5/16/2023 12:22 PM EDT -----  Subject: Appointment Request    Reason for Call: Established Patient Appointment needed: Routine ED Follow   Up Visit    QUESTIONS    Reason for appointment request? No appointments available during search     Additional Information for Provider? Patient called in today to book an   appointment from being in the ER, patient blood pressure too high needs   adjustment on medications.  Please call to schedule.   ---------------------------------------------------------------------------  --------------  Chelsea Hernandez YVTD  [phone]; OK to leave message on voicemail  ---------------------------------------------------------------------------  --------------  SCRIPT ANSWERS  COVID Screen: Areli Barton

## 2023-05-16 NOTE — ED TRIAGE NOTES
Pt comes to ED by [facility] from work with c/o dizziness while on the line and after taking her BP, the patient has a high BP of 591 systolic. PT blood sugar is 259. PT denies any pain at this time and took all daily medications. Pt respers are regular and unlabored at this time. EKG is completed.

## 2023-05-16 NOTE — DISCHARGE INSTRUCTIONS
You are seen today in the emergency department for high blood pressure, dizziness. Your labs and imaging were reassuring. It shows that you may have a mild sinusitis. You can use over-the-counter nasal sinus rinses to help with this. You will need to follow-up with your family doctor regarding today's visit.

## 2023-05-16 NOTE — ED PROVIDER NOTES
Chief Complaint   Patient presents with    Hypertension    Dizziness             Differential Diagnosis includes (but not limited to):  hypertensive urgency, hypertensive emergency, endorgan damage, DKA, electro abnormality, hyper glycemia, hyper Po glycemia, UTI. ACS    Although some of these diagnoses are unlikely, they were consider in my medical decision making. Data Reviewed (none if left blank)    My Independent interpretations:     EKG:      NSR    Imaging: See Ed Course  Labs:      See Ed Course    Decision Rules/Clinical Scores utilized:      External Documentation Reviewed:   Previous patient encounter documents & history available on EMR was reviewed   See Formal Diagnostic Results above for the lab and radiology tests and orders. Please see ED course for further reassessments, treatments, MDM, and final disposition. Case discussed with specialties other than Emergency Medicine: Not Applicable    Shared Decision-Making was performed, disposition discussed with the patient/family and questions answered. Social determinants of health impacting treatment or disposition:      Code Status:  Not addressed during this ED visit    Summary of Patient Presentation:   ED Course as of 05/16/23 1632   Tue May 16, 2023   0838 BP(!): 210/98 [EL]   0932 Troponin T: < 0.010 [EL]   0944 Ketones, Urine(!): TRACE [EL]   0944 Anion Gap: 14.0 [EL]   1126 CT HEAD WO CONTRAST  IMPRESSION:   Normal CT appearance of the brain. Mild mucosal thickening in the left maxillary sinus.  [EL]      ED Course User Index  [EL] Emily Valera MD       ED Medications administered this visit:  (None if blank)  Medications   0.9 % sodium chloride bolus (0 mLs IntraVENous Stopped 5/16/23 1216)       Vitals Reviewed:    Vitals:    05/16/23 0832 05/16/23 0936 05/16/23 1051 05/16/23 1216   BP:  (!) 163/103 (!) 181/96 (!) 161/86   Pulse:  74 72    Resp:  17 16    Temp: 98.3 °F (36.8 °C)      TempSrc: Oral      SpO2:  97% 98%

## 2023-05-17 ENCOUNTER — OFFICE VISIT (OUTPATIENT)
Dept: FAMILY MEDICINE CLINIC | Age: 53
End: 2023-05-17
Payer: COMMERCIAL

## 2023-05-17 VITALS
OXYGEN SATURATION: 97 % | TEMPERATURE: 97 F | HEIGHT: 63 IN | HEART RATE: 78 BPM | BODY MASS INDEX: 40.22 KG/M2 | SYSTOLIC BLOOD PRESSURE: 178 MMHG | WEIGHT: 227 LBS | DIASTOLIC BLOOD PRESSURE: 86 MMHG | RESPIRATION RATE: 16 BRPM

## 2023-05-17 DIAGNOSIS — I16.0 HYPERTENSIVE URGENCY: Primary | ICD-10-CM

## 2023-05-17 DIAGNOSIS — F41.9 ANXIETY: ICD-10-CM

## 2023-05-17 PROCEDURE — 3017F COLORECTAL CA SCREEN DOC REV: CPT | Performed by: STUDENT IN AN ORGANIZED HEALTH CARE EDUCATION/TRAINING PROGRAM

## 2023-05-17 PROCEDURE — G8427 DOCREV CUR MEDS BY ELIG CLIN: HCPCS | Performed by: STUDENT IN AN ORGANIZED HEALTH CARE EDUCATION/TRAINING PROGRAM

## 2023-05-17 PROCEDURE — 99213 OFFICE O/P EST LOW 20 MIN: CPT | Performed by: STUDENT IN AN ORGANIZED HEALTH CARE EDUCATION/TRAINING PROGRAM

## 2023-05-17 PROCEDURE — 3079F DIAST BP 80-89 MM HG: CPT | Performed by: STUDENT IN AN ORGANIZED HEALTH CARE EDUCATION/TRAINING PROGRAM

## 2023-05-17 PROCEDURE — 1036F TOBACCO NON-USER: CPT | Performed by: STUDENT IN AN ORGANIZED HEALTH CARE EDUCATION/TRAINING PROGRAM

## 2023-05-17 PROCEDURE — G8417 CALC BMI ABV UP PARAM F/U: HCPCS | Performed by: STUDENT IN AN ORGANIZED HEALTH CARE EDUCATION/TRAINING PROGRAM

## 2023-05-17 PROCEDURE — 3077F SYST BP >= 140 MM HG: CPT | Performed by: STUDENT IN AN ORGANIZED HEALTH CARE EDUCATION/TRAINING PROGRAM

## 2023-05-17 RX ORDER — BLOOD PRESSURE TEST KIT
1 KIT MISCELLANEOUS 2 TIMES DAILY
Qty: 1 KIT | Refills: 0 | Status: SHIPPED | OUTPATIENT
Start: 2023-05-17

## 2023-05-17 RX ORDER — SPIRONOLACTONE 50 MG/1
50 TABLET, FILM COATED ORAL 2 TIMES DAILY
Qty: 180 TABLET | Refills: 1 | Status: SHIPPED | OUTPATIENT
Start: 2023-05-17

## 2023-05-17 RX ORDER — DULOXETIN HYDROCHLORIDE 30 MG/1
30 CAPSULE, DELAYED RELEASE ORAL DAILY
Qty: 30 CAPSULE | Refills: 3 | Status: SHIPPED | OUTPATIENT
Start: 2023-05-17

## 2023-05-17 NOTE — PROGRESS NOTES
[id number] [facility] [address] [address]  Dept: [phone]  Loc: Cullen Avalos (:  1970) is a 48 y.o. female,Established patient, here for evaluation of the following chief complaint(s):  ED Follow-up (Pt presents for an ED f/u from [facility] for  hypertensive urgency and dizziness. Pt states she has been very fatigued since. )      ASSESSMENT/PLAN:  1. Hypertensive urgency  -     Blood Pressure KIT; 2 TIMES DAILY Starting Wed 2023, Disp-1 kit, R-0, Normal  -     TSH With Reflex Ft4; Future  -     spironolactone (ALDACTONE) 50 MG tablet; Take 1 tablet by mouth 2 times daily, Disp-180 tablet, R-1Normal  -     [address], [facility], , Nephrology, [email or web address]  -     Comprehensive Metabolic Panel; Future  2. Anxiety  -     DULoxetine (CYMBALTA) 30 MG extended release capsule; Take 1 capsule by mouth daily, Disp-30 capsule, R-3Normal    Hypertensive urgency with blood pressure 178/86 in office.  - Prior work up unremarkable from PCP on 22: Aldosterone 3.4, Renin 0.2,  Metanephrin 0.11, non-metanephrin 0.28, TSH: 1.93  - Patient reports compliance with medication.  - Patient on felodipine 10 mg daily, hydrochlorothiazide 25 mg daily valsartan 320 mg daily, spironolactone 50 mg daily, metoprolol 100 BID.  - Increase spironolactone to 50mg BID, CMP in 1 week. - Home blood pressure monitoring, home BP kit ordered. - Referral to nephrology HTN specialists for assistance. - Has anxiety, not on medication. See below.  - TSH ordered. Anxiety, chronic, uncontrolled. Could be contributing some to HTN. Picks at hands as coping mechanism. Not currently on medication. Failed some prior treatments. 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 Start Cymbalta at 30 mg. Return in about 1 month (around 2023).     SUBJECTIVE/OBJECTIVE:  HPI  Presents to clinic

## 2023-05-18 LAB
EKG ATRIAL RATE: 77 BPM
EKG P AXIS: 65 DEGREES
EKG P-R INTERVAL: 150 MS
EKG Q-T INTERVAL: 372 MS
EKG QRS DURATION: 84 MS
EKG QTC CALCULATION (BAZETT): 420 MS
EKG R AXIS: -10 DEGREES
EKG T AXIS: 49 DEGREES
EKG VENTRICULAR RATE: 77 BPM

## 2023-05-23 ENCOUNTER — OFFICE VISIT (OUTPATIENT)
Dept: FAMILY MEDICINE CLINIC | Age: 53
End: 2023-05-23
Payer: COMMERCIAL

## 2023-05-23 ENCOUNTER — NURSE ONLY (OUTPATIENT)
Dept: LAB | Age: 53
End: 2023-05-23

## 2023-05-23 VITALS
TEMPERATURE: 97.2 F | RESPIRATION RATE: 18 BRPM | HEART RATE: 80 BPM | SYSTOLIC BLOOD PRESSURE: 132 MMHG | HEIGHT: 63 IN | WEIGHT: 221 LBS | BODY MASS INDEX: 39.16 KG/M2 | OXYGEN SATURATION: 97 % | DIASTOLIC BLOOD PRESSURE: 86 MMHG

## 2023-05-23 DIAGNOSIS — Z79.4 TYPE 2 DIABETES MELLITUS WITH HYPERGLYCEMIA, WITH LONG-TERM CURRENT USE OF INSULIN (HCC): ICD-10-CM

## 2023-05-23 DIAGNOSIS — I10 ESSENTIAL HYPERTENSION: Primary | ICD-10-CM

## 2023-05-23 DIAGNOSIS — E11.65 TYPE 2 DIABETES MELLITUS WITH HYPERGLYCEMIA, WITH LONG-TERM CURRENT USE OF INSULIN (HCC): ICD-10-CM

## 2023-05-23 DIAGNOSIS — I10 ESSENTIAL HYPERTENSION: ICD-10-CM

## 2023-05-23 DIAGNOSIS — E11.69 TYPE 2 DIABETES MELLITUS WITH OTHER SPECIFIED COMPLICATION, UNSPECIFIED WHETHER LONG TERM INSULIN USE (HCC): ICD-10-CM

## 2023-05-23 DIAGNOSIS — I16.0 HYPERTENSIVE URGENCY: ICD-10-CM

## 2023-05-23 LAB
ALBUMIN SERPL BCG-MCNC: 4.7 G/DL (ref 3.5–5.1)
ALP SERPL-CCNC: 76 U/L (ref 38–126)
ALT SERPL W/O P-5'-P-CCNC: 17 U/L (ref 11–66)
ANION GAP SERPL CALC-SCNC: 16 MEQ/L (ref 8–16)
AST SERPL-CCNC: 19 U/L (ref 5–40)
BILIRUB SERPL-MCNC: 0.4 MG/DL (ref 0.3–1.2)
BUN SERPL-MCNC: 31 MG/DL (ref 7–22)
CALCIUM SERPL-MCNC: 10.6 MG/DL (ref 8.5–10.5)
CHLORIDE SERPL-SCNC: 94 MEQ/L (ref 98–111)
CO2 SERPL-SCNC: 24 MEQ/L (ref 23–33)
CREAT SERPL-MCNC: 0.8 MG/DL (ref 0.4–1.2)
GFR SERPL CREATININE-BSD FRML MDRD: > 60 ML/MIN/1.73M2
GLUCOSE SERPL-MCNC: 193 MG/DL (ref 70–108)
POTASSIUM SERPL-SCNC: 4.4 MEQ/L (ref 3.5–5.2)
PROT SERPL-MCNC: 8.1 G/DL (ref 6.1–8)
SODIUM SERPL-SCNC: 134 MEQ/L (ref 135–145)
TSH SERPL DL<=0.005 MIU/L-ACNC: 2.38 UIU/ML (ref 0.4–4.2)

## 2023-05-23 PROCEDURE — G8427 DOCREV CUR MEDS BY ELIG CLIN: HCPCS | Performed by: STUDENT IN AN ORGANIZED HEALTH CARE EDUCATION/TRAINING PROGRAM

## 2023-05-23 PROCEDURE — 99213 OFFICE O/P EST LOW 20 MIN: CPT | Performed by: STUDENT IN AN ORGANIZED HEALTH CARE EDUCATION/TRAINING PROGRAM

## 2023-05-23 PROCEDURE — 3078F DIAST BP <80 MM HG: CPT | Performed by: STUDENT IN AN ORGANIZED HEALTH CARE EDUCATION/TRAINING PROGRAM

## 2023-05-23 PROCEDURE — 3052F HG A1C>EQUAL 8.0%<EQUAL 9.0%: CPT | Performed by: STUDENT IN AN ORGANIZED HEALTH CARE EDUCATION/TRAINING PROGRAM

## 2023-05-23 PROCEDURE — G8417 CALC BMI ABV UP PARAM F/U: HCPCS | Performed by: STUDENT IN AN ORGANIZED HEALTH CARE EDUCATION/TRAINING PROGRAM

## 2023-05-23 PROCEDURE — 3017F COLORECTAL CA SCREEN DOC REV: CPT | Performed by: STUDENT IN AN ORGANIZED HEALTH CARE EDUCATION/TRAINING PROGRAM

## 2023-05-23 PROCEDURE — 1036F TOBACCO NON-USER: CPT | Performed by: STUDENT IN AN ORGANIZED HEALTH CARE EDUCATION/TRAINING PROGRAM

## 2023-05-23 PROCEDURE — 3074F SYST BP LT 130 MM HG: CPT | Performed by: STUDENT IN AN ORGANIZED HEALTH CARE EDUCATION/TRAINING PROGRAM

## 2023-05-23 PROCEDURE — 2022F DILAT RTA XM EVC RTNOPTHY: CPT | Performed by: STUDENT IN AN ORGANIZED HEALTH CARE EDUCATION/TRAINING PROGRAM

## 2023-05-23 RX ORDER — AMLODIPINE BESYLATE 5 MG/1
5 TABLET ORAL DAILY
Qty: 90 TABLET | Refills: 1 | Status: CANCELLED | OUTPATIENT
Start: 2023-05-23

## 2023-05-23 NOTE — PROGRESS NOTES
S: 48 y.o. female with   Chief Complaint   Patient presents with    Follow-up     Pt presents for a 1 week f/u. Pt states she has been doing better. HPI: please see resident note for HPI and ROS. BP Readings from Last 3 Encounters:   05/23/23 132/86   05/17/23 (!) 178/86   05/16/23 (!) 161/86     Wt Readings from Last 3 Encounters:   05/23/23 221 lb (100.2 kg)   05/17/23 227 lb (103 kg)   05/16/23 210 lb (95.3 kg)       O: VS:  height is 5' 3\" (1.6 m) and weight is 221 lb (100.2 kg). Her temperature is 97.2 °F (36.2 °C). Her blood pressure is 132/86 and her pulse is 80. Her respiration is 18 and oxygen saturation is 97%. Diagnosis Orders   1. Essential hypertension        2. Type 2 diabetes mellitus with hyperglycemia, with long-term current use of insulin (HCC)  Hemoglobin A1C          Plan:  Please refer to resident note for full plan. Hypertension: Chronic, improved. /86 today but reports elevated home BP readings (unsure of accuracy). Continue felodipine 10 mg, HCTZ 25 mg, metoprolol 100 mg BID, valsartan 320 mg daily, and spironolactone 50 mg BID. CMP ordered -- labs collected today but CMP not drawn; lab will add on. Has had negative workup for resistant hypertension in the past. Scheduled to see nephrology in 6/2023. Plan to follow up in 2-4 weeks for recheck or sooner if needed. Could consider hydralazine in future.     Health Maintenance Due   Topic Date Due    COVID-19 Vaccine (1) Never done    Pneumococcal 0-64 years Vaccine (1 - PCV) Never done    Lipids  Never done    HIV screen  Never done    Diabetic Alb to Cr ratio (uACR) test  Never done    Diabetic retinal exam  Never done    Hepatitis B vaccine (1 of 3 - Risk 3-dose series) Never done    Cervical cancer screen  Never done    Colorectal Cancer Screen  Never done    Shingles vaccine (1 of 2) Never done       Attending Physician Statement  I have discussed the case, including pertinent history and exam findings with the

## 2023-05-23 NOTE — PROGRESS NOTES
[address] [facility] [address] [address]  Dept: [phone]  Loc: Cullen Avalos (:  1970) is a 48 y.o. female,Established patient, here for evaluation of the following chief complaint(s):  Follow-up (Pt presents for a 1 week f/u. Pt states she has been doing better. )      ASSESSMENT/PLAN:  1. Essential hypertension  - Chronic, improvement in office blood pressure readings: 132/86 in office today. Pt reports some elevated home blood pressures up to 173/93 and 205/121. - Spironolactone increased last visit from 50 mg once daily to 50 mg twice daily. - Continue felodipine 10 mg, HCTZ 25 mg, metoprolol 100 mg twice daily, valsartan 320 daily, spironolactone 50 mg BID.  - TSH and CMP ordered, labs collected today but CMP not drawn, called lab and verified lab will add on CMP to TSH draw. - Prior workup for resistant HTN unremarkable. - Scheduled with Nephrology 23  - Call office if systolic BP consistent > 465, will consider starting hydralazine.  - Other options considered: adding clonidine patch or switching off HCTZ to chlorthalidone.   - Patient reports verified personal BP cuff with local pharmacy BP cuff readings, reports normal.  May consider having patient bring her cuff in to verify with our readings. 2. Type 2 diabetes mellitus with hyperglycemia, with long-term current use of insulin (HCC)  -  Chronic, uncontrolled. Last A1C 8.2 on 2022, no POCT A1c available today, will do blood draw and follow up with PCP next visit. -     Hemoglobin A1C; Future    2-4 week follow up with PCP for DM and HTN. Return in about 1 month (around 2023) for DM and HTN. SUBJECTIVE/OBJECTIVE:  HPI  Patient reports to clinic for follow-up of hypertension. Feels better overall with some improvement in her blood pressure, not as tired.    Does have some elevated home blood pressure readings that

## 2023-05-25 NOTE — TELEPHONE ENCOUNTER
Patient's last appointment was : 5/23/23  Patient's next appointment is :  6/19/23  Last refilled: 2/24/23  [address] Verified    Lab Results   Component Value Date    LABA1C 8.2 01/19/2023     No results found for: CHOL, TRIG, HDL, LDLCALC, LDLDIRECT  Lab Results   Component Value Date     (L) 05/23/2023    K 4.4 05/23/2023    CL 94 (L) 05/23/2023    CO2 24 05/23/2023    BUN 31 (H) 05/23/2023    CREATININE 0.8 05/23/2023    GLUCOSE 193 (H) 05/23/2023    CALCIUM 10.6 (H) 05/23/2023    PROT 8.1 (H) 05/23/2023    LABALBU 4.7 05/23/2023    BILITOT 0.4 05/23/2023    ALKPHOS 76 05/23/2023    AST 19 05/23/2023    ALT 17 05/23/2023    LABGLOM >60 05/23/2023     Lab Results   Component Value Date    TSH 2.380 05/23/2023    T4FREE 0.99 06/02/2022     Lab Results   Component Value Date    WBC 7.5 05/16/2023    HGB 12.5 05/16/2023    HCT 37.6 05/16/2023    MCV 90.4 05/16/2023     05/16/2023

## 2023-05-26 RX ORDER — METFORMIN HYDROCHLORIDE 500 MG/1
500 TABLET, EXTENDED RELEASE ORAL 2 TIMES DAILY
Qty: 120 TABLET | Refills: 0 | Status: SHIPPED | OUTPATIENT
Start: 2023-05-26

## 2023-05-26 RX ORDER — METOPROLOL TARTRATE 100 MG/1
100 TABLET ORAL 2 TIMES DAILY
Qty: 180 TABLET | Refills: 3 | Status: SHIPPED | OUTPATIENT
Start: 2023-05-26

## 2023-05-26 RX ORDER — HYDROCHLOROTHIAZIDE 25 MG/1
25 TABLET ORAL EVERY MORNING
Qty: 90 TABLET | Refills: 3 | Status: SHIPPED | OUTPATIENT
Start: 2023-05-26

## 2023-05-26 RX ORDER — VALSARTAN 320 MG/1
320 TABLET ORAL DAILY
Qty: 90 TABLET | Refills: 3 | Status: SHIPPED | OUTPATIENT
Start: 2023-05-26

## 2023-06-15 DIAGNOSIS — E11.69 TYPE 2 DIABETES MELLITUS WITH OTHER SPECIFIED COMPLICATION, UNSPECIFIED WHETHER LONG TERM INSULIN USE (HCC): ICD-10-CM

## 2023-06-18 ASSESSMENT — ENCOUNTER SYMPTOMS
VOMITING: 0
CONSTIPATION: 0
WHEEZING: 0
ABDOMINAL PAIN: 0
DIARRHEA: 0
BACK PAIN: 0
NAUSEA: 0
COUGH: 0
SHORTNESS OF BREATH: 0

## 2023-06-19 ENCOUNTER — OFFICE VISIT (OUTPATIENT)
Dept: FAMILY MEDICINE CLINIC | Age: 53
End: 2023-06-19
Payer: COMMERCIAL

## 2023-06-19 ENCOUNTER — OFFICE VISIT (OUTPATIENT)
Dept: NEPHROLOGY | Age: 53
End: 2023-06-19
Payer: COMMERCIAL

## 2023-06-19 ENCOUNTER — NURSE ONLY (OUTPATIENT)
Dept: LAB | Age: 53
End: 2023-06-19

## 2023-06-19 VITALS
DIASTOLIC BLOOD PRESSURE: 93 MMHG | SYSTOLIC BLOOD PRESSURE: 156 MMHG | BODY MASS INDEX: 41.38 KG/M2 | WEIGHT: 233.6 LBS | HEART RATE: 74 BPM | OXYGEN SATURATION: 94 %

## 2023-06-19 VITALS
DIASTOLIC BLOOD PRESSURE: 86 MMHG | HEIGHT: 63 IN | HEART RATE: 77 BPM | OXYGEN SATURATION: 96 % | TEMPERATURE: 97.1 F | WEIGHT: 232.4 LBS | SYSTOLIC BLOOD PRESSURE: 138 MMHG | RESPIRATION RATE: 16 BRPM | BODY MASS INDEX: 41.18 KG/M2

## 2023-06-19 DIAGNOSIS — Z79.4 TYPE 2 DIABETES MELLITUS WITH HYPERGLYCEMIA, WITH LONG-TERM CURRENT USE OF INSULIN (HCC): ICD-10-CM

## 2023-06-19 DIAGNOSIS — I16.0 HYPERTENSIVE URGENCY: ICD-10-CM

## 2023-06-19 DIAGNOSIS — E11.65 TYPE 2 DIABETES MELLITUS WITH HYPERGLYCEMIA, WITH LONG-TERM CURRENT USE OF INSULIN (HCC): Primary | ICD-10-CM

## 2023-06-19 DIAGNOSIS — F41.9 ANXIETY: ICD-10-CM

## 2023-06-19 DIAGNOSIS — I10 PRIMARY HYPERTENSION: Primary | ICD-10-CM

## 2023-06-19 DIAGNOSIS — E11.65 TYPE 2 DIABETES MELLITUS WITH HYPERGLYCEMIA, WITH LONG-TERM CURRENT USE OF INSULIN (HCC): ICD-10-CM

## 2023-06-19 DIAGNOSIS — I10 ESSENTIAL HYPERTENSION: ICD-10-CM

## 2023-06-19 DIAGNOSIS — Z79.4 TYPE 2 DIABETES MELLITUS WITH HYPERGLYCEMIA, WITH LONG-TERM CURRENT USE OF INSULIN (HCC): Primary | ICD-10-CM

## 2023-06-19 DIAGNOSIS — Z11.4 ENCOUNTER FOR SCREENING FOR HIV: ICD-10-CM

## 2023-06-19 DIAGNOSIS — Z12.31 BREAST CANCER SCREENING BY MAMMOGRAM: ICD-10-CM

## 2023-06-19 PROBLEM — A41.9 SEPTICEMIA (HCC): Status: RESOLVED | Noted: 2022-05-29 | Resolved: 2023-06-19

## 2023-06-19 LAB
ALBUMIN SERPL BCG-MCNC: 4.2 G/DL (ref 3.5–5.1)
ALP SERPL-CCNC: 74 U/L (ref 38–126)
ALT SERPL W/O P-5'-P-CCNC: 14 U/L (ref 11–66)
ANION GAP SERPL CALC-SCNC: 12 MEQ/L (ref 8–16)
AST SERPL-CCNC: 14 U/L (ref 5–40)
BILIRUB SERPL-MCNC: 0.2 MG/DL (ref 0.3–1.2)
BUN SERPL-MCNC: 15 MG/DL (ref 7–22)
CALCIUM SERPL-MCNC: 9.5 MG/DL (ref 8.5–10.5)
CHLORIDE SERPL-SCNC: 99 MEQ/L (ref 98–111)
CO2 SERPL-SCNC: 24 MEQ/L (ref 23–33)
CREAT SERPL-MCNC: 0.7 MG/DL (ref 0.4–1.2)
DEPRECATED MEAN GLUCOSE BLD GHB EST-ACNC: 237 MG/DL (ref 70–126)
GFR SERPL CREATININE-BSD FRML MDRD: > 60 ML/MIN/1.73M2
GLUCOSE SERPL-MCNC: 251 MG/DL (ref 70–108)
HBA1C MFR BLD HPLC: 9.9 % (ref 4.4–6.4)
POTASSIUM SERPL-SCNC: 4.1 MEQ/L (ref 3.5–5.2)
PROT SERPL-MCNC: 7.5 G/DL (ref 6.1–8)
SODIUM SERPL-SCNC: 135 MEQ/L (ref 135–145)

## 2023-06-19 PROCEDURE — 3017F COLORECTAL CA SCREEN DOC REV: CPT | Performed by: INTERNAL MEDICINE

## 2023-06-19 PROCEDURE — 99214 OFFICE O/P EST MOD 30 MIN: CPT | Performed by: STUDENT IN AN ORGANIZED HEALTH CARE EDUCATION/TRAINING PROGRAM

## 2023-06-19 PROCEDURE — 2022F DILAT RTA XM EVC RTNOPTHY: CPT | Performed by: STUDENT IN AN ORGANIZED HEALTH CARE EDUCATION/TRAINING PROGRAM

## 2023-06-19 PROCEDURE — 99204 OFFICE O/P NEW MOD 45 MIN: CPT | Performed by: INTERNAL MEDICINE

## 2023-06-19 PROCEDURE — 3079F DIAST BP 80-89 MM HG: CPT | Performed by: INTERNAL MEDICINE

## 2023-06-19 PROCEDURE — G8417 CALC BMI ABV UP PARAM F/U: HCPCS | Performed by: INTERNAL MEDICINE

## 2023-06-19 PROCEDURE — 3052F HG A1C>EQUAL 8.0%<EQUAL 9.0%: CPT | Performed by: STUDENT IN AN ORGANIZED HEALTH CARE EDUCATION/TRAINING PROGRAM

## 2023-06-19 PROCEDURE — 1036F TOBACCO NON-USER: CPT | Performed by: STUDENT IN AN ORGANIZED HEALTH CARE EDUCATION/TRAINING PROGRAM

## 2023-06-19 PROCEDURE — G8427 DOCREV CUR MEDS BY ELIG CLIN: HCPCS | Performed by: INTERNAL MEDICINE

## 2023-06-19 PROCEDURE — 3017F COLORECTAL CA SCREEN DOC REV: CPT | Performed by: STUDENT IN AN ORGANIZED HEALTH CARE EDUCATION/TRAINING PROGRAM

## 2023-06-19 PROCEDURE — 3075F SYST BP GE 130 - 139MM HG: CPT | Performed by: STUDENT IN AN ORGANIZED HEALTH CARE EDUCATION/TRAINING PROGRAM

## 2023-06-19 PROCEDURE — 1036F TOBACCO NON-USER: CPT | Performed by: INTERNAL MEDICINE

## 2023-06-19 PROCEDURE — G8427 DOCREV CUR MEDS BY ELIG CLIN: HCPCS | Performed by: STUDENT IN AN ORGANIZED HEALTH CARE EDUCATION/TRAINING PROGRAM

## 2023-06-19 PROCEDURE — 3079F DIAST BP 80-89 MM HG: CPT | Performed by: STUDENT IN AN ORGANIZED HEALTH CARE EDUCATION/TRAINING PROGRAM

## 2023-06-19 PROCEDURE — G8417 CALC BMI ABV UP PARAM F/U: HCPCS | Performed by: STUDENT IN AN ORGANIZED HEALTH CARE EDUCATION/TRAINING PROGRAM

## 2023-06-19 PROCEDURE — 3075F SYST BP GE 130 - 139MM HG: CPT | Performed by: INTERNAL MEDICINE

## 2023-06-19 RX ORDER — DULAGLUTIDE 0.75 MG/.5ML
0.75 INJECTION, SOLUTION SUBCUTANEOUS WEEKLY
Qty: 4 ADJUSTABLE DOSE PRE-FILLED PEN SYRINGE | Refills: 1 | Status: SHIPPED | OUTPATIENT
Start: 2023-06-19

## 2023-06-19 RX ORDER — DULOXETIN HYDROCHLORIDE 60 MG/1
60 CAPSULE, DELAYED RELEASE ORAL DAILY
Qty: 90 CAPSULE | Refills: 3 | Status: SHIPPED | OUTPATIENT
Start: 2023-06-19

## 2023-06-19 RX ORDER — MINOXIDIL 2.5 MG/1
5 TABLET ORAL 2 TIMES DAILY
Qty: 30 TABLET | Refills: 3 | Status: SHIPPED | OUTPATIENT
Start: 2023-06-19 | End: 2024-06-18

## 2023-06-19 RX ORDER — METFORMIN HYDROCHLORIDE 500 MG/1
1000 TABLET, EXTENDED RELEASE ORAL 2 TIMES DAILY
Qty: 360 TABLET | Refills: 3 | Status: SHIPPED | OUTPATIENT
Start: 2023-06-19

## 2023-06-19 RX ORDER — HYDROCHLOROTHIAZIDE 25 MG/1
50 TABLET ORAL EVERY MORNING
Qty: 90 TABLET | Refills: 3 | Status: SHIPPED | OUTPATIENT
Start: 2023-06-19

## 2023-06-19 RX ORDER — FELODIPINE 10 MG/1
10 TABLET, EXTENDED RELEASE ORAL DAILY
Qty: 90 TABLET | Refills: 3 | Status: SHIPPED | OUTPATIENT
Start: 2023-06-19 | End: 2023-06-19 | Stop reason: ALTCHOICE

## 2023-06-19 NOTE — TELEPHONE ENCOUNTER
Patient's last appointment was :  Today  Patient's next appointment is :  7/20/23  Last refilled: 5/26/23  [facility] Verified    Lab Results   Component Value Date    LABA1C 9.9 (H) 06/19/2023     No results found for: CHOL, TRIG, HDL, LDLCALC, LDLDIRECT  Lab Results   Component Value Date     06/19/2023    K 4.1 06/19/2023    CL 99 06/19/2023    CO2 24 06/19/2023    BUN 15 06/19/2023    CREATININE 0.7 06/19/2023    GLUCOSE 251 (H) 06/19/2023    CALCIUM 9.5 06/19/2023    PROT 7.5 06/19/2023    LABALBU 4.2 06/19/2023    BILITOT 0.2 (L) 06/19/2023    ALKPHOS 74 06/19/2023    AST 14 06/19/2023    ALT 14 06/19/2023    LABGLOM >60 06/19/2023     Lab Results   Component Value Date    TSH 2.380 05/23/2023    T4FREE 0.99 06/02/2022     Lab Results   Component Value Date    WBC 7.5 05/16/2023    HGB 12.5 05/16/2023    HCT 37.6 05/16/2023    MCV 90.4 05/16/2023     05/16/2023

## 2023-06-19 NOTE — PROGRESS NOTES
Nephrology Consult Note  Patient's Name: Mabel Araya  9:47 AM  6/19/2023    Nephrologist: Maria Teresa Medina    Reason for Consult: Uncontrolled hypertension  Requesting Physician:  No att. providers found  PCP: Angeline Jimenez MD    Chief Complaint: None      Assessment    ICD-10-CM    1. Primary hypertension  I10 Catecholamines, Plasma Fractionated     Renin     TSH with Reflex     Aldosterone     US RENAL LIMITED            Plan    I discussed my thoughts at length with the patient. She understood well. Her questions were addressed. She has primary hypertension rather than secondary I believe. I discussed that with her. We will however check thyroid studies. It was normal about a year ago. Plasma aldosterone renin  Plasma catecholamines  Kidney ultrasound for the sake of completeness  . We will add minoxidil 5 mg twice a day  Possible side effects including hair growth discussed with the patient  She wants to proceed with it  Discontinue felodipine for now  Increase hydrochlorothiazide from 25 mg a day to twice a day  . Would benefit from weight loss for better blood pressure control  Continue to do blood pressure movement  Return visit in 4 weeks with labs      History Obtained From: Patient and electronic medical record  History of Present Ilness:    Mabel Araya is a 48 y.o. female with history of hypertension diagnosed about 10 years ago, diabetes mellitus among other things referred to our office due to difficult to control blood pressure. According to the patient, her blood pressure runs anywhere from 160 to 190 mm mercury systolic at home. Several medications have been tried in the past with no success according to her. She denies any chest pain. No shortness of breath. Appetite is good. Denies any difficulties with urination. Urination. No significant overt weight gain recently.   She had a kidney ultrasound done in July 2022 and it was unremarkable except for some simple cyst.  She had a CAT scan of

## 2023-06-19 NOTE — PATIENT INSTRUCTIONS
Thank you   Thank you for trusting us with your healthcare needs. You may receive a survey regarding today's visit. It would help us out if you would take a few moments to provide your feedback. We value your input. Please bring in ALL medications BOTTLES, including inhalers, herbal supplements, over the counter, prescribed & non-prescribed medicine. The office would like actual medication bottles and a list.   Please note our OFFICE POLICIES:   Prior to getting your labs drawn, please check with your insurance company for benefits and eligibility of lab services. Often, insurance companies cover certain tests for preventative visits only. It is patient's responsibility to see what is covered. We are unable to change a diagnosis after the test has been performed. Lab orders will not be re-printed. Please hold onto your original lab orders and take them to your lab to be completed. If you no show your scheduled appointment three times, you will be dismissed from this practice. Reschedules must be completed 24 hours prior to your schedule appointment. If the list below has been completed, PLEASE FAX RECORDS TO OUR OFFICE @ [phone].  Once the records have been received we will update your records at our office:  Health Maintenance Due   Topic Date Due    COVID-19 Vaccine (1) Never done    Pneumococcal 0-64 years Vaccine (1 - PCV) Never done    Lipids  Never done    HIV screen  Never done    Diabetic Alb to Cr ratio (uACR) test  Never done    Diabetic retinal exam  Never done    Hepatitis B vaccine (1 of 3 - Risk 3-dose series) Never done    Cervical cancer screen  Never done    Colorectal Cancer Screen  Never done    Shingles vaccine (1 of 2) Never done

## 2023-06-19 NOTE — PROGRESS NOTES
Health Maintenance Due   Topic Date Due    COVID-19 Vaccine (1) Never done    Pneumococcal 0-64 years Vaccine (1 - PCV) Never done    Lipids  Never done    HIV screen  Never done    Diabetic Alb to Cr ratio (uACR) test  Never done    Diabetic retinal exam  Never done    Hepatitis B vaccine (1 of 3 - Risk 3-dose series) Never done    Cervical cancer screen  Never done    Colorectal Cancer Screen  Never done    Shingles vaccine (1 of 2) Never done
José Luis Carter is a 48 y.o. female who presents today for:  Chief Complaint   Patient presents with    1 Month Follow-Up     Labs completed today 06/19/2023         HPI:   José Luis Carter is 48 y.o. who presents today for 1 month follow-up. HTN: Taking Felodipine 10 mg, HCTZ 25 mg , Lopressor 100 mg BID, Valsartan 320 mg daily and Spironolactone 50 mg BID. BP running 140-150s/80s. Prior work-up for resistant HTN negative. Scheduled to see nephrology later today. DM: Last A1c 8.2 in 1/2023. Blood sugars running 230s. Taking Metformin 1000 mg BID, Basaglar 50 U nightly. Has been out of Trulicity for 2-3 months. Eye exam: Andrew Sexton, will be scheduling    Started on Cymbalta 30 mg for anxiety 2 visits ago, worked initially but not as well lately. Anxiety has worsened but she takes medication daily.     Due for mammogram  Pap: due for this time, will schedule with next visit  Colon Cancer screen: Previously ordered Cologuard last year, not completed but has number and will call to obtain kit    Decline Pneumonia vaccine          Objective:     Vitals:    06/19/23 0831   BP: 138/86   Site: Left Upper Arm   Position: Sitting   Cuff Size: Medium Adult   Pulse: 77   Resp: 16   Temp: 97.1 °F (36.2 °C)   TempSrc: Temporal   SpO2: 96%   Weight: 232 lb 6.4 oz (105.4 kg)   Height: 5' 3\" (1.6 m)       Wt Readings from Last 3 Encounters:   06/19/23 232 lb 6.4 oz (105.4 kg)   05/23/23 221 lb (100.2 kg)   05/17/23 227 lb (103 kg)       BP Readings from Last 3 Encounters:   06/19/23 138/86   05/23/23 132/86   05/17/23 (!) 178/86       Lab Results   Component Value Date    WBC 7.5 05/16/2023    HGB 12.5 05/16/2023    HCT 37.6 05/16/2023    MCV 90.4 05/16/2023     05/16/2023     Lab Results   Component Value Date     (L) 05/23/2023    K 4.4 05/23/2023    CL 94 (L) 05/23/2023    CO2 24 05/23/2023    BUN 31 (H) 05/23/2023    CREATININE 0.8 05/23/2023    GLUCOSE 193 (H) 05/23/2023    CALCIUM 10.6 (H) 05/23/2023
Value Date    WBC 7.5 05/16/2023    HGB 12.5 05/16/2023    HCT 37.6 05/16/2023     05/16/2023    AST 19 05/23/2023     (L) 05/23/2023    K 4.4 05/23/2023    CL 94 (L) 05/23/2023    CREATININE 0.8 05/23/2023    BUN 31 (H) 05/23/2023    CO2 24 05/23/2023    TSH 2.380 05/23/2023    INR 1.27 (H) 05/30/2022    LABA1C 8.2 01/19/2023    LABGLOM >60 05/23/2023    MG 1.7 06/25/2022    CALCIUM 10.6 (H) 05/23/2023     Lab Results   Component Value Date    CALCIUM 10.6 (H) 05/23/2023    PHOS 4.2 06/25/2022     No results found. Diagnosis Orders   1. Type 2 diabetes mellitus with hyperglycemia, with long-term current use of insulin ([facility] [address]        2. Anxiety        3. Essential hypertension        4. Encounter for screening for HIV        5. Breast cancer screening by mammogram            Plan    DM II -- follow up A1c needed. She will obtain with nephrology labs  Trulicity will hopefully help with   HTN -- much improved. Per patient log, not controlled yet  Will see nephrology  Calcium elevation -- doesn't seem to be a trend. On HCTZ but not new. Follow along with nephrology. Okay with increase Cymbalta   Encouraged Lifestyle modifications -- healthy diet and daily exercise. Weight reduction. Return in about 4 weeks (around 7/17/2023) for diabetes. Orders Placed:  No orders of the defined types were placed in this encounter. Medications Prescribed:  No orders of the defined types were placed in this encounter.       Future Appointments   Date Time Provider Hung Giron   6/19/2023  9:40 AM Cornelius Gallegos MD [facility], [facility]. [facility] Maintenance Due   Topic Date Due    COVID-19 Vaccine (1) Never done    Pneumococcal 0-64 years Vaccine (1 - PCV) Never done    Lipids  Never done    HIV screen  Never done    Diabetic Alb to Cr ratio (uACR) test  Never done    Diabetic retinal exam  Never done    Hepatitis B vaccine (1 of 3 - Risk 3-dose series) Never done    Cervical cancer screen

## 2023-06-20 ENCOUNTER — TELEPHONE (OUTPATIENT)
Dept: FAMILY MEDICINE CLINIC | Age: 53
End: 2023-06-20

## 2023-06-20 DIAGNOSIS — I10 ESSENTIAL HYPERTENSION: ICD-10-CM

## 2023-06-20 DIAGNOSIS — E11.69 TYPE 2 DIABETES MELLITUS WITH OTHER SPECIFIED COMPLICATION, UNSPECIFIED WHETHER LONG TERM INSULIN USE (HCC): ICD-10-CM

## 2023-06-20 DIAGNOSIS — I16.0 HYPERTENSIVE URGENCY: ICD-10-CM

## 2023-06-20 NOTE — TELEPHONE ENCOUNTER
----- Message from Trina Bell MD sent at 6/19/2023 10:11 PM EDT -----  Meng Olson,    Your labs show that your diabetes is not well controlled currently. Your A1c is 9.9, which is consistent with the elevated readings you have been having. Let's restart the Trulicity like we discussed, and we will work on increasing this. Please try to limit some of the high sugar foods you are eating and monitor your blood sugars both fasting in the morning and another time throughout the day. Please update me with these readings in 2 weeks, and we may need to consider increasing your Basaglar if they remain elevated. Thank you!

## 2023-06-20 NOTE — TELEPHONE ENCOUNTER
----- Message from Yanet Bush MD sent at 6/19/2023 10:11 PM EDT -----  Orly Prcie,    Your labs show that your diabetes is not well controlled currently. Your A1c is 9.9, which is consistent with the elevated readings you have been having. Let's restart the Trulicity like we discussed, and we will work on increasing this. Please try to limit some of the high sugar foods you are eating and monitor your blood sugars both fasting in the morning and another time throughout the day. Please update me with these readings in 2 weeks, and we may need to consider increasing your Basaglar if they remain elevated. Thank you!

## 2023-06-21 RX ORDER — SPIRONOLACTONE 50 MG/1
50 TABLET, FILM COATED ORAL 2 TIMES DAILY
Qty: 180 TABLET | Refills: 1 | OUTPATIENT
Start: 2023-06-21

## 2023-06-21 RX ORDER — VALSARTAN 320 MG/1
320 TABLET ORAL DAILY
Qty: 90 TABLET | Refills: 3 | OUTPATIENT
Start: 2023-06-21

## 2023-06-21 RX ORDER — METFORMIN HYDROCHLORIDE 500 MG/1
1000 TABLET, EXTENDED RELEASE ORAL 2 TIMES DAILY
Qty: 360 TABLET | Refills: 3 | OUTPATIENT
Start: 2023-06-21

## 2023-06-26 ENCOUNTER — TELEPHONE (OUTPATIENT)
Dept: FAMILY MEDICINE CLINIC | Age: 53
End: 2023-06-26

## 2023-06-26 ENCOUNTER — HOSPITAL ENCOUNTER (EMERGENCY)
Age: 53
Discharge: HOME | End: 2023-06-27
Attending: EMERGENCY MEDICINE
Payer: COMMERCIAL

## 2023-06-26 DIAGNOSIS — R19.7 NAUSEA VOMITING AND DIARRHEA: Primary | ICD-10-CM

## 2023-06-26 DIAGNOSIS — R11.2 NAUSEA VOMITING AND DIARRHEA: Primary | ICD-10-CM

## 2023-06-26 LAB
ALBUMIN SERPL BCG-MCNC: 4.5 G/DL (ref 3.5–5.1)
ALP SERPL-CCNC: 82 U/L (ref 38–126)
ALT SERPL W/O P-5'-P-CCNC: 39 U/L (ref 11–66)
ANION GAP SERPL CALC-SCNC: 19 MEQ/L (ref 8–16)
AST SERPL-CCNC: 25 U/L (ref 5–40)
B-OH-BUTYR SERPL-MSCNC: 4.84 MG/DL (ref 0.2–2.81)
BASE EXCESS BLDA CALC-SCNC: -1.1 MMOL/L (ref -2–3)
BASOPHILS ABSOLUTE: 0 THOU/MM3 (ref 0–0.1)
BASOPHILS NFR BLD AUTO: 0.1 %
BILIRUB SERPL-MCNC: 0.6 MG/DL (ref 0.3–1.2)
BUN SERPL-MCNC: 27 MG/DL (ref 7–22)
CALCIUM SERPL-MCNC: 9.5 MG/DL (ref 8.5–10.5)
CHLORIDE SERPL-SCNC: 96 MEQ/L (ref 98–111)
CO2 SERPL-SCNC: 20 MEQ/L (ref 23–33)
COLLECTED BY:: ABNORMAL
CREAT SERPL-MCNC: 0.9 MG/DL (ref 0.4–1.2)
DEPRECATED RDW RBC AUTO: 38.7 FL (ref 35–45)
EOSINOPHIL NFR BLD AUTO: 0.7 %
EOSINOPHILS ABSOLUTE: 0.1 THOU/MM3 (ref 0–0.4)
ERYTHROCYTE [DISTWIDTH] IN BLOOD BY AUTOMATED COUNT: 11.9 % (ref 11.5–14.5)
GFR SERPL CREATININE-BSD FRML MDRD: > 60 ML/MIN/1.73M2
GLUCOSE BLD STRIP.AUTO-MCNC: 204 MG/DL (ref 70–108)
GLUCOSE BLD-MCNC: 204 MG/DL
GLUCOSE SERPL-MCNC: 255 MG/DL (ref 70–108)
HCO3 BLDA-SCNC: 22 MMOL/L (ref 23–28)
HCT VFR BLD AUTO: 45.9 % (ref 37–47)
HGB BLD-MCNC: 15.3 GM/DL (ref 12–16)
IMM GRANULOCYTES # BLD AUTO: 0.04 THOU/MM3 (ref 0–0.07)
IMM GRANULOCYTES NFR BLD AUTO: 0.3 %
LACTATE SERPL-SCNC: 2.3 MMOL/L (ref 0.5–2)
LIPASE SERPL-CCNC: 13.6 U/L (ref 5.6–51.3)
LYMPHOCYTES ABSOLUTE: 1.8 THOU/MM3 (ref 1–4.8)
LYMPHOCYTES NFR BLD AUTO: 13.3 %
MAGNESIUM SERPL-MCNC: 2 MG/DL (ref 1.6–2.4)
MCH RBC QN AUTO: 29.7 PG (ref 26–33)
MCHC RBC AUTO-ENTMCNC: 33.3 GM/DL (ref 32.2–35.5)
MCV RBC AUTO: 89.1 FL (ref 81–99)
MONOCYTES ABSOLUTE: 0.6 THOU/MM3 (ref 0.4–1.3)
MONOCYTES NFR BLD AUTO: 4.3 %
NEUTROPHILS NFR BLD AUTO: 81.3 %
NRBC BLD AUTO-RTO: 0 /100 WBC
OSMOLALITY SERPL CALC.SUM OF ELEC: 283.9 MOSMOL/KG (ref 275–300)
PCO2 TEMP ADJ BLDMV: 33 MMHG (ref 41–51)
PH BLDMV: 7.44 [PH] (ref 7.31–7.41)
PLATELET # BLD AUTO: 285 THOU/MM3 (ref 130–400)
PMV BLD AUTO: 9.6 FL (ref 9.4–12.4)
PO2 BLDMV: 43 MMHG (ref 25–40)
POTASSIUM SERPL-SCNC: 4.1 MEQ/L (ref 3.5–5.2)
PROT SERPL-MCNC: 8.3 G/DL (ref 6.1–8)
RBC # BLD AUTO: 5.15 MILL/MM3 (ref 4.2–5.4)
SAO2 % BLDMV: 81 %
SEGMENTED NEUTROPHILS ABSOLUTE COUNT: 11 THOU/MM3 (ref 1.8–7.7)
SODIUM SERPL-SCNC: 135 MEQ/L (ref 135–145)
TROPONIN T: < 0.01 NG/ML
WBC # BLD AUTO: 13.5 THOU/MM3 (ref 4.8–10.8)

## 2023-06-26 PROCEDURE — 84484 ASSAY OF TROPONIN QUANT: CPT

## 2023-06-26 PROCEDURE — 85025 COMPLETE CBC W/AUTO DIFF WBC: CPT

## 2023-06-26 PROCEDURE — 80053 COMPREHEN METABOLIC PANEL: CPT

## 2023-06-26 PROCEDURE — 83735 ASSAY OF MAGNESIUM: CPT

## 2023-06-26 PROCEDURE — 87086 URINE CULTURE/COLONY COUNT: CPT

## 2023-06-26 PROCEDURE — 83690 ASSAY OF LIPASE: CPT

## 2023-06-26 PROCEDURE — 6360000002 HC RX W HCPCS: Performed by: EMERGENCY MEDICINE

## 2023-06-26 PROCEDURE — 81001 URINALYSIS AUTO W/SCOPE: CPT

## 2023-06-26 PROCEDURE — 82010 KETONE BODYS QUAN: CPT

## 2023-06-26 PROCEDURE — 2580000003 HC RX 258: Performed by: EMERGENCY MEDICINE

## 2023-06-26 PROCEDURE — 82803 BLOOD GASES ANY COMBINATION: CPT

## 2023-06-26 PROCEDURE — 36415 COLL VENOUS BLD VENIPUNCTURE: CPT

## 2023-06-26 PROCEDURE — 83605 ASSAY OF LACTIC ACID: CPT

## 2023-06-26 PROCEDURE — 82948 REAGENT STRIP/BLOOD GLUCOSE: CPT

## 2023-06-26 PROCEDURE — 99284 EMERGENCY DEPT VISIT MOD MDM: CPT

## 2023-06-26 PROCEDURE — 96374 THER/PROPH/DIAG INJ IV PUSH: CPT

## 2023-06-26 RX ORDER — 0.9 % SODIUM CHLORIDE 0.9 %
1000 INTRAVENOUS SOLUTION INTRAVENOUS ONCE
Status: DISCONTINUED | OUTPATIENT
Start: 2023-06-26 | End: 2023-06-27 | Stop reason: HOSPADM

## 2023-06-26 RX ORDER — ONDANSETRON 2 MG/ML
4 INJECTION INTRAMUSCULAR; INTRAVENOUS ONCE
Status: COMPLETED | OUTPATIENT
Start: 2023-06-26 | End: 2023-06-26

## 2023-06-26 RX ORDER — 0.9 % SODIUM CHLORIDE 0.9 %
1000 INTRAVENOUS SOLUTION INTRAVENOUS ONCE
Status: COMPLETED | OUTPATIENT
Start: 2023-06-26 | End: 2023-06-27

## 2023-06-26 RX ORDER — 0.9 % SODIUM CHLORIDE 0.9 %
1000 INTRAVENOUS SOLUTION INTRAVENOUS ONCE
Status: COMPLETED | OUTPATIENT
Start: 2023-06-26 | End: 2023-06-26

## 2023-06-26 RX ADMIN — SODIUM CHLORIDE 1000 ML: 9 INJECTION, SOLUTION INTRAVENOUS at 22:41

## 2023-06-26 RX ADMIN — ONDANSETRON 4 MG: 2 INJECTION INTRAMUSCULAR; INTRAVENOUS at 21:19

## 2023-06-26 RX ADMIN — SODIUM CHLORIDE 1000 ML: 9 INJECTION, SOLUTION INTRAVENOUS at 21:19

## 2023-06-26 ASSESSMENT — PAIN - FUNCTIONAL ASSESSMENT
PAIN_FUNCTIONAL_ASSESSMENT: 0-10
PAIN_FUNCTIONAL_ASSESSMENT: NONE - DENIES PAIN

## 2023-06-26 ASSESSMENT — PAIN SCALES - GENERAL: PAINLEVEL_OUTOF10: 7

## 2023-06-27 ENCOUNTER — HOSPITAL ENCOUNTER (OUTPATIENT)
Dept: ULTRASOUND IMAGING | Age: 53
Discharge: HOME | End: 2023-06-27
Attending: INTERNAL MEDICINE
Payer: COMMERCIAL

## 2023-06-27 VITALS
OXYGEN SATURATION: 95 % | BODY MASS INDEX: 40.75 KG/M2 | TEMPERATURE: 98.1 F | RESPIRATION RATE: 17 BRPM | DIASTOLIC BLOOD PRESSURE: 76 MMHG | SYSTOLIC BLOOD PRESSURE: 123 MMHG | HEIGHT: 63 IN | HEART RATE: 95 BPM | WEIGHT: 230 LBS

## 2023-06-27 DIAGNOSIS — I10 PRIMARY HYPERTENSION: ICD-10-CM

## 2023-06-27 LAB
BACTERIA URNS QL MICRO: ABNORMAL /HPF
BILIRUB UR QL STRIP.AUTO: ABNORMAL
CASTS #/AREA URNS LPF: ABNORMAL /LPF
CHARACTER UR: CLEAR
COLOR: YELLOW
CRYSTALS URNS MICRO: ABNORMAL
EPITHELIAL CELLS, UA: ABNORMAL /HPF
GLUCOSE UR QL STRIP.AUTO: 100 MG/DL
HGB UR QL STRIP.AUTO: ABNORMAL
ICTOTEST: NEGATIVE
KETONES UR QL STRIP.AUTO: ABNORMAL
NITRITE UR QL STRIP: NEGATIVE
PH UR STRIP.AUTO: 5.5 [PH] (ref 5–9)
PROT UR STRIP.AUTO-MCNC: 100 MG/DL
RBC URINE: ABNORMAL /HPF
SP GR UR REFRACT.AUTO: >= 1.03 (ref 1–1.03)
UROBILINOGEN, URINE: 0.2 EU/DL (ref 0–1)
WBC #/AREA URNS HPF: ABNORMAL /HPF
WBC #/AREA URNS HPF: ABNORMAL /[HPF]
YEAST LIKE FUNGI URNS QL MICRO: ABNORMAL

## 2023-06-27 PROCEDURE — 76770 US EXAM ABDO BACK WALL COMP: CPT

## 2023-06-28 LAB
BACTERIA UR CULT: ABNORMAL
ORGANISM: ABNORMAL

## 2023-07-17 ENCOUNTER — NURSE ONLY (OUTPATIENT)
Dept: LAB | Age: 53
End: 2023-07-17

## 2023-07-17 DIAGNOSIS — I10 PRIMARY HYPERTENSION: ICD-10-CM

## 2023-07-17 DIAGNOSIS — Z11.4 ENCOUNTER FOR SCREENING FOR HIV: ICD-10-CM

## 2023-07-17 DIAGNOSIS — Z79.4 TYPE 2 DIABETES MELLITUS WITH HYPERGLYCEMIA, WITH LONG-TERM CURRENT USE OF INSULIN (HCC): ICD-10-CM

## 2023-07-17 DIAGNOSIS — E11.65 TYPE 2 DIABETES MELLITUS WITH HYPERGLYCEMIA, WITH LONG-TERM CURRENT USE OF INSULIN (HCC): ICD-10-CM

## 2023-07-17 LAB
CHOLEST SERPL-MCNC: 330 MG/DL (ref 100–199)
HDLC SERPL-MCNC: 19 MG/DL
LDLC SERPL CALC-MCNC: ABNORMAL MG/DL
TRIGL SERPL-MCNC: 2474 MG/DL (ref 0–199)
TSH SERPL DL<=0.005 MIU/L-ACNC: 3.61 UIU/ML (ref 0.4–4.2)

## 2023-07-17 RX ORDER — MINOXIDIL 2.5 MG/1
5 TABLET ORAL 2 TIMES DAILY
Qty: 120 TABLET | Refills: 0 | Status: SHIPPED | OUTPATIENT
Start: 2023-07-17 | End: 2024-07-16

## 2023-07-18 LAB — HIV 1+2 AB+HIV1 P24 AG SERPL QL IA: NONREACTIVE

## 2023-07-19 LAB — ALDOST SERPL-MCNC: 18.1 NG/DL

## 2023-07-20 ENCOUNTER — OFFICE VISIT (OUTPATIENT)
Dept: FAMILY MEDICINE CLINIC | Age: 53
End: 2023-07-20
Payer: COMMERCIAL

## 2023-07-20 ENCOUNTER — OFFICE VISIT (OUTPATIENT)
Dept: NEPHROLOGY | Age: 53
End: 2023-07-20
Payer: COMMERCIAL

## 2023-07-20 VITALS
BODY MASS INDEX: 40.22 KG/M2 | DIASTOLIC BLOOD PRESSURE: 78 MMHG | SYSTOLIC BLOOD PRESSURE: 116 MMHG | HEIGHT: 63 IN | TEMPERATURE: 98.4 F | OXYGEN SATURATION: 96 % | WEIGHT: 227 LBS | RESPIRATION RATE: 14 BRPM | HEART RATE: 80 BPM

## 2023-07-20 VITALS
HEIGHT: 63 IN | HEART RATE: 80 BPM | BODY MASS INDEX: 40.22 KG/M2 | OXYGEN SATURATION: 95 % | WEIGHT: 227 LBS | SYSTOLIC BLOOD PRESSURE: 112 MMHG | DIASTOLIC BLOOD PRESSURE: 77 MMHG

## 2023-07-20 DIAGNOSIS — F41.9 ANXIETY: ICD-10-CM

## 2023-07-20 DIAGNOSIS — E78.1 HYPERTRIGLYCERIDEMIA: ICD-10-CM

## 2023-07-20 DIAGNOSIS — I10 PRIMARY HYPERTENSION: Primary | ICD-10-CM

## 2023-07-20 DIAGNOSIS — Z79.4 TYPE 2 DIABETES MELLITUS WITH HYPERGLYCEMIA, WITH LONG-TERM CURRENT USE OF INSULIN (HCC): Primary | ICD-10-CM

## 2023-07-20 DIAGNOSIS — E11.65 TYPE 2 DIABETES MELLITUS WITH HYPERGLYCEMIA, WITH LONG-TERM CURRENT USE OF INSULIN (HCC): Primary | ICD-10-CM

## 2023-07-20 LAB — CATECHOLAMINES FRACT FREE PLASMA: NORMAL

## 2023-07-20 PROCEDURE — 3074F SYST BP LT 130 MM HG: CPT | Performed by: STUDENT IN AN ORGANIZED HEALTH CARE EDUCATION/TRAINING PROGRAM

## 2023-07-20 PROCEDURE — G8417 CALC BMI ABV UP PARAM F/U: HCPCS | Performed by: INTERNAL MEDICINE

## 2023-07-20 PROCEDURE — 3078F DIAST BP <80 MM HG: CPT | Performed by: STUDENT IN AN ORGANIZED HEALTH CARE EDUCATION/TRAINING PROGRAM

## 2023-07-20 PROCEDURE — 3078F DIAST BP <80 MM HG: CPT | Performed by: INTERNAL MEDICINE

## 2023-07-20 PROCEDURE — 3017F COLORECTAL CA SCREEN DOC REV: CPT | Performed by: INTERNAL MEDICINE

## 2023-07-20 PROCEDURE — 99214 OFFICE O/P EST MOD 30 MIN: CPT | Performed by: STUDENT IN AN ORGANIZED HEALTH CARE EDUCATION/TRAINING PROGRAM

## 2023-07-20 PROCEDURE — 1036F TOBACCO NON-USER: CPT | Performed by: INTERNAL MEDICINE

## 2023-07-20 PROCEDURE — 3017F COLORECTAL CA SCREEN DOC REV: CPT | Performed by: STUDENT IN AN ORGANIZED HEALTH CARE EDUCATION/TRAINING PROGRAM

## 2023-07-20 PROCEDURE — 99214 OFFICE O/P EST MOD 30 MIN: CPT | Performed by: INTERNAL MEDICINE

## 2023-07-20 PROCEDURE — G8427 DOCREV CUR MEDS BY ELIG CLIN: HCPCS | Performed by: INTERNAL MEDICINE

## 2023-07-20 PROCEDURE — 3074F SYST BP LT 130 MM HG: CPT | Performed by: INTERNAL MEDICINE

## 2023-07-20 PROCEDURE — G8427 DOCREV CUR MEDS BY ELIG CLIN: HCPCS | Performed by: STUDENT IN AN ORGANIZED HEALTH CARE EDUCATION/TRAINING PROGRAM

## 2023-07-20 PROCEDURE — 2022F DILAT RTA XM EVC RTNOPTHY: CPT | Performed by: STUDENT IN AN ORGANIZED HEALTH CARE EDUCATION/TRAINING PROGRAM

## 2023-07-20 PROCEDURE — G8417 CALC BMI ABV UP PARAM F/U: HCPCS | Performed by: STUDENT IN AN ORGANIZED HEALTH CARE EDUCATION/TRAINING PROGRAM

## 2023-07-20 PROCEDURE — 1036F TOBACCO NON-USER: CPT | Performed by: STUDENT IN AN ORGANIZED HEALTH CARE EDUCATION/TRAINING PROGRAM

## 2023-07-20 PROCEDURE — 3046F HEMOGLOBIN A1C LEVEL >9.0%: CPT | Performed by: STUDENT IN AN ORGANIZED HEALTH CARE EDUCATION/TRAINING PROGRAM

## 2023-07-20 RX ORDER — INSULIN GLARGINE 100 [IU]/ML
60 INJECTION, SOLUTION SUBCUTANEOUS NIGHTLY
Qty: 9 ML | Refills: 3 | Status: SHIPPED | OUTPATIENT
Start: 2023-07-20 | End: 2023-09-30

## 2023-07-20 RX ORDER — FLASH GLUCOSE SENSOR
1 KIT MISCELLANEOUS
Qty: 2 EACH | Refills: 5 | Status: SHIPPED | OUTPATIENT
Start: 2023-07-20

## 2023-07-20 ASSESSMENT — ENCOUNTER SYMPTOMS
CONSTIPATION: 0
NAUSEA: 0
VOMITING: 0
SHORTNESS OF BREATH: 0
BACK PAIN: 0
DIARRHEA: 0
COUGH: 0
ABDOMINAL PAIN: 0
WHEEZING: 0

## 2023-07-20 NOTE — PROGRESS NOTES
Renal Progress Note    Assessment and Plan:      Diagnosis Orders   1. Primary hypertension                  PLAN:  Lab result reviewed with the patient and in epic  She understood  I addressed her questions  Catecholamine levels are normal  Thyroid studies are normal  Kidney ultrasound is normal  Medications reviewed  No changes  Will see as needed since blood pressure is doing very good now. Patient Active Problem List   Diagnosis    Essential hypertension    SOB (shortness of breath) on exertion    Intermittent palpitations    Hx of Sinus tachycardia    Hyperbilirubinemia    Hypokalemia    Normocytic anemia    Thrombocytopenia (HCC)    Hyponatremia    Hydroureter on left    Leukopenia    Type 2 diabetes mellitus with hyperglycemia, with long-term current use of insulin (HCC)    Obesity (BMI 30-39. 9)    Depression    Hepatic steatosis    Iron deficiency anemia secondary to inadequate dietary iron intake    Diastolic dysfunction without heart failure           Subjective:   Chief complaint:  Chief Complaint   Patient presents with    Hypertension      HPI:This is a follow up visit for Ms. Beryle Mechanic who is here today for return appointment. We saw her in the initial office visit about 4 weeks ago for uncontrolled hypertension. We added minoxidil 5 mg twice a day. Side effect was explained to her. She is doing well with it. Blood pressure is doing good at home. No chest pain. No shortness of breath. No lower extremity edema. ROS:  Pertinent positives stated above in HPI. All other systems were reviewed and were negative.   Medications:     Current Outpatient Medications   Medication Sig Dispense Refill    empagliflozin (JARDIANCE) 10 MG tablet Take 1 tablet by mouth daily 30 tablet 1    insulin glargine (BASAGLAR KWIKPEN) 100 UNIT/ML injection pen Inject 60 Units into the skin nightly 9 mL 3    Continuous Blood Gluc  (1234ENTERYLE JAKUB 2 READER) FERNANDO 1 each by Does not apply route 4 times

## 2023-07-26 DIAGNOSIS — F41.9 ANXIETY: ICD-10-CM

## 2023-07-26 DIAGNOSIS — I10 ESSENTIAL HYPERTENSION: ICD-10-CM

## 2023-07-27 RX ORDER — VALSARTAN 320 MG/1
320 TABLET ORAL DAILY
Qty: 90 TABLET | Refills: 3 | OUTPATIENT
Start: 2023-07-27

## 2023-07-27 RX ORDER — DULOXETIN HYDROCHLORIDE 60 MG/1
60 CAPSULE, DELAYED RELEASE ORAL DAILY
Qty: 90 CAPSULE | Refills: 3 | OUTPATIENT
Start: 2023-07-27

## 2023-07-28 LAB — RENIN PLAS-CCNC: 1 NG/ML/HR

## 2023-08-13 DIAGNOSIS — I16.0 HYPERTENSIVE URGENCY: ICD-10-CM

## 2023-08-13 DIAGNOSIS — F41.9 ANXIETY: ICD-10-CM

## 2023-08-13 DIAGNOSIS — E11.69 TYPE 2 DIABETES MELLITUS WITH OTHER SPECIFIED COMPLICATION, UNSPECIFIED WHETHER LONG TERM INSULIN USE (HCC): ICD-10-CM

## 2023-08-13 DIAGNOSIS — I10 ESSENTIAL HYPERTENSION: ICD-10-CM

## 2023-08-14 RX ORDER — MINOXIDIL 2.5 MG/1
5 TABLET ORAL 2 TIMES DAILY
Qty: 120 TABLET | Refills: 0 | OUTPATIENT
Start: 2023-08-14 | End: 2024-08-13

## 2023-08-14 RX ORDER — HYDROCHLOROTHIAZIDE 25 MG/1
50 TABLET ORAL EVERY MORNING
Qty: 90 TABLET | Refills: 3 | OUTPATIENT
Start: 2023-08-14

## 2023-08-14 NOTE — TELEPHONE ENCOUNTER
Patient's last appointment was : 7-20-23  Patient's next appointment is :  8-18-23  Last refilled: 5-17-23  Pharmacy Verified    Lab Results   Component Value Date    LABA1C 9.9 (H) 06/19/2023     Lab Results   Component Value Date    CHOL 330 (H) 07/17/2023    TRIG 2,474 (H) 07/17/2023    HDL 19 07/17/2023    LDLCALC SEE BELOW 07/17/2023     Lab Results   Component Value Date     06/26/2023    K 4.1 06/26/2023    CL 96 (L) 06/26/2023    CO2 20 (L) 06/26/2023    BUN 27 (H) 06/26/2023    CREATININE 0.9 06/26/2023    GLUCOSE 204 06/26/2023    CALCIUM 9.5 06/26/2023    PROT 8.3 (H) 06/26/2023    LABALBU 4.5 06/26/2023    BILITOT 0.6 06/26/2023    ALKPHOS 82 06/26/2023    AST 25 06/26/2023    ALT 39 06/26/2023    LABGLOM >60 06/26/2023     Lab Results   Component Value Date    TSH 3.610 07/17/2023    T4FREE 0.99 06/02/2022     Lab Results   Component Value Date    WBC 13.5 (H) 06/26/2023    HGB 15.3 06/26/2023    HCT 45.9 06/26/2023    MCV 89.1 06/26/2023     06/26/2023

## 2023-08-15 RX ORDER — SPIRONOLACTONE 50 MG/1
50 TABLET, FILM COATED ORAL 2 TIMES DAILY
Qty: 180 TABLET | Refills: 1 | Status: SHIPPED | OUTPATIENT
Start: 2023-08-15

## 2023-08-15 RX ORDER — DULOXETIN HYDROCHLORIDE 60 MG/1
60 CAPSULE, DELAYED RELEASE ORAL DAILY
Qty: 90 CAPSULE | Refills: 3 | OUTPATIENT
Start: 2023-08-15

## 2023-08-15 RX ORDER — METFORMIN HYDROCHLORIDE 500 MG/1
1000 TABLET, EXTENDED RELEASE ORAL 2 TIMES DAILY
Qty: 360 TABLET | Refills: 3 | OUTPATIENT
Start: 2023-08-15

## 2023-08-15 RX ORDER — METOPROLOL TARTRATE 100 MG/1
100 TABLET ORAL 2 TIMES DAILY
Qty: 180 TABLET | Refills: 3 | OUTPATIENT
Start: 2023-08-15

## 2023-08-15 RX ORDER — VALSARTAN 320 MG/1
320 TABLET ORAL DAILY
Qty: 90 TABLET | Refills: 3 | OUTPATIENT
Start: 2023-08-15

## 2023-08-15 NOTE — TELEPHONE ENCOUNTER
Medication Pended   Next Appt 8/18/23
Rx were filled with 1 year supply last visit, she should have refills. Please have her check with pharmacy. Thanks!
unk

## 2023-08-18 ENCOUNTER — OFFICE VISIT (OUTPATIENT)
Dept: FAMILY MEDICINE CLINIC | Age: 53
End: 2023-08-18

## 2023-08-18 VITALS
HEIGHT: 63 IN | RESPIRATION RATE: 12 BRPM | HEART RATE: 88 BPM | BODY MASS INDEX: 41.32 KG/M2 | SYSTOLIC BLOOD PRESSURE: 122 MMHG | DIASTOLIC BLOOD PRESSURE: 72 MMHG | OXYGEN SATURATION: 95 % | WEIGHT: 233.2 LBS

## 2023-08-18 DIAGNOSIS — E11.65 TYPE 2 DIABETES MELLITUS WITH HYPERGLYCEMIA, WITH LONG-TERM CURRENT USE OF INSULIN (HCC): Primary | ICD-10-CM

## 2023-08-18 DIAGNOSIS — E78.1 HYPERTRIGLYCERIDEMIA: ICD-10-CM

## 2023-08-18 DIAGNOSIS — Z79.4 TYPE 2 DIABETES MELLITUS WITH HYPERGLYCEMIA, WITH LONG-TERM CURRENT USE OF INSULIN (HCC): Primary | ICD-10-CM

## 2023-08-18 RX ORDER — BLOOD-GLUCOSE METER
1 EACH MISCELLANEOUS DAILY
Qty: 1 KIT | Refills: 0 | Status: SHIPPED | OUTPATIENT
Start: 2023-08-18

## 2023-08-18 ASSESSMENT — ENCOUNTER SYMPTOMS
WHEEZING: 0
VOMITING: 0
ABDOMINAL PAIN: 0
DIARRHEA: 0
COUGH: 0
SHORTNESS OF BREATH: 0
NAUSEA: 0
BACK PAIN: 0
CONSTIPATION: 0

## 2023-08-18 NOTE — PROGRESS NOTES
Diego Amin is a 48 y.o. female who presents today for:  Chief Complaint   Patient presents with    1 Month Follow-Up     Pt presents for 1 mo f/u for type 2 DM. Pt states she is doing fine and has no complainsts or concerns at this time. HPI:   Diego Amin is 48 y.o. who presents today for 4 week follow-up. DM: Last A1c 9.9 6/16/23. Taking Metformin, Basaglar 60 U Nightly, started on Jardiance 10 mg daily. Doing well with regimen. Cannot tolerate GLP-1. CGM sent last visit, she has not been able to get this due to insurance. Monitoring blood sugars, improving with fasting numbers running 160s, occasionally 200s usually with eating dinner late. Has not been able to monitor blood sugar over the last week, due to her monitor falling and getting broken. Would like new monitor until she is able to get CGM. No hypoglycemic episodes, or side effects from medications. Will see dietician next month. Elevated triglycerides 2474. She has not made many changes to her diet, but denies eating high amounts of red meat, high fat, or processed foods. Weight increased 6 pounds since last visit. Due to have lipid panel rechecked.     Objective:     Vitals:    08/18/23 1101   BP: 122/72   Pulse: 88   Resp: 12   SpO2: 95%   Weight: 233 lb 3.2 oz (105.8 kg)   Height: 5' 3\" (1.6 m)       Wt Readings from Last 3 Encounters:   08/18/23 233 lb 3.2 oz (105.8 kg)   07/20/23 227 lb (103 kg)   07/20/23 227 lb (103 kg)       BP Readings from Last 3 Encounters:   08/18/23 122/72   07/20/23 112/77   07/20/23 116/78       Lab Results   Component Value Date    WBC 13.5 (H) 06/26/2023    HGB 15.3 06/26/2023    HCT 45.9 06/26/2023    MCV 89.1 06/26/2023     06/26/2023     Lab Results   Component Value Date     06/26/2023    K 4.1 06/26/2023    CL 96 (L) 06/26/2023    CO2 20 (L) 06/26/2023    BUN 27 (H) 06/26/2023    CREATININE 0.9 06/26/2023    GLUCOSE 204 06/26/2023    CALCIUM 9.5 06/26/2023    PROT 8.3 (H)

## 2023-08-18 NOTE — PROGRESS NOTES
S: 48 y.o. female with   Chief Complaint   Patient presents with    1 Month Follow-Up     Pt presents for 1 mo f/u for type 2 DM. Pt states she is doing fine and has no complainsts or concerns at this time. HPI per Dr. Robb Ochoa    BP Readings from Last 3 Encounters:   08/18/23 122/72   07/20/23 112/77   07/20/23 116/78     Wt Readings from Last 3 Encounters:   08/18/23 233 lb 3.2 oz (105.8 kg)   07/20/23 227 lb (103 kg)   07/20/23 227 lb (103 kg)           O: VS:  height is 5' 3\" (1.6 m) and weight is 233 lb 3.2 oz (105.8 kg). Her blood pressure is 122/72 and her pulse is 88. Her respiration is 12 and oxygen saturation is 95%. AAO/NAD, appropriate affect for mood     Diagnosis Orders   1. Type 2 diabetes mellitus with other specified complication, unspecified whether long term insulin use ([facility])  Blood Glucose Monitoring Suppl (ONE TOUCH ULTRA 2) w/Device KIT      2. Hypertriglyceridemia        3. Type 2 diabetes mellitus with hyperglycemia, with long-term current use of insulin ([facility])  empagliflozin (JARDIANCE) 25 MG tablet          Plan  Continue HENRIQUEZ at 60 units for now. Some improvement in BS over the past month. Increase Jardiance to 25mg daily. Rpt A1c in 1 month. Consider statin for hyperlipidemia and hypertriglyceridemia. If unable to tolerate statin consider repatha. RTC in 1month. Health Maintenance Due   Topic Date Due    COVID-19 Vaccine (1) Never done    Pneumococcal 0-64 years Vaccine (1 - PCV) Never done    Diabetic Alb to Cr ratio (uACR) test  Never done    Diabetic retinal exam  Never done    Hepatitis B vaccine (1 of 3 - Risk 3-dose series) Never done    Cervical cancer screen  Never done    Colorectal Cancer Screen  Never done    Shingles vaccine (1 of 2) Never done    Flu vaccine (1) Never done         Attending Physician Statement  I have discussed the case, including pertinent history and exam findings with the resident.  I also have seen the patient and performed key portions of the

## 2023-08-18 NOTE — PROGRESS NOTES
After pharmacist chart review, the following recommendations are made:    Per ADA & ACC guidelines, patient is a candidate for statin therapy. Recommended initiation of high-intensity statin therapy such as rosuvastatin 20 mg daily. If history of statin intolerance, consider trialing rosuvastatin 5 mg daily.      For Pharmacy Admin Tracking Only    Program: [facility]  CPA in place:  No  Recommendation Provided To: Provider: 1 via Verbally to provider  Intervention Detail: New Rx: 1, reason: Needs Additional Therapy  Gap Closed?: No   Time Spent (min): 20

## 2023-09-25 ENCOUNTER — HOSPITAL ENCOUNTER (OUTPATIENT)
Dept: WOMENS IMAGING | Age: 53
Discharge: HOME | End: 2023-09-25
Payer: COMMERCIAL

## 2023-09-25 DIAGNOSIS — Z12.31 BREAST CANCER SCREENING BY MAMMOGRAM: ICD-10-CM

## 2023-09-25 PROCEDURE — 77063 BREAST TOMOSYNTHESIS BI: CPT

## 2023-09-26 ENCOUNTER — TELEPHONE (OUTPATIENT)
Dept: FAMILY MEDICINE CLINIC | Age: 53
End: 2023-09-26

## 2023-09-26 NOTE — TELEPHONE ENCOUNTER
----- Message from Radha Mojica MD sent at 9/25/2023  1:01 PM EDT -----  Ceil Spikes,    Your mammogram was normal. We will repeat in 1 year. Thanks!

## 2023-10-10 ENCOUNTER — HOSPITAL ENCOUNTER (OUTPATIENT)
Age: 53
Discharge: HOME | End: 2023-10-10

## 2023-10-10 ENCOUNTER — HOSPITAL ENCOUNTER (OUTPATIENT)
Dept: GENERAL RADIOLOGY | Age: 53
Discharge: HOME | End: 2023-10-10

## 2023-10-10 DIAGNOSIS — R52 PAIN: ICD-10-CM

## 2023-12-12 ENCOUNTER — HOSPITAL ENCOUNTER (OUTPATIENT)
Dept: MRI IMAGING | Age: 53
Discharge: HOME | End: 2023-12-12
Payer: COMMERCIAL

## 2023-12-12 DIAGNOSIS — S93.402A SPRAIN OF LEFT ANKLE, UNSPECIFIED LIGAMENT, INITIAL ENCOUNTER: ICD-10-CM

## 2023-12-12 DIAGNOSIS — S80.11XA CONTUSION OF MULTIPLE SITES OF RIGHT LOWER EXTREMITY, INITIAL ENCOUNTER: ICD-10-CM

## 2023-12-12 PROCEDURE — 73721 MRI JNT OF LWR EXTRE W/O DYE: CPT

## 2023-12-26 DIAGNOSIS — I16.0 HYPERTENSIVE URGENCY: ICD-10-CM

## 2023-12-26 DIAGNOSIS — Z79.4 TYPE 2 DIABETES MELLITUS WITH HYPERGLYCEMIA, WITH LONG-TERM CURRENT USE OF INSULIN (HCC): ICD-10-CM

## 2023-12-26 DIAGNOSIS — I10 ESSENTIAL HYPERTENSION: ICD-10-CM

## 2023-12-26 DIAGNOSIS — F41.9 ANXIETY: ICD-10-CM

## 2023-12-26 DIAGNOSIS — E11.65 TYPE 2 DIABETES MELLITUS WITH HYPERGLYCEMIA, WITH LONG-TERM CURRENT USE OF INSULIN (HCC): ICD-10-CM

## 2023-12-26 DIAGNOSIS — E11.69 TYPE 2 DIABETES MELLITUS WITH OTHER SPECIFIED COMPLICATION, UNSPECIFIED WHETHER LONG TERM INSULIN USE (HCC): ICD-10-CM

## 2023-12-27 RX ORDER — SPIRONOLACTONE 50 MG/1
50 TABLET, FILM COATED ORAL 2 TIMES DAILY
Qty: 180 TABLET | Refills: 0 | Status: SHIPPED | OUTPATIENT
Start: 2023-12-27

## 2023-12-27 RX ORDER — METOPROLOL TARTRATE 100 MG/1
100 TABLET ORAL 2 TIMES DAILY
Qty: 180 TABLET | Refills: 0 | Status: SHIPPED | OUTPATIENT
Start: 2023-12-27

## 2023-12-27 RX ORDER — VALSARTAN 320 MG/1
320 TABLET ORAL DAILY
Qty: 90 TABLET | Refills: 0 | Status: SHIPPED | OUTPATIENT
Start: 2023-12-27

## 2023-12-27 RX ORDER — MINOXIDIL 2.5 MG/1
5 TABLET ORAL 2 TIMES DAILY
Qty: 120 TABLET | Refills: 0 | Status: SHIPPED | OUTPATIENT
Start: 2023-12-27 | End: 2024-12-26

## 2023-12-27 RX ORDER — METFORMIN HYDROCHLORIDE 500 MG/1
1000 TABLET, EXTENDED RELEASE ORAL 2 TIMES DAILY
Qty: 360 TABLET | Refills: 0 | Status: SHIPPED | OUTPATIENT
Start: 2023-12-27

## 2023-12-27 RX ORDER — HYDROCHLOROTHIAZIDE 25 MG/1
50 TABLET ORAL EVERY MORNING
Qty: 90 TABLET | Refills: 3 | Status: SHIPPED | OUTPATIENT
Start: 2023-12-27

## 2023-12-27 RX ORDER — DULOXETIN HYDROCHLORIDE 60 MG/1
60 CAPSULE, DELAYED RELEASE ORAL DAILY
Qty: 90 CAPSULE | Refills: 0 | Status: SHIPPED | OUTPATIENT
Start: 2023-12-27

## 2023-12-27 NOTE — TELEPHONE ENCOUNTER
Rx sent. Please call patient and let her know that she needs to schedule an appointment in the next 1-2 months for re-evaluation prior to additional refills. Thanks!

## 2023-12-27 NOTE — TELEPHONE ENCOUNTER
Called and spoke to patient, advised refills had been sent and Dr. Geoffrey Mathew would like to see in office. Patient scheduled an appointment for 2/1/24.

## 2023-12-27 NOTE — TELEPHONE ENCOUNTER
Patient's last appointment was : 8/18/2023 with our   Patient's next appointment is : Visit date not found   Last refilled on: 05/26/2023-06/19/2023-08/15/2023  Which pharmacy does the script need sent to: [facility]      Lab Results   Component Value Date    LABA1C 9.9 (H) 06/19/2023     Lab Results   Component Value Date    CHOL 330 (H) 07/17/2023    TRIG 2,474 (H) 07/17/2023    HDL 19 07/17/2023    LDLCALC SEE BELOW 07/17/2023     Lab Results   Component Value Date     06/26/2023    K 4.1 06/26/2023    CL 96 (L) 06/26/2023    CO2 20 (L) 06/26/2023    BUN 27 (H) 06/26/2023    CREATININE 0.9 06/26/2023    GLUCOSE 204 06/26/2023    CALCIUM 9.5 06/26/2023    PROT 8.3 (H) 06/26/2023    LABALBU 4.5 06/26/2023    BILITOT 0.6 06/26/2023    ALKPHOS 82 06/26/2023    AST 25 06/26/2023    ALT 39 06/26/2023    LABGLOM >60 06/26/2023     Lab Results   Component Value Date    TSH 3.610 07/17/2023    T4FREE 0.99 06/02/2022     Lab Results   Component Value Date    WBC 13.5 (H) 06/26/2023    HGB 15.3 06/26/2023    HCT 45.9 06/26/2023    MCV 89.1 06/26/2023     06/26/2023

## 2023-12-29 LAB — DIABETIC RETINOPATHY: POSITIVE

## 2024-03-19 DIAGNOSIS — I10 ESSENTIAL HYPERTENSION: ICD-10-CM

## 2024-03-19 DIAGNOSIS — F41.9 ANXIETY: ICD-10-CM

## 2024-03-19 NOTE — TELEPHONE ENCOUNTER
Patient's last appointment was : 8/18/2023 with our   Patient's next appointment is : Visit date not found   Last refilled on: 12-  Which pharmacy does the script need sent to: [facility]      Lab Results   Component Value Date    LABA1C 9.9 (H) 06/19/2023     Lab Results   Component Value Date    CHOL 330 (H) 07/17/2023    TRIG 2,474 (H) 07/17/2023    HDL 19 07/17/2023    LDLCALC SEE BELOW 07/17/2023     Lab Results   Component Value Date     06/26/2023    K 4.1 06/26/2023    CL 96 (L) 06/26/2023    CO2 20 (L) 06/26/2023    BUN 27 (H) 06/26/2023    CREATININE 0.9 06/26/2023    GLUCOSE 204 06/26/2023    CALCIUM 9.5 06/26/2023    PROT 8.3 (H) 06/26/2023    LABALBU 4.5 06/26/2023    BILITOT 0.6 06/26/2023    ALKPHOS 82 06/26/2023    AST 25 06/26/2023    ALT 39 06/26/2023    LABGLOM >60 06/26/2023     Lab Results   Component Value Date    TSH 3.610 07/17/2023    T4FREE 0.99 06/02/2022     Lab Results   Component Value Date    WBC 13.5 (H) 06/26/2023    HGB 15.3 06/26/2023    HCT 45.9 06/26/2023    MCV 89.1 06/26/2023     06/26/2023

## 2024-03-20 RX ORDER — VALSARTAN 320 MG/1
320 TABLET ORAL DAILY
Qty: 90 TABLET | Refills: 0 | OUTPATIENT
Start: 2024-03-20

## 2024-03-20 RX ORDER — DULOXETIN HYDROCHLORIDE 60 MG/1
60 CAPSULE, DELAYED RELEASE ORAL DAILY
Qty: 90 CAPSULE | Refills: 0 | OUTPATIENT
Start: 2024-03-20

## 2024-03-25 ENCOUNTER — OFFICE VISIT (OUTPATIENT)
Dept: FAMILY MEDICINE CLINIC | Age: 54
End: 2024-03-25
Payer: COMMERCIAL

## 2024-03-25 VITALS
DIASTOLIC BLOOD PRESSURE: 110 MMHG | SYSTOLIC BLOOD PRESSURE: 172 MMHG | OXYGEN SATURATION: 97 % | HEIGHT: 63 IN | TEMPERATURE: 98 F | WEIGHT: 231.4 LBS | HEART RATE: 100 BPM | RESPIRATION RATE: 20 BRPM | BODY MASS INDEX: 41 KG/M2

## 2024-03-25 DIAGNOSIS — E78.1 HYPERTRIGLYCERIDEMIA: ICD-10-CM

## 2024-03-25 DIAGNOSIS — R53.83 OTHER FATIGUE: ICD-10-CM

## 2024-03-25 DIAGNOSIS — E11.65 TYPE 2 DIABETES MELLITUS WITH HYPERGLYCEMIA, WITH LONG-TERM CURRENT USE OF INSULIN (HCC): Primary | ICD-10-CM

## 2024-03-25 DIAGNOSIS — I10 ESSENTIAL HYPERTENSION: ICD-10-CM

## 2024-03-25 DIAGNOSIS — Z91.89 AT RISK FOR OSTEOPOROSIS: ICD-10-CM

## 2024-03-25 DIAGNOSIS — E61.1 IRON DEFICIENCY: ICD-10-CM

## 2024-03-25 DIAGNOSIS — Z79.4 TYPE 2 DIABETES MELLITUS WITH HYPERGLYCEMIA, WITH LONG-TERM CURRENT USE OF INSULIN (HCC): Primary | ICD-10-CM

## 2024-03-25 DIAGNOSIS — F41.9 ANXIETY: ICD-10-CM

## 2024-03-25 PROCEDURE — 1036F TOBACCO NON-USER: CPT | Performed by: STUDENT IN AN ORGANIZED HEALTH CARE EDUCATION/TRAINING PROGRAM

## 2024-03-25 PROCEDURE — G8417 CALC BMI ABV UP PARAM F/U: HCPCS | Performed by: STUDENT IN AN ORGANIZED HEALTH CARE EDUCATION/TRAINING PROGRAM

## 2024-03-25 PROCEDURE — 2022F DILAT RTA XM EVC RTNOPTHY: CPT | Performed by: STUDENT IN AN ORGANIZED HEALTH CARE EDUCATION/TRAINING PROGRAM

## 2024-03-25 PROCEDURE — G8484 FLU IMMUNIZE NO ADMIN: HCPCS | Performed by: STUDENT IN AN ORGANIZED HEALTH CARE EDUCATION/TRAINING PROGRAM

## 2024-03-25 PROCEDURE — 3077F SYST BP >= 140 MM HG: CPT | Performed by: STUDENT IN AN ORGANIZED HEALTH CARE EDUCATION/TRAINING PROGRAM

## 2024-03-25 PROCEDURE — 99214 OFFICE O/P EST MOD 30 MIN: CPT | Performed by: STUDENT IN AN ORGANIZED HEALTH CARE EDUCATION/TRAINING PROGRAM

## 2024-03-25 PROCEDURE — G8427 DOCREV CUR MEDS BY ELIG CLIN: HCPCS | Performed by: STUDENT IN AN ORGANIZED HEALTH CARE EDUCATION/TRAINING PROGRAM

## 2024-03-25 PROCEDURE — 3017F COLORECTAL CA SCREEN DOC REV: CPT | Performed by: STUDENT IN AN ORGANIZED HEALTH CARE EDUCATION/TRAINING PROGRAM

## 2024-03-25 PROCEDURE — 3046F HEMOGLOBIN A1C LEVEL >9.0%: CPT | Performed by: STUDENT IN AN ORGANIZED HEALTH CARE EDUCATION/TRAINING PROGRAM

## 2024-03-25 PROCEDURE — 3080F DIAST BP >= 90 MM HG: CPT | Performed by: STUDENT IN AN ORGANIZED HEALTH CARE EDUCATION/TRAINING PROGRAM

## 2024-03-25 RX ORDER — AMLODIPINE AND VALSARTAN 5; 160 MG/1; MG/1
1 TABLET ORAL DAILY
Qty: 90 TABLET | Refills: 3 | Status: SHIPPED | OUTPATIENT
Start: 2024-03-25

## 2024-03-25 RX ORDER — DULOXETIN HYDROCHLORIDE 60 MG/1
60 CAPSULE, DELAYED RELEASE ORAL DAILY
Qty: 90 CAPSULE | Refills: 3 | Status: SHIPPED | OUTPATIENT
Start: 2024-03-25

## 2024-03-25 SDOH — ECONOMIC STABILITY: FOOD INSECURITY: WITHIN THE PAST 12 MONTHS, YOU WORRIED THAT YOUR FOOD WOULD RUN OUT BEFORE YOU GOT MONEY TO BUY MORE.: NEVER TRUE

## 2024-03-25 SDOH — ECONOMIC STABILITY: FOOD INSECURITY: WITHIN THE PAST 12 MONTHS, THE FOOD YOU BOUGHT JUST DIDN'T LAST AND YOU DIDN'T HAVE MONEY TO GET MORE.: NEVER TRUE

## 2024-03-25 SDOH — ECONOMIC STABILITY: INCOME INSECURITY: HOW HARD IS IT FOR YOU TO PAY FOR THE VERY BASICS LIKE FOOD, HOUSING, MEDICAL CARE, AND HEATING?: NOT HARD AT ALL

## 2024-03-25 ASSESSMENT — PATIENT HEALTH QUESTIONNAIRE - PHQ9
9. THOUGHTS THAT YOU WOULD BE BETTER OFF DEAD, OR OF HURTING YOURSELF: NOT AT ALL
1. LITTLE INTEREST OR PLEASURE IN DOING THINGS: NOT AT ALL
3. TROUBLE FALLING OR STAYING ASLEEP: MORE THAN HALF THE DAYS
10. IF YOU CHECKED OFF ANY PROBLEMS, HOW DIFFICULT HAVE THESE PROBLEMS MADE IT FOR YOU TO DO YOUR WORK, TAKE CARE OF THINGS AT HOME, OR GET ALONG WITH OTHER PEOPLE: SOMEWHAT DIFFICULT
8. MOVING OR SPEAKING SO SLOWLY THAT OTHER PEOPLE COULD HAVE NOTICED. OR THE OPPOSITE, BEING SO FIGETY OR RESTLESS THAT YOU HAVE BEEN MOVING AROUND A LOT MORE THAN USUAL: NOT AT ALL
SUM OF ALL RESPONSES TO PHQ9 QUESTIONS 1 & 2: 1
SUM OF ALL RESPONSES TO PHQ QUESTIONS 1-9: 5
7. TROUBLE CONCENTRATING ON THINGS, SUCH AS READING THE NEWSPAPER OR WATCHING TELEVISION: NOT AT ALL
2. FEELING DOWN, DEPRESSED OR HOPELESS: SEVERAL DAYS
SUM OF ALL RESPONSES TO PHQ QUESTIONS 1-9: 5
4. FEELING TIRED OR HAVING LITTLE ENERGY: MORE THAN HALF THE DAYS
6. FEELING BAD ABOUT YOURSELF - OR THAT YOU ARE A FAILURE OR HAVE LET YOURSELF OR YOUR FAMILY DOWN: NOT AT ALL
5. POOR APPETITE OR OVEREATING: NOT AT ALL

## 2024-03-25 ASSESSMENT — ENCOUNTER SYMPTOMS
SHORTNESS OF BREATH: 0
ABDOMINAL PAIN: 0
COUGH: 0

## 2024-03-26 NOTE — PROGRESS NOTES
Attending Physician Note    I saw and evaluated the patient, performing the key elements of the service.  I discussed the findings, assessment and plan with the resident and agree with the resident's findings and plan as documented in the resident's note.  GC modifier added.    Brief summary:  HTN - work to simplify regimen and improve adherence.  Pt aware of importance of BP control.  Amlodipine-valsartan 5-160 daily, with option to increase to  daily if needed.  Monitor BP at home.   DM type 2 - continue monitoring with cgm.  Continue empagliflozin and insulin.  Did not tolerate GLP-1 agonists.  Discontinue metformin for now.    Anxiety - resume duloxetine.  Pt wanted to start at 60mg daily dose rather than 30mg with upward titration.  
Lexie (Screen of Drug Use):    1) How many days in the past 12 months have you used drugs other than alcohol? 0 (positive if 7 or more)    2) How many days in the past 12 months have you used drugs more than you meant to? 0 (positive if 2 or more)   
multivitamin     Report your blood pressure in 1 month.

## 2024-04-02 ENCOUNTER — TELEPHONE (OUTPATIENT)
Dept: FAMILY MEDICINE CLINIC | Age: 54
End: 2024-04-02

## 2024-04-02 DIAGNOSIS — R11.0 NAUSEA: Primary | ICD-10-CM

## 2024-04-02 RX ORDER — ONDANSETRON 4 MG/1
4 TABLET, FILM COATED ORAL 3 TIMES DAILY PRN
Qty: 30 TABLET | Refills: 0 | Status: SHIPPED | OUTPATIENT
Start: 2024-04-02

## 2024-04-02 NOTE — TELEPHONE ENCOUNTER
Spoke with patient via phone. Patient having some nausea w/o vomiting since starting on cymbalta. Would like to continue but to have some prn zofran as needed. Patient will continue to monitor nausea for the next 1-2 weeks to see if nausea resolves.

## 2024-04-15 NOTE — TELEPHONE ENCOUNTER
Patient scheduled for renal us and urinary bladder scan  at [facility] on Nov 8 at 4 and 430. No carbonated patti and go well hydrated. Order mailed with instructions to patient.  Lm on 7-28 Never smoker

## 2024-04-27 DIAGNOSIS — Z79.4 TYPE 2 DIABETES MELLITUS WITH HYPERGLYCEMIA, WITH LONG-TERM CURRENT USE OF INSULIN (HCC): ICD-10-CM

## 2024-04-27 DIAGNOSIS — R11.0 NAUSEA: ICD-10-CM

## 2024-04-27 DIAGNOSIS — F41.9 ANXIETY: ICD-10-CM

## 2024-04-27 DIAGNOSIS — E11.65 TYPE 2 DIABETES MELLITUS WITH HYPERGLYCEMIA, WITH LONG-TERM CURRENT USE OF INSULIN (HCC): ICD-10-CM

## 2024-04-27 DIAGNOSIS — I10 ESSENTIAL HYPERTENSION: ICD-10-CM

## 2024-04-29 RX ORDER — DULOXETIN HYDROCHLORIDE 60 MG/1
60 CAPSULE, DELAYED RELEASE ORAL DAILY
Qty: 90 CAPSULE | Refills: 3 | OUTPATIENT
Start: 2024-04-29

## 2024-04-29 RX ORDER — AMLODIPINE AND VALSARTAN 5; 160 MG/1; MG/1
1 TABLET ORAL DAILY
Qty: 90 TABLET | Refills: 3 | OUTPATIENT
Start: 2024-04-29

## 2024-04-29 RX ORDER — ONDANSETRON 4 MG/1
4 TABLET, FILM COATED ORAL 3 TIMES DAILY PRN
Qty: 30 TABLET | Refills: 0 | OUTPATIENT
Start: 2024-04-29

## 2024-06-03 DIAGNOSIS — E11.65 TYPE 2 DIABETES MELLITUS WITH HYPERGLYCEMIA, WITH LONG-TERM CURRENT USE OF INSULIN (HCC): ICD-10-CM

## 2024-06-03 DIAGNOSIS — Z79.4 TYPE 2 DIABETES MELLITUS WITH HYPERGLYCEMIA, WITH LONG-TERM CURRENT USE OF INSULIN (HCC): ICD-10-CM

## 2024-06-03 DIAGNOSIS — F41.9 ANXIETY: ICD-10-CM

## 2024-06-03 DIAGNOSIS — I10 ESSENTIAL HYPERTENSION: ICD-10-CM

## 2024-06-04 RX ORDER — AMLODIPINE AND VALSARTAN 5; 160 MG/1; MG/1
1 TABLET ORAL DAILY
Qty: 90 TABLET | Refills: 3 | OUTPATIENT
Start: 2024-06-04

## 2024-06-04 RX ORDER — DULOXETIN HYDROCHLORIDE 60 MG/1
60 CAPSULE, DELAYED RELEASE ORAL DAILY
Qty: 90 CAPSULE | Refills: 3 | OUTPATIENT
Start: 2024-06-04

## 2024-06-30 ENCOUNTER — TELEPHONE (OUTPATIENT)
Dept: FAMILY MEDICINE CLINIC | Age: 54
End: 2024-06-30

## 2024-08-09 ENCOUNTER — HOSPITAL ENCOUNTER (EMERGENCY)
Age: 54
Discharge: HOME | End: 2024-08-09
Attending: EMERGENCY MEDICINE
Payer: COMMERCIAL

## 2024-08-09 ENCOUNTER — APPOINTMENT (OUTPATIENT)
Dept: CT IMAGING | Age: 54
End: 2024-08-09
Payer: COMMERCIAL

## 2024-08-09 VITALS
WEIGHT: 215 LBS | OXYGEN SATURATION: 93 % | TEMPERATURE: 97.6 F | HEART RATE: 92 BPM | HEIGHT: 63 IN | DIASTOLIC BLOOD PRESSURE: 90 MMHG | BODY MASS INDEX: 38.09 KG/M2 | RESPIRATION RATE: 16 BRPM | SYSTOLIC BLOOD PRESSURE: 162 MMHG

## 2024-08-09 DIAGNOSIS — S09.90XA CLOSED HEAD INJURY, INITIAL ENCOUNTER: Primary | ICD-10-CM

## 2024-08-09 DIAGNOSIS — S02.2XXA CLOSED FRACTURE OF NASAL BONE, INITIAL ENCOUNTER: ICD-10-CM

## 2024-08-09 PROCEDURE — 99284 EMERGENCY DEPT VISIT MOD MDM: CPT

## 2024-08-09 PROCEDURE — 70450 CT HEAD/BRAIN W/O DYE: CPT

## 2024-08-09 PROCEDURE — 70486 CT MAXILLOFACIAL W/O DYE: CPT

## 2024-08-09 PROCEDURE — 6370000000 HC RX 637 (ALT 250 FOR IP): Performed by: EMERGENCY MEDICINE

## 2024-08-09 RX ORDER — ONDANSETRON 4 MG/1
4 TABLET, ORALLY DISINTEGRATING ORAL ONCE
Status: COMPLETED | OUTPATIENT
Start: 2024-08-09 | End: 2024-08-09

## 2024-08-09 RX ORDER — ACETAMINOPHEN 325 MG/1
650 TABLET ORAL ONCE
Status: COMPLETED | OUTPATIENT
Start: 2024-08-09 | End: 2024-08-09

## 2024-08-09 RX ORDER — HYDROCODONE BITARTRATE AND ACETAMINOPHEN 5; 325 MG/1; MG/1
1 TABLET ORAL EVERY 6 HOURS PRN
Qty: 10 TABLET | Refills: 0 | Status: SHIPPED | OUTPATIENT
Start: 2024-08-09 | End: 2024-08-12

## 2024-08-09 RX ORDER — ONDANSETRON 4 MG/1
4 TABLET, FILM COATED ORAL EVERY 8 HOURS PRN
Qty: 20 TABLET | Refills: 0 | Status: SHIPPED | OUTPATIENT
Start: 2024-08-09 | End: 2024-08-29

## 2024-08-09 RX ADMIN — ONDANSETRON 4 MG: 4 TABLET, ORALLY DISINTEGRATING ORAL at 08:13

## 2024-08-09 RX ADMIN — ACETAMINOPHEN 650 MG: 325 TABLET ORAL at 07:39

## 2024-08-09 ASSESSMENT — PAIN DESCRIPTION - PAIN TYPE: TYPE: ACUTE PAIN

## 2024-08-09 ASSESSMENT — PAIN - FUNCTIONAL ASSESSMENT: PAIN_FUNCTIONAL_ASSESSMENT: 0-10

## 2024-08-09 ASSESSMENT — PAIN SCALES - GENERAL: PAINLEVEL_OUTOF10: 10

## 2024-08-09 ASSESSMENT — PAIN DESCRIPTION - LOCATION: LOCATION: HEAD

## 2024-08-09 ASSESSMENT — PAIN DESCRIPTION - FREQUENCY: FREQUENCY: CONTINUOUS

## 2024-08-09 NOTE — ED NOTES
Pt presents to ED with c/o head injury. Pt states her dog pulled her down the stairs this morning. Bruising noted to forehead and laceration to nose, bleeding controlled. Pt denies LOC.

## 2024-08-09 NOTE — ED PROVIDER NOTES
[facility]  EMERGENCY DEPARTMENT ENCOUNTER          Pt Name: Aster Loco  MRN: [id number]  Birthdate 1970  Date of evaluation: 8/9/2024    CHIEF COMPLAINT       Chief Complaint   Patient presents with    Head Injury       Nurses Notes reviewed and I agree except as noted in the HPI.    HISTORY OF PRESENT ILLNESS    Aster Loco is a 54 y.o. pleasant female who presents to the emergency department for evaluation of head, facial injury.  Patient states that she was taking her dog puppy out today.  She took them out together to try to save time, as they went down the stairs they pulled on her and she fell onto a flower bed, struck her forehead and face on a rock.  This occurred around 615 this morning.  She washed her face, did not take any over-the-counter medications.  She did have some bleeding from her nose.  Denies loss of consciousness.  Reports pain at the nose and forehead.  Reports nausea.  Denies vomiting, seizure, or other injury.  She is not on blood thinners.  Denies focal weakness, numbness, tingling.  No changes in vision, speech, or hearing.  Her tetanus is up-to-date.  The patient has no other acute complaints at this time.        REVIEW OF SYSTEMS   Review of Systems    PAST MEDICAL AND SURGICAL HISTORY     Past Medical History:   Diagnosis Date    COVID-19 11/2021    Diabetes mellitus (HCC)     Hypertension      Past Surgical History:   Procedure Laterality Date    CHOLECYSTECTOMY      ENDOMETRIAL ABLATION      TUBAL LIGATION  2001     BREAST BIOPSY W LOC DEVICE 1ST LESION RIGHT Right 09/03/2014    benign       CURRENT MEDICATIONS   No current facility-administered medications for this encounter.    Current Outpatient Medications:     HYDROcodone-acetaminophen (NORCO) 5-325 MG per tablet, Take 1 tablet by mouth every 6 hours as needed for Pain for up to 3 days. Intended supply: 3 days. Take lowest dose possible to manage pain Max Daily Amount: 4 tablets, Disp:

## 2024-08-12 ENCOUNTER — TELEPHONE (OUTPATIENT)
Dept: ENT CLINIC | Age: 54
End: 2024-08-12

## 2024-08-12 NOTE — TELEPHONE ENCOUNTER
Received cc of patients chart from ER provider for nasal bone fracture with injury occurring yesterday. Please call patient to arrange follow up with next available provider in the next 7-10 days.

## 2024-08-21 ENCOUNTER — OFFICE VISIT (OUTPATIENT)
Dept: ENT CLINIC | Age: 54
End: 2024-08-21
Payer: COMMERCIAL

## 2024-08-21 VITALS
WEIGHT: 223 LBS | OXYGEN SATURATION: 97 % | TEMPERATURE: 97.2 F | RESPIRATION RATE: 18 BRPM | HEIGHT: 63 IN | HEART RATE: 115 BPM | SYSTOLIC BLOOD PRESSURE: 162 MMHG | BODY MASS INDEX: 39.51 KG/M2 | DIASTOLIC BLOOD PRESSURE: 102 MMHG

## 2024-08-21 DIAGNOSIS — S02.2XXA CLOSED FRACTURE OF NASAL BONE, INITIAL ENCOUNTER: Primary | ICD-10-CM

## 2024-08-21 PROCEDURE — 3077F SYST BP >= 140 MM HG: CPT | Performed by: NURSE PRACTITIONER

## 2024-08-21 PROCEDURE — 3080F DIAST BP >= 90 MM HG: CPT | Performed by: NURSE PRACTITIONER

## 2024-08-21 PROCEDURE — 3017F COLORECTAL CA SCREEN DOC REV: CPT | Performed by: NURSE PRACTITIONER

## 2024-08-21 PROCEDURE — G8427 DOCREV CUR MEDS BY ELIG CLIN: HCPCS | Performed by: NURSE PRACTITIONER

## 2024-08-21 PROCEDURE — G8417 CALC BMI ABV UP PARAM F/U: HCPCS | Performed by: NURSE PRACTITIONER

## 2024-08-21 PROCEDURE — 99204 OFFICE O/P NEW MOD 45 MIN: CPT | Performed by: NURSE PRACTITIONER

## 2024-08-21 PROCEDURE — 1036F TOBACCO NON-USER: CPT | Performed by: NURSE PRACTITIONER

## 2024-08-21 NOTE — PROGRESS NOTES
External nose abnormal for deformity at right nasal bridge. Elevation of right nasal bone with tenderness.  Swelling has mostly resolved.  Nasal mucosa normal. No evidence of septal hematoma present.   Mouth/Throat:  Good dentition. Oral cavity mucosa normal, no masses or lesions noted.   Eyes:  Pupils are equal, round, and reactive to light. Conjunctivae and EOM are normal.   Neck:  Normal range of motion. Neck supple. No JVD present. No tracheal deviation present. No thyromegaly present. No cervical lymphadenopathy noted.  Cardiovascular:  Normal rate.   Pulmonary/Chest:  Effort normal. No stridor or stertor. No respiratory distress.   Musculoskeletal:  Normal range of motion. No edema or lymphadenopathy.  Neurological:  Alert and oriented to person, place, and time.   Cranial nerve II-XII grossly intact.  Skin:  Skin is warm. No erythema.   Psychiatric:  Normal mood and affect. Behavior is normal.     Vitals reviewed.    Data:  All of the past medical history, past surgical history, family history,social history, allergies and current medications were reviewed with the patient.    8/9/2024 CT Maxillofacial WO Contrast  PROCEDURE: CT MAXILLOFACIAL WO CONTRAST     CLINICAL INFORMATION: Injury     TECHNIQUE: CT of the facial bones was performed without the use of intravenous  contrast. Axial images as well as coronal and sagittal reconstructions were  obtained.     All CT scans at this facility use dose modulation, iterative reconstruction,  and/or weight-based dosing when appropriate to reduce radiation dose to as low  as reasonably achievable.     COMPARISON: None     FINDINGS: There are nondisplaced fractures of the bilateral nasal bones. Mucosal  thickening is present in the left maxillary sinus. No air-fluid levels are  identified. Visualized orbits and extra ocular structures are intact. There is a  small hematoma overlying the left frontal bone. No depressed calvarial fracture  is identified.

## 2024-08-22 ENCOUNTER — PREP FOR PROCEDURE (OUTPATIENT)
Dept: ENT CLINIC | Age: 54
End: 2024-08-22

## 2024-08-22 ENCOUNTER — HOSPITAL ENCOUNTER (OUTPATIENT)
Age: 54
Discharge: HOME | End: 2024-08-22
Payer: COMMERCIAL

## 2024-08-22 DIAGNOSIS — S02.2XXD CLOSED DISPLACED FRACTURE OF NASAL BONE WITH ROUTINE HEALING: Primary | ICD-10-CM

## 2024-08-22 DIAGNOSIS — Z01.818 PREOP TESTING: ICD-10-CM

## 2024-08-22 DIAGNOSIS — Z01.818 PREOP TESTING: Primary | ICD-10-CM

## 2024-08-22 PROBLEM — S02.2XXA CLOSED FRACTURE OF NASAL BONES: Status: ACTIVE | Noted: 2024-08-22

## 2024-08-22 LAB
ALBUMIN SERPL BCG-MCNC: 4.2 G/DL (ref 3.5–5.1)
ALP SERPL-CCNC: 88 U/L (ref 38–126)
ALT SERPL W/O P-5'-P-CCNC: 26 U/L (ref 11–66)
ANION GAP SERPL CALC-SCNC: 13 MEQ/L (ref 8–16)
AST SERPL-CCNC: 20 U/L (ref 5–40)
BASOPHILS ABSOLUTE: 0 THOU/MM3 (ref 0–0.1)
BASOPHILS NFR BLD AUTO: 0.6 %
BILIRUB SERPL-MCNC: 0.3 MG/DL (ref 0.3–1.2)
BUN SERPL-MCNC: 14 MG/DL (ref 7–22)
CALCIUM SERPL-MCNC: 9.8 MG/DL (ref 8.5–10.5)
CHLORIDE SERPL-SCNC: 98 MEQ/L (ref 98–111)
CO2 SERPL-SCNC: 25 MEQ/L (ref 23–33)
CREAT SERPL-MCNC: 0.8 MG/DL (ref 0.4–1.2)
DEPRECATED RDW RBC AUTO: 40.4 FL (ref 35–45)
EKG ATRIAL RATE: 95 BPM
EKG P AXIS: 66 DEGREES
EKG P-R INTERVAL: 146 MS
EKG Q-T INTERVAL: 354 MS
EKG QRS DURATION: 80 MS
EKG QTC CALCULATION (BAZETT): 444 MS
EKG R AXIS: -7 DEGREES
EKG T AXIS: 54 DEGREES
EKG VENTRICULAR RATE: 95 BPM
EOSINOPHIL NFR BLD AUTO: 3.3 %
EOSINOPHILS ABSOLUTE: 0.2 THOU/MM3 (ref 0–0.4)
ERYTHROCYTE [DISTWIDTH] IN BLOOD BY AUTOMATED COUNT: 12.2 % (ref 11.5–14.5)
GFR SERPL CREATININE-BSD FRML MDRD: 88 ML/MIN/1.73M2
GLUCOSE SERPL-MCNC: 319 MG/DL (ref 70–108)
HCT VFR BLD AUTO: 41.7 % (ref 37–47)
HGB BLD-MCNC: 13.7 GM/DL (ref 12–16)
IMM GRANULOCYTES # BLD AUTO: 0.02 THOU/MM3 (ref 0–0.07)
IMM GRANULOCYTES NFR BLD AUTO: 0.3 %
LYMPHOCYTES ABSOLUTE: 2.2 THOU/MM3 (ref 1–4.8)
LYMPHOCYTES NFR BLD AUTO: 33.7 %
MCH RBC QN AUTO: 29.6 PG (ref 26–33)
MCHC RBC AUTO-ENTMCNC: 32.9 GM/DL (ref 32.2–35.5)
MCV RBC AUTO: 90.1 FL (ref 81–99)
MONOCYTES ABSOLUTE: 0.4 THOU/MM3 (ref 0.4–1.3)
MONOCYTES NFR BLD AUTO: 6.1 %
NEUTROPHILS ABSOLUTE: 3.6 THOU/MM3 (ref 1.8–7.7)
NEUTROPHILS NFR BLD AUTO: 56 %
NRBC BLD AUTO-RTO: 0 /100 WBC
PLATELET # BLD AUTO: 220 THOU/MM3 (ref 130–400)
PMV BLD AUTO: 10.2 FL (ref 9.4–12.4)
POTASSIUM SERPL-SCNC: 3.9 MEQ/L (ref 3.5–5.2)
PROT SERPL-MCNC: 7.2 G/DL (ref 6.1–8)
RBC # BLD AUTO: 4.63 MILL/MM3 (ref 4.2–5.4)
SODIUM SERPL-SCNC: 136 MEQ/L (ref 135–145)
WBC # BLD AUTO: 6.4 THOU/MM3 (ref 4.8–10.8)

## 2024-08-22 PROCEDURE — 85025 COMPLETE CBC W/AUTO DIFF WBC: CPT

## 2024-08-22 PROCEDURE — 80053 COMPREHEN METABOLIC PANEL: CPT

## 2024-08-22 PROCEDURE — 36415 COLL VENOUS BLD VENIPUNCTURE: CPT

## 2024-08-22 PROCEDURE — 93005 ELECTROCARDIOGRAM TRACING: CPT

## 2024-08-22 RX ORDER — SODIUM CHLORIDE 0.9 % (FLUSH) 0.9 %
5-40 SYRINGE (ML) INJECTION PRN
Status: CANCELLED | OUTPATIENT
Start: 2024-08-22

## 2024-08-22 RX ORDER — SODIUM CHLORIDE 0.9 % (FLUSH) 0.9 %
5-40 SYRINGE (ML) INJECTION EVERY 12 HOURS SCHEDULED
Status: CANCELLED | OUTPATIENT
Start: 2024-08-23

## 2024-08-22 RX ORDER — SODIUM CHLORIDE 9 MG/ML
INJECTION, SOLUTION INTRAVENOUS PRN
Status: CANCELLED | OUTPATIENT
Start: 2024-08-22

## 2024-08-22 NOTE — PROGRESS NOTES
[facility] PROFESSIONAL SERVS  [facility]  [address]  [facility] [address]  Dept: [phone]  Dept Fax: [phone]  Loc: [phone]  Resident Note    Patient:Aster Loco Sex: female  YOB: 1970 Age:54 y.o.  MRN: [id number]  Assessment & Plan   1. Type 2 diabetes mellitus with hyperglycemia, with long-term current use of insulin (HCC)  - Chronic, poorly controlled  - POC HgbA1C 9.5, down from 9.9 on 6/19/23  - Continue Empagliflozin 25mg and insulin Basaglar 50U nightly  - Will restart Metformin ER at 500mg daily for 5 days with plan to increase by 500 mg every day to maximum 2000 mg daily  - Ordering CMP, Vit D, and Microalbumin/Creatinine Ratio  - RTC: 3 months for A1c  Orders  - POCT glycosylated hemoglobin (Hb A1C)  - Comprehensive Metabolic Panel; Future  - Vitamin D 25 Hydroxy; Future  - Microalbumin / Creatinine Urine Ratio; Future  - metFORMIN (GLUCOPHAGE-XR) 500 MG extended release tablet; Take 1 tablet by mouth daily (with breakfast) Take 1 tablet daily for 5 days then 2 tablets daily for 5 days then 3 tablets for 5 days then 4 tablets for 5 days.  Dispense: 180 tablet; Refill: 0    2. Hypertriglyceridemia  - Chronic, not controlled  - 7/17/23 Lipid Panel with T Cholesterol 330, HDL 19, Triglycerides 2474, and LDL uncalculated (due to such high Triglycerides)   - Ordering repeat lipid panel  - RTC: 3 months to review labs.  Patient has not tolerated statins in the past, thus may need to explore other options like Repatha if still elevated  Orders  - Lipid Panel; Future    3. History of iron deficiency  - Ordering reticulocytes, iron, ferritin, iron saturation, and CBC  Orders  - Reticulocytes; Future  - Iron; Future  - Ferritin; Future  - Iron Saturation; Future  - CBC with Auto Differential; Future    4. Essential hypertension  - Chronic, poorly controlled  - /92 in office.  Motivated for better blood pressure control as she is  Contrast    Result Date: 8/9/2024  PROCEDURE: CT HEAD WO CONTRAST CLINICAL INFORMATION: Injury TECHNIQUE: CT scan of the head was performed from the vertex to the skull base without use of intravenous contrast. Axial images as well as coronal and sagittal reconstructions were obtained. All CT scans at this facility use dose modulation, iterative reconstruction, and/or weight-based dosing when appropriate to reduce radiation dose to as low as reasonably achievable. COMPARISON: CT head 5/16/2023 FINDINGS: There is no mass effect, midline shift or acute hemorrhage. Ventricles and CSF spaces are within normal limits. Gray-white matter differentiation is preserved. Visualized orbits, paranasal sinuses and mastoid air cells are unremarkable. There are nondisplaced bilateral nasal bone fractures. There is a hematoma overlying the left frontal bone. No depressed calvarial fracture is identified.     No mass effect or acute hemorrhage. **This report has been created using voice recognition software. It may contain minor errors which are inherent in voice recognition technology.** Electronically signed by Dr. Regan Mclaughlin      An electronic signature was used to authenticate this note  - Janey Ghosh DO PGY-2 on 8/27/2024 at 10:25 PM

## 2024-08-23 ENCOUNTER — HOSPITAL ENCOUNTER (OUTPATIENT)
Age: 54
Setting detail: OUTPATIENT SURGERY
Discharge: HOME | End: 2024-08-23
Attending: OTOLARYNGOLOGY | Admitting: OTOLARYNGOLOGY
Payer: COMMERCIAL

## 2024-08-23 ENCOUNTER — ANESTHESIA EVENT (OUTPATIENT)
Dept: OPERATING ROOM | Age: 54
End: 2024-08-23
Payer: COMMERCIAL

## 2024-08-23 ENCOUNTER — ANESTHESIA (OUTPATIENT)
Dept: OPERATING ROOM | Age: 54
End: 2024-08-23
Payer: COMMERCIAL

## 2024-08-23 VITALS
HEART RATE: 88 BPM | RESPIRATION RATE: 16 BRPM | BODY MASS INDEX: 37.42 KG/M2 | DIASTOLIC BLOOD PRESSURE: 84 MMHG | TEMPERATURE: 96.9 F | WEIGHT: 219.2 LBS | SYSTOLIC BLOOD PRESSURE: 173 MMHG | HEIGHT: 64 IN | OXYGEN SATURATION: 97 %

## 2024-08-23 DIAGNOSIS — R11.2 NAUSEA AND VOMITING, UNSPECIFIED VOMITING TYPE: ICD-10-CM

## 2024-08-23 DIAGNOSIS — S02.2XXD CLOSED DISPLACED FRACTURE OF NASAL BONE WITH ROUTINE HEALING: Primary | ICD-10-CM

## 2024-08-23 LAB
GLUCOSE BLD STRIP.AUTO-MCNC: 185 MG/DL (ref 70–108)
POTASSIUM SERPL-SCNC: 3.8 MEQ/L (ref 3.5–5.2)

## 2024-08-23 PROCEDURE — 2709999900 HC NON-CHARGEABLE SUPPLY: Performed by: OTOLARYNGOLOGY

## 2024-08-23 PROCEDURE — 3700000000 HC ANESTHESIA ATTENDED CARE: Performed by: OTOLARYNGOLOGY

## 2024-08-23 PROCEDURE — 2580000003 HC RX 258: Performed by: OTOLARYNGOLOGY

## 2024-08-23 PROCEDURE — 6370000000 HC RX 637 (ALT 250 FOR IP): Performed by: ANESTHESIOLOGY

## 2024-08-23 PROCEDURE — 7100000011 HC PHASE II RECOVERY - ADDTL 15 MIN: Performed by: OTOLARYNGOLOGY

## 2024-08-23 PROCEDURE — 82948 REAGENT STRIP/BLOOD GLUCOSE: CPT

## 2024-08-23 PROCEDURE — 84132 ASSAY OF SERUM POTASSIUM: CPT

## 2024-08-23 PROCEDURE — 3600000002 HC SURGERY LEVEL 2 BASE: Performed by: OTOLARYNGOLOGY

## 2024-08-23 PROCEDURE — 6360000002 HC RX W HCPCS

## 2024-08-23 PROCEDURE — 6370000000 HC RX 637 (ALT 250 FOR IP): Performed by: NURSE ANESTHETIST, CERTIFIED REGISTERED

## 2024-08-23 PROCEDURE — 6360000002 HC RX W HCPCS: Performed by: ANESTHESIOLOGY

## 2024-08-23 PROCEDURE — 21320 CLSD TX NSL FX W/MNPJ&STABLJ: CPT | Performed by: OTOLARYNGOLOGY

## 2024-08-23 PROCEDURE — 6360000002 HC RX W HCPCS: Performed by: NURSE ANESTHETIST, CERTIFIED REGISTERED

## 2024-08-23 PROCEDURE — 2580000003 HC RX 258: Performed by: NURSE ANESTHETIST, CERTIFIED REGISTERED

## 2024-08-23 PROCEDURE — 3600000012 HC SURGERY LEVEL 2 ADDTL 15MIN: Performed by: OTOLARYNGOLOGY

## 2024-08-23 PROCEDURE — 36415 COLL VENOUS BLD VENIPUNCTURE: CPT

## 2024-08-23 PROCEDURE — 7100000010 HC PHASE II RECOVERY - FIRST 15 MIN: Performed by: OTOLARYNGOLOGY

## 2024-08-23 PROCEDURE — 2500000003 HC RX 250 WO HCPCS: Performed by: NURSE ANESTHETIST, CERTIFIED REGISTERED

## 2024-08-23 PROCEDURE — 7100000000 HC PACU RECOVERY - FIRST 15 MIN: Performed by: OTOLARYNGOLOGY

## 2024-08-23 PROCEDURE — 3700000001 HC ADD 15 MINUTES (ANESTHESIA): Performed by: OTOLARYNGOLOGY

## 2024-08-23 PROCEDURE — 6370000000 HC RX 637 (ALT 250 FOR IP): Performed by: OTOLARYNGOLOGY

## 2024-08-23 PROCEDURE — 7100000001 HC PACU RECOVERY - ADDTL 15 MIN: Performed by: OTOLARYNGOLOGY

## 2024-08-23 RX ORDER — FENTANYL CITRATE 50 UG/ML
INJECTION, SOLUTION INTRAMUSCULAR; INTRAVENOUS PRN
Status: DISCONTINUED | OUTPATIENT
Start: 2024-08-23 | End: 2024-08-23 | Stop reason: SDUPTHER

## 2024-08-23 RX ORDER — FENTANYL CITRATE 50 UG/ML
25 INJECTION, SOLUTION INTRAMUSCULAR; INTRAVENOUS EVERY 5 MIN PRN
Status: DISCONTINUED | OUTPATIENT
Start: 2024-08-23 | End: 2024-08-25 | Stop reason: HOSPADM

## 2024-08-23 RX ORDER — SODIUM CHLORIDE 9 MG/ML
INJECTION, SOLUTION INTRAVENOUS PRN
Status: DISCONTINUED | OUTPATIENT
Start: 2024-08-23 | End: 2024-08-25 | Stop reason: HOSPADM

## 2024-08-23 RX ORDER — SODIUM CHLORIDE 0.9 % (FLUSH) 0.9 %
5-40 SYRINGE (ML) INJECTION PRN
Status: DISCONTINUED | OUTPATIENT
Start: 2024-08-23 | End: 2024-08-25 | Stop reason: HOSPADM

## 2024-08-23 RX ORDER — ONDANSETRON 2 MG/ML
INJECTION INTRAMUSCULAR; INTRAVENOUS PRN
Status: DISCONTINUED | OUTPATIENT
Start: 2024-08-23 | End: 2024-08-23 | Stop reason: SDUPTHER

## 2024-08-23 RX ORDER — NALOXONE HYDROCHLORIDE 0.4 MG/ML
INJECTION, SOLUTION INTRAMUSCULAR; INTRAVENOUS; SUBCUTANEOUS PRN
Status: DISCONTINUED | OUTPATIENT
Start: 2024-08-23 | End: 2024-08-25 | Stop reason: HOSPADM

## 2024-08-23 RX ORDER — ONDANSETRON 4 MG/1
4 TABLET, ORALLY DISINTEGRATING ORAL 3 TIMES DAILY PRN
Qty: 10 TABLET | Refills: 0 | Status: SHIPPED | OUTPATIENT
Start: 2024-08-23 | End: 2024-08-26

## 2024-08-23 RX ORDER — HYDROCODONE BITARTRATE AND ACETAMINOPHEN 5; 325 MG/1; MG/1
1 TABLET ORAL EVERY 6 HOURS PRN
Qty: 12 TABLET | Refills: 0 | Status: SHIPPED | OUTPATIENT
Start: 2024-08-23 | End: 2024-08-26

## 2024-08-23 RX ORDER — MIDAZOLAM HYDROCHLORIDE 1 MG/ML
INJECTION INTRAMUSCULAR; INTRAVENOUS PRN
Status: DISCONTINUED | OUTPATIENT
Start: 2024-08-23 | End: 2024-08-23 | Stop reason: SDUPTHER

## 2024-08-23 RX ORDER — FENTANYL CITRATE 50 UG/ML
50 INJECTION, SOLUTION INTRAMUSCULAR; INTRAVENOUS EVERY 5 MIN PRN
Status: DISCONTINUED | OUTPATIENT
Start: 2024-08-23 | End: 2024-08-25 | Stop reason: HOSPADM

## 2024-08-23 RX ORDER — VALSARTAN 160 MG/1
160 TABLET ORAL ONCE
Status: COMPLETED | OUTPATIENT
Start: 2024-08-23 | End: 2024-08-23

## 2024-08-23 RX ORDER — SODIUM CHLORIDE, SODIUM LACTATE, POTASSIUM CHLORIDE, CALCIUM CHLORIDE 600; 310; 30; 20 MG/100ML; MG/100ML; MG/100ML; MG/100ML
INJECTION, SOLUTION INTRAVENOUS CONTINUOUS PRN
Status: DISCONTINUED | OUTPATIENT
Start: 2024-08-23 | End: 2024-08-23 | Stop reason: SDUPTHER

## 2024-08-23 RX ORDER — DEXAMETHASONE SODIUM PHOSPHATE 10 MG/ML
INJECTION, EMULSION INTRAMUSCULAR; INTRAVENOUS PRN
Status: DISCONTINUED | OUTPATIENT
Start: 2024-08-23 | End: 2024-08-23 | Stop reason: SDUPTHER

## 2024-08-23 RX ORDER — ROCURONIUM BROMIDE 10 MG/ML
INJECTION, SOLUTION INTRAVENOUS PRN
Status: DISCONTINUED | OUTPATIENT
Start: 2024-08-23 | End: 2024-08-23 | Stop reason: SDUPTHER

## 2024-08-23 RX ORDER — SODIUM CHLORIDE 0.9 % (FLUSH) 0.9 %
5-40 SYRINGE (ML) INJECTION EVERY 12 HOURS SCHEDULED
Status: DISCONTINUED | OUTPATIENT
Start: 2024-08-23 | End: 2024-08-25 | Stop reason: HOSPADM

## 2024-08-23 RX ORDER — HYDRALAZINE HYDROCHLORIDE 20 MG/ML
INJECTION INTRAMUSCULAR; INTRAVENOUS
Status: COMPLETED
Start: 2024-08-23 | End: 2024-08-23

## 2024-08-23 RX ORDER — HYDROCODONE BITARTRATE AND ACETAMINOPHEN 5; 325 MG/1; MG/1
1 TABLET ORAL ONCE
Status: COMPLETED | OUTPATIENT
Start: 2024-08-23 | End: 2024-08-23

## 2024-08-23 RX ORDER — AMLODIPINE BESYLATE 5 MG/1
5 TABLET ORAL ONCE
Status: COMPLETED | OUTPATIENT
Start: 2024-08-23 | End: 2024-08-23

## 2024-08-23 RX ORDER — HYDRALAZINE HYDROCHLORIDE 20 MG/ML
10 INJECTION INTRAMUSCULAR; INTRAVENOUS
Status: COMPLETED | OUTPATIENT
Start: 2024-08-23 | End: 2024-08-23

## 2024-08-23 RX ORDER — PROPOFOL 10 MG/ML
INJECTION, EMULSION INTRAVENOUS PRN
Status: DISCONTINUED | OUTPATIENT
Start: 2024-08-23 | End: 2024-08-23 | Stop reason: SDUPTHER

## 2024-08-23 RX ORDER — LIDOCAINE HCL/PF 100 MG/5ML
SYRINGE (ML) INJECTION PRN
Status: DISCONTINUED | OUTPATIENT
Start: 2024-08-23 | End: 2024-08-23 | Stop reason: SDUPTHER

## 2024-08-23 RX ORDER — ONDANSETRON 2 MG/ML
4 INJECTION INTRAMUSCULAR; INTRAVENOUS ONCE
Status: COMPLETED | OUTPATIENT
Start: 2024-08-23 | End: 2024-08-23

## 2024-08-23 RX ORDER — MINERAL OIL AND WHITE PETROLATUM 150; 830 MG/G; MG/G
OINTMENT OPHTHALMIC PRN
Status: DISCONTINUED | OUTPATIENT
Start: 2024-08-23 | End: 2024-08-23 | Stop reason: SDUPTHER

## 2024-08-23 RX ADMIN — AMLODIPINE BESYLATE 5 MG: 5 TABLET ORAL at 14:08

## 2024-08-23 RX ADMIN — Medication 100 MG: at 15:48

## 2024-08-23 RX ADMIN — MINERAL OIL AND WHITE PETROLATUM 0.5 INCH: 150; 830 OINTMENT OPHTHALMIC at 15:52

## 2024-08-23 RX ADMIN — PROPOFOL 150 MG: 10 INJECTION, EMULSION INTRAVENOUS at 15:48

## 2024-08-23 RX ADMIN — SUGAMMADEX 200 MG: 100 INJECTION, SOLUTION INTRAVENOUS at 16:18

## 2024-08-23 RX ADMIN — HYDROCODONE BITARTRATE AND ACETAMINOPHEN 1 TABLET: 5; 325 TABLET ORAL at 17:33

## 2024-08-23 RX ADMIN — ONDANSETRON 4 MG: 2 INJECTION INTRAMUSCULAR; INTRAVENOUS at 16:13

## 2024-08-23 RX ADMIN — VALSARTAN 160 MG: 160 TABLET ORAL at 14:08

## 2024-08-23 RX ADMIN — SODIUM CHLORIDE: 9 INJECTION, SOLUTION INTRAVENOUS at 16:04

## 2024-08-23 RX ADMIN — ROCURONIUM BROMIDE 40 MG: 10 INJECTION INTRAVENOUS at 15:48

## 2024-08-23 RX ADMIN — DEXAMETHASONE SODIUM PHOSPHATE 4 MG: 10 INJECTION, EMULSION INTRAMUSCULAR; INTRAVENOUS at 15:55

## 2024-08-23 RX ADMIN — SODIUM CHLORIDE, POTASSIUM CHLORIDE, SODIUM LACTATE AND CALCIUM CHLORIDE: 600; 310; 30; 20 INJECTION, SOLUTION INTRAVENOUS at 16:04

## 2024-08-23 RX ADMIN — FENTANYL CITRATE 100 MCG: 50 INJECTION, SOLUTION INTRAMUSCULAR; INTRAVENOUS at 15:46

## 2024-08-23 RX ADMIN — MIDAZOLAM 2 MG: 1 INJECTION INTRAMUSCULAR; INTRAVENOUS at 15:45

## 2024-08-23 RX ADMIN — HYDRALAZINE HYDROCHLORIDE 10 MG: 20 INJECTION INTRAMUSCULAR; INTRAVENOUS at 16:50

## 2024-08-23 RX ADMIN — HYDRALAZINE HYDROCHLORIDE 10 MG: 20 INJECTION INTRAMUSCULAR; INTRAVENOUS at 16:35

## 2024-08-23 RX ADMIN — ONDANSETRON 4 MG: 2 INJECTION INTRAMUSCULAR; INTRAVENOUS at 17:34

## 2024-08-23 RX ADMIN — SODIUM CHLORIDE: 9 INJECTION, SOLUTION INTRAVENOUS at 13:33

## 2024-08-23 ASSESSMENT — PAIN SCALES - GENERAL
PAINLEVEL_OUTOF10: 8
PAINLEVEL_OUTOF10: 8

## 2024-08-23 ASSESSMENT — PAIN - FUNCTIONAL ASSESSMENT
PAIN_FUNCTIONAL_ASSESSMENT: 0-10
PAIN_FUNCTIONAL_ASSESSMENT: NONE - DENIES PAIN

## 2024-08-23 ASSESSMENT — PAIN SCALES - WONG BAKER: WONGBAKER_NUMERICALRESPONSE: NO HURT

## 2024-08-23 ASSESSMENT — ENCOUNTER SYMPTOMS: SHORTNESS OF BREATH: 1

## 2024-08-23 NOTE — DISCHARGE INSTRUCTIONS
CLOSED REDUCTION NASAL FRACTURE  DISCHARGE INSTRUCTIONS.    Keep nasal splint dry, and avoid activities that would cause significant perspiration that could loosen the adhesive.    Change drip pad as needed. Do not obstruct nostrils. Not needed if no more drainage.    Take pain medicine prescribed as needed. Later may take just Tylenol or after three days Motrin.    Avoid wearing glasses resting on the nasal bones for 4-6 weeks.     Keep follow-up appt in approximately one week.    For any problems or questions, call the ENT Clinic at [phone]. It transfers to the physician on call after hours and on weekends.

## 2024-08-23 NOTE — ANESTHESIA POSTPROCEDURE EVALUATION
Department of Anesthesiology  Postprocedure Note    Patient: Aster Loco  MRN: [id number]  YOB: 1970  Date of evaluation: 8/23/2024    Procedure Summary       Date: 08/23/24 Room / Location: [facility] OR  / [facility] OR    Anesthesia Start: 1541 Anesthesia Stop: 1632    Procedure: Closed Reduction of Nasal Fracture with Stabilization (Nose) Diagnosis:       Closed fracture of nasal bone, initial encounter      (Closed fracture of nasal bone, initial encounter [S02.2XXA])    Surgeons: Sung Landon MD Responsible Provider: Ke Jackson DO    Anesthesia Type: general ASA Status: 2            Anesthesia Type: No value filed.    Antonia Phase I: Antonia Score: 9    Antonia Phase II: Antonia Score: 10    Anesthesia Post Evaluation    Patient location during evaluation: PACU  Patient participation: complete - patient participated  Level of consciousness: awake and alert  Airway patency: patent  Nausea & Vomiting: no nausea and no vomiting  Cardiovascular status: hemodynamically stable  Respiratory status: acceptable  Hydration status: euvolemic  Pain management: adequate    [facility]  POST-ANESTHESIA NOTE       Name:  Aster Loco                                         Age:  54 y.o.  MRN:  [id number]      Last Vitals:  BP (!) 175/86   Pulse 86   Temp 97.8 °F (36.6 °C) (Temporal)   Resp 16   Ht 1.626 m (5' 4\")   Wt 99.4 kg (219 lb 3.2 oz)   SpO2 99%   BMI 37.63 kg/m²   Patient Vitals for the past 4 hrs:   BP Temp Temp src Pulse Resp SpO2   08/23/24 1705 (!) 175/86 -- -- 86 16 99 %   08/23/24 1700 (!) 178/94 -- -- 94 18 98 %   08/23/24 1655 (!) 179/94 -- -- 85 16 99 %   08/23/24 1650 (!) 183/91 -- -- 81 18 99 %   08/23/24 1645 (!) 186/96 -- -- 87 18 98 %   08/23/24 1640 (!) 184/91 -- -- 83 18 95 %   08/23/24 1635 (!) 207/107 -- -- 90 18 91 %   08/23/24 1630 (!) 212/106 97.8 °F (36.6 °C) Temporal 88 18 94 %       Level of Consciousness:  Awake    Respiratory:  Stable    Oxygen Saturation:

## 2024-08-23 NOTE — PROGRESS NOTES
Pt admitted to [facility] room 16 and oriented to unit. SCD sleeves applied. Nares swabbed. Pt verbalized permission for first name, last initial and physicians name on white board. SDS board and discharge criteria explained, pt and family verbalized understanding. Pt denies thoughts of harming self or others. Call light in reach. Family at the bedside.  [facility] [phone]

## 2024-08-23 NOTE — H&P
[facility] [place] [facility]  [facility] EAR, NOSE AND THROAT  [address]  [address]  [facility] [address]  Dept: [phone]  Dept Fax: [phone]  Loc: [phone]    HPI:     Aster Loco is a 54 y.o. female new patient here for evaluation of nasal injury.  Patient was referred by ER Provider; notes reviewed.  Patient was evaluated in ER on 8/9/2024 for head/facial injury after fall at home hitting her face on a rock.  She had bleeding from the nose at time of injury.  CT done in ER showed bilateral nasal bones fractures - see below.  She does feel there is nasal bridge deformity present.  She is able to breathe through her nose without restriction.  She has not broken her nose in the past.      History:     Allergies   Allergen Reactions    Penicillins Nausea Only and Swelling     Current Outpatient Medications   Medication Sig Dispense Refill    ondansetron (ZOFRAN) 4 MG tablet Take 1 tablet by mouth every 8 hours as needed for Nausea 20 tablet 0    DULoxetine (CYMBALTA) 60 MG extended release capsule Take 1 capsule by mouth daily 90 capsule 3    amLODIPine-valsartan (EXFORGE) 5-160 MG per tablet Take 1 tablet by mouth daily 90 tablet 3    empagliflozin (JARDIANCE) 25 MG tablet Take 1 tablet by mouth daily 90 tablet 3    Blood Glucose Monitoring Suppl (ONE TOUCH ULTRA 2) w/Device KIT 1 kit by Does not apply route daily 1 kit 0    Continuous Blood Gluc  (FREESTYLE JAKUB 2 READER) FERNANDO 1 each by Does not apply route 4 times daily 1 each 0    Continuous Blood Gluc Sensor (FREESTYLE JAKUB 14 DAY SENSOR) MISC 1 each by Does not apply route every 14 days 2 each 5    Blood Pressure KIT 1 kit by Does not apply route 2 times daily 1 kit 0    blood glucose test strips (ASCENSIA AUTODISC VI;ONE TOUCH ULTRA TEST VI) strip 1 each by In Vitro route daily As needed. 100 each 3    Lancets MISC 1 each by Does not apply route daily 100 each 3    Insulin Pen Needle 29G X 12.7MM MISC 1 each by    IMPRESSION:     Bilateral nasal bone fractures.     **This report has been created using voice recognition software. It may contain  minor errors which are inherent in voice recognition technology.**     Electronically signed by Dr. Regan Mclaughlin           Exam Ended: 08/09/24 07:47 EDT Last Resulted: 08/09/24 07:52 EDT                        Note that the report has clerical error stating the fractures are nondisplaced.  It is obvious from the above images that THEY ARE DISPLACED, and with no prior history of trauma or deformity, they are acute.      Assessment & Plan   Diagnoses and all orders for this visit:     Diagnosis Orders   1. Closed displaced fracture of nasal bone, initial encounter  Closed reduction nasal bone fracture with stabilization          The findings were explained and her questions were answered.  Patient with right displaced nasal bone fracture - fractured piece is elevated causing nasal deformity.  Recommend closed reduction nasal fracture with external splint placement.  Indications, risks and benefits were discussed in detail.  Patient wishes to proceed.  We are currently working with surgery scheduling to find a spot, hopefully yet this week if not early next given that she is 12 days out from injury at this point.       Return for scheduled surgery.    **This report has been created using voice recognition software. It may contain minor errors which are inherent in voice recognition technology.**    Electronically signed by GEOVANNY QUINN MD on 8/22/2024 at 11:45 PM

## 2024-08-23 NOTE — PROGRESS NOTES
1630 Patient arrived to PACU. Patient arouses to voice and follows commands. Patient denies pain at this time. Nasal splint in place and CDI. Respirations even and unlabored. VSS.    1635 10 mg of Hydralazine given at this time for SBP >180. 02 91%, face tent applied at 28% Fi02 5 L/ min.    1640 Patient resting in bed with eyes closed, but arouses to voice. Respirations even and unlabored. VSS.    1650 10 mg of Hydralazine given at this time for SBP >180.    1700 Patient resting in bed with eyes closed, but arouses to voice. Respirations even and unlabored. VSS.    1710 Patient meets criteria to discharge from PACU at this time. Patient transported to [facility] in stable condition.

## 2024-08-23 NOTE — OP NOTE
Operative Note      Patient: Aster Loco  YOB: 1970  MRN: [id number]    Date of Procedure: 8/23/2024    Pre-Op Diagnosis Codes:      * Closed displaced fracture of nasal bone, initial encounter [S02.2XXA]    Post-Op Diagnosis: Same       Procedure(s):  Closed Reduction of Nasal Fracture with Stabilization    Surgeon(s):  Sung Landon MD    Assistant:   * No surgical staff found *    Anesthesia: General    Estimated Blood Loss (mL): Minimal    Complications: None    Specimens:   * No specimens in log *    Implants:  * No implants in log *      Drains: * No LDAs found *    Findings: Dorsum fractured with right nasal bone elevated and left nasal bone slightly depressed.  Reduce to well.  External adhesive metal splint applied    Detailed Description of Procedure:   CLOSED REDUCTION NASAL FRACTURE WITH APPLICATION OF SPLINT     After adequate level of general endotracheal anesthesia had been obtained, patient was draped in the usual fashion for nasal surgery.  Nose was examined and palpated with findings noted as above.  1% lidocaine with 1 100,000 epinephrine was infiltrated on the outside of the nasal bones bilaterally and also the internal periosteum.  Cottonoids impregnated with Afrin were placed in the upper nasal fossa.  This was allowed to take effect.    A Boies elevator (butter knife) was used to elevate and reposition the nasal bones.       Skin of the nasal dorsum was prepped with alcohol and then the adhesive skin prep solution, taking care not to get any in the eyes.  Steri-Strips were placed over the nasal dorsum in the usual manner.  Adhesive metal nasal splint was checked for size and trimmed as needed.  This was applied.   Nasal fossa and nasopharynx were suctioned of any clot.    Stomach was suctioned.    Attention was returned to the nose and final check for position of the packing and clearance of any clot in the nasopharynx was performed.  Patient was then turned back to the care  of anesthesia.  The patient was awakened, extubated, and taken to recovery room in stable condition.  The patient tolerated the procedure extremely well, and there were no complications.      Electronically signed by GEOVANNY QUINN MD on 8/23/2024 at 4:28 PM

## 2024-08-23 NOTE — ANESTHESIA PRE PROCEDURE
Value Date    PREGSERUM NEGATIVE 09/14/2017        ABGs: No results found for: \"PHART\", \"PO2ART\", \"OOH1ZOF\", \"XFP7RGX\", \"BEART\", \"V1TIDCOV\"     Type & Screen (If Applicable):  No results found for: \"LABABO\"    Drug/Infectious Status (If Applicable):  Lab Results   Component Value Date/Time    HEPCAB Negative 05/31/2022 04:39 AM       COVID-19 Screening (If Applicable): No results found for: \"COVID19\"        Anesthesia Evaluation  Patient summary reviewed  Airway: Mallampati: II  TM distance: >3 FB   Neck ROM: full  Mouth opening: > = 3 FB   Dental:          Pulmonary:   (+)   shortness of breath:                                    Cardiovascular:    (+) hypertension:, WALLACE:      ECG reviewed                        Neuro/Psych:   (+) psychiatric history:            GI/Hepatic/Renal:   (+) liver disease:          Endo/Other:    (+) Diabetes.                 Abdominal:             Vascular:          Other Findings:       Anesthesia Plan      general     ASA 2       Induction: intravenous.    MIPS: Postoperative opioids intended and Prophylactic antiemetics administered.  Anesthetic plan and risks discussed with patient.      Plan discussed with CRNA.                Ke Jackson,    8/23/2024

## 2024-08-23 NOTE — INTERVAL H&P NOTE
Pt Name: Aster Loco  MRN: [id number]  YOB: 1970  Date of evaluation: 8/23/2024    I have examined the patient and reviewed the H&P/Consult and there are no changes to the patient or plans.         Electronically signed by GEOVANNY QUINN MD on 8/23/2024 at 3:18 PM

## 2024-08-23 NOTE — BRIEF OP NOTE
Brief Postoperative Note      Patient: Aster Loco  YOB: 1970  MRN: [id number]    Date of Procedure: 8/23/2024    Pre-Op Diagnosis Codes:      * Closed displaced fracture of nasal bone, initial encounter [S02.2XXA]    Post-Op Diagnosis: Same       Procedure(s):  Closed Reduction of Nasal Fracture with Stabilization    Surgeon(s):  Sung Quinn MD    Assistant:   * No surgical staff found *    Anesthesia: General    Estimated Blood Loss (mL): Minimal    Complications: None    Specimens:   * No specimens in log *    Implants:  * No implants in log *      Drains: * No LDAs found *    Findings: Dorsum fractured with right nasal bone elevated and left nasal bone slightly depressed.  Reduce to well.  External adhesive metal splint applied    Electronically signed by SUNG QUINN MD on 8/23/2024 at 4:32 PM

## 2024-08-23 NOTE — PROGRESS NOTES
Pt returned to [facility] room 16. Vitals and assessment as charted. 0.9 infusing, @400ml to count from PACU. Pt has muffin and juice. Family at the bedside. Pt and family verbalized understanding of discharge criteria and call light use. Call light in reach.

## 2024-08-26 ENCOUNTER — OFFICE VISIT (OUTPATIENT)
Dept: FAMILY MEDICINE CLINIC | Age: 54
End: 2024-08-26
Payer: COMMERCIAL

## 2024-08-26 VITALS
RESPIRATION RATE: 21 BRPM | WEIGHT: 216.4 LBS | DIASTOLIC BLOOD PRESSURE: 92 MMHG | TEMPERATURE: 98.1 F | SYSTOLIC BLOOD PRESSURE: 168 MMHG | BODY MASS INDEX: 36.95 KG/M2 | OXYGEN SATURATION: 93 % | HEART RATE: 90 BPM | HEIGHT: 64 IN

## 2024-08-26 DIAGNOSIS — Z79.4 TYPE 2 DIABETES MELLITUS WITH HYPERGLYCEMIA, WITH LONG-TERM CURRENT USE OF INSULIN (HCC): Primary | ICD-10-CM

## 2024-08-26 DIAGNOSIS — E78.1 HYPERTRIGLYCERIDEMIA: ICD-10-CM

## 2024-08-26 DIAGNOSIS — I10 ESSENTIAL HYPERTENSION: ICD-10-CM

## 2024-08-26 DIAGNOSIS — Z12.11 COLON CANCER SCREENING: ICD-10-CM

## 2024-08-26 DIAGNOSIS — E11.65 TYPE 2 DIABETES MELLITUS WITH HYPERGLYCEMIA, WITH LONG-TERM CURRENT USE OF INSULIN (HCC): Primary | ICD-10-CM

## 2024-08-26 DIAGNOSIS — Z86.39 HISTORY OF IRON DEFICIENCY: ICD-10-CM

## 2024-08-26 LAB — HBA1C MFR BLD: 9.5 %

## 2024-08-26 PROCEDURE — 83036 HEMOGLOBIN GLYCOSYLATED A1C: CPT

## 2024-08-26 PROCEDURE — 1036F TOBACCO NON-USER: CPT

## 2024-08-26 PROCEDURE — 3046F HEMOGLOBIN A1C LEVEL >9.0%: CPT

## 2024-08-26 PROCEDURE — 3080F DIAST BP >= 90 MM HG: CPT

## 2024-08-26 PROCEDURE — 3077F SYST BP >= 140 MM HG: CPT

## 2024-08-26 PROCEDURE — G8427 DOCREV CUR MEDS BY ELIG CLIN: HCPCS

## 2024-08-26 PROCEDURE — 3017F COLORECTAL CA SCREEN DOC REV: CPT

## 2024-08-26 PROCEDURE — G8417 CALC BMI ABV UP PARAM F/U: HCPCS

## 2024-08-26 PROCEDURE — 99214 OFFICE O/P EST MOD 30 MIN: CPT

## 2024-08-26 PROCEDURE — 2022F DILAT RTA XM EVC RTNOPTHY: CPT

## 2024-08-26 RX ORDER — METFORMIN HCL 500 MG
500 TABLET, EXTENDED RELEASE 24 HR ORAL
Qty: 180 TABLET | Refills: 0 | Status: SHIPPED | OUTPATIENT
Start: 2024-08-26

## 2024-08-26 RX ORDER — AMLODIPINE AND VALSARTAN 5; 160 MG/1; MG/1
2 TABLET ORAL DAILY
Qty: 90 TABLET | Refills: 1 | Status: SHIPPED | OUTPATIENT
Start: 2024-08-26

## 2024-08-26 NOTE — PROGRESS NOTES
Attending Physician Note    I have seen and evaluated the patient. I discussed the findings, assessment and plan with the resident and agree with the resident's findings and plan as documented in the resident's note.  GC modifier added.    Brief summary:  HTN - increase amlodipine/losartan to 10/320 daily.  Watch for LE edema.  DM type 2, uncontrolled - rec cgm.  Add back metformin extended release.    Refer for cscope.  Hypertriglyceridemia - repeat lipid profile.

## 2024-08-27 ASSESSMENT — ENCOUNTER SYMPTOMS: SHORTNESS OF BREATH: 0

## 2024-09-03 ENCOUNTER — OFFICE VISIT (OUTPATIENT)
Dept: ENT CLINIC | Age: 54
End: 2024-09-03

## 2024-09-03 VITALS
HEART RATE: 91 BPM | SYSTOLIC BLOOD PRESSURE: 140 MMHG | TEMPERATURE: 97.4 F | HEIGHT: 64 IN | OXYGEN SATURATION: 98 % | BODY MASS INDEX: 37.42 KG/M2 | WEIGHT: 219.2 LBS | RESPIRATION RATE: 18 BRPM | DIASTOLIC BLOOD PRESSURE: 92 MMHG

## 2024-09-03 DIAGNOSIS — S02.2XXD CLOSED DISPLACED FRACTURE OF NASAL BONE WITH ROUTINE HEALING: Primary | ICD-10-CM

## 2024-09-03 PROCEDURE — 99024 POSTOP FOLLOW-UP VISIT: CPT | Performed by: NURSE PRACTITIONER

## 2024-09-03 NOTE — PROGRESS NOTES
[facility] PHYSIC[facility]ANS [place] SPECIAL[facility]  [facility] EAR, NOSE AND THROAT  [address]  [address]  [facility] [address]  Dept: [phone]  Dept Fax: [phone]  Loc: [phone]    HPI:     Aster Loco is a 54 y.o. female patient here for post op follow up.  S/p closed reduction nasal fracture with splint application 8/23/2024 with Dr Landon.  No issues or concerns.    History:     Allergies   Allergen Reactions    Penicillins Nausea Only and Swelling     Current Outpatient Medications   Medication Sig Dispense Refill    metFORMIN (GLUCOPHAGE-XR) 500 MG extended release tablet Take 1 tablet by mouth daily (with breakfast) Take 1 tablet daily for 5 days then 2 tablets daily for 5 days then 3 tablets for 5 days then 4 tablets for 5 days. (Patient taking differently: Take 2 tablets by mouth daily (with breakfast) Take 1 tablet daily for 5 days then 2 tablets daily for 5 days then 3 tablets for 5 days then 4 tablets for 5 days.) 180 tablet 0    amLODIPine-valsartan (EXFORGE) 5-160 MG per tablet Take 2 tablets by mouth daily 90 tablet 1    DULoxetine (CYMBALTA) 60 MG extended release capsule Take 1 capsule by mouth daily 90 capsule 3    empagliflozin (JARDIANCE) 25 MG tablet Take 1 tablet by mouth daily 90 tablet 3    Blood Glucose Monitoring Suppl (ONE TOUCH ULTRA 2) w/Device KIT 1 kit by Does not apply route daily 1 kit 0    Continuous Blood Gluc  (FREESTYLE JAKUB 2 READER) FERNANDO 1 each by Does not apply route 4 times daily 1 each 0    Continuous Blood Gluc Sensor (FREESTYLE JAKUB 14 DAY SENSOR) MISC 1 each by Does not apply route every 14 days 2 each 5    Blood Pressure KIT 1 kit by Does not apply route 2 times daily 1 kit 0    blood glucose test strips (ASCENSIA AUTODISC VI;ONE TOUCH ULTRA TEST VI) strip 1 each by In Vitro route daily As needed. 100 each 3    Lancets MISC 1 each by Does not apply route daily 100 each 3    Insulin Pen Needle 29G X 12.7MM MISC 1 each by Does not apply route daily

## 2024-12-30 DIAGNOSIS — E11.65 TYPE 2 DIABETES MELLITUS WITH HYPERGLYCEMIA, WITH LONG-TERM CURRENT USE OF INSULIN (HCC): ICD-10-CM

## 2024-12-30 DIAGNOSIS — I10 ESSENTIAL HYPERTENSION: ICD-10-CM

## 2024-12-30 DIAGNOSIS — Z79.4 TYPE 2 DIABETES MELLITUS WITH HYPERGLYCEMIA, WITH LONG-TERM CURRENT USE OF INSULIN (HCC): ICD-10-CM

## 2024-12-30 NOTE — TELEPHONE ENCOUNTER
Patient's last appointment was: 8/26/2024  with our   Patient's next appointment is: 1/6/2025  with our Dr. Ghosh  Last refilled on:   Which pharmacy does the script need sent to: [facility]       Lab Results   Component Value Date    LABA1C 9.5 08/26/2024     Lab Results   Component Value Date    CHOL 330 (H) 07/17/2023    TRIG 2,474 (H) 07/17/2023    HDL 19 07/17/2023     Lab Results   Component Value Date     08/22/2024    K 3.8 08/23/2024    CL 98 08/22/2024    CO2 25 08/22/2024    BUN 14 08/22/2024    CREATININE 0.8 08/22/2024    GLUCOSE 319 (H) 08/22/2024    CALCIUM 9.8 08/22/2024    BILITOT 0.3 08/22/2024    ALKPHOS 88 08/22/2024    AST 20 08/22/2024    ALT 26 08/22/2024    LABGLOM 88 08/22/2024     Lab Results   Component Value Date    TSH 3.610 07/17/2023    T4FREE 0.99 06/02/2022     Lab Results   Component Value Date    WBC 6.4 08/22/2024    HGB 13.7 08/22/2024    HCT 41.7 08/22/2024    MCV 90.1 08/22/2024     08/22/2024

## 2024-12-31 RX ORDER — AMLODIPINE AND VALSARTAN 5; 160 MG/1; MG/1
2 TABLET ORAL DAILY
Qty: 90 TABLET | Refills: 1 | Status: SHIPPED | OUTPATIENT
Start: 2024-12-31

## 2024-12-31 RX ORDER — METFORMIN HYDROCHLORIDE 500 MG/1
1000 TABLET, EXTENDED RELEASE ORAL
Qty: 90 TABLET | Refills: 1 | Status: SHIPPED | OUTPATIENT
Start: 2024-12-31

## 2025-01-05 NOTE — PROGRESS NOTES
insomnia  - New issue since worsening of depression with passing of sister  - Will start Trazodone 50mg for acute phase of insomnia likely due to grief reaction  Orders  - traZODone (DESYREL) 50 MG tablet; Take 1 tablet by mouth nightly  Dispense: 90 tablet; Refill: 1     Previously ordered CBC, Iron Saturation, Ferritin, Iron, Reticulocytes, Vit D, CMP, Lipid Panel, and Microalbumin/Creatinine Ratio not yet completed    Health Maintenance/Vaccinations  - Immunizations: Hep A x2, TDaP (10/11/2015)  Age appropriate for Shingrix x2, Influenza, PNA  - Colon Cancer Screening: Cologuard ordered  but not completed - was going to call for another kit  Needs colon cancer screen - patient interested in colonoscopy (previously referred to Dr. Corcoran at 24 visit)   - Lung Cancer Screening: No prior tobacco use  - Diabetes and Diabetic Retinopathy Screenin23 HgbA1C 9.9  Needs repeat HgbA1C, Retinal Exam, and Foot Exam (last done 22). Saw [facility]augh and Pica in , to return in December  - Lipid Screenin23 Lipid Panel with T Cholesterol 330, HDL 19, Triglycerides 2474, and LDL uncalculated (due to such high Triglycerides)               Needs repeat Lipid Panel - previoulsy ordered at 24 visit  - Hypothyroidism Screenin23 TSH 3.610  - Vitamin D Deficiency Screening: No Vit D in chart  - Cervical Cancer Screening: Per 22 note, pap smear had been done several years prior at [facility]. Likely repeat due              Needs pap smear  - STI Screening: HepC and HIV negative  - Breast Cancer Screenin23 Mammogram negative (but with stable nodular density in outer right breast with biopsy-associated clip), to repeat in 1 year               Needs mammogram  - Osteoporosis Screening: At age 67    Return in about 2 months (around 3/6/2025) for HTN.  Future Appointments   Date Time Provider Department Center   3/26/2025  1:20 PM Janey Ghosh DO [facility]  [facility] ECC DEP

## 2025-01-06 ENCOUNTER — OFFICE VISIT (OUTPATIENT)
Dept: FAMILY MEDICINE CLINIC | Age: 55
End: 2025-01-06

## 2025-01-06 VITALS
SYSTOLIC BLOOD PRESSURE: 158 MMHG | OXYGEN SATURATION: 98 % | RESPIRATION RATE: 12 BRPM | BODY MASS INDEX: 37.32 KG/M2 | TEMPERATURE: 98.3 F | HEART RATE: 99 BPM | HEIGHT: 64 IN | WEIGHT: 218.6 LBS | DIASTOLIC BLOOD PRESSURE: 100 MMHG

## 2025-01-06 DIAGNOSIS — Z79.4 TYPE 2 DIABETES MELLITUS WITH HYPERGLYCEMIA, WITH LONG-TERM CURRENT USE OF INSULIN (HCC): Primary | ICD-10-CM

## 2025-01-06 DIAGNOSIS — E78.1 HYPERTRIGLYCERIDEMIA: ICD-10-CM

## 2025-01-06 DIAGNOSIS — I10 ESSENTIAL HYPERTENSION: ICD-10-CM

## 2025-01-06 DIAGNOSIS — F43.21 GRIEF REACTION: ICD-10-CM

## 2025-01-06 DIAGNOSIS — E11.65 TYPE 2 DIABETES MELLITUS WITH HYPERGLYCEMIA, WITH LONG-TERM CURRENT USE OF INSULIN (HCC): Primary | ICD-10-CM

## 2025-01-06 DIAGNOSIS — F51.02 ADJUSTMENT INSOMNIA: ICD-10-CM

## 2025-01-06 LAB — HBA1C MFR BLD: 10.5 % (ref 4.3–5.7)

## 2025-01-06 RX ORDER — HYDROCHLOROTHIAZIDE 25 MG/1
25 TABLET ORAL EVERY MORNING
Qty: 90 TABLET | Refills: 1 | Status: SHIPPED | OUTPATIENT
Start: 2025-01-06

## 2025-01-06 RX ORDER — TRAZODONE HYDROCHLORIDE 50 MG/1
50 TABLET, FILM COATED ORAL NIGHTLY
Qty: 90 TABLET | Refills: 1 | Status: SHIPPED | OUTPATIENT
Start: 2025-01-06

## 2025-01-06 RX ORDER — BUSPIRONE HYDROCHLORIDE 7.5 MG/1
7.5 TABLET ORAL 3 TIMES DAILY
Qty: 90 TABLET | Refills: 0 | Status: SHIPPED | OUTPATIENT
Start: 2025-01-06 | End: 2025-02-05

## 2025-01-06 ASSESSMENT — PATIENT HEALTH QUESTIONNAIRE - PHQ9
SUM OF ALL RESPONSES TO PHQ QUESTIONS 1-9: 19
9. THOUGHTS THAT YOU WOULD BE BETTER OFF DEAD, OR OF HURTING YOURSELF: NOT AT ALL
3. TROUBLE FALLING OR STAYING ASLEEP: MORE THAN HALF THE DAYS
1. LITTLE INTEREST OR PLEASURE IN DOING THINGS: NEARLY EVERY DAY
7. TROUBLE CONCENTRATING ON THINGS, SUCH AS READING THE NEWSPAPER OR WATCHING TELEVISION: MORE THAN HALF THE DAYS
5. POOR APPETITE OR OVEREATING: NEARLY EVERY DAY
SUM OF ALL RESPONSES TO PHQ QUESTIONS 1-9: 19
8. MOVING OR SPEAKING SO SLOWLY THAT OTHER PEOPLE COULD HAVE NOTICED. OR THE OPPOSITE, BEING SO FIGETY OR RESTLESS THAT YOU HAVE BEEN MOVING AROUND A LOT MORE THAN USUAL: NEARLY EVERY DAY
6. FEELING BAD ABOUT YOURSELF - OR THAT YOU ARE A FAILURE OR HAVE LET YOURSELF OR YOUR FAMILY DOWN: NOT AT ALL
10. IF YOU CHECKED OFF ANY PROBLEMS, HOW DIFFICULT HAVE THESE PROBLEMS MADE IT FOR YOU TO DO YOUR WORK, TAKE CARE OF THINGS AT HOME, OR GET ALONG WITH OTHER PEOPLE: NOT DIFFICULT AT ALL
SUM OF ALL RESPONSES TO PHQ9 QUESTIONS 1 & 2: 6
2. FEELING DOWN, DEPRESSED OR HOPELESS: NEARLY EVERY DAY
4. FEELING TIRED OR HAVING LITTLE ENERGY: NEARLY EVERY DAY
SUM OF ALL RESPONSES TO PHQ QUESTIONS 1-9: 19
SUM OF ALL RESPONSES TO PHQ QUESTIONS 1-9: 19

## 2025-01-06 NOTE — PATIENT INSTRUCTIONS
Consider attending a [employer]rief[employer] Group. You can find local groups on their website: [email or web address].  Below is a screenshot of several local groups which will be starting soon.

## 2025-01-06 NOTE — PROGRESS NOTES
reaction and adjustment insomnia. Will plan to have psychology reach out to patient to assist and consider grief share group. Taking cymbalta 60mg daily, will add buspar as needed, and trazodone for sleep. This will hopefully be short term and plan to get back to baseline cymbalta only.     Plan to get labs done and follow up as scheduled.     Health Maintenance Due   Topic Date Due    Pneumococcal 0-64 years Vaccine (1 of 2 - PCV) Never done    Diabetic Alb to Cr ratio (uACR) test  Never done    Hepatitis B vaccine (1 of 3 - 19+ 3-dose series) Never done    Cervical cancer screen  Never done    Colorectal Cancer Screen  Never done    Shingles vaccine (1 of 2) Never done    Diabetic foot exam  12/21/2023    Lipids  07/17/2024    Flu vaccine (1) Never done    COVID-19 Vaccine (1 - 2023-24 season) Never done       Attending Physician Statement  I have discussed the case, including pertinent history and exam findings with the resident. I also have seen the patient and performed key portions of the examination.  I agree with the documented assessment and plan as documented by the resident.    SUSIE Ventura Jr., DO 1/9/2025 2:23 PM

## 2025-01-08 ASSESSMENT — ENCOUNTER SYMPTOMS
ABDOMINAL PAIN: 0
SHORTNESS OF BREATH: 0

## 2025-01-09 ENCOUNTER — TELEPHONE (OUTPATIENT)
Dept: FAMILY MEDICINE CLINIC | Age: 55
End: 2025-01-09

## 2025-01-09 NOTE — TELEPHONE ENCOUNTER
Patient referred by Dr. Ghosh.  Attempted to reach patient by phone to offer appt but no answer.  Left HIPAA-compliant message.

## 2025-01-14 NOTE — PROGRESS NOTES
Health Maintenance Due   Topic Date Due    COVID-19 Vaccine (1) Never done    Pneumococcal 0-64 years Vaccine (1 - PCV) Never done    Lipids  Never done    HIV screen  Never done    Diabetic Alb to Cr ratio (uACR) test  Never done    Diabetic retinal exam  Never done    Hepatitis B vaccine (1 of 3 - Risk 3-dose series) Never done    Cervical cancer screen  Never done    Colorectal Cancer Screen  Never done    Shingles vaccine (1 of 2) Never done    Flu vaccine (1) Never done    A1C test (Diabetic or Prediabetic)  08/30/2022 14-Jan-2025 06:51

## 2025-02-25 DIAGNOSIS — F43.21 GRIEF REACTION: ICD-10-CM

## 2025-02-25 RX ORDER — BUSPIRONE HYDROCHLORIDE 7.5 MG/1
7.5 TABLET ORAL 3 TIMES DAILY
Qty: 90 TABLET | Refills: 0 | Status: SHIPPED | OUTPATIENT
Start: 2025-02-25 | End: 2025-03-27

## 2025-02-25 NOTE — TELEPHONE ENCOUNTER
Patient's last appointment was: 1/6/2025  with our   Patient's next appointment is: 3/26/2025  with our Dr. Ghosh  Last refilled on: 1/6/2025  Which pharmacy does the script need sent to: [facility]      Lab Results   Component Value Date    LABA1C 10.5 (H) 01/06/2025     Lab Results   Component Value Date    CHOL 330 (H) 07/17/2023    TRIG 2,474 (H) 07/17/2023    HDL 19 07/17/2023     Lab Results   Component Value Date     08/22/2024    K 3.8 08/23/2024    CL 98 08/22/2024    CO2 25 08/22/2024    BUN 14 08/22/2024    CREATININE 0.8 08/22/2024    GLUCOSE 319 (H) 08/22/2024    CALCIUM 9.8 08/22/2024    BILITOT 0.3 08/22/2024    ALKPHOS 88 08/22/2024    AST 20 08/22/2024    ALT 26 08/22/2024    LABGLOM 88 08/22/2024     Lab Results   Component Value Date    TSH 3.610 07/17/2023    T4FREE 0.99 06/02/2022     Lab Results   Component Value Date    WBC 6.4 08/22/2024    HGB 13.7 08/22/2024    HCT 41.7 08/22/2024    MCV 90.1 08/22/2024     08/22/2024

## 2025-03-23 ENCOUNTER — HOSPITAL ENCOUNTER (EMERGENCY)
Age: 55
Discharge: HOME | End: 2025-03-23
Attending: EMERGENCY MEDICINE

## 2025-03-23 VITALS
HEART RATE: 118 BPM | SYSTOLIC BLOOD PRESSURE: 120 MMHG | TEMPERATURE: 97.5 F | OXYGEN SATURATION: 93 % | DIASTOLIC BLOOD PRESSURE: 85 MMHG | RESPIRATION RATE: 19 BRPM

## 2025-03-23 DIAGNOSIS — R10.84 GENERALIZED ABDOMINAL PAIN: Primary | ICD-10-CM

## 2025-03-23 DIAGNOSIS — N30.00 ACUTE CYSTITIS WITHOUT HEMATURIA: ICD-10-CM

## 2025-03-23 DIAGNOSIS — R19.7 DIARRHEA, UNSPECIFIED TYPE: ICD-10-CM

## 2025-03-23 LAB
ALBUMIN SERPL BCG-MCNC: 4 G/DL (ref 3.4–4.9)
ALP SERPL-CCNC: 98 U/L (ref 35–104)
ALT SERPL W/O P-5'-P-CCNC: 13 U/L (ref 10–35)
ANION GAP SERPL CALC-SCNC: 17 MEQ/L (ref 8–16)
AST SERPL-CCNC: 12 U/L (ref 10–35)
BACTERIA URNS QL MICRO: ABNORMAL /HPF
BASOPHILS ABSOLUTE: 0 THOU/MM3 (ref 0–0.1)
BASOPHILS NFR BLD AUTO: 0.4 %
BILIRUB SERPL-MCNC: 0.5 MG/DL (ref 0.3–1.2)
BILIRUB UR QL STRIP.AUTO: NEGATIVE
BUN SERPL-MCNC: 19 MG/DL (ref 8–23)
CALCIUM SERPL-MCNC: 9.3 MG/DL (ref 8.6–10)
CASTS #/AREA URNS LPF: ABNORMAL /LPF
CASTS 2: ABNORMAL /LPF
CHARACTER UR: ABNORMAL
CHLORIDE SERPL-SCNC: 100 MEQ/L (ref 98–111)
CO2 SERPL-SCNC: 20 MEQ/L (ref 22–29)
COLOR, UA: YELLOW
CREAT SERPL-MCNC: 0.8 MG/DL (ref 0.5–0.9)
CRYSTALS URNS MICRO: ABNORMAL
DEPRECATED RDW RBC AUTO: 41.2 FL (ref 35–45)
EOSINOPHIL NFR BLD AUTO: 2.6 %
EOSINOPHILS ABSOLUTE: 0.3 THOU/MM3 (ref 0–0.4)
EPITHELIAL CELLS, UA: ABNORMAL /HPF
ERYTHROCYTE [DISTWIDTH] IN BLOOD BY AUTOMATED COUNT: 12.7 % (ref 11.5–14.5)
GFR SERPL CREATININE-BSD FRML MDRD: 88 ML/MIN/1.73M2
GLUCOSE SERPL-MCNC: 358 MG/DL (ref 74–109)
GLUCOSE UR QL STRIP.AUTO: >= 1000 MG/DL
HCT VFR BLD AUTO: 47.5 % (ref 37–47)
HGB BLD-MCNC: 15.8 GM/DL (ref 12–16)
HGB UR QL STRIP.AUTO: NEGATIVE
IMM GRANULOCYTES # BLD AUTO: 0.05 THOU/MM3 (ref 0–0.07)
IMM GRANULOCYTES NFR BLD AUTO: 0.4 %
KETONES UR QL STRIP.AUTO: 15
LACTATE SERPL-SCNC: 2.1 MMOL/L (ref 0.5–2)
LIPASE SERPL-CCNC: 10 U/L (ref 13–60)
LYMPHOCYTES ABSOLUTE: 2.7 THOU/MM3 (ref 1–4.8)
LYMPHOCYTES NFR BLD AUTO: 22.8 %
MCH RBC QN AUTO: 29.5 PG (ref 26–33)
MCHC RBC AUTO-ENTMCNC: 33.3 GM/DL (ref 32.2–35.5)
MCV RBC AUTO: 88.8 FL (ref 81–99)
MISCELLANEOUS 2: ABNORMAL
MONOCYTES ABSOLUTE: 0.7 THOU/MM3 (ref 0.4–1.3)
MONOCYTES NFR BLD AUTO: 6.4 %
NEUTROPHILS ABSOLUTE: 7.9 THOU/MM3 (ref 1.8–7.7)
NEUTROPHILS NFR BLD AUTO: 67.4 %
NITRITE UR QL STRIP: NEGATIVE
NRBC BLD AUTO-RTO: 0 /100 WBC
OSMOLALITY SERPL CALC.SUM OF ELEC: 290.5 MOSMOL/KG (ref 275–300)
PH UR STRIP.AUTO: 5.5 [PH] (ref 5–9)
PLATELET # BLD AUTO: 271 THOU/MM3 (ref 130–400)
PMV BLD AUTO: 10.1 FL (ref 9.4–12.4)
POTASSIUM SERPL-SCNC: 3.5 MEQ/L (ref 3.5–5.2)
PROT SERPL-MCNC: 7.5 G/DL (ref 6.4–8.3)
PROT UR STRIP.AUTO-MCNC: NEGATIVE MG/DL
RBC # BLD AUTO: 5.35 MILL/MM3 (ref 4.2–5.4)
RBC URINE: ABNORMAL /HPF
RENAL EPI CELLS #/AREA URNS HPF: ABNORMAL /[HPF]
SODIUM SERPL-SCNC: 137 MEQ/L (ref 135–145)
SP GR UR REFRACT.AUTO: > 1.03 (ref 1–1.03)
UROBILINOGEN, URINE: 0.2 EU/DL (ref 0–1)
WBC # BLD AUTO: 11.7 THOU/MM3 (ref 4.8–10.8)
WBC #/AREA URNS HPF: ABNORMAL /HPF
WBC #/AREA URNS HPF: ABNORMAL /[HPF]
YEAST LIKE FUNGI URNS QL MICRO: ABNORMAL

## 2025-03-23 PROCEDURE — 83605 ASSAY OF LACTIC ACID: CPT

## 2025-03-23 PROCEDURE — 87086 URINE CULTURE/COLONY COUNT: CPT

## 2025-03-23 PROCEDURE — 96374 THER/PROPH/DIAG INJ IV PUSH: CPT

## 2025-03-23 PROCEDURE — 80053 COMPREHEN METABOLIC PANEL: CPT

## 2025-03-23 PROCEDURE — 96361 HYDRATE IV INFUSION ADD-ON: CPT

## 2025-03-23 PROCEDURE — 83690 ASSAY OF LIPASE: CPT

## 2025-03-23 PROCEDURE — 2580000003 HC RX 258: Performed by: EMERGENCY MEDICINE

## 2025-03-23 PROCEDURE — 87077 CULTURE AEROBIC IDENTIFY: CPT

## 2025-03-23 PROCEDURE — 99284 EMERGENCY DEPT VISIT MOD MDM: CPT

## 2025-03-23 PROCEDURE — 85025 COMPLETE CBC W/AUTO DIFF WBC: CPT

## 2025-03-23 PROCEDURE — 81001 URINALYSIS AUTO W/SCOPE: CPT

## 2025-03-23 PROCEDURE — 6360000002 HC RX W HCPCS: Performed by: EMERGENCY MEDICINE

## 2025-03-23 PROCEDURE — 36415 COLL VENOUS BLD VENIPUNCTURE: CPT

## 2025-03-23 PROCEDURE — 6370000000 HC RX 637 (ALT 250 FOR IP): Performed by: EMERGENCY MEDICINE

## 2025-03-23 PROCEDURE — 96375 TX/PRO/DX INJ NEW DRUG ADDON: CPT

## 2025-03-23 RX ORDER — DICYCLOMINE HCL 20 MG
20 TABLET ORAL 4 TIMES DAILY
Qty: 28 TABLET | Refills: 0 | Status: SHIPPED | OUTPATIENT
Start: 2025-03-23 | End: 2025-03-30

## 2025-03-23 RX ORDER — 0.9 % SODIUM CHLORIDE 0.9 %
1000 INTRAVENOUS SOLUTION INTRAVENOUS ONCE
Status: COMPLETED | OUTPATIENT
Start: 2025-03-23 | End: 2025-03-23

## 2025-03-23 RX ORDER — ONDANSETRON 2 MG/ML
4 INJECTION INTRAMUSCULAR; INTRAVENOUS ONCE
Status: COMPLETED | OUTPATIENT
Start: 2025-03-23 | End: 2025-03-23

## 2025-03-23 RX ORDER — DICYCLOMINE HYDROCHLORIDE 10 MG/1
20 CAPSULE ORAL ONCE
Status: COMPLETED | OUTPATIENT
Start: 2025-03-23 | End: 2025-03-23

## 2025-03-23 RX ORDER — KETOROLAC TROMETHAMINE 30 MG/ML
30 INJECTION, SOLUTION INTRAMUSCULAR; INTRAVENOUS ONCE
Status: COMPLETED | OUTPATIENT
Start: 2025-03-23 | End: 2025-03-23

## 2025-03-23 RX ORDER — ONDANSETRON 4 MG/1
4 TABLET, ORALLY DISINTEGRATING ORAL 3 TIMES DAILY PRN
Qty: 21 TABLET | Refills: 0 | Status: SHIPPED | OUTPATIENT
Start: 2025-03-23

## 2025-03-23 RX ORDER — CEPHALEXIN 500 MG/1
500 CAPSULE ORAL 3 TIMES DAILY
Qty: 21 CAPSULE | Refills: 0 | Status: SHIPPED | OUTPATIENT
Start: 2025-03-23 | End: 2025-03-30

## 2025-03-23 RX ADMIN — CEPHALEXIN 500 MG: 250 CAPSULE ORAL at 20:43

## 2025-03-23 RX ADMIN — SODIUM CHLORIDE 1000 ML: 0.9 INJECTION, SOLUTION INTRAVENOUS at 19:02

## 2025-03-23 RX ADMIN — ONDANSETRON 4 MG: 2 INJECTION, SOLUTION INTRAMUSCULAR; INTRAVENOUS at 18:57

## 2025-03-23 RX ADMIN — DICYCLOMINE HYDROCHLORIDE 20 MG: 10 CAPSULE ORAL at 18:57

## 2025-03-23 RX ADMIN — KETOROLAC TROMETHAMINE 30 MG: 30 INJECTION, SOLUTION INTRAMUSCULAR at 18:57

## 2025-03-23 NOTE — ED PROVIDER NOTES
[facility] EMERGENCY DEPARTMENT  EMERGENCY DEPARTMENT ENCOUNTER      Pt Name: Aster Loco  MRN: [id number]  Birthdate 1970  Date of evaluation: 3/23/2025  Provider: Gary Osman DO  8:37 PM    CHIEF COMPLAINT       Chief Complaint   Patient presents with    Abdominal Pain         HISTORY OF PRESENT ILLNESS    Aster Loco is a 55 y.o. female who presents to the emergency department with a chief complaint of abdominal pain, nausea and diarrhea for the last 4 days.  She cannot identify inciting factor, there is no aggravating or alleviating factors.  Time is been constant.  Patient states that she was having more bowel movements at the beginning of her symptoms, and states that she has had 2 today but it is liquid.  Denies any fever, admits to nausea and vomiting.  No dysuria    HPI    Nursing Notes were reviewed.    REVIEW OF SYSTEMS       Review of Systems   Constitutional:  Negative for chills and fever.   HENT:  Negative for congestion and rhinorrhea.    Eyes:  Negative for visual disturbance.   Respiratory:  Negative for cough and shortness of breath.    Cardiovascular:  Negative for chest pain.   Gastrointestinal:  Positive for abdominal pain, diarrhea, nausea and vomiting.   Endocrine: Negative for polydipsia and polyuria.   Genitourinary:  Negative for dysuria.   Musculoskeletal:  Negative for arthralgias and myalgias.   Skin:  Negative for rash and wound.   Neurological:  Negative for dizziness and light-headedness.   Psychiatric/Behavioral:  Negative for suicidal ideas.        Except as noted above the remainder of the review of systems was reviewed and negative.       PAST MEDICAL HISTORY     Past Medical History:   Diagnosis Date    COVID-19 11/2021    Diabetes mellitus (HCC)     Hypertension          SURGICAL HISTORY       Past Surgical History:   Procedure Laterality Date    CHOLECYSTECTOMY      ENDOMETRIAL ABLATION      NASAL FRACTURE SURGERY N/A 8/23/2024    Closed Reduction of Nasal

## 2025-03-23 NOTE — ED NOTES
Patient resting in bed. Respirations easy and unlabored. No distress noted. Call light within reach.

## 2025-03-25 LAB
BACTERIA UR CULT: ABNORMAL
ORGANISM: ABNORMAL

## 2025-03-26 NOTE — PROGRESS NOTES
Pharmacy Note  ED Culture Follow-up    Aster Loco is a 55 y.o. female.     Allergies: Penicillins     Current antimicrobials:   Reviewed patient's urine culture - culture positive for Group B Strep.  Patient was discharged on cephalexin, and culture is sensitive to prescribed medication.  Antibiotic prescribed at discharge is appropriate - no changes made to antibiotic regimen.      Please call with any questions. Ext. [phone]    Trish Curtis RPH, PharmD 2:42 PM 3/26/2025

## 2025-03-27 ENCOUNTER — HOSPITAL ENCOUNTER (OUTPATIENT)
Age: 55
Discharge: HOME | End: 2025-03-27
Payer: COMMERCIAL

## 2025-03-27 LAB
ANION GAP SERPL CALC-SCNC: 14 MEQ/L (ref 8–16)
BASOPHILS ABSOLUTE: 0.1 THOU/MM3 (ref 0–0.1)
BASOPHILS NFR BLD AUTO: 0.8 %
BUN SERPL-MCNC: 8 MG/DL (ref 8–23)
CALCIUM SERPL-MCNC: 9.2 MG/DL (ref 8.6–10)
CHLORIDE SERPL-SCNC: 101 MEQ/L (ref 98–111)
CO2 SERPL-SCNC: 23 MEQ/L (ref 22–29)
CREAT SERPL-MCNC: 1 MG/DL (ref 0.5–0.9)
DEPRECATED RDW RBC AUTO: 40.1 FL (ref 35–45)
EKG ATRIAL RATE: 86 BPM
EKG P AXIS: 58 DEGREES
EKG P-R INTERVAL: 142 MS
EKG Q-T INTERVAL: 374 MS
EKG QRS DURATION: 82 MS
EKG QTC CALCULATION (BAZETT): 447 MS
EKG R AXIS: -18 DEGREES
EKG T AXIS: 43 DEGREES
EKG VENTRICULAR RATE: 86 BPM
EOSINOPHIL NFR BLD AUTO: 3.2 %
EOSINOPHILS ABSOLUTE: 0.2 THOU/MM3 (ref 0–0.4)
ERYTHROCYTE [DISTWIDTH] IN BLOOD BY AUTOMATED COUNT: 12.3 % (ref 11.5–14.5)
GFR SERPL CREATININE-BSD FRML MDRD: 67 ML/MIN/1.73M2
GLUCOSE SERPL-MCNC: 382 MG/DL (ref 74–109)
HCT VFR BLD AUTO: 40.2 % (ref 37–47)
HGB BLD-MCNC: 13.4 GM/DL (ref 12–16)
IMM GRANULOCYTES # BLD AUTO: 0.04 THOU/MM3 (ref 0–0.07)
IMM GRANULOCYTES NFR BLD AUTO: 0.5 %
LYMPHOCYTES ABSOLUTE: 2 THOU/MM3 (ref 1–4.8)
LYMPHOCYTES NFR BLD AUTO: 25.4 %
MCH RBC QN AUTO: 29.7 PG (ref 26–33)
MCHC RBC AUTO-ENTMCNC: 33.3 GM/DL (ref 32.2–35.5)
MCV RBC AUTO: 89.1 FL (ref 81–99)
MONOCYTES ABSOLUTE: 0.4 THOU/MM3 (ref 0.4–1.3)
MONOCYTES NFR BLD AUTO: 4.7 %
NEUTROPHILS ABSOLUTE: 5.1 THOU/MM3 (ref 1.8–7.7)
NEUTROPHILS NFR BLD AUTO: 65.4 %
NRBC BLD AUTO-RTO: 0 /100 WBC
PLATELET # BLD AUTO: 264 THOU/MM3 (ref 130–400)
PMV BLD AUTO: 10.4 FL (ref 9.4–12.4)
POTASSIUM SERPL-SCNC: 3.8 MEQ/L (ref 3.5–5.2)
RBC # BLD AUTO: 4.51 MILL/MM3 (ref 4.2–5.4)
SODIUM SERPL-SCNC: 138 MEQ/L (ref 135–145)
WBC # BLD AUTO: 7.8 THOU/MM3 (ref 4.8–10.8)

## 2025-03-27 PROCEDURE — 85025 COMPLETE CBC W/AUTO DIFF WBC: CPT

## 2025-03-27 PROCEDURE — 93005 ELECTROCARDIOGRAM TRACING: CPT | Performed by: PODIATRIST

## 2025-03-27 PROCEDURE — 36415 COLL VENOUS BLD VENIPUNCTURE: CPT

## 2025-03-27 PROCEDURE — 80048 BASIC METABOLIC PNL TOTAL CA: CPT

## 2025-07-30 NOTE — PROGRESS NOTES
[facility]ESSION[facility] PR[facility]  [address]  [facility] [address]  Dept: [phone]  Dept Fax: [phone]  Loc: [phone]  Resident Note    Patient:Aster Loco Sex: female  YOB: 1970 Age:55 y.o.  MRN: [id number]  Assessment & Plan   1. Type 2 diabetes mellitus with hyperglycemia, with long-term current use of insulin ([facility])  - Chronic, uncontrolled. POC HgbA1C 12.4 in office, increased from 10.5 in January. Largely due to noncompliance with meds (missed her prior follow-up and has been out of her Insulin and Metformin). Patient currently does not have insurance as she was let go from her job, thus significant financial constraints. Discussed with pharmacist in office who spoke with [facility] - they have Metformin and Basaglar  - Continue Metformin (sent as 500mg x2 BID to [facility]) and Basaglar 50U nightly. To hold Empagliflozin 2 weeks while working on urinary incontinence   - Will update labs ordered previously as they  soon and encouraged patient to complete them - Albumin/Creatinine Ratio, Vit D, CMP, and Lipid Panel  - Patient previously tried Januvia and Trulicity but had GI upset with them  Orders  - POCT glycosylated hemoglobin (Hb A1C)  - empagliflozin (JARDIANCE) 25 MG tablet; Take 1 tablet by mouth daily  Dispense: 90 tablet; Refill: 3  - insulin glargine (BASAGLAR KWIKPEN) 100 UNIT/ML injection pen; Inject 60 Units into the skin nightly  Dispense: 15 mL; Refill: 3  - Insulin Pen Needle 29G X 12.7MM MISC; 1 each by Does not apply route daily  Dispense: 100 each; Refill: 3  - Albumin/Creatinine Ratio, Urine; Future  - Vitamin D 25 Hydroxy; Future  - Comprehensive Metabolic Panel; Future  - Lipid Panel; Future    2. Essential hypertension  - Chronic, poorly controlled. /110 in office. Has not been taking HCTZ started at last visit in January. Discussed with pharmacist in office who [facility]

## 2025-07-31 ENCOUNTER — OFFICE VISIT (OUTPATIENT)
Dept: FAMILY MEDICINE CLINIC | Age: 55
End: 2025-07-31

## 2025-07-31 ENCOUNTER — TELEPHONE (OUTPATIENT)
Dept: FAMILY MEDICINE CLINIC | Age: 55
End: 2025-07-31

## 2025-07-31 VITALS
HEART RATE: 74 BPM | DIASTOLIC BLOOD PRESSURE: 110 MMHG | OXYGEN SATURATION: 97 % | SYSTOLIC BLOOD PRESSURE: 154 MMHG | BODY MASS INDEX: 36.26 KG/M2 | WEIGHT: 212.4 LBS | RESPIRATION RATE: 18 BRPM | HEIGHT: 64 IN

## 2025-07-31 DIAGNOSIS — E11.65 TYPE 2 DIABETES MELLITUS WITH HYPERGLYCEMIA, WITH LONG-TERM CURRENT USE OF INSULIN (HCC): Primary | ICD-10-CM

## 2025-07-31 DIAGNOSIS — N39.41 URGE INCONTINENCE: ICD-10-CM

## 2025-07-31 DIAGNOSIS — F41.9 ANXIETY: ICD-10-CM

## 2025-07-31 DIAGNOSIS — E78.1 HYPERTRIGLYCERIDEMIA: ICD-10-CM

## 2025-07-31 DIAGNOSIS — I10 ESSENTIAL HYPERTENSION: ICD-10-CM

## 2025-07-31 DIAGNOSIS — N39.3 STRESS INCONTINENCE: ICD-10-CM

## 2025-07-31 DIAGNOSIS — Z79.4 TYPE 2 DIABETES MELLITUS WITH HYPERGLYCEMIA, WITH LONG-TERM CURRENT USE OF INSULIN (HCC): Primary | ICD-10-CM

## 2025-07-31 DIAGNOSIS — Z86.39 HISTORY OF IRON DEFICIENCY: ICD-10-CM

## 2025-07-31 DIAGNOSIS — I10 ESSENTIAL HYPERTENSION: Primary | ICD-10-CM

## 2025-07-31 DIAGNOSIS — E11.65 TYPE 2 DIABETES MELLITUS WITH HYPERGLYCEMIA, WITH LONG-TERM CURRENT USE OF INSULIN (HCC): ICD-10-CM

## 2025-07-31 DIAGNOSIS — I51.89 DIASTOLIC DYSFUNCTION WITHOUT HEART FAILURE: ICD-10-CM

## 2025-07-31 DIAGNOSIS — Z79.4 TYPE 2 DIABETES MELLITUS WITH HYPERGLYCEMIA, WITH LONG-TERM CURRENT USE OF INSULIN (HCC): ICD-10-CM

## 2025-07-31 LAB
BACTERIA URNS QL MICRO: ABNORMAL /HPF
BILIRUB UR QL STRIP.AUTO: NEGATIVE
BILIRUBIN, URINE: NEGATIVE
BLOOD URINE, POC: ABNORMAL
CASTS #/AREA URNS LPF: ABNORMAL /LPF
CASTS 2: ABNORMAL /LPF
CHARACTER UR: ABNORMAL
CHARACTER, URINE: CLEAR
COLOR, UA: YELLOW
COLOR, UA: YELLOW
CRYSTALS URNS MICRO: ABNORMAL
EPITHELIAL CELLS, UA: ABNORMAL /HPF
GLUCOSE UR QL STRIP.AUTO: >= 1000 MG/DL
GLUCOSE URINE: >= 1000 MG/DL
HBA1C MFR BLD: 12.4 %
HGB UR QL STRIP.AUTO: ABNORMAL
KETONES UR QL STRIP.AUTO: ABNORMAL
KETONES, URINE: ABNORMAL
LEUKOCYTE CLUMPS, URINE: ABNORMAL
MISCELLANEOUS 2: ABNORMAL
NITRITE UR QL STRIP: NEGATIVE
NITRITE, URINE: NEGATIVE
PH UR STRIP.AUTO: 5 [PH] (ref 5–9)
PH, URINE: 5.5 (ref 5–9)
PROT UR STRIP.AUTO-MCNC: 30 MG/DL
PROTEIN, URINE: 100 MG/DL
RBC URINE: ABNORMAL /HPF
RENAL EPI CELLS #/AREA URNS HPF: ABNORMAL /[HPF]
SP GR UR REFRACT.AUTO: > 1.03 (ref 1–1.03)
SPECIFIC GRAVITY UA: >= 1.03 (ref 1–1.03)
UROBILINOGEN, URINE: 0.2 EU/DL (ref 0–1)
UROBILINOGEN, URINE: 1 EU/DL (ref 0–1)
WBC #/AREA URNS HPF: ABNORMAL /HPF
WBC #/AREA URNS HPF: ABNORMAL /[HPF]
YEAST LIKE FUNGI URNS QL MICRO: ABNORMAL

## 2025-07-31 PROCEDURE — 83036 HEMOGLOBIN GLYCOSYLATED A1C: CPT

## 2025-07-31 PROCEDURE — 3080F DIAST BP >= 90 MM HG: CPT

## 2025-07-31 PROCEDURE — 3077F SYST BP >= 140 MM HG: CPT

## 2025-07-31 PROCEDURE — 81003 URINALYSIS AUTO W/O SCOPE: CPT

## 2025-07-31 PROCEDURE — 3046F HEMOGLOBIN A1C LEVEL >9.0%: CPT

## 2025-07-31 PROCEDURE — 99214 OFFICE O/P EST MOD 30 MIN: CPT

## 2025-07-31 RX ORDER — OXYBUTYNIN CHLORIDE 5 MG/1
5 TABLET, EXTENDED RELEASE ORAL DAILY
Qty: 90 TABLET | Refills: 1 | Status: SHIPPED | OUTPATIENT
Start: 2025-07-31

## 2025-07-31 RX ORDER — CHLORTHALIDONE 25 MG/1
25 TABLET ORAL DAILY
Qty: 30 TABLET | Refills: 5 | Status: SHIPPED | OUTPATIENT
Start: 2025-07-31

## 2025-07-31 RX ORDER — DULOXETIN HYDROCHLORIDE 60 MG/1
60 CAPSULE, DELAYED RELEASE ORAL DAILY
Qty: 90 CAPSULE | Refills: 3 | Status: SHIPPED | OUTPATIENT
Start: 2025-07-31

## 2025-07-31 RX ORDER — HYDROCHLOROTHIAZIDE 25 MG/1
25 TABLET ORAL EVERY MORNING
Qty: 90 TABLET | Refills: 3 | Status: CANCELLED | OUTPATIENT
Start: 2025-07-31

## 2025-07-31 RX ORDER — METFORMIN HYDROCHLORIDE 500 MG/1
1000 TABLET, EXTENDED RELEASE ORAL 2 TIMES DAILY
Qty: 360 TABLET | Refills: 3 | Status: CANCELLED | OUTPATIENT
Start: 2025-07-31

## 2025-07-31 RX ORDER — INSULIN GLARGINE 100 [IU]/ML
60 INJECTION, SOLUTION SUBCUTANEOUS NIGHTLY
Qty: 15 ML | Refills: 3 | Status: SHIPPED | OUTPATIENT
Start: 2025-07-31 | End: 2025-11-08

## 2025-07-31 RX ORDER — AMLODIPINE AND VALSARTAN 5; 160 MG/1; MG/1
2 TABLET ORAL DAILY
Qty: 180 TABLET | Refills: 3 | Status: SHIPPED | OUTPATIENT
Start: 2025-07-31 | End: 2025-08-01

## 2025-07-31 SDOH — ECONOMIC STABILITY: FOOD INSECURITY: WITHIN THE PAST 12 MONTHS, THE FOOD YOU BOUGHT JUST DIDN'T LAST AND YOU DIDN'T HAVE MONEY TO GET MORE.: NEVER TRUE

## 2025-07-31 SDOH — ECONOMIC STABILITY: FOOD INSECURITY: WITHIN THE PAST 12 MONTHS, YOU WORRIED THAT YOUR FOOD WOULD RUN OUT BEFORE YOU GOT MONEY TO BUY MORE.: NEVER TRUE

## 2025-07-31 NOTE — PATIENT INSTRUCTIONS
Start Oxybutynin at nighttime for bladder symptoms.    Resume Lantus 50U nightly, Metformin 8500mg twice daily. Sent to [facility]. Restart Jardiance 25mg daily in 2 weeks. This will help the bladder as well.    [facility] has Chlorthalidone, so we will start that instead of the HCTZ. Also take the ExForge

## 2025-07-31 NOTE — PROGRESS NOTES
S: 55 y.o. female with   Chief Complaint   Patient presents with    Medication Refill    Other     Patient states she is having issues with bladder- stating that it goes when it wants to go and she can't control it, mostly in the evenings.     Please see resident note for complete details.   Bladder issues -- worse at night and cannot make it to the bathroom in time. Urge during day, manageable. Leakage when cough/sneezing.  Grief related non-compliance last time addressed. Doing better on cymbalta (using [employer] OP pharm)  HTN -- out of meds. Not controlled here.   DM ii uncontrolled -- not taking medications for this, including her insulin.  Trulicity associated with belly pain.   Labs not done    BP Readings from Last 3 Encounters:   07/31/25 (!) 154/110   03/23/25 120/85   01/06/25 (!) 158/100     Wt Readings from Last 3 Encounters:   07/31/25 96.3 kg (212 lb 6.4 oz)   01/06/25 99.2 kg (218 lb 9.6 oz)   09/03/24 99.4 kg (219 lb 3.2 oz)     Lab Results   Component Value Date    LABA1C 12.4 07/31/2025    LABA1C 10.5 (H) 01/06/2025    LABA1C 9.5 08/26/2024       O: VS:   Vitals:    07/31/25 1110   BP: (!) 154/110   BP Site: Right Upper Arm   Patient Position: Sitting   BP Cuff Size: Large Adult   Pulse: 74   Resp: 18   SpO2: 97%   Weight: 96.3 kg (212 lb 6.4 oz)   Height: 1.626 m (5' 4.02\")     Body mass index is 36.44 kg/m².    AAO/NAD, appropriate affect for mood  Normocephalic, atraumatic, eyes - conjunctiva and sclera normal,   skin no rashes on exposed areas   Insight, judgement normal and in no acute distress      Lab Results   Component Value Date    WBC 7.8 03/27/2025    HGB 13.4 03/27/2025    HCT 40.2 03/27/2025     03/27/2025    CHOL 330 (H) 07/17/2023    TRIG 2,474 (H) 07/17/2023    HDL 19 07/17/2023    LDL SEE BELOW 07/17/2023    AST 12 03/23/2025     03/27/2025    K 3.8 03/27/2025     03/27/2025    CREATININE 1.0 (H) 03/27/2025    BUN 8 03/27/2025    CO2 23 03/27/2025    TSH 3.610

## 2025-08-01 PROBLEM — Z86.39 HISTORY OF IRON DEFICIENCY: Status: ACTIVE | Noted: 2025-08-01

## 2025-08-01 PROBLEM — N39.3 STRESS INCONTINENCE: Status: ACTIVE | Noted: 2025-08-01

## 2025-08-01 PROBLEM — E78.1 HYPERTRIGLYCERIDEMIA: Status: ACTIVE | Noted: 2025-08-01

## 2025-08-01 PROBLEM — N39.41 URGE INCONTINENCE: Status: ACTIVE | Noted: 2025-08-01

## 2025-08-01 PROBLEM — F41.9 ANXIETY: Status: ACTIVE | Noted: 2025-08-01

## 2025-08-01 RX ORDER — VALSARTAN 160 MG/1
160 TABLET ORAL DAILY
Qty: 30 TABLET | Refills: 5 | Status: SHIPPED | OUTPATIENT
Start: 2025-08-01

## 2025-08-01 RX ORDER — AMLODIPINE BESYLATE 5 MG/1
5 TABLET ORAL DAILY
Qty: 30 TABLET | Refills: 5 | Status: SHIPPED | OUTPATIENT
Start: 2025-08-01

## 2025-08-02 LAB
BACTERIA UR CULT: ABNORMAL
ORGANISM: ABNORMAL

## (undated) DEVICE — NEEDLE HYPO 27GA L15IN REG BVL W O SFTY FOR SYR DISPOSABLE

## (undated) DEVICE — GLOVE SURG 7.5 PF POLYMER WHT STRL SIGN LTX ESSENTIAL LTX

## (undated) DEVICE — STRIP,CLOSURE,WOUND,MEDI-STRIP,1/2X4: Brand: MEDLINE

## (undated) DEVICE — MARKER,SKIN,WI/RULER AND LABELS: Brand: MEDLINE

## (undated) DEVICE — SYRINGE MED 10ML TRNSLUC BRL PLUNG BLK MRK POLYPR CTRL

## (undated) DEVICE — CODMAN® SURGICAL PATTIES 1/2" X 3" (1.27CM X 7.62CM): Brand: CODMAN®

## (undated) DEVICE — Device: Brand: DENVER SPLINT

## (undated) DEVICE — TUBING, SUCTION, 1/4" X 20', STRAIGHT: Brand: MEDLINE INDUSTRIES, INC.

## (undated) DEVICE — GAUZE,SPONGE,8"X4",12PLY,XRAY,STRL,LF: Brand: MEDLINE

## (undated) DEVICE — PACK-MAJOR